# Patient Record
Sex: MALE | Race: WHITE | NOT HISPANIC OR LATINO | Employment: OTHER | URBAN - NONMETROPOLITAN AREA
[De-identification: names, ages, dates, MRNs, and addresses within clinical notes are randomized per-mention and may not be internally consistent; named-entity substitution may affect disease eponyms.]

---

## 2018-06-12 ENCOUNTER — APPOINTMENT (OUTPATIENT)
Dept: CT IMAGING | Facility: HOSPITAL | Age: 48
End: 2018-06-12

## 2018-06-12 ENCOUNTER — APPOINTMENT (OUTPATIENT)
Dept: GENERAL RADIOLOGY | Facility: HOSPITAL | Age: 48
End: 2018-06-12

## 2018-06-12 ENCOUNTER — HOSPITAL ENCOUNTER (EMERGENCY)
Facility: HOSPITAL | Age: 48
Discharge: HOME | End: 2018-06-12
Attending: FAMILY MEDICINE | Admitting: FAMILY MEDICINE

## 2018-06-12 VITALS
BODY MASS INDEX: 38.5 KG/M2 | DIASTOLIC BLOOD PRESSURE: 79 MMHG | SYSTOLIC BLOOD PRESSURE: 114 MMHG | RESPIRATION RATE: 18 BRPM | WEIGHT: 300 LBS | TEMPERATURE: 98.4 F | OXYGEN SATURATION: 100 % | HEART RATE: 109 BPM | HEIGHT: 74 IN

## 2018-06-12 DIAGNOSIS — E11.65 TYPE 2 DIABETES MELLITUS WITH HYPERGLYCEMIA, WITH LONG-TERM CURRENT USE OF INSULIN (HCC): ICD-10-CM

## 2018-06-12 DIAGNOSIS — R42 EPISODE OF DIZZINESS: Primary | ICD-10-CM

## 2018-06-12 DIAGNOSIS — Z79.4 TYPE 2 DIABETES MELLITUS WITH HYPERGLYCEMIA, WITH LONG-TERM CURRENT USE OF INSULIN (HCC): ICD-10-CM

## 2018-06-12 LAB
A-A DO2: 18.7 MMHG (ref 0–300)
ALBUMIN SERPL-MCNC: 4 G/DL (ref 3.5–5)
ALBUMIN/GLOB SERPL: 1.5 G/DL (ref 1.5–2.5)
ALP SERPL-CCNC: 97 U/L (ref 40–129)
ALT SERPL W P-5'-P-CCNC: 31 U/L (ref 10–44)
ANION GAP SERPL CALCULATED.3IONS-SCNC: 9.8 MMOL/L (ref 3.6–11.2)
APTT PPP: 24.5 SECONDS (ref 23.8–36.1)
ARTERIAL PATENCY WRIST A: ABNORMAL
AST SERPL-CCNC: 26 U/L (ref 10–34)
ATMOSPHERIC PRESS: 728 MMHG
BASE EXCESS BLDA CALC-SCNC: 0.5 MMOL/L
BASOPHILS # BLD AUTO: 0.1 10*3/MM3 (ref 0–0.3)
BASOPHILS NFR BLD AUTO: 1.2 % (ref 0–2)
BDY SITE: ABNORMAL
BILIRUB SERPL-MCNC: 0.6 MG/DL (ref 0.2–1.8)
BILIRUB UR QL STRIP: NEGATIVE
BNP SERPL-MCNC: 15 PG/ML (ref 0–100)
BODY TEMPERATURE: 98.6 C
BUN BLD-MCNC: 10 MG/DL (ref 7–21)
BUN/CREAT SERPL: 12 (ref 7–25)
CALCIUM SPEC-SCNC: 8.6 MG/DL (ref 7.7–10)
CHLORIDE SERPL-SCNC: 101 MMOL/L (ref 99–112)
CK MB SERPL-CCNC: 0.56 NG/ML (ref 0–5)
CK MB SERPL-RTO: 1.2 % (ref 0–3)
CK SERPL-CCNC: 46 U/L (ref 24–204)
CLARITY UR: CLEAR
CO2 SERPL-SCNC: 23.2 MMOL/L (ref 24.3–31.9)
COHGB MFR BLD: 1.5 % (ref 0–5)
COLOR UR: YELLOW
CREAT BLD-MCNC: 0.83 MG/DL (ref 0.43–1.29)
CRP SERPL-MCNC: <0.5 MG/DL (ref 0–0.99)
D DIMER PPP FEU-MCNC: 0.41 MCGFEU/ML (ref 0–0.5)
D-LACTATE SERPL-SCNC: 1.4 MMOL/L (ref 0.5–2)
D-LACTATE SERPL-SCNC: 3 MMOL/L (ref 0.5–2)
DEPRECATED RDW RBC AUTO: 39.8 FL (ref 37–54)
EOSINOPHIL # BLD AUTO: 0.31 10*3/MM3 (ref 0–0.7)
EOSINOPHIL NFR BLD AUTO: 3.6 % (ref 0–5)
ERYTHROCYTE [DISTWIDTH] IN BLOOD BY AUTOMATED COUNT: 12.4 % (ref 11.5–14.5)
GFR SERPL CREATININE-BSD FRML MDRD: 99 ML/MIN/1.73
GLOBULIN UR ELPH-MCNC: 2.7 GM/DL
GLUCOSE BLD-MCNC: 407 MG/DL (ref 70–110)
GLUCOSE BLDC GLUCOMTR-MCNC: 459 MG/DL (ref 70–130)
GLUCOSE UR STRIP-MCNC: ABNORMAL MG/DL
HCO3 BLDA-SCNC: 24.6 MMOL/L (ref 22–26)
HCT VFR BLD AUTO: 46.5 % (ref 42–52)
HCT VFR BLD CALC: 50 % (ref 42–52)
HGB BLD-MCNC: 17 G/DL (ref 14–18)
HGB BLDA-MCNC: 17 G/DL (ref 12–16)
HGB UR QL STRIP.AUTO: NEGATIVE
HOLD SPECIMEN: NORMAL
HOROWITZ INDEX BLD+IHG-RTO: 21 %
IMM GRANULOCYTES # BLD: 0.05 10*3/MM3 (ref 0–0.03)
IMM GRANULOCYTES NFR BLD: 0.6 % (ref 0–0.5)
INR PPP: 0.94 (ref 0.9–1.1)
KETONES UR QL STRIP: NEGATIVE
LEUKOCYTE ESTERASE UR QL STRIP.AUTO: NEGATIVE
LIPASE SERPL-CCNC: 60 U/L (ref 13–60)
LYMPHOCYTES # BLD AUTO: 2.98 10*3/MM3 (ref 1–3)
LYMPHOCYTES NFR BLD AUTO: 34.7 % (ref 21–51)
MCH RBC QN AUTO: 32.9 PG (ref 27–33)
MCHC RBC AUTO-ENTMCNC: 36.6 G/DL (ref 33–37)
MCV RBC AUTO: 90.1 FL (ref 80–94)
METHGB BLD QL: 0.5 % (ref 0–3)
MODALITY: ABNORMAL
MONOCYTES # BLD AUTO: 0.81 10*3/MM3 (ref 0.1–0.9)
MONOCYTES NFR BLD AUTO: 9.4 % (ref 0–10)
NEUTROPHILS # BLD AUTO: 4.34 10*3/MM3 (ref 1.4–6.5)
NEUTROPHILS NFR BLD AUTO: 50.5 % (ref 30–70)
NITRITE UR QL STRIP: NEGATIVE
OSMOLALITY SERPL CALC.SUM OF ELEC: 284.4 MOSM/KG (ref 273–305)
OXYHGB MFR BLDV: 94.4 % (ref 85–100)
PCO2 BLDA: 38.3 MM HG (ref 35–45)
PH BLDA: 7.43 PH UNITS (ref 7.35–7.45)
PH UR STRIP.AUTO: 6 [PH] (ref 5–8)
PLATELET # BLD AUTO: 241 10*3/MM3 (ref 130–400)
PMV BLD AUTO: 11.1 FL (ref 6–10)
PO2 BLDA: 78.4 MM HG (ref 80–100)
POTASSIUM BLD-SCNC: 3.7 MMOL/L (ref 3.5–5.3)
PROT SERPL-MCNC: 6.7 G/DL (ref 6–8)
PROT UR QL STRIP: NEGATIVE
PROTHROMBIN TIME: 12.8 SECONDS (ref 11–15.4)
RBC # BLD AUTO: 5.16 10*6/MM3 (ref 4.7–6.1)
SAO2 % BLDCOA: 96.3 % (ref 90–100)
SODIUM BLD-SCNC: 134 MMOL/L (ref 135–153)
SP GR UR STRIP: >1.03 (ref 1–1.03)
TROPONIN I SERPL-MCNC: <0.006 NG/ML
TROPONIN I SERPL-MCNC: <0.006 NG/ML
UROBILINOGEN UR QL STRIP: ABNORMAL
WBC NRBC COR # BLD: 8.59 10*3/MM3 (ref 4.5–12.5)
WHOLE BLOOD HOLD SPECIMEN: NORMAL
WHOLE BLOOD HOLD SPECIMEN: NORMAL

## 2018-06-12 PROCEDURE — 36600 WITHDRAWAL OF ARTERIAL BLOOD: CPT | Performed by: FAMILY MEDICINE

## 2018-06-12 PROCEDURE — 82805 BLOOD GASES W/O2 SATURATION: CPT | Performed by: FAMILY MEDICINE

## 2018-06-12 PROCEDURE — 93010 ELECTROCARDIOGRAM REPORT: CPT | Performed by: INTERNAL MEDICINE

## 2018-06-12 PROCEDURE — 71045 X-RAY EXAM CHEST 1 VIEW: CPT | Performed by: RADIOLOGY

## 2018-06-12 PROCEDURE — 81003 URINALYSIS AUTO W/O SCOPE: CPT | Performed by: FAMILY MEDICINE

## 2018-06-12 PROCEDURE — 99284 EMERGENCY DEPT VISIT MOD MDM: CPT

## 2018-06-12 PROCEDURE — 70450 CT HEAD/BRAIN W/O DYE: CPT

## 2018-06-12 PROCEDURE — 82962 GLUCOSE BLOOD TEST: CPT

## 2018-06-12 PROCEDURE — 71045 X-RAY EXAM CHEST 1 VIEW: CPT

## 2018-06-12 PROCEDURE — 25010000002 ONDANSETRON PER 1 MG: Performed by: FAMILY MEDICINE

## 2018-06-12 PROCEDURE — 93005 ELECTROCARDIOGRAM TRACING: CPT | Performed by: FAMILY MEDICINE

## 2018-06-12 PROCEDURE — 83880 ASSAY OF NATRIURETIC PEPTIDE: CPT | Performed by: FAMILY MEDICINE

## 2018-06-12 PROCEDURE — 83605 ASSAY OF LACTIC ACID: CPT | Performed by: FAMILY MEDICINE

## 2018-06-12 PROCEDURE — 87040 BLOOD CULTURE FOR BACTERIA: CPT | Performed by: FAMILY MEDICINE

## 2018-06-12 PROCEDURE — 82375 ASSAY CARBOXYHB QUANT: CPT | Performed by: FAMILY MEDICINE

## 2018-06-12 PROCEDURE — 84484 ASSAY OF TROPONIN QUANT: CPT | Performed by: FAMILY MEDICINE

## 2018-06-12 PROCEDURE — 70450 CT HEAD/BRAIN W/O DYE: CPT | Performed by: RADIOLOGY

## 2018-06-12 PROCEDURE — 83050 HGB METHEMOGLOBIN QUAN: CPT | Performed by: FAMILY MEDICINE

## 2018-06-12 PROCEDURE — 96361 HYDRATE IV INFUSION ADD-ON: CPT

## 2018-06-12 PROCEDURE — 85730 THROMBOPLASTIN TIME PARTIAL: CPT | Performed by: FAMILY MEDICINE

## 2018-06-12 PROCEDURE — 85025 COMPLETE CBC W/AUTO DIFF WBC: CPT | Performed by: EMERGENCY MEDICINE

## 2018-06-12 PROCEDURE — 85610 PROTHROMBIN TIME: CPT | Performed by: FAMILY MEDICINE

## 2018-06-12 PROCEDURE — 86140 C-REACTIVE PROTEIN: CPT | Performed by: FAMILY MEDICINE

## 2018-06-12 PROCEDURE — 82550 ASSAY OF CK (CPK): CPT | Performed by: FAMILY MEDICINE

## 2018-06-12 PROCEDURE — 36415 COLL VENOUS BLD VENIPUNCTURE: CPT

## 2018-06-12 PROCEDURE — 80053 COMPREHEN METABOLIC PANEL: CPT | Performed by: FAMILY MEDICINE

## 2018-06-12 PROCEDURE — 83690 ASSAY OF LIPASE: CPT | Performed by: FAMILY MEDICINE

## 2018-06-12 PROCEDURE — 82553 CREATINE MB FRACTION: CPT | Performed by: FAMILY MEDICINE

## 2018-06-12 PROCEDURE — 96374 THER/PROPH/DIAG INJ IV PUSH: CPT

## 2018-06-12 PROCEDURE — 85379 FIBRIN DEGRADATION QUANT: CPT | Performed by: FAMILY MEDICINE

## 2018-06-12 RX ORDER — QUINAPRIL 20 MG/1
20 TABLET ORAL NIGHTLY
COMMUNITY
End: 2021-03-09 | Stop reason: SDUPTHER

## 2018-06-12 RX ORDER — MELOXICAM 7.5 MG/1
7.5 TABLET ORAL DAILY
COMMUNITY
End: 2021-03-09 | Stop reason: SDUPTHER

## 2018-06-12 RX ORDER — METOPROLOL TARTRATE 50 MG/1
50 TABLET, FILM COATED ORAL 2 TIMES DAILY
COMMUNITY
End: 2021-03-09 | Stop reason: SDUPTHER

## 2018-06-12 RX ORDER — ASPIRIN 325 MG
325 TABLET ORAL DAILY
COMMUNITY
End: 2020-09-02

## 2018-06-12 RX ORDER — SODIUM CHLORIDE 0.9 % (FLUSH) 0.9 %
10 SYRINGE (ML) INJECTION AS NEEDED
Status: DISCONTINUED | OUTPATIENT
Start: 2018-06-12 | End: 2018-06-12 | Stop reason: HOSPADM

## 2018-06-12 RX ORDER — INSULIN GLARGINE 100 [IU]/ML
30 INJECTION, SOLUTION SUBCUTANEOUS 2 TIMES DAILY
COMMUNITY
End: 2020-10-30 | Stop reason: ALTCHOICE

## 2018-06-12 RX ORDER — ONDANSETRON 2 MG/ML
4 INJECTION INTRAMUSCULAR; INTRAVENOUS ONCE
Status: COMPLETED | OUTPATIENT
Start: 2018-06-12 | End: 2018-06-12

## 2018-06-12 RX ORDER — SILDENAFIL CITRATE 20 MG/1
20 TABLET ORAL 3 TIMES DAILY
COMMUNITY
End: 2020-10-30 | Stop reason: DRUGHIGH

## 2018-06-12 RX ORDER — SODIUM CHLORIDE 9 MG/ML
INJECTION, SOLUTION INTRAVENOUS
Status: COMPLETED
Start: 2018-06-12 | End: 2018-06-12

## 2018-06-12 RX ADMIN — ONDANSETRON 4 MG: 2 INJECTION, SOLUTION INTRAMUSCULAR; INTRAVENOUS at 08:45

## 2018-06-12 RX ADMIN — SODIUM CHLORIDE 2466 ML: 9 INJECTION, SOLUTION INTRAVENOUS at 08:49

## 2018-06-12 NOTE — ED PROVIDER NOTES
Subjective     History provided by:  Patient  Dizziness   Quality:  Lightheadedness  Severity:  Moderate  Onset quality:  Sudden  Timing:  Constant  Progression:  Resolved  Chronicity:  New  Context: standing up    Relieved by:  Nothing  Worsened by:  Standing up  Ineffective treatments:  None tried  Associated symptoms: headaches and nausea    Associated symptoms: no blood in stool, no chest pain, no tinnitus, no vision changes, no vomiting and no weakness        Review of Systems   Constitutional: Negative.  Negative for fever.   HENT: Negative for tinnitus.    Respiratory: Negative.    Cardiovascular: Negative.  Negative for chest pain.   Gastrointestinal: Positive for nausea. Negative for abdominal pain, blood in stool and vomiting.   Endocrine: Negative.    Genitourinary: Negative.  Negative for dysuria.   Skin: Negative.    Neurological: Positive for dizziness and headaches. Negative for weakness.   Psychiatric/Behavioral: Negative.    All other systems reviewed and are negative.      Past Medical History:   Diagnosis Date   • Blood clot in vein    • Diabetes mellitus    • Hypertension        No Known Allergies    Past Surgical History:   Procedure Laterality Date   • IVC FILTER RETRIEVAL         History reviewed. No pertinent family history.    Social History     Social History   • Marital status:      Social History Main Topics   • Smoking status: Never Smoker   • Alcohol use Yes      Comment: ocassional use - reports using 2x yearly   • Drug use: No   • Sexual activity: Defer     Other Topics Concern   • Not on file           Objective   Physical Exam   Constitutional: He is oriented to person, place, and time. He appears well-developed and well-nourished.   HENT:   Head: Normocephalic and atraumatic.   Right Ear: External ear normal.   Left Ear: External ear normal.   Nose: Nose normal.   Mouth/Throat: Oropharynx is clear and moist.   Eyes: EOM are normal. Pupils are equal, round, and reactive to  light.   Neck: Neck supple.   Cardiovascular: Normal rate and regular rhythm.    Pulmonary/Chest: Effort normal and breath sounds normal.   Abdominal: Soft. Bowel sounds are normal.   Musculoskeletal: Normal range of motion.   Neurological: He is alert and oriented to person, place, and time.   Skin: Skin is warm. Capillary refill takes less than 2 seconds.   Psychiatric: He has a normal mood and affect. His behavior is normal. Judgment and thought content normal.   Nursing note and vitals reviewed.      Procedures           ED Course  ED Course as of Jun 12 1729   Tue Jun 12, 2018   1225 Gave pt RX for glucometer with test strips and lancets since his broke  []      ED Course User Index  [MH] Lizabeth Villagomez, DO                  MDM  Number of Diagnoses or Management Options  Episode of dizziness: new and does not require workup  Type 2 diabetes mellitus with hyperglycemia, with long-term current use of insulin: new and does not require workup     Amount and/or Complexity of Data Reviewed  Clinical lab tests: ordered and reviewed  Tests in the radiology section of CPT®: ordered and reviewed  Tests in the medicine section of CPT®: reviewed and ordered  Independent visualization of images, tracings, or specimens: yes    Risk of Complications, Morbidity, and/or Mortality  Presenting problems: moderate  Diagnostic procedures: moderate  Management options: moderate    Patient Progress  Patient progress: stable        Final diagnoses:   Episode of dizziness   Type 2 diabetes mellitus with hyperglycemia, with long-term current use of insulin            Lizabeth Villagomez DO  06/12/18 4108

## 2018-06-12 NOTE — ED NOTES
Sepsis bolus complete. VS:    T 98.0  HR 87  RR 18  /76  O2 99% on room air.     Dr. Villagomez aware. Awaiting further orders, will continue to monitor and follow plan of care.     Kym Hurt RN  06/12/18 7804

## 2018-06-17 LAB
BACTERIA SPEC AEROBE CULT: NORMAL
BACTERIA SPEC AEROBE CULT: NORMAL

## 2020-06-18 ENCOUNTER — TELEPHONE (OUTPATIENT)
Dept: FAMILY MEDICINE CLINIC | Facility: CLINIC | Age: 50
End: 2020-06-18

## 2020-06-24 ENCOUNTER — APPOINTMENT (OUTPATIENT)
Dept: PHYSICAL THERAPY | Facility: HOSPITAL | Age: 50
End: 2020-06-24

## 2020-06-26 ENCOUNTER — HOSPITAL ENCOUNTER (OUTPATIENT)
Dept: PHYSICAL THERAPY | Facility: HOSPITAL | Age: 50
Setting detail: THERAPIES SERIES
Discharge: HOME | End: 2020-06-26

## 2020-06-26 DIAGNOSIS — L97.519 DIABETIC ULCER OF RIGHT GREAT TOE (HCC): Primary | ICD-10-CM

## 2020-06-26 DIAGNOSIS — E11.621 DIABETIC ULCER OF RIGHT GREAT TOE (HCC): Primary | ICD-10-CM

## 2020-06-26 PROCEDURE — 97597 DBRDMT OPN WND 1ST 20 CM/<: CPT

## 2020-06-26 PROCEDURE — 97161 PT EVAL LOW COMPLEX 20 MIN: CPT

## 2020-06-26 NOTE — THERAPY EVALUATION
Outpatient Rehabilitation - Wound/Debridement Initial Eval   [facility]     Patient Name: Juarez Hunt  : 1970  MRN: [id number]  Today's Date: 2020                  Admit Date: 2020    Visit Dx:    ICD-10-CM ICD-9-CM   1. Diabetic ulcer of right great toe (CMS/HCC) E11.621 250.80    L97.519 707.15       There is no problem list on file for this patient.       Past Medical History:   Diagnosis Date   • Blood clot in vein    • Diabetes mellitus (CMS/HCC)    • Hypertension         Past Surgical History:   Procedure Laterality Date   • IVC FILTER RETRIEVAL         Patient History     Row Name 20 0840             History    Chief Complaint  Ulcer, wound or other skin conditions  -      Brief Description of Current Complaint  Pt reports the area opened in 2020 with no specific trauma or known etiology. Pt has been treating with soap and triple abx ointment. Followed up with urgent care secondary to delayed healing. Pt was given oral abx, a Darco shoe, and was referred here for follow up.  -      Previous treatment for THIS PROBLEM  Medication  -      Patient/Caregiver Goals  Heal wound  -      Patient's Rating of General Health  Fair  -      Patient seeing anyone else for problem(s)?  No  -      How has patient tried to help current problem?  triple abx ointment, oral abx (almost finished with current course)  -      Related/Recent Hospitalizations  No  -         Pain     Pain Location  Toe (Comment which one)  -      Pain at Present  0  -      Pain at Best  0  -      Pain at Worst  0  -         Services    Are you currently receiving Home Health services  No  -      Do you plan to receive Home Health services in the near future  No  -         Daily Activities    Primary Language  English  -      How does patient learn best?  Listening;Demonstration  -      Teaching needs identified  Management of Condition  -      Patient is concerned about/has  problems with  Other (comment) wound mgmt  -      Does patient have problems with the following?  None  -      Barriers to learning  None  -      Recommended Referrals  Physician endocrinologist for diabetes education and management  -      Pt Participated in POC and Goals  Yes  -         Safety    Are you being hurt, hit, or frightened by anyone at home or in your life?  No  -MC      Are you being neglected by a caregiver  No  -MC        User Key  (r) = Recorded By, (t) = Taken By, (c) = Cosigned By    Initials Name Provider Type    Shanita Lance PT Physical Therapist          EVALUATION  PT Ortho     Row Name 06/26/20 0840       Precautions and Contraindications    Precautions  DPN  -       Subjective Pain    Able to rate subjective pain?  yes  -MC    Pre-Treatment Pain Level  0  -MC    Post-Treatment Pain Level  0  -MC       Transfers    Sit-Stand [place] (Transfers)  independent  -MC    Stand-Sit [place] (Transfers)  independent  -    Comment (Transfers)  long sitting on stretcher for tx  -       Gait/Stairs Assessment/Training    [place] Level (Gait)  independent  -      User Key  (r) = Recorded By, (t) = Taken By, (c) = Cosigned By    Initials Name Provider Type    Shanita Lance PT Physical Therapist        LDA Wound     Row Name 06/26/20 0840             Wound 06/26/20 0840 Right toe Diabetic Ulcer    Wound - Properties Group Date first assessed: 06/26/20  - Time first assessed: 0840  - Present on Hospital Admission: Y  - Side: Right  - Location: toe  - Primary Wound Type: Diabetic ulc  -    Wound Image  Images linked: 1  -      Dressing Appearance  open to air  -      Base  moist;red;pink  -      Periwound  intact;dry;pink  -      Periwound Temperature  warm  -      Periwound Skin Turgor  firm  -      Edges  callused;open  -      Wound Length (cm)  1 cm  -      Wound Width (cm)  0.8 cm  -      Wound Depth (cm)  0.1 cm  -       Drainage Characteristics/Odor  serosanguineous  -      Drainage Amount  scant  -      Care, Wound  cleansed with;wound cleanser;debrided  -      Dressing Care, Wound  dressing applied;collagen;silver impregnated;low-adherent;foam rey, optifoam Ag, primafix tape  -      Periwound Care, Wound  cleansed with pH balanced cleanser;dry periwound area maintained issued XL offloading shoe  -        User Key  (r) = Recorded By, (t) = Taken By, (c) = Cosigned By    Initials Name Provider Type    Shanita Lance, PT Physical Therapist            WOUND DEBRIDEMENT  Total area of Debridement: 2 cm2  Debridement Site 1  Location- Site 1: R great toe  Selective Debridement- Site 1: Wound Surface <20cmsq  Instruments- Site 1: #15, scapel, tweezers  Excised Tissue Description- Site 1: scant, slough, moderate, other (comment)(mod periwound callus)  Bleeding- Site 1: none             Therapy Education     Row Name 06/26/20 0879             Therapy Education    Education Details  Extensive education re: normal blood glucose levels, management through diet/exercise, and importance of establishing care with an endocrinologist for long-term education and management. Explained that blood glucose control will be vital for wound healing. Explained PT role in wound care and reviewed s/sx of infection. Change dressing daily or every other day as follows: clean with skintegrity/gauze, cover with rey, optifoam Ag, and primafix tape. Offload when able, and wear offloading shoe when unable to stay off the foot.   -      Given  Symptoms/condition management;Bandaging/dressing change  -      Program  New  -      How Provided  Verbal;Demonstration  -MC      Provided to  Patient;Other (comment) spouse  -      Level of Understanding  Teach back education performed;Verbalized  -        User Key  (r) = Recorded By, (t) = Taken By, (c) = Cosigned By    Initials Name Provider Type    Shanita Lance, PT Physical  Therapist          Recommendation and Plan  PT Assessment/Plan     Row Name 06/26/20 0840          PT Assessment    Functional Limitations  Other (comment) wound mgmt  -     Impairments  Integumentary integrity  -     Assessment Comments  Pt presents with diabetic ulcer to the R great toe. The wound bed is clean, moist, and pink/red. PT able to debride moderate callus from the periwound area as well. Pt with uncontrolled diabetes mellitus (reports BG levels averaging in the 300s), which will complicate his healing process. Pt will benefit from skilled PT wound care to promote healing.  -     Rehab Potential  Fair  -     Patient/caregiver participated in establishment of treatment plan and goals  Yes  -     Patient would benefit from skilled therapy intervention  Yes  -MC        PT Plan    PT Frequency  1x/week  -     Predicted Duration of Therapy Intervention (Therapy Eval)  16 visits  -     Planned CPT's?  PT EVAL LOW COMPLEXITY: 36740;PT SELF CARE/MGMT/TRAIN 15 MIN: 45088;PT NONSELECT DEBRIDE 15 MIN: 28880;PT EAMON DEBRIDE OPEN WOUND UP TO 20 CM: 36311;PT NLFU MIST: 88899  -     Physical Therapy Interventions (Optional Details)  patient/family education;wound care  -     PT Plan Comments  debridement, dressings. MIST not covered (pt may consider OOP payment depending on price and healing progress)  -       User Key  (r) = Recorded By, (t) = Taken By, (c) = Cosigned By    Initials Name Provider Type    Shanita Lance, PT Physical Therapist            Goals  PT OP Goals     Row Name 06/26/20 0840          PT Short Term Goals    STG 1  Pt will verbalize s/sx of infection.  -     STG 2  Pt will demonstrate 25% reduction in wound area to indicate healing progress.  -        Long Term Goals    LTG 1  Pt will demonstrate independence with clean home dressing changes.  -     LTG 2  Pt will demonstrate 75% reduction in wound area to indicate healing progress.  -        Time Calculation     PT Goal Re-Cert Due Date  09/24/20  -       User Key  (r) = Recorded By, (t) = Taken By, (c) = Cosigned By    Initials Name Provider Type    Shanita Lance, PT Physical Therapist          Time Calculation: Start Time: 0840  Therapy Charges for Today     Code Description Service Date Service Provider Modifiers Qty    30175241959  PT EVAL LOW COMPLEXITY 4 6/26/2020 Shanita Massey, PT GP 1    [id number]  EAMON DEBRIDE OPEN WOUND UP TO 20CM 6/26/2020 Shanita Massey, PT GP 1                Shanita Massey, PT  6/26/2020

## 2020-07-01 ENCOUNTER — APPOINTMENT (OUTPATIENT)
Dept: PHYSICAL THERAPY | Facility: HOSPITAL | Age: 50
End: 2020-07-01

## 2020-07-03 ENCOUNTER — HOSPITAL ENCOUNTER (OUTPATIENT)
Dept: PHYSICAL THERAPY | Facility: HOSPITAL | Age: 50
Setting detail: THERAPIES SERIES
Discharge: HOME | End: 2020-07-03

## 2020-07-03 DIAGNOSIS — E11.621 DIABETIC ULCER OF RIGHT GREAT TOE (HCC): Primary | ICD-10-CM

## 2020-07-03 DIAGNOSIS — L97.519 DIABETIC ULCER OF RIGHT GREAT TOE (HCC): Primary | ICD-10-CM

## 2020-07-03 PROCEDURE — 97597 DBRDMT OPN WND 1ST 20 CM/<: CPT

## 2020-07-03 NOTE — THERAPY WOUND CARE TREATMENT
Outpatient Rehabilitation - Wound/Debridement Treatment Note  [facility]     Patient Name: Juarez Hunt  : 1970  MRN: [id number]  Today's Date: 7/3/2020                 Admit Date: 7/3/2020    Visit Dx:    ICD-10-CM ICD-9-CM   1. Diabetic ulcer of right great toe (CMS/HCC) E11.621 250.80    L97.519 707.15       There is no problem list on file for this patient.       Past Medical History:   Diagnosis Date   • Blood clot in vein    • Diabetes mellitus (CMS/HCC)    • Hypertension         Past Surgical History:   Procedure Laterality Date   • IVC FILTER RETRIEVAL           EVALUATION  PT Ortho     Row Name 20 1445       Subjective Comments    Subjective Comments  Pt/spouse think the area is improving. No issues with dressing changes.  -       Precautions and Contraindications    Precautions  DPN  -       Subjective Pain    Able to rate subjective pain?  yes  -MC    Pre-Treatment Pain Level  0  -MC    Post-Treatment Pain Level  0  -MC       Transfers    Sit-Stand [place] (Transfers)  independent  -MC    Stand-Sit [place] (Transfers)  independent  -MC    Comment (Transfers)  long sitting on stretcher for tx  -       Gait/Stairs Assessment/Training    [place] Level (Gait)  independent  -      User Key  (r) = Recorded By, (t) = Taken By, (c) = Cosigned By    Initials Name Provider Type    Shanita Lance PT Physical Therapist          [facility] Wound     Row Name 20 1445             Wound 20 0840 Right toe Diabetic Ulcer    Wound - Properties Group Date first assessed: 20  - Time first assessed: 840  - Present on Hospital Admission: Y  - Side: Right  - Location: toe  - Primary Wound Type: Diabetic ulc  -    Wound Image  Images linked: 1  -MC      Dressing Appearance  moist drainage;intact  -      Base  moist;red;pink;epithelialization  -      Periwound  intact;dry;pink  -      Periwound Temperature  warm  -      Periwound Skin Turgor  firm   -      Edges  callused;open  -      Wound Length (cm)  0.8 cm  -      Wound Width (cm)  0.7 cm  -      Wound Depth (cm)  0.1 cm  -      Drainage Characteristics/Odor  serosanguineous  -      Drainage Amount  scant  -MC      Care, Wound  cleansed with;wound cleanser;debrided  -      Dressing Care, Wound  dressing applied;silver impregnated;collagen;low-adherent;foam rey, optifoam Ag, primafix tape  -      Periwound Care, Wound  cleansed with pH balanced cleanser;dry periwound area maintained  -        User Key  (r) = Recorded By, (t) = Taken By, (c) = Cosigned By    Initials Name Provider Type    Shanita Lance, PT Physical Therapist            WOUND DEBRIDEMENT  Total area of Debridement: 1 cm2  Debridement Site 1  Location- Site 1: R great toe  Selective Debridement- Site 1: Wound Surface <20cmsq  Instruments- Site 1: #15, scapel, tweezers  Excised Tissue Description- Site 1: scant, slough, moderate, other (comment)(periwound callus)  Bleeding- Site 1: scant, held pressure, 1 minute             Therapy Education     Row Name 07/03/20 1449             Therapy Education    Education Details  Continue with current POC  -      Given  Symptoms/condition management;Bandaging/dressing change  -      Program  Reinforced  -      How Provided  Verbal;Demonstration  -MC      Provided to  Patient;Other (comment) spouse  -      Level of Understanding  Teach back education performed;Verbalized  -        User Key  (r) = Recorded By, (t) = Taken By, (c) = Cosigned By    Initials Name Provider Type    Shanita Lance, PT Physical Therapist          Recommendation and Plan  PT Assessment/Plan     Row Name 07/03/20 1445          PT Assessment    Functional Limitations  Other (comment) wound mgmt  -     Impairments  Integumentary integrity  -MC     Assessment Comments  Pt with improved wound dimensions and new epithelialization around the wound edges. The central aspect of the wound  remains clean, pink, and red. Pt will continue to benefit from the current POC to continue progress.  -     Rehab Potential  Fair  -     Patient/caregiver participated in establishment of treatment plan and goals  Yes  -     Patient would benefit from skilled therapy intervention  Yes  -        PT Plan    PT Frequency  1x/week  -     Physical Therapy Interventions (Optional Details)  patient/family education;wound care  -     PT Plan Comments  debridement, dressings (MIST not covered)  -       User Key  (r) = Recorded By, (t) = Taken By, (c) = Cosigned By    Initials Name Provider Type    Shanita Lance, PT Physical Therapist          Goals  PT OP Goals     Row Name 07/03/20 1445          Time Calculation    PT Goal Re-Cert Due Date  09/24/20  -       User Key  (r) = Recorded By, (t) = Taken By, (c) = Cosigned By    Initials Name Provider Type    Shanita Lance, PT Physical Therapist          PT Goal Re-Cert Due Date: 09/24/20            Time Calculation: Start Time: 1445  Therapy Charges for Today     Code Description Service Date Service Provider Modifiers Qty    47546023328 HC EAMON DEBRIDE OPEN WOUND UP TO 20CM 7/3/2020 Shanita Massey, PT GP 1                  Shanita Massey, PT  7/3/2020

## 2020-07-08 ENCOUNTER — HOSPITAL ENCOUNTER (OUTPATIENT)
Dept: PHYSICAL THERAPY | Facility: HOSPITAL | Age: 50
Setting detail: THERAPIES SERIES
Discharge: HOME | End: 2020-07-08

## 2020-07-08 DIAGNOSIS — L97.519 DIABETIC ULCER OF RIGHT GREAT TOE (HCC): Primary | ICD-10-CM

## 2020-07-08 DIAGNOSIS — E11.621 DIABETIC ULCER OF RIGHT GREAT TOE (HCC): Primary | ICD-10-CM

## 2020-07-08 PROCEDURE — 97597 DBRDMT OPN WND 1ST 20 CM/<: CPT

## 2020-07-08 NOTE — THERAPY WOUND CARE TREATMENT
Outpatient Rehabilitation - Wound/Debridement Treatment Note  [facility]     Patient Name: Juarez Hunt  : 1970  MRN: [id number]  Today's Date: 2020                 Admit Date: 2020    Visit Dx:    ICD-10-CM ICD-9-CM   1. Diabetic ulcer of right great toe (CMS/HCC) E11.621 250.80    L97.519 707.15       There is no problem list on file for this patient.       Past Medical History:   Diagnosis Date   • Blood clot in vein    • Diabetes mellitus (CMS/HCC)    • Hypertension         Past Surgical History:   Procedure Laterality Date   • IVC FILTER RETRIEVAL           EVALUATION  PT Ortho     Row Name 20 1500       Subjective Comments    Subjective Comments  No complaints or changes since last assessment  -       Precautions and Contraindications    Precautions  DPN  -       Subjective Pain    Able to rate subjective pain?  yes  -MC    Pre-Treatment Pain Level  0  -MC    Post-Treatment Pain Level  0  -MC       Transfers    Sit-Stand [place] (Transfers)  independent  -MC    Stand-Sit [place] (Transfers)  independent  -MC    Comment (Transfers)  long sitting on stretcher for tx  -       Gait/Stairs Assessment/Training    [place] Level (Gait)  independent  -      User Key  (r) = Recorded By, (t) = Taken By, (c) = Cosigned By    Initials Name Provider Type    Shanita Lance PT Physical Therapist          LDA Wound     Row Name 20 1500             Wound 20 0840 Right toe Diabetic Ulcer    Wound - Properties Group Date first assessed: 20  - Time first assessed: 840  - Present on Hospital Admission: Y  - Side: Right  - Location: toe  - Primary Wound Type: Diabetic ulc  -    Wound Image  Images linked: 1  -      Dressing Appearance  moist drainage;intact  -      Base  moist;red;pink;epithelialization  -      Periwound  intact;dry;pink  -      Periwound Temperature  warm  -      Periwound Skin Turgor  firm  -      Edges   callused;open  -      Wound Length (cm)  0.8 cm  -      Wound Width (cm)  0.8 cm  -      Wound Depth (cm)  0.1 cm  -      Drainage Characteristics/Odor  serosanguineous  -      Drainage Amount  scant  -MC      Care, Wound  cleansed with;wound cleanser;debrided  -      Dressing Care, Wound  dressing applied;silver impregnated;collagen;low-adherent;foam rey, optifoam Ag, primafix tape  -      Periwound Care, Wound  cleansed with pH balanced cleanser;dry periwound area maintained  -        User Key  (r) = Recorded By, (t) = Taken By, (c) = Cosigned By    Initials Name Provider Type    Shanita Lance, PT Physical Therapist            WOUND DEBRIDEMENT  Total area of Debridement: 1 cm2  Debridement Site 1  Location- Site 1: R great toe  Selective Debridement- Site 1: Wound Surface <20cmsq  Instruments- Site 1: #15, scapel  Excised Tissue Description- Site 1: moderate, other (comment)(periwound callus)  Bleeding- Site 1: none             Therapy Education     Row Name 07/08/20 1500             Therapy Education    Education Details  Continue with current POC  -      Given  Symptoms/condition management;Bandaging/dressing change  -      Program  Reinforced  -      How Provided  Verbal;Demonstration  -MC      Provided to  Patient;Other (comment) spouse  -      Level of Understanding  Teach back education performed;Verbalized  -        User Key  (r) = Recorded By, (t) = Taken By, (c) = Cosigned By    Initials Name Provider Type    Sahnita Lance PT Physical Therapist          Recommendation and Plan  PT Assessment/Plan     Row Name 07/08/20 1500          PT Assessment    Functional Limitations  Other (comment) wound mgmt  -     Impairments  Integumentary integrity  -     Assessment Comments  Pt with stable wound dimensions since last assessment. Pt with no slough, but still with periwound callus development that requires debridement. Progress is likely to be slow due to chronic  diabetes mellitus with limited control, but the pt is attempting to make dietary changes to improve his blood glucose. Pt will continue to benefit from skilled PT wound care to promote healing.  -     Rehab Potential  Fair  -     Patient/caregiver participated in establishment of treatment plan and goals  Yes  -     Patient would benefit from skilled therapy intervention  Yes  -        PT Plan    PT Frequency  1x/week  -     Physical Therapy Interventions (Optional Details)  patient/family education;wound care  -     PT Plan Comments  debridement, dressings. MIST not covered. ?add honey or change dressing type if no progress noted next visit.  -       User Key  (r) = Recorded By, (t) = Taken By, (c) = Cosigned By    Initials Name Provider Type    Shanita Lance, PT Physical Therapist          Goals  PT OP Goals     Row Name 07/08/20 1500          Time Calculation    PT Goal Re-Cert Due Date  09/24/20  -       User Key  (r) = Recorded By, (t) = Taken By, (c) = Cosigned By    Initials Name Provider Type     Shanita Massey, PT Physical Therapist          PT Goal Re-Cert Due Date: 09/24/20            Time Calculation: Start Time: 1500  Therapy Charges for Today     Code Description Service Date Service Provider Modifiers Qty    [id number] HC EAMON DEBRIDE OPEN WOUND UP TO 20CM 7/8/2020 Shanita Msasey, PT GP 1                  Shanita Massey, ENEDELIA  7/8/2020

## 2020-07-15 ENCOUNTER — APPOINTMENT (OUTPATIENT)
Dept: PHYSICAL THERAPY | Facility: HOSPITAL | Age: 50
End: 2020-07-15

## 2020-07-23 ENCOUNTER — HOSPITAL ENCOUNTER (OUTPATIENT)
Dept: PHYSICAL THERAPY | Facility: HOSPITAL | Age: 50
Setting detail: THERAPIES SERIES
Discharge: HOME | End: 2020-07-23

## 2020-07-23 DIAGNOSIS — E11.621 DIABETIC ULCER OF RIGHT GREAT TOE (HCC): Primary | ICD-10-CM

## 2020-07-23 DIAGNOSIS — L97.519 DIABETIC ULCER OF RIGHT GREAT TOE (HCC): Primary | ICD-10-CM

## 2020-07-23 PROCEDURE — 97597 DBRDMT OPN WND 1ST 20 CM/<: CPT

## 2020-07-23 NOTE — THERAPY PROGRESS REPORT/RE-CERT
Outpatient Rehabilitation - Wound/Debridement Progress Note  [facility]     Patient Name: Juarez Hunt  : 1970  MRN: [id number]  Today's Date: 2020                 Admit Date: 2020    Visit Dx:    ICD-10-CM ICD-9-CM   1. Diabetic ulcer of right great toe (CMS/HCC) E11.621 250.80    L97.519 707.15       There is no problem list on file for this patient.       Past Medical History:   Diagnosis Date   • Blood clot in vein    • Diabetes mellitus (CMS/HCC)    • Hypertension         Past Surgical History:   Procedure Laterality Date   • IVC FILTER RETRIEVAL           EVALUATION  PT Ortho     Row Name 20 1400       Subjective Comments    Subjective Comments  Pt/family report being nervous about the pandemic. The would like to keep 1-2 more appointments and then maybe decrease frequency to reduce potential exposure.  -       Precautions and Contraindications    Precautions  DPN  -MC       Subjective Pain    Able to rate subjective pain?  yes  -MC    Pre-Treatment Pain Level  0  -MC    Post-Treatment Pain Level  0  -MC       Transfers    Sit-Stand [place] (Transfers)  independent  -MC    Stand-Sit [place] (Transfers)  independent  -MC    Comment (Transfers)  long sitting on stretcher for tx  -MC       Gait/Stairs Assessment/Training    [place] Level (Gait)  independent  -      User Key  (r) = Recorded By, (t) = Taken By, (c) = Cosigned By    Initials Name Provider Type    Shanita Lance, PT Physical Therapist          LDA Wound     Row Name 20 1400             Wound 20 0840 Right toe Diabetic Ulcer    Wound - Properties Group Date first assessed: 20  - Time first assessed: 840  - Present on Hospital Admission: Y  - Side: Right  - Location: toe  -MC Primary Wound Type: Diabetic ulc  -    Wound Image  Images linked: 1  -MC      Dressing Appearance  moist drainage;intact  -MC      Base  moist;red;pink;epithelialization;dry slightly dry  -       Periwound  intact;dry;pink  -      Periwound Temperature  warm  -      Periwound Skin Turgor  firm  -      Edges  callused;open  -      Wound Length (cm)  0.7 cm  -      Wound Width (cm)  0.6 cm  -      Wound Depth (cm)  0.1 cm  -      Drainage Characteristics/Odor  serosanguineous  -      Drainage Amount  scant  -      Care, Wound  cleansed with;wound cleanser;debrided;honey applied  -      Dressing Care, Wound  dressing applied;silver impregnated;collagen;low-adherent;foam rey, optifoam Ag, primafix tape  -      Periwound Care, Wound  cleansed with pH balanced cleanser;dry periwound area maintained  -        User Key  (r) = Recorded By, (t) = Taken By, (c) = Cosigned By    Initials Name Provider Type    Shanita Lance, PT Physical Therapist            WOUND DEBRIDEMENT  Total area of Debridement: 1 cm2  Debridement Site 1  Location- Site 1: R great toe  Selective Debridement- Site 1: Wound Surface <20cmsq  Instruments- Site 1: #15, scapel, tweezers  Excised Tissue Description- Site 1: minimum, slough, other (comment)(periwound callus)  Bleeding- Site 1: none             Therapy Education     Row Name 07/23/20 1400             Therapy Education    Education Details  Add small drop of therahoney with every other dressing change to attempt to promote better moisture balance to the wound bed. Otherwise, continue with current POC.  -      Given  Symptoms/condition management;Bandaging/dressing change  -      Program  Reinforced  -      How Provided  Verbal;Demonstration  -MC      Provided to  Patient;Other (comment) spouse  -      Level of Understanding  Teach back education performed;Verbalized  -        User Key  (r) = Recorded By, (t) = Taken By, (c) = Cosigned By    Initials Name Provider Type    Shanita Lance, PT Physical Therapist          Recommendation and Plan  PT Assessment/Plan     Row Name 07/23/20 1400          PT Assessment    Functional Limitations   Other (comment) wound mgmt  -     Impairments  Integumentary integrity  -     Assessment Comments  Pt with small decrease in wound dimensions since last assessment. The wound bed is slightly dry today, so PT added therahoney and instructed pt/spouse to add therahoney to wound with every other dressing change to promote better moisture balance. Pt continues to develop minimal slough and periwound callus that requires debridement. Pt will continue to benefit from skilled PT wound care to promote healing. Dependent on progress over the next 1-2 weeks, pt will be appropriate to decrease frequency with family performing dressing changes at home, due to pt/family concerns about COVID pandemic.  -     Rehab Potential  Fair  -     Patient/caregiver participated in establishment of treatment plan and goals  Yes  -     Patient would benefit from skilled therapy intervention  Yes  -        PT Plan    PT Frequency  1x/week  -     Predicted Duration of Therapy Intervention (Therapy Eval)  10 visits  -     Planned CPT's?  PT SELF CARE/MGMT/TRAIN 15 MIN: 92193;PT NONSELECT DEBRIDE 15 MIN: 34506;PT EAMON DEBRIDE OPEN WOUND UP TO 20 CM: 17363  -     Physical Therapy Interventions (Optional Details)  patient/family education;wound care  -     PT Plan Comments  debridement, dressings. MIST not covered. Assess addition of honey. ?decrease frequency if appropriate.  -       User Key  (r) = Recorded By, (t) = Taken By, (c) = Cosigned By    Initials Name Provider Type    Shanita Lance, PT Physical Therapist          Goals  PT OP Goals     Row Name 07/23/20 1400          PT Short Term Goals    STG 1  Pt will verbalize s/sx of infection.  -     STG 1 Progress  Met  -     STG 2  Pt will demonstrate 25% reduction in wound area to indicate healing progress.  -     STG 2 Progress  Ongoing  -        Long Term Goals    LTG 1  Pt will demonstrate independence with clean home dressing changes.  -     LTG 1  Progress  Partially Met;Ongoing  -     LTG 2  Pt will demonstrate 75% reduction in wound area to indicate healing progress.  -     LTG 2 Progress  Ongoing  -        Time Calculation    PT Goal Re-Cert Due Date  09/24/20  -       User Key  (r) = Recorded By, (t) = Taken By, (c) = Cosigned By    Initials Name Provider Type    Shanita Lance, PT Physical Therapist          PT Goal Re-Cert Due Date: 09/24/20  PT Short Term Goals  STG 1: Pt will verbalize s/sx of infection.  STG 1 Progress: Met  STG 2: Pt will demonstrate 25% reduction in wound area to indicate healing progress.  STG 2 Progress: Ongoing  Long Term Goals  LTG 1: Pt will demonstrate independence with clean home dressing changes.  LTG 1 Progress: Partially Met, Ongoing  LTG 2: Pt will demonstrate 75% reduction in wound area to indicate healing progress.  LTG 2 Progress: Ongoing      Time Calculation: Start Time: 1400  Therapy Charges for Today     Code Description Service Date Service Provider Modifiers Qty    95613876345 HC EAMON DEBRIDE OPEN WOUND UP TO 20CM 7/23/2020 Shanita Massey, PT GP 1                  Shanita Massey, PT  7/23/2020

## 2020-07-27 ENCOUNTER — APPOINTMENT (OUTPATIENT)
Dept: PHYSICAL THERAPY | Facility: HOSPITAL | Age: 50
End: 2020-07-27

## 2020-07-31 ENCOUNTER — APPOINTMENT (OUTPATIENT)
Dept: PHYSICAL THERAPY | Facility: HOSPITAL | Age: 50
End: 2020-07-31

## 2020-08-05 ENCOUNTER — RESULTS ENCOUNTER (OUTPATIENT)
Dept: INTERNAL MEDICINE | Facility: CLINIC | Age: 50
End: 2020-08-05

## 2020-08-05 ENCOUNTER — OFFICE VISIT (OUTPATIENT)
Dept: INTERNAL MEDICINE | Facility: CLINIC | Age: 50
End: 2020-08-05

## 2020-08-05 VITALS
DIASTOLIC BLOOD PRESSURE: 88 MMHG | SYSTOLIC BLOOD PRESSURE: 122 MMHG | HEART RATE: 104 BPM | WEIGHT: 315 LBS | BODY MASS INDEX: 40.43 KG/M2 | OXYGEN SATURATION: 98 % | RESPIRATION RATE: 16 BRPM | HEIGHT: 74 IN | TEMPERATURE: 97.5 F

## 2020-08-05 DIAGNOSIS — I27.82 OTHER CHRONIC PULMONARY EMBOLISM, UNSPECIFIED WHETHER ACUTE COR PULMONALE PRESENT (HCC): ICD-10-CM

## 2020-08-05 DIAGNOSIS — Z12.11 COLON CANCER SCREENING: ICD-10-CM

## 2020-08-05 DIAGNOSIS — Z95.828 GREENFIELD FILTER IN PLACE: ICD-10-CM

## 2020-08-05 DIAGNOSIS — E11.621 DIABETIC ULCER OF TOE OF RIGHT FOOT ASSOCIATED WITH TYPE 2 DIABETES MELLITUS, WITH FAT LAYER EXPOSED (HCC): ICD-10-CM

## 2020-08-05 DIAGNOSIS — L97.509 TYPE 2 DIABETES MELLITUS WITH FOOT ULCER, WITH LONG-TERM CURRENT USE OF INSULIN (HCC): Primary | ICD-10-CM

## 2020-08-05 DIAGNOSIS — I73.9 PVD (PERIPHERAL VASCULAR DISEASE) (HCC): ICD-10-CM

## 2020-08-05 DIAGNOSIS — G62.9 NEUROPATHY: ICD-10-CM

## 2020-08-05 DIAGNOSIS — Z12.5 PROSTATE CANCER SCREENING: ICD-10-CM

## 2020-08-05 DIAGNOSIS — L97.512 DIABETIC ULCER OF TOE OF RIGHT FOOT ASSOCIATED WITH TYPE 2 DIABETES MELLITUS, WITH FAT LAYER EXPOSED (HCC): ICD-10-CM

## 2020-08-05 DIAGNOSIS — I10 ESSENTIAL HYPERTENSION: ICD-10-CM

## 2020-08-05 DIAGNOSIS — G47.33 OSA (OBSTRUCTIVE SLEEP APNEA): ICD-10-CM

## 2020-08-05 DIAGNOSIS — Z79.4 TYPE 2 DIABETES MELLITUS WITH FOOT ULCER, WITH LONG-TERM CURRENT USE OF INSULIN (HCC): Primary | ICD-10-CM

## 2020-08-05 DIAGNOSIS — I82.469 DEEP VEIN THROMBOSIS (DVT) OF CALF MUSCLE VEIN, UNSPECIFIED CHRONICITY, UNSPECIFIED LATERALITY (HCC): ICD-10-CM

## 2020-08-05 DIAGNOSIS — E11.621 TYPE 2 DIABETES MELLITUS WITH FOOT ULCER, WITH LONG-TERM CURRENT USE OF INSULIN (HCC): Primary | ICD-10-CM

## 2020-08-05 PROBLEM — Z99.89 OSA ON CPAP: Status: ACTIVE | Noted: 2020-08-05

## 2020-08-05 PROBLEM — E11.9 TYPE 2 DIABETES MELLITUS WITHOUT COMPLICATION, WITH LONG-TERM CURRENT USE OF INSULIN (HCC): Status: ACTIVE | Noted: 2020-08-05

## 2020-08-05 PROCEDURE — 99386 PREV VISIT NEW AGE 40-64: CPT | Performed by: INTERNAL MEDICINE

## 2020-08-05 NOTE — PROGRESS NOTES
Subjective     Patient ID: Juarez Hunt is a 49 y.o. male. Patient is here for management of multiple medical problems.     Chief Complaint   Patient presents with   • Annual Exam     Initial visit to establish care, patient having issues with right lef, pain in right thigh and right knee especially in the mornings     History of Present Illness        annual exam.    PCP in [place].  NP in [place].  Had phone visit  Moved to [facility]   The following portions of the patient's history were reviewed and updated as appropriate: allergies, current medications, past family history, past medical history, past social history, past surgical history and problem list.      Blood clot   Coumadin stopped. Pt not compliant to inr.   Hx of dvt and pe.  And multiple dvt pe.   Fist clot followed surgery of the knee 1986. Smoked at the time.        Review of Systems   Constitutional: Negative for diaphoresis and fatigue.   Respiratory: Negative for cough, choking and shortness of breath.        Current Outpatient Medications:   •  aspirin 325 MG tablet, Take 325 mg by mouth Daily., Disp: , Rfl:   •  insulin glargine (LANTUS) 100 UNIT/ML injection, Inject 30 Units under the skin into the appropriate area as directed 2 (Two) Times a Day., Disp: , Rfl:   •  meloxicam (MOBIC) 7.5 MG tablet, Take 7.5 mg by mouth Daily., Disp: , Rfl:   •  metFORMIN (GLUCOPHAGE) 500 MG tablet, Take 500 mg by mouth 2 (Two) Times a Day With Meals., Disp: , Rfl:   •  metoprolol tartrate (LOPRESSOR) 50 MG tablet, Take 50 mg by mouth 2 (Two) Times a Day., Disp: , Rfl:   •  quinapril (ACCUPRIL) 20 MG tablet, Take 20 mg by mouth Every Night., Disp: , Rfl:   •  sildenafil (REVATIO) 20 MG tablet, Take 20 mg by mouth 3 (Three) Times a Day., Disp: , Rfl:   •  apixaban (ELIQUIS) 5 MG tablet tablet, Take 1 tablet by mouth Every 12 (Twelve) Hours., Disp: 60 tablet, Rfl: 11  •  Semaglutide,0.25 or 0.5MG/DOS, (Ozempic, 0.25 or 0.5 MG/DOSE,) 2 MG/1.5ML solution pen-injector,  "Inject 0.25 mg under the skin into the appropriate area as directed Every 7 (Seven) Days., Disp: 1.5 mL, Rfl: 3    Objective      Blood pressure 122/88, pulse 104, temperature 97.5 °F (36.4 °C), temperature source Temporal, resp. rate 16, height 188 cm (74\"), weight (!) 143 kg (315 lb), SpO2 98 %.    Physical Exam    Juarez had a diabetic foot exam performed today.   During the foot exam he had a monofilament test performed.    Neurological Sensory Findings - Unaltered hot/cold right ankle/foot discrimination and unaltered hot/cold left ankle/foot discrimination. Altered sharp/dull right ankle/foot discrimination and altered sharp/dull left ankle/foot discrimination. Right ankle/foot altered proprioception and left ankle/foot altered proprioception.  Vascular Status -  His right foot exhibits normal foot vasculature  and no edema.  Skin Integrity  -  His right foot skin is intact.His left foot skin is intact.He has left foot ulcer..   Foot Structure and Biomechanics -  His right foot has no hallux valgus, no pes cavus and no claw toes present. His left foot has no hallux valgus, no pes cavus and no claw toes.       General Appearance:    Alert, cooperative, no distress, appears stated age   Head:    Normocephalic, without obvious abnormality, atraumatic   Eyes:    PERRL, conjunctiva/corneas clear, EOM's intact   Ears:    Normal TM's and external ear canals, both ears   Nose:   Nares normal, septum midline, mucosa normal, no drainage   or sinus tenderness   Throat:   Lips, mucosa, and tongue normal; teeth and gums normal   Neck:   Supple, symmetrical, trachea midline, no adenopathy;        thyroid:  No enlargement/tenderness/nodules; no carotid    bruit or JVD   Back:     Symmetric, no curvature, ROM normal, no CVA tenderness   Lungs:     Clear to auscultation bilaterally, respirations unlabored   Chest wall:    No tenderness or deformity   Heart:    Regular rate and rhythm, S1 and S2 normal, no murmur,        rub or " gallop   Abdomen:     Soft, non-tender, bowel sounds active all four quadrants,     no masses, no organomegaly   Extremities: Loss of sensation to mind shin.     Pulses:   2+ and symmetric all extremities   Skin:   Skin color, texture, turgor normal, no rashes or lesions   Lymph nodes:   Cervical, supraclavicular, and axillary nodes normal   Neurologic:   CNII-XII intact. Normal strength, sensation and reflexes       throughout      Results for orders placed or performed during the hospital encounter of 06/13/20   Anaerobic & Aerobic Culture ([facility] Only) - Swab, Toe, Right   Result Value Ref Range    Aer Anaer Result 1 Final report     Result 1 Comment     Culture Final report     Result 1 Mixed skin merari          Assessment/Plan   starting to exercise more.  Walking more.    Walking a mile 5-6 x a week.      Juarez was seen today for annual exam.    Diagnoses and all orders for this visit:    Type 2 diabetes mellitus with foot ulcer, with long-term current use of insulin (CMS/[facility])  -     Lipid Panel  -     CBC & Differential  -     Vitamin B12  -     Comprehensive Metabolic Panel  -     TSH  -     T4, Free  -     Hemoglobin A1c  -     MicroAlbumin, Urine, Random - Urine, Clean Catch    Essential hypertension  -     Lipid Panel  -     CBC & Differential  -     Vitamin B12  -     Comprehensive Metabolic Panel  -     TSH  -     T4, Free  -     Hemoglobin A1c  -     MicroAlbumin, Urine, Random - Urine, Clean Catch    Prostate cancer screening  -     PSA Screen    Diabetic ulcer of toe of right foot associated with type 2 diabetes mellitus, with fat layer exposed (CMS/[facility])  -     Doppler Arterial Multi Level Lower Extremity - Bilateral CAR; Future  -     US Arterial Doppler Lower Extremity Bilateral; Future    [place] filter in place  -     Ambulatory Referral to General Surgery    Deep vein thrombosis (DVT) of calf muscle vein, unspecified chronicity, unspecified laterality (CMS/[facility])  -     Ambulatory Referral to  Hematology / Oncology  -     Ambulatory Referral to General Surgery    Other chronic pulmonary embolism, unspecified whether acute cor pulmonale present (CMS/[facility])  -     Ambulatory Referral to Hematology / Oncology  -     Ambulatory Referral to General Surgery    Colon cancer screening  -     Cologuard - Stool, Per Rectum; Future    Neuropathy    PVD (peripheral vascular disease) (CMS/[facility])    ERIK (obstructive sleep apnea)  -     Ambulatory Referral to Pulmonology    Other orders  -     Semaglutide,0.25 or 0.5MG/DOS, (Ozempic, 0.25 or 0.5 MG/DOSE,) 2 MG/1.5ML solution pen-injector; Inject 0.25 mg under the skin into the appropriate area as directed Every 7 (Seven) Days.  -     apixaban (ELIQUIS) 5 MG tablet tablet; Take 1 tablet by mouth Every 12 (Twelve) Hours.      Return in about 6 weeks (around 9/16/2020).          There are no Patient Instructions on file for this visit.     Jw Guido MD    Assessment/Plan

## 2020-08-07 ENCOUNTER — HOSPITAL ENCOUNTER (OUTPATIENT)
Dept: PHYSICAL THERAPY | Facility: HOSPITAL | Age: 50
Setting detail: THERAPIES SERIES
Discharge: HOME | End: 2020-08-07

## 2020-08-07 DIAGNOSIS — E11.621 DIABETIC ULCER OF RIGHT GREAT TOE (HCC): Primary | ICD-10-CM

## 2020-08-07 DIAGNOSIS — L97.519 DIABETIC ULCER OF RIGHT GREAT TOE (HCC): Primary | ICD-10-CM

## 2020-08-07 PROCEDURE — 97597 DBRDMT OPN WND 1ST 20 CM/<: CPT

## 2020-08-07 NOTE — THERAPY WOUND CARE TREATMENT
Outpatient Rehabilitation - Wound/Debridement Treatment Note  [facility]     Patient Name: Juarez Hunt  : 1970  MRN: [id number]  Today's Date: 2020                 Admit Date: 2020    Visit Dx:    ICD-10-CM ICD-9-CM   1. Diabetic ulcer of right great toe (CMS/HCC) E11.621 250.80    L97.519 707.15       Patient Active Problem List   Diagnosis   • ERIK on CPAP   • Type 2 diabetes mellitus without complication, with long-term current use of insulin (CMS/HCC)        Past Medical History:   Diagnosis Date   • Arthritis    • Blood clot in vein    • Diabetes mellitus (CMS/HCC)    • Hypertension         Past Surgical History:   Procedure Laterality Date   • IVC FILTER RETRIEVAL     • KNEE SURGERY Left          EVALUATION  PT Ortho     Row Name 20 1130       Subjective Comments    Subjective Comments  Pt reports he is pleased with his new PCP, was placed on blood thinners for possible blood clot in RLE, and is having vascular tests and consultation with vascular surgery.  Did not bandage his toe wound today due to coming for tx, and is wearing his regular shoe due to driving this AM.  Reports he wears the offloading shoe most of the time.  -       Precautions and Contraindications    Precautions  DPN  -       Subjective Pain    Able to rate subjective pain?  yes  -    Pre-Treatment Pain Level  0  -    Post-Treatment Pain Level  0  -JM       Transfers    Sit-Stand [place] (Transfers)  independent  -JM    Stand-Sit [place] (Transfers)  independent  -    Comment (Transfers)  long sitting for tx  -       Gait/Stairs Assessment/Training    [place] Level (Gait)  independent  -      User Key  (r) = Recorded By, (t) = Taken By, (c) = Cosigned By    Initials Name Provider Type    Karolyn Montero, PT Physical Therapist          LDA Wound     Row Name 20 1130             Wound 20 0840 Right toe Diabetic Ulcer    Wound - Properties Group Date first  assessed: 06/26/20  - Time first assessed: 0840  - Present on Hospital Admission: Y  - Side: Right  - Location: toe  - Primary Wound Type: Diabetic ulc  -    Wound Image  Images linked: 2  -      Dressing Appearance  open to air  -      Base  moist;red;pink;epithelialization;dry slightly dry  -      Periwound  intact;dry;pink  -      Periwound Temperature  warm  -      Periwound Skin Turgor  firm  -      Edges  callused;open  -      Wound Length (cm)  0.7 cm  -      Wound Width (cm)  0.5 cm  -      Wound Depth (cm)  0.1 cm  -      Drainage Characteristics/Odor  serosanguineous  -      Drainage Amount  small bloody drainage on pt's sock  -      Care, Wound  cleansed with;wound cleanser;debrided  -      Dressing Care, Wound  dressing applied;collagen;foam;silver impregnated rey, optifoam ag, primafix tape  -      Periwound Care, Wound  cleansed with pH balanced cleanser;dry periwound area maintained  -        User Key  (r) = Recorded By, (t) = Taken By, (c) = Cosigned By    Initials Name Provider Type     Shanita Massey, PT Physical Therapist    Karolyn Montero, PT Physical Therapist            WOUND DEBRIDEMENT  Total area of Debridement: 1cmsq  Debridement Site 1  Location- Site 1: R great toe  Selective Debridement- Site 1: Wound Surface <20cmsq  Instruments- Site 1: #15, scapel, tweezers  Excised Tissue Description- Site 1: minimum, slough, moderate, other (comment)(periwound callous)  Bleeding- Site 1: none             Therapy Education     Row Name 08/07/20 5678             Therapy Education    Education Details  Reinforced need to keep area bandaged at all times, and continue to wear offloading shoe as much as possible, preferrably NWB R foot for wound to heal.  -      Given  Symptoms/condition management;Bandaging/dressing change  -      Program  Reinforced  -GERMAIN      How Provided  Verbal;Demonstration  -      Provided to  Patient;Other (comment)  spouse  -      Level of Understanding  Teach back education performed;Verbalized  -        User Key  (r) = Recorded By, (t) = Taken By, (c) = Cosigned By    Initials Name Provider Type    Karolyn Montero, PT Physical Therapist          Recommendation and Plan  PT Assessment/Plan     Row Name 08/07/20 1030          PT Assessment    Functional Limitations  Other (comment) wound mgmt  -     Impairments  Integumentary integrity  -     Assessment Comments  Small decrease in wound area, but still moderate callous buildup d/t going 2 weeks b/n txs.  Pt recommended continued weekly tx for debridement for best outcomes.  Pt agreeable and compliant with tx plan  -        PT Plan    PT Frequency  1x/week  -     Physical Therapy Interventions (Optional Details)  patient/family education;wound care  -     PT Plan Comments  debridement, dressings management  -       User Key  (r) = Recorded By, (t) = Taken By, (c) = Cosigned By    Initials Name Provider Type    Karolyn Montero, PT Physical Therapist          Goals  PT OP Goals     Row Name 08/07/20 1130          Time Calculation    PT Goal Re-Cert Due Date  09/24/20  -       User Key  (r) = Recorded By, (t) = Taken By, (c) = Cosigned By    Initials Name Provider Type    Karolyn Montero, PT Physical Therapist          PT Goal Re-Cert Due Date: 09/24/20            Time Calculation: Start Time: 1130  Therapy Charges for Today     Code Description Service Date Service Provider Modifiers Qty    07448149952 HC EAMON DEBRIDE OPEN WOUND UP TO 20CM 8/7/2020 Karolyn Lomeli, PT GP 1                  Karolyn Lomeli, PT  8/7/2020

## 2020-08-10 ENCOUNTER — HOSPITAL ENCOUNTER (OUTPATIENT)
Dept: ULTRASOUND IMAGING | Facility: HOSPITAL | Age: 50
Discharge: HOME | End: 2020-08-10
Admitting: INTERNAL MEDICINE

## 2020-08-10 DIAGNOSIS — E11.621 DIABETIC ULCER OF TOE OF RIGHT FOOT ASSOCIATED WITH TYPE 2 DIABETES MELLITUS, WITH FAT LAYER EXPOSED (HCC): ICD-10-CM

## 2020-08-10 DIAGNOSIS — L97.512 DIABETIC ULCER OF TOE OF RIGHT FOOT ASSOCIATED WITH TYPE 2 DIABETES MELLITUS, WITH FAT LAYER EXPOSED (HCC): ICD-10-CM

## 2020-08-10 PROCEDURE — 93923 UPR/LXTR ART STDY 3+ LVLS: CPT

## 2020-08-14 ENCOUNTER — APPOINTMENT (OUTPATIENT)
Dept: PHYSICAL THERAPY | Facility: HOSPITAL | Age: 50
End: 2020-08-14

## 2020-08-19 ENCOUNTER — HOSPITAL ENCOUNTER (OUTPATIENT)
Dept: PHYSICAL THERAPY | Facility: HOSPITAL | Age: 50
Setting detail: THERAPIES SERIES
Discharge: HOME | End: 2020-08-19

## 2020-08-19 DIAGNOSIS — E11.621 DIABETIC ULCER OF RIGHT GREAT TOE (HCC): Primary | ICD-10-CM

## 2020-08-19 DIAGNOSIS — L97.519 DIABETIC ULCER OF RIGHT GREAT TOE (HCC): Primary | ICD-10-CM

## 2020-08-19 PROCEDURE — 97597 DBRDMT OPN WND 1ST 20 CM/<: CPT

## 2020-08-19 NOTE — THERAPY WOUND CARE TREATMENT
Outpatient Rehabilitation - Wound/Debridement Treatment Note  [facility]     Patient Name: Juarez Hunt  : 1970  MRN: [id number]  Today's Date: 2020                 Admit Date: 2020    Visit Dx:    ICD-10-CM ICD-9-CM   1. Diabetic ulcer of right great toe (CMS/HCC) E11.621 250.80    L97.519 707.15       Patient Active Problem List   Diagnosis   • ERIK on CPAP   • Type 2 diabetes mellitus without complication, with long-term current use of insulin (CMS/HCC)        Past Medical History:   Diagnosis Date   • Arthritis    • Blood clot in vein    • Diabetes mellitus (CMS/HCC)    • Hypertension         Past Surgical History:   Procedure Laterality Date   • IVC FILTER RETRIEVAL     • KNEE SURGERY Left          EVALUATION  PT Ortho     Row Name 20 1345       Subjective Comments    Subjective Comments  No complaints or changes. Wife reports they ran out of dressings, so she did the best she could to keep it covered.  -       Precautions and Contraindications    Precautions  DPN  -       Subjective Pain    Able to rate subjective pain?  yes  -    Pre-Treatment Pain Level  0  -    Post-Treatment Pain Level  0  -MC       Transfers    Sit-Stand [place] (Transfers)  independent  -MC    Stand-Sit [place] (Transfers)  independent  -    Comment (Transfers)  long sitting for tx  -       Gait/Stairs Assessment/Training    [place] Level (Gait)  independent  -      User Key  (r) = Recorded By, (t) = Taken By, (c) = Cosigned By    Initials Name Provider Type    Shanita Lance PT Physical Therapist          LDA Wound     Row Name 20 1345             Wound 20 0840 Right toe Diabetic Ulcer    Wound - Properties Group Date first assessed: 20  - Time first assessed: 840  - Present on Hospital Admission: Y  - Side: Right  - Location: toe  - Primary Wound Type: Diabetic ul  -    Wound Image  Images linked: 1  -      Dressing Appearance   intact;moist drainage film dressing, bandaid, tape  -      Base  moist;red;pink;epithelialization  -      Periwound  intact;pink;moist very moist/soft  -      Periwound Temperature  warm  -      Periwound Skin Turgor  firm  -      Edges  callused;open  -      Wound Length (cm)  0.6 cm  -      Wound Width (cm)  0.5 cm  -      Wound Depth (cm)  0.1 cm  -      Drainage Characteristics/Odor  serosanguineous  -      Drainage Amount  small  -      Care, Wound  cleansed with;wound cleanser;debrided  -      Dressing Care, Wound  dressing applied;silver impregnated;low-adherent;foam;collagen rey, optifoam Ag, primafix tape  -      Periwound Care, Wound  cleansed with pH balanced cleanser;dry periwound area maintained  -        User Key  (r) = Recorded By, (t) = Taken By, (c) = Cosigned By    Initials Name Provider Type    Shanita Lance, PT Physical Therapist    Shanita Lance, PT Physical Therapist            WOUND DEBRIDEMENT  Total area of Debridement: 1 cm2  Debridement Site 1  Location- Site 1: R great toe  Selective Debridement- Site 1: Wound Surface <20cmsq  Instruments- Site 1: #15, scapel, tweezers  Excised Tissue Description- Site 1: scant, slough, moderate, other (comment)(mod periwound callus)  Bleeding- Site 1: none             Therapy Education     Row Name 08/19/20 5337             Therapy Education    Education Details  Continue with home dressing changes, hold honey at this time, only reapply if area appears dry.  -      Given  Symptoms/condition management;Bandaging/dressing change  -      Program  Reinforced  -      How Provided  Verbal;Demonstration  -      Provided to  Patient;Other (comment) spouse  -      Level of Understanding  Teach back education performed;Verbalized  -        User Key  (r) = Recorded By, (t) = Taken By, (c) = Cosigned By    Initials Name Provider Type    Shanita Lance PT Physical Therapist          Recommendation and  Plan  PT Assessment/Plan     Row Name 08/19/20 1345          PT Assessment    Functional Limitations  Other (comment) wound mgmt  -     Impairments  Integumentary integrity  -     Assessment Comments  Pt with only small decrease in wound area, but new epithelialization around the edges not reflected in wound dimensions. The wound base is slightly less red, but the entire wound/periwound area is very moist today. PT held therahoney d/t excess moisture, but was able to debride moderate amounts of periwound callus to allow for additional epithelialization. Pt is making slow but consistent progress toward PT wound care goals. Anticipate continued progress with the current POC.  -     Rehab Potential  Fair  -     Patient/caregiver participated in establishment of treatment plan and goals  Yes  -     Patient would benefit from skilled therapy intervention  Yes  -        PT Plan    PT Frequency  1x/week  -     Predicted Duration of Therapy Intervention (Therapy Eval)  8 visits  -     Planned CPT's?  PT SELF CARE/MGMT/TRAIN 15 MIN: 84979;PT NONSELECT DEBRIDE 15 MIN: 61974;PT EAMON DEBRIDE OPEN WOUND UP TO 20 CM: 70869  -     Physical Therapy Interventions (Optional Details)  patient/family education;wound care  -     PT Plan Comments  debridement, dressings management  -       User Key  (r) = Recorded By, (t) = Taken By, (c) = Cosigned By    Initials Name Provider Type    Shanita Lance, PT Physical Therapist          Goals  PT OP Goals     Row Name 08/19/20 5820          PT Short Term Goals    STG 1  Pt will verbalize s/sx of infection.  -     STG 1 Progress  Met  -     STG 2  Pt will demonstrate 25% reduction in wound area to indicate healing progress.  -     STG 2 Progress  Met  -        Long Term Goals    LTG 1  Pt will demonstrate independence with clean home dressing changes.  -     LTG 1 Progress  Partially Met;Ongoing  -     LTG 2  Pt will demonstrate 75% reduction in wound  area to indicate healing progress.  -     LTG 2 Progress  Ongoing  -        Time Calculation    PT Goal Re-Cert Due Date  09/24/20  -       User Key  (r) = Recorded By, (t) = Taken By, (c) = Cosigned By    Initials Name Provider Type    Shanita Lance, PT Physical Therapist          PT Goal Re-Cert Due Date: 09/24/20  PT Short Term Goals  STG 1: Pt will verbalize s/sx of infection.  STG 1 Progress: Met  STG 2: Pt will demonstrate 25% reduction in wound area to indicate healing progress.  STG 2 Progress: Met  Long Term Goals  LTG 1: Pt will demonstrate independence with clean home dressing changes.  LTG 1 Progress: Partially Met, Ongoing  LTG 2: Pt will demonstrate 75% reduction in wound area to indicate healing progress.  LTG 2 Progress: Ongoing      Time Calculation: Start Time: 1345  Therapy Charges for Today     Code Description Service Date Service Provider Modifiers Qty    41180410998 HC EAMON DEBRIDE OPEN WOUND UP TO 20CM 8/19/2020 Shanita Massey, PT GP 1                  Shanita Massey, PT  8/19/2020

## 2020-08-27 ENCOUNTER — HOSPITAL ENCOUNTER (OUTPATIENT)
Dept: PHYSICAL THERAPY | Facility: HOSPITAL | Age: 50
Setting detail: THERAPIES SERIES
Discharge: HOME | End: 2020-08-27

## 2020-08-27 DIAGNOSIS — L97.519 DIABETIC ULCER OF RIGHT GREAT TOE (HCC): Primary | ICD-10-CM

## 2020-08-27 DIAGNOSIS — E11.621 DIABETIC ULCER OF RIGHT GREAT TOE (HCC): Primary | ICD-10-CM

## 2020-08-27 PROCEDURE — 97597 DBRDMT OPN WND 1ST 20 CM/<: CPT | Performed by: PHYSICAL THERAPIST

## 2020-08-27 NOTE — THERAPY PROGRESS REPORT/RE-CERT
Outpatient Rehabilitation - Wound/Debridement Progress Note  [facility]     Patient Name: Juarez Hunt  : 1970  MRN: [id number]  Today's Date: 2020                 Admit Date: 2020    Visit Dx:    ICD-10-CM ICD-9-CM   1. Diabetic ulcer of right great toe (CMS/HCC) E11.621 250.80    L97.519 707.15       Patient Active Problem List   Diagnosis   • ERIK on CPAP   • Type 2 diabetes mellitus without complication, with long-term current use of insulin (CMS/HCC)        Past Medical History:   Diagnosis Date   • Arthritis    • Blood clot in vein    • Diabetes mellitus (CMS/HCC)    • Hypertension         Past Surgical History:   Procedure Laterality Date   • IVC FILTER RETRIEVAL     • KNEE SURGERY Left          EVALUATION  PT Ortho     Row Name 20       Subjective Comments    Subjective Comments  Changed dressing this morning after showering. Seems to have less drainage.   -MW       Precautions and Contraindications    Precautions  DPN  -MW       Subjective Pain    Able to rate subjective pain?  yes  -MW    Pre-Treatment Pain Level  0  -MW    Post-Treatment Pain Level  0  -MW       Transfers    Sit-Stand [place] (Transfers)  independent  -MW    Stand-Sit [place] (Transfers)  independent  -MW    Comment (Transfers)  long sitting for tx  -MW       Gait/Stairs Assessment/Training    [place] Level (Gait)  independent  -MW      User Key  (r) = Recorded By, (t) = Taken By, (c) = Cosigned By    Initials Name Provider Type    Kristan Velarde, PT Physical Therapist          LDA Wound     Row Name 20 0915             Wound 20 0840 Right toe Diabetic Ulcer    Wound - Properties Group Date first assessed: 20  - Time first assessed: 840  - Present on Hospital Admission: Y  - Side: Right  - Location: toe  -MC Primary Wound Type: Diabetic ulc  -    Dressing Appearance  intact;moist drainage  -MW      Base  moist;red;pink;epithelialization  -MW       Periwound  intact;pink very moist/soft  -MW      Periwound Temperature  warm  -MW      Periwound Skin Turgor  firm  -MW      Edges  callused;open  -MW      Wound Length (cm)  0.4 cm  -MW      Wound Width (cm)  0.2 cm  -MW      Wound Depth (cm)  0.1 cm  -MW      Drainage Characteristics/Odor  serosanguineous  -MW      Drainage Amount  scant  -MW      Care, Wound  cleansed with;wound cleanser;debrided  -MW      Dressing Care, Wound  dressing applied;silver impregnated;collagen;low-adherent;foam Ayde, Optifoam AG, primafix   -MW      Periwound Care, Wound  cleansed with pH balanced cleanser;dry periwound area maintained  -MW        User Key  (r) = Recorded By, (t) = Taken By, (c) = Cosigned By    Initials Name Provider Type    MW Kristan Yun, PT Physical Therapist    Shanita Lance, PT Physical Therapist            WOUND DEBRIDEMENT  Total area of Debridement: ~1 cm2  Debridement Site 1  Location- Site 1: R great toe  Selective Debridement- Site 1: Wound Surface <20cmsq  Instruments- Site 1: #15, scapel, tweezers  Excised Tissue Description- Site 1: scant, slough, minimum, other (comment)(periwound callus )  Bleeding- Site 1: none                 Recommendation and Plan  PT Assessment/Plan     Row Name 08/27/20 0915          PT Assessment    Functional Limitations  Other (comment) wound mgmt  -MW     Impairments  Integumentary integrity  -MW     Assessment Comments  Decrease in wound area noted this visit. Reviewed benefits of keeping blood sugar levels closer to normal range for improved blood flow and improved wound healing; also reviewed effects of pressure on wound healing. Able to debride additional periwound callous this visit. Pt may ultimately benefit from custom orthotics to prevent future callous and ulcerations. Cont PT to monitor for wound closure due to location and high risk for delayed closure.   -MW     Rehab Potential  Good  -MW     Patient/caregiver participated in establishment of  treatment plan and goals  Yes  -MW     Patient would benefit from skilled therapy intervention  Yes  -MW        PT Plan    PT Frequency  1x/week  -MW     Predicted Duration of Therapy Intervention (Therapy Eval)  7 visits   -MW     Planned CPT's?  PT EAMON DEBRIDE OPEN WOUND UP TO 20 CM: 68500;PT NONSELECT DEBRIDE 15 MIN: 09329;PT SELF CARE/HOME MGMT/TRAIN EA 15: 64181;PT NLFU MIST: 15424  -MW     Physical Therapy Interventions (Optional Details)  wound care;patient/family education  -     PT Plan Comments  debridement, dressings management  -       User Key  (r) = Recorded By, (t) = Taken By, (c) = Cosigned By    Initials Name Provider Type    Kristan Velarde, PT Physical Therapist          Goals  PT OP Goals     Row Name 08/27/20 0915          PT Short Term Goals    STG 1  Pt will verbalize s/sx of infection.  -     STG 1 Progress  Met  -     STG 2  Pt will demonstrate 25% reduction in wound area to indicate healing progress.  -     STG 2 Progress  Met  -        Long Term Goals    LTG 1  Pt will demonstrate independence with clean home dressing changes.  -     LTG 1 Progress  Met  -     LTG 2  Pt will demonstrate 75% reduction in wound area to indicate healing progress.  -     LTG 2 Progress  Ongoing;Progressing  -       User Key  (r) = Recorded By, (t) = Taken By, (c) = Cosigned By    Initials Name Provider Type    Kristan Velarde, PT Physical Therapist             PT Short Term Goals  STG 1: Pt will verbalize s/sx of infection.  STG 1 Progress: Met  STG 2: Pt will demonstrate 25% reduction in wound area to indicate healing progress.  STG 2 Progress: Met  Long Term Goals  LTG 1: Pt will demonstrate independence with clean home dressing changes.  LTG 1 Progress: Met  LTG 2: Pt will demonstrate 75% reduction in wound area to indicate healing progress.  LTG 2 Progress: Ongoing, Progressing      Time Calculation: Start Time: 0915  Therapy Charges for Today     Code Description Service Date  Service Provider Modifiers Qty    [id number] HC EAMON DEBRIDE OPEN WOUND UP TO 20CM 8/27/2020 Kristan Yun, PT GP 1                  Kristan Yun, PT  8/27/2020

## 2020-09-02 ENCOUNTER — CONSULT (OUTPATIENT)
Dept: ONCOLOGY | Facility: CLINIC | Age: 50
End: 2020-09-02

## 2020-09-02 VITALS
HEART RATE: 104 BPM | HEIGHT: 74 IN | TEMPERATURE: 97.1 F | RESPIRATION RATE: 16 BRPM | WEIGHT: 315 LBS | SYSTOLIC BLOOD PRESSURE: 118 MMHG | BODY MASS INDEX: 40.43 KG/M2 | OXYGEN SATURATION: 98 % | DIASTOLIC BLOOD PRESSURE: 68 MMHG

## 2020-09-02 DIAGNOSIS — I26.99 ACUTE PULMONARY EMBOLISM WITHOUT ACUTE COR PULMONALE, UNSPECIFIED PULMONARY EMBOLISM TYPE (HCC): Primary | ICD-10-CM

## 2020-09-02 PROCEDURE — 99205 OFFICE O/P NEW HI 60 MIN: CPT | Performed by: INTERNAL MEDICINE

## 2020-09-02 RX ORDER — SILDENAFIL 100 MG/1
TABLET, FILM COATED ORAL
COMMUNITY
Start: 2020-08-26 | End: 2020-09-02

## 2020-09-02 NOTE — PROGRESS NOTES
DATE OF CONSULTATION: 9/2/2020    REFERRING PHYSICIAN: Rubina Pierre APRN    Dear Rubina Campos APRN  Thank you for asking for my medical advice on this patient. I saw him in the [place] office on 9/2/2020    REASON FOR CONSULTATION: History of recurrent venous thrombotic events    HISTORY OF PRESENT ILLNESS: The patient is a very pleasant 49 y.o.  male   who was in his usual state of health until 2005 patient presented with symptoms of chest pain and shortness of breath he was diagnosed with pulmonary embolism felt to be induced by morbid obesity type 2 diabetes and frequent long distance trips.  He was treated with full anticoagulation for about 6-month.  2006 he presented with similar symptoms found to have another episode of massive bilateral PEs at that point he did require IVC filter placement and he was discharged home on warfarin which he took for several years.  Over the last 3 years he was switched to aspirin for no clear reasons.  The patient switch providers and relocated to [facility].  He was appropriately restarted on full anticoagulation using Eliquis 5 mg twice daily.  The patient was referred to me for further recommendations.    SUBJECTIVE: When I saw the patient today he is here with his wife.  He is feeling really good.  Any bleedings denies any weight loss no night sweats no fever chills.    Review of Systems   Constitutional: Negative for activity change, appetite change, chills, fatigue, fever and unexpected weight change.   HENT: Negative for hearing loss, mouth sores, nosebleeds, sore throat and trouble swallowing.    Eyes: Negative for visual disturbance.   Respiratory: Negative for cough, chest tightness, shortness of breath and wheezing.    Cardiovascular: Negative for chest pain, palpitations and leg swelling.   Gastrointestinal: Negative for abdominal distention, abdominal pain, blood in stool, constipation, diarrhea, nausea, rectal pain and vomiting.    Endocrine: Negative for cold intolerance and heat intolerance.   Genitourinary: Negative for difficulty urinating, dysuria, frequency and urgency.   Musculoskeletal: Negative for arthralgias, back pain, gait problem, joint swelling and myalgias.   Skin: Negative for rash.   Neurological: Negative for dizziness, tremors, syncope, weakness, light-headedness, numbness and headaches.   Hematological: Negative for adenopathy. Does not bruise/bleed easily.   Psychiatric/Behavioral: Negative for confusion, sleep disturbance and suicidal ideas. The patient is not nervous/anxious.        Past Medical History:   Diagnosis Date   • Arthritis    • Blood clot in vein    • Diabetes mellitus (CMS/[facility])    • Hypertension        Social History     Socioeconomic History   • Marital status:      Spouse name: Not on file   • Number of children: Not on file   • Years of education: Not on file   • Highest education level: Not on file   Tobacco Use   • Smoking status: Former Smoker     Last attempt to quit: 2000     Years since quittin.6   • Smokeless tobacco: Current User     Types: Chew   Substance and Sexual Activity   • Alcohol use: Yes     Frequency: Monthly or less     Drinks per session: 1 or 2     Comment: 1/4 glass per week   • Drug use: No   • Sexual activity: Defer       Family History   Problem Relation Age of Onset   • Diabetes Mother    • Heart disease Mother    • Heart attack Mother    • Hypertension Mother    • Arthritis Father    • Heart attack Maternal Grandfather    • Heart attack Maternal Uncle    • Liver cancer Maternal Aunt        Past Surgical History:   Procedure Laterality Date   • CARDIAC CATHETERIZATION     • IVC FILTER RETRIEVAL     • KNEE SURGERY Left    • SKIN BIOPSY  2020    toe       No Known Allergies       Current Outpatient Medications:   •  apixaban (ELIQUIS) 5 MG tablet tablet, Take 1 tablet by mouth Every 12 (Twelve) Hours., Disp: 60 tablet, Rfl: 11  •  insulin glargine  "(LANTUS) 100 UNIT/ML injection, Inject 30 Units under the skin into the appropriate area as directed 2 (Two) Times a Day., Disp: , Rfl:   •  meloxicam (MOBIC) 7.5 MG tablet, Take 7.5 mg by mouth Daily., Disp: , Rfl:   •  metFORMIN (GLUCOPHAGE) 500 MG tablet, Take 500 mg by mouth 2 (Two) Times a Day With Meals., Disp: , Rfl:   •  metoprolol tartrate (LOPRESSOR) 50 MG tablet, Take 50 mg by mouth 2 (Two) Times a Day., Disp: , Rfl:   •  quinapril (ACCUPRIL) 20 MG tablet, Take 20 mg by mouth Every Night., Disp: , Rfl:   •  Semaglutide,0.25 or 0.5MG/DOS, (Ozempic, 0.25 or 0.5 MG/DOSE,) 2 MG/1.5ML solution pen-injector, Inject 0.25 mg under the skin into the appropriate area as directed Every 7 (Seven) Days., Disp: 1.5 mL, Rfl: 3  •  sildenafil (REVATIO) 20 MG tablet, Take 20 mg by mouth 3 (Three) Times a Day., Disp: , Rfl:     PHYSICAL EXAMINATION:   /68   Pulse 104   Temp 97.1 °F (36.2 °C) (Temporal)   Resp 16   Ht 188 cm (74\")   Wt (!) 143 kg (315 lb)   SpO2 98%   BMI 40.44 kg/m²   Pain Score    09/02/20 1342   PainSc: 0-No pain       ECOG Performance Status: 1 - Symptomatic but completely ambulatory  General Appearance:  alert, cooperative, no apparent distress and appears stated age   Neurologic/Psychiatric: A&O x 3, gait steady, appropriate affect, strength 5/5 in all muscle groups   HEENT:  Normocephalic, without obvious abnormality, mucous membranes moist   Neck: Supple, symmetrical, trachea midline, no adenopathy;  No thyromegaly, masses, or tenderness   Lungs:   Clear to auscultation bilaterally; respirations regular, even, and unlabored bilaterally   Heart:  Regular rate and rhythm, no murmurs appreciated   Abdomen:   Soft, non-tender, non-distended and no organomegaly   Lymph nodes: No cervical, supraclavicular, inguinal or axillary adenopathy noted   Extremities: Normal, atraumatic; no clubbing, cyanosis, or edema    Skin: No rashes, ulcers, or suspicious lesions noted       No visits with results " within 2 Week(s) from this visit.   Latest known visit with results is:   Admission on 06/13/2020, Discharged on 06/13/2020   Component Date Value Ref Range Status   • Aer Anaer Result 1 06/13/2020 Final report   Final   • Result 1 06/13/2020 Comment   Final    No anaerobic growth in 72 hours.   • Culture 06/13/2020 Final report   Final   • Result 1 06/13/2020 Mixed skin merari   Final        Us Arterial Doppler Lower Extremity Bilateral    Result Date: 8/10/2020  Narrative: PROCEDURE: US ARTERIAL DOPPLER LOWER EXTREMITY  BILATERAL-  HISTORY: cold leg with ulcer.; E11.621-Type 2 diabetes mellitus with foot ulcer; L97.512-Non-pressure chronic ulcer of other part of right foot with fat layer exposed  PROCEDURE: Doppler waveform evaluation of the lower extremities were performed bilaterally.  FINDINGS:  RIGHT LOWER EXTREMITY.      Velocities cm/sec :  CFA: 64 SFA Mid: 61 SFA Dist: 56 POP: 47 PT: 47 JORDY: 46 Waveforms are triphasic.    LEFT LOWER EXTREMITY.      Velocities cm/sec :  CFA: 80 SFA Mid: 82 SFA Dist: 76 POP: 52 PT: 63 JORDY: 64 Waveforms are triphasic.       Impression: No significant stenosis identified.  This report was finalized on 8/10/2020 10:59 AM by Toby Carr M.D..        DIAGNOSTIC DATA:   1. Radiology: As above  2. Dr. Gonzalez's note reviewed by me and documented in the  chart.   3. Pathology report: None available  4. Laboratory data:    Results for DUSTIN LAI (MRN [id number]) as of 9/2/2020 15:07   Ref. Range 6/12/2018 07:27   WBC Latest Ref Range: 4.50 - 12.50 10*3/mm3 8.59   RBC Latest Ref Range: 4.70 - 6.10 10*6/mm3 5.16   Hemoglobin Latest Ref Range: 14.0 - 18.0 g/dL 17.0   Hematocrit Latest Ref Range: 42.0 - 52.0 % 46.5   RDW Latest Ref Range: 11.5 - 14.5 % 12.4   MCV Latest Ref Range: 80.0 - 94.0 fL 90.1   MCH Latest Ref Range: 27.0 - 33.0 pg 32.9   MCHC Latest Ref Range: 33.0 - 37.0 g/dL 36.6   MPV Latest Ref Range: 6.0 - 10.0 fL 11.1 (H)   Platelets Latest Ref Range: 130 - 400  10*3/mm3 241       ASSESSMENT: The patient is a very pleasant 49 y.o.  male  with recurrent venous thrombotic events    PROBLEM LIST:   1.  Recurrent venous thrombotic events:  A.  First episode left lower extremity VT with bilateral PEs 2005 provoked by long distance trips morbid obesity and type 2 diabetes  B.  Second episode massive other PEs requiring IVC filter placement 2006 seemed to be unprovoked  C.  Initially was treated with warfarin and currently he is on Eliquis 5 mg twice daily started August 2020  2.  Morbid obesity  3.  Type 2 diabetes  4.  Hypertension    PLAN:   1. I had a long discussion today with the patient and his wife about his  new diagnosis of recurrent venous thrombotic events. I did go over the final pathology report in  detail with both of them. I reviewed the patient's documents including refereing provider's notes, lab results, imaging, and path report.   2.  Regarding etiology the patient does have morbid obesity as well as type 2 diabetes which definitely has been contributing to his recurrent blood clots.  He could have inherited thrombophilia since his first clot happened at age of 34.  3.  Regarding agent of choice Eliquis is an FDA approved drug.  I recommend to go done to 2.5 mg twice daily since he has been on treatment for a long time.  Based on amplify extension trial 2.5 mg twice daily was as effective as 500 twice daily after initial 612-month course of treatment with less risk of major bleeding which was as low as placebo.  4.  Regarding duration of treatment patient will benefit from lifelong anticoagulation given the fact that he has recurrent PEs as well as concurrent IVC filter placement.  5.  He will follow-up with me in 3 months.  6.  We discussed role of thrombophilia testing I told him it may not change what were doing for him since he will still need to be on anticoagulation lifelong but it might help his siblings as well as his 3 daughters.  He is interested we  will get the testing done prior to return.  I would not be able to do a lupus anticoagulant since he is on Eliquis but we should be applied to do the rest.  7.  Patient was advised to exercise and lose weight  8.  We will continue Lopressor as well as Accupril for hypertension  9.  Continue insulin and metformin for type 2 diabetes.  Souleymane Woods MD  9/2/2020

## 2020-09-09 ENCOUNTER — APPOINTMENT (OUTPATIENT)
Dept: PHYSICAL THERAPY | Facility: HOSPITAL | Age: 50
End: 2020-09-09

## 2020-10-22 ENCOUNTER — TELEPHONE (OUTPATIENT)
Dept: INTERNAL MEDICINE | Facility: CLINIC | Age: 50
End: 2020-10-22

## 2020-10-22 NOTE — TELEPHONE ENCOUNTER
Caller: [email or web address]    Relationship: Emergency Contact    Best call back number: [phone]      What medication are you requesting:  sildenafil (VIAGRA) 100 MG tablet     If a prescription is needed, what is your preferred pharmacy and phone number: [id number] - [place], [place] - [address]  - [phone]  - [phone]     Additional notes: Patient's wife Nu requested this medication. Nu stated this has been prescribed to the patient by his previous PCP, GISESL Pierre. Patient is currently out of this medication.

## 2020-10-30 ENCOUNTER — OFFICE VISIT (OUTPATIENT)
Dept: INTERNAL MEDICINE | Facility: CLINIC | Age: 50
End: 2020-10-30

## 2020-10-30 VITALS
DIASTOLIC BLOOD PRESSURE: 73 MMHG | RESPIRATION RATE: 16 BRPM | OXYGEN SATURATION: 97 % | HEART RATE: 87 BPM | WEIGHT: 315 LBS | TEMPERATURE: 98 F | HEIGHT: 74 IN | SYSTOLIC BLOOD PRESSURE: 137 MMHG | BODY MASS INDEX: 40.43 KG/M2

## 2020-10-30 DIAGNOSIS — Z00.00 ROUTINE GENERAL MEDICAL EXAMINATION AT A HEALTH CARE FACILITY: ICD-10-CM

## 2020-10-30 DIAGNOSIS — G89.29 CHRONIC PAIN OF LEFT KNEE: Primary | ICD-10-CM

## 2020-10-30 DIAGNOSIS — M25.562 CHRONIC PAIN OF LEFT KNEE: Primary | ICD-10-CM

## 2020-10-30 PROCEDURE — 99214 OFFICE O/P EST MOD 30 MIN: CPT | Performed by: INTERNAL MEDICINE

## 2020-10-30 RX ORDER — APIXABAN 5 MG/1
5 TABLET, FILM COATED ORAL EVERY 12 HOURS
COMMUNITY
Start: 2020-10-19 | End: 2021-03-09 | Stop reason: SDUPTHER

## 2020-10-30 RX ORDER — SILDENAFIL 100 MG/1
TABLET, FILM COATED ORAL
COMMUNITY
Start: 2020-09-22 | End: 2021-10-13 | Stop reason: SDUPTHER

## 2020-10-30 RX ORDER — INSULIN GLARGINE 100 [IU]/ML
INJECTION, SOLUTION SUBCUTANEOUS
COMMUNITY
Start: 2020-10-28 | End: 2020-12-21 | Stop reason: SDUPTHER

## 2020-10-30 RX ORDER — PNEUMOCOCCAL VACCINE POLYVALENT 25; 25; 25; 25; 25; 25; 25; 25; 25; 25; 25; 25; 25; 25; 25; 25; 25; 25; 25; 25; 25; 25; 25 UG/.5ML; UG/.5ML; UG/.5ML; UG/.5ML; UG/.5ML; UG/.5ML; UG/.5ML; UG/.5ML; UG/.5ML; UG/.5ML; UG/.5ML; UG/.5ML; UG/.5ML; UG/.5ML; UG/.5ML; UG/.5ML; UG/.5ML; UG/.5ML; UG/.5ML; UG/.5ML; UG/.5ML; UG/.5ML; UG/.5ML
0.5 INJECTION, SOLUTION INTRAMUSCULAR; SUBCUTANEOUS ONCE
Qty: 0.5 ML | Refills: 0 | Status: SHIPPED | OUTPATIENT
Start: 2020-10-30 | End: 2020-10-30

## 2020-10-30 RX ORDER — SILDENAFIL CITRATE 20 MG/1
20 TABLET ORAL 3 TIMES DAILY
Qty: 60 TABLET | Refills: 5 | Status: SHIPPED | OUTPATIENT
Start: 2020-10-30 | End: 2021-03-02

## 2020-10-30 RX ORDER — TITANIUM DIOXIDE, OCTINOXATE 1.94; 1.8 MG/G; MG/G
POWDER TOPICAL
COMMUNITY
Start: 2020-09-30

## 2020-10-30 NOTE — PROGRESS NOTES
Subjective     Patient ID: Juarez Hunt is a 50 y.o. male. Patient is here for management of multiple medical problems.     Chief Complaint   Patient presents with   • Diabetes     follow-up     History of Present Illness       Change from asa to eliquis.     Pt feels better on eliquis.    Pt on lantus;        The following portions of the patient's history were reviewed and updated as appropriate: allergies, current medications, past family history, past medical history, past social history, past surgical history and problem list.    Review of Systems   Constitutional: Negative for fatigue.   Respiratory: Negative for shortness of breath and stridor.    Cardiovascular: Negative for chest pain.   Musculoskeletal: Positive for arthralgias and gait problem. Negative for back pain and joint swelling.   Psychiatric/Behavioral: Negative for sleep disturbance.   All other systems reviewed and are negative.      Current Outpatient Medications:   •  Eliquis 5 MG tablet tablet, Take 5 mg by mouth Every 12 (Twelve) Hours., Disp: , Rfl:   •  Insulin Glargine (BASAGLAR KWIKPEN) 100 UNIT/ML injection pen, inject 30 units sub-q two times a day, Disp: , Rfl:   •  [employer] Lancets Universal 33G misc, USE TO CHECK BLOOD GLUCOSE THREE TIMES DAILY OR AS DIRECTED, Disp: , Rfl:   •  meloxicam (MOBIC) 7.5 MG tablet, Take 7.5 mg by mouth Daily., Disp: , Rfl:   •  metFORMIN (GLUCOPHAGE) 500 MG tablet, Take 500 mg by mouth 2 (Two) Times a Day With Meals., Disp: , Rfl:   •  metoprolol tartrate (LOPRESSOR) 50 MG tablet, Take 50 mg by mouth 2 (Two) Times a Day., Disp: , Rfl:   •  quinapril (ACCUPRIL) 20 MG tablet, Take 20 mg by mouth Every Night., Disp: , Rfl:   •  sildenafil (REVATIO) 20 MG tablet, Take 1 tablet by mouth 3 (Three) Times a Day., Disp: 60 tablet, Rfl: 5  •  sildenafil (VIAGRA) 100 MG tablet, TAKE 1/2 TABLET BY MOUTH ONE TIME A DAY AS NEEDED, Disp: , Rfl:   •  pneumococcal polysaccharide 23-valent (Pneumovax 23) 25 MCG/0.5ML  "vaccine, Inject 0.5 mL into the appropriate muscle as directed by prescriber 1 (One) Time for 1 dose., Disp: 0.5 mL, Rfl: 0    Objective      Blood pressure 137/73, pulse 87, temperature 98 °F (36.7 °C), temperature source Temporal, resp. rate 16, height 188 cm (74\"), weight (!) 147 kg (324 lb 12.8 oz), SpO2 97 %.    Physical Exam     General Appearance:    Alert, cooperative, no distress, appears stated age   Head:    Normocephalic, without obvious abnormality, atraumatic   Eyes:    PERRL, conjunctiva/corneas clear, EOM's intact   Ears:    Normal TM's and external ear canals, both ears   Nose:   Nares normal, septum midline, mucosa normal, no drainage   or sinus tenderness   Throat:   Lips, mucosa, and tongue normal; teeth and gums normal   Neck:   Supple, symmetrical, trachea midline, no adenopathy;        thyroid:  No enlargement/tenderness/nodules; no carotid    bruit or JVD   Back:     Symmetric, no curvature, ROM normal, no CVA tenderness   Lungs:     Clear to auscultation bilaterally, respirations unlabored   Chest wall:    No tenderness or deformity   Heart:    Regular rate and rhythm, S1 and S2 normal, no murmur,        rub or gallop   Abdomen:     Soft, non-tender, bowel sounds active all four quadrants,     no masses, no organomegaly   Extremities:   Limited rom in left knee.  Extremities normal, atraumatic, no cyanosis or edema   Pulses:   2+ and symmetric all extremities   Skin:   Skin color, texture, turgor normal, no rashes or lesions   Lymph nodes:   Cervical, supraclavicular, and axillary nodes normal   Neurologic:   CNII-XII intact. Normal strength, sensation and reflexes       throughout      Results for orders placed or performed during the hospital encounter of 06/13/20   Anaerobic & Aerobic Culture ([facility] Only) - Swab, Toe, Right    Specimen: Toe, Right; Swab   Result Value Ref Range    Aer Anaer Result 1 Final report     Result 1 Comment     Culture Final report     Result 1 Mixed skin merari "          Assessment/Plan       Diagnoses and all orders for this visit:    1. Chronic pain of left knee (Primary)  -     Cane    2. Routine general medical examination at a health care facility    Other orders  -     sildenafil (REVATIO) 20 MG tablet; Take 1 tablet by mouth 3 (Three) Times a Day.  Dispense: 60 tablet; Refill: 5  -     FluLaval Quad >6 Months (8289-4730)  -     pneumococcal polysaccharide 23-valent (Pneumovax 23) 25 MCG/0.5ML vaccine; Inject 0.5 mL into the appropriate muscle as directed by prescriber 1 (One) Time for 1 dose.  Dispense: 0.5 mL; Refill: 0      Return in about 6 months (around 4/30/2021).          There are no Patient Instructions on file for this visit.     Jw Guido MD    Assessment/Plan

## 2020-11-09 ENCOUNTER — DOCUMENTATION (OUTPATIENT)
Dept: PHYSICAL THERAPY | Facility: HOSPITAL | Age: 50
End: 2020-11-09

## 2020-11-09 NOTE — THERAPY DISCHARGE NOTE
Outpatient Rehabilitation - Wound/Debridement Discharge Summary       Patient Name: Juarez Hunt  : 1970  MRN: [id number]  Today's Date: 2020                  Admit Date: (Not on file)    Visit Dx:  No diagnosis found.    Patient Active Problem List   Diagnosis   • ERIK on CPAP   • Type 2 diabetes mellitus without complication, with long-term current use of insulin (CMS/[facility])   • Acute pulmonary embolism without acute cor pulmonale (CMS/[facility])        Past Medical History:   Diagnosis Date   • Arthritis    • Blood clot in vein    • Diabetes mellitus (CMS/[facility])    • Hypertension         Past Surgical History:   Procedure Laterality Date   • CARDIAC CATHETERIZATION     • IVC FILTER RETRIEVAL     • KNEE SURGERY Left    • SKIN BIOPSY  2020    toe       Goals  PT OP Goals     Row Name 20 0800          PT Short Term Goals    STG 1  Pt will verbalize s/sx of infection.  -     STG 1 Progress  Met  -     STG 2  Pt will demonstrate 25% reduction in wound area to indicate healing progress.  -     STG 2 Progress  Met  -        Long Term Goals    LTG 1  Pt will demonstrate independence with clean home dressing changes.  -     LTG 1 Progress  Met  -     LTG 2  Pt will demonstrate 75% reduction in wound area to indicate healing progress.  -     LTG 2 Progress  Met  -       User Key  (r) = Recorded By, (t) = Taken By, (c) = Cosigned By    Initials Name Provider Type    Karolyn Montero, PT Physical Therapist          OP Discharge Summary     Row Name 20 0805             OP PT Discharge Summary    Date of Discharge  20  -      Reason for Discharge  All goals achieved;Independent  -      Outcomes Achieved  Able to achieve all goals within established timeline  -      Discharge Destination  Home without follow-up  -        User Key  (r) = Recorded By, (t) = Taken By, (c) = Cosigned By    Initials Name Provider Type    Karolyn Montero, PT Physical  Therapist          Karolyn Lomeli, PT  11/9/2020

## 2020-11-10 ENCOUNTER — OFFICE VISIT (OUTPATIENT)
Dept: NEUROLOGY | Facility: CLINIC | Age: 50
End: 2020-11-10

## 2020-11-10 VITALS
SYSTOLIC BLOOD PRESSURE: 122 MMHG | HEIGHT: 74 IN | HEART RATE: 86 BPM | DIASTOLIC BLOOD PRESSURE: 70 MMHG | BODY MASS INDEX: 40.43 KG/M2 | OXYGEN SATURATION: 93 % | TEMPERATURE: 97.5 F | WEIGHT: 315 LBS

## 2020-11-10 DIAGNOSIS — G47.19 EXCESSIVE DAYTIME SLEEPINESS: ICD-10-CM

## 2020-11-10 DIAGNOSIS — Z79.4 TYPE 2 DIABETES MELLITUS WITHOUT COMPLICATION, WITH LONG-TERM CURRENT USE OF INSULIN (HCC): ICD-10-CM

## 2020-11-10 DIAGNOSIS — E11.9 TYPE 2 DIABETES MELLITUS WITHOUT COMPLICATION, WITH LONG-TERM CURRENT USE OF INSULIN (HCC): ICD-10-CM

## 2020-11-10 DIAGNOSIS — Z86.711 HISTORY OF PULMONARY EMBOLISM: ICD-10-CM

## 2020-11-10 DIAGNOSIS — I10 ESSENTIAL HYPERTENSION: ICD-10-CM

## 2020-11-10 DIAGNOSIS — R06.83 SNORING: Primary | ICD-10-CM

## 2020-11-10 PROCEDURE — 99213 OFFICE O/P EST LOW 20 MIN: CPT | Performed by: NURSE PRACTITIONER

## 2020-11-10 NOTE — PROGRESS NOTES
New Sleep Patient Office Visit      Patient Name: Juarez Hunt  : 1970   MRN: [id number]     Referring Physician: Jw Guido MD    Chief Complaint:    Chief Complaint   Patient presents with   • Consult     NP,in office to establish care for sleep.       History of Present Illness: Juarez Hunt is a 50 y.o. male who is here today to establish care with Sleep Medicine.  Sleep questionnaire reviewed.  He complains of not sleeping but 2-3 hours at a time, waking up 1-4 times during the night, having difficulty returning to sleep after waking up, sleeping 2 to 3 hours per night, experiencing disturbed her restless sleep, feeling better with more sleep, having a history of working third shift, napping during the day, snoring moderately, being told he stops breathing during sleep, waking up with a dry mouth, experiencing memory loss/decreased concentration/decreased libido/daytime sleepiness, experiencing a crawling and aching feeling in his legs or an urge to move his legs prior to falling asleep.  Additional risk factors- BMI 41, hypertension, diabetes, pulmonary embolism.      Hopkins Score: 4    Subjective      Review of Systems:   Review of Systems   Constitutional: Positive for fatigue. Negative for fever, unexpected weight gain and unexpected weight loss.   HENT: Negative for hearing loss, sore throat, swollen glands, tinnitus and trouble swallowing.    Eyes: Negative for blurred vision, double vision, photophobia and visual disturbance.   Respiratory: Positive for apnea. Negative for cough, chest tightness and shortness of breath.    Cardiovascular: Negative for chest pain, palpitations and leg swelling.   Gastrointestinal: Negative for constipation, diarrhea and nausea.   Endocrine: Negative for cold intolerance and heat intolerance.   Musculoskeletal: Negative for gait problem, neck pain and neck stiffness.   Skin: Negative for color change and rash.   Allergic/Immunologic: Negative for  environmental allergies and food allergies.   Neurological: Negative for dizziness, syncope, facial asymmetry, speech difficulty, weakness, headache, memory problem and confusion.   Psychiatric/Behavioral: Positive for sleep disturbance. Negative for agitation, behavioral problems and depressed mood. The patient is not nervous/anxious.        Past Medical History:   Past Medical History:   Diagnosis Date   • Arthritis    • Blood clot in vein    • Diabetes mellitus (CMS/HCC)    • Hypertension        Past Surgical History:   Past Surgical History:   Procedure Laterality Date   • CARDIAC CATHETERIZATION     • IVC FILTER RETRIEVAL     • KNEE SURGERY Left    • SKIN BIOPSY  2020    toe       Family History:   Family History   Problem Relation Age of Onset   • Diabetes Mother    • Heart disease Mother    • Heart attack Mother    • Hypertension Mother    • Arthritis Father    • Heart attack Maternal Grandfather    • Heart attack Maternal Uncle    • Liver cancer Maternal Aunt        Social History:   Social History     Socioeconomic History   • Marital status:      Spouse name: Not on file   • Number of children: Not on file   • Years of education: Not on file   • Highest education level: Not on file   Tobacco Use   • Smoking status: Former Smoker     Quit date:      Years since quittin.8   • Smokeless tobacco: Current User     Types: Chew   Substance and Sexual Activity   • Alcohol use: Yes     Frequency: Monthly or less     Drinks per session: 1 or 2     Comment: 1/4 glass per week   • Drug use: No   • Sexual activity: Defer       Medications:     Current Outpatient Medications:   •  Eliquis 5 MG tablet tablet, Take 5 mg by mouth Every 12 (Twelve) Hours., Disp: , Rfl:   •  Insulin Glargine (BASAGLAR KWIKPEN) 100 UNIT/ML injection pen, inject 30 units sub-q two times a day, Disp: , Rfl:   •  [employer] Lancets Universal 33G misc, USE TO CHECK BLOOD GLUCOSE THREE TIMES DAILY OR AS DIRECTED, Disp: ,  "Rfl:   •  meloxicam (MOBIC) 7.5 MG tablet, Take 7.5 mg by mouth Daily., Disp: , Rfl:   •  metFORMIN (GLUCOPHAGE) 500 MG tablet, Take 500 mg by mouth 2 (Two) Times a Day With Meals., Disp: , Rfl:   •  metoprolol tartrate (LOPRESSOR) 50 MG tablet, Take 50 mg by mouth 2 (Two) Times a Day., Disp: , Rfl:   •  quinapril (ACCUPRIL) 20 MG tablet, Take 20 mg by mouth Every Night., Disp: , Rfl:   •  sildenafil (REVATIO) 20 MG tablet, Take 1 tablet by mouth 3 (Three) Times a Day., Disp: 60 tablet, Rfl: 5  •  sildenafil (VIAGRA) 100 MG tablet, TAKE 1/2 TABLET BY MOUTH ONE TIME A DAY AS NEEDED, Disp: , Rfl:     Allergies:   No Known Allergies    Objective     Physical Exam:  Vital Signs:   Vitals:    11/10/20 1304   BP: 122/70   BP Location: Left arm   Patient Position: Sitting   Pulse: 86   Temp: 97.5 °F (36.4 °C)   SpO2: 93%   Weight: (!) 146 kg (322 lb)   Height: 188 cm (74\")   PainSc: 0-No pain     BMI: Body mass index is 41.34 kg/m².  Neck Circumference: 17 5/8    Physical Exam  Vitals signs and nursing note reviewed.   Constitutional:       General: He is not in acute distress.     Appearance: He is well-developed. He is obese. He is not diaphoretic.   HENT:      Head: Normocephalic and atraumatic.      Comments: Mallampati 4  Eyes:      Conjunctiva/sclera: Conjunctivae normal.      Pupils: Pupils are equal, round, and reactive to light.   Neck:      Musculoskeletal: Neck supple.      Thyroid: No thyroid mass or thyromegaly.      Vascular: Normal carotid pulses.      Trachea: Trachea normal.   Cardiovascular:      Rate and Rhythm: Normal rate and regular rhythm.      Heart sounds: Normal heart sounds. No murmur. No friction rub. No gallop.    Pulmonary:      Effort: Pulmonary effort is normal. No respiratory distress.      Breath sounds: Normal breath sounds. No wheezing or rales.   Musculoskeletal: Normal range of motion.   Skin:     General: Skin is warm and dry.      Findings: No rash.   Neurological:      Mental " Status: He is alert and oriented to person, place, and time.   Psychiatric:         Behavior: Behavior normal.         Thought Content: Thought content normal.         Assessment / Plan      Assessment/Plan:   Diagnoses and all orders for this visit:    1. Snoring (Primary)  -     Polysomnography 4 or More Parameters; Future. If too expensive patient may do home sleep study.   - Advised patient to avoid driving if drowsy.   - Printed patient education on ERIK provided.     2. Excessive daytime sleepiness  -     Polysomnography 4 or More Parameters; Future    3. Type 2 diabetes mellitus without complication, with long-term current use of insulin (CMS/HCC)  -     Polysomnography 4 or More Parameters; Future    4. History of pulmonary embolism  -     Polysomnography 4 or More Parameters; Future    5. Essential hypertension  -     Polysomnography 4 or More Parameters; Future    6. BMI 40.0-44.9, adult (CMS/HCC)  -     Polysomnography 4 or More Parameters; Future  - The patient was counseled on goals and the need for weight reduction. They were directed to the NIH's website on weight management, (https://www.niddk.nih.gov/health-information/weight-management/health-tips-adults) which addresses the risks of being overweight or obese, a healthy diet, tips for losing weight, and the benefit of physical activity/exercise.         Follow Up:   Return in about 3 months (around 2/10/2021) for F/U Obstructive Sleep Apnea.    I have advised the patient the need to continue the use of CPAP.  Gold standard for treatment of sleep apnea includes weight loss, use of cpap and avoidance of alcohol.  Untreated ERIK may increase the risk for development of hypertension, stroke, myocardial infarction, diabetes, cardiovascular disease, work-related issues and driving accidents. I have counseled and advised the patient to avoid driving or operating heavy/dangerous equipment if feeling drowsy.     CORNELIA Morelos, FNP-C  [facility]  [facility] Neurology and Sleep Medicine       Please note that portions of this note may have been completed with a voice recognition program. Efforts were made to edit the dictations, but occasionally words are mistranscribed.

## 2020-11-19 ENCOUNTER — APPOINTMENT (OUTPATIENT)
Dept: SLEEP MEDICINE | Facility: HOSPITAL | Age: 50
End: 2020-11-19

## 2020-12-31 DIAGNOSIS — E11.9 TYPE 2 DIABETES MELLITUS WITHOUT COMPLICATION, WITHOUT LONG-TERM CURRENT USE OF INSULIN (HCC): Primary | ICD-10-CM

## 2020-12-31 DIAGNOSIS — Z12.5 PROSTATE CANCER SCREENING: ICD-10-CM

## 2021-01-04 ENCOUNTER — TELEPHONE (OUTPATIENT)
Dept: INTERNAL MEDICINE | Facility: CLINIC | Age: 51
End: 2021-01-04

## 2021-01-04 NOTE — TELEPHONE ENCOUNTER
Caller: Juarez Hunt    Relationship to patient: Self    Best call back number [phone]  Patient is needing: PATIENT RETURNED A PHONE CALL

## 2021-01-05 NOTE — TELEPHONE ENCOUNTER
Tried calling patient back with no answer and no voicemail set up, if patient calls back please inform that Dr. Guido called him yesterday for him to have labs done since he is overdue for those.   Hub may deliver message.

## 2021-02-15 RX ORDER — INSULIN GLARGINE 100 [IU]/ML
30 INJECTION, SOLUTION SUBCUTANEOUS 2 TIMES DAILY
Qty: 30 ML | Refills: 3 | Status: SHIPPED | OUTPATIENT
Start: 2021-02-15 | End: 2021-03-09 | Stop reason: SDUPTHER

## 2021-02-15 RX ORDER — INSULIN GLARGINE 100 [IU]/ML
30 INJECTION, SOLUTION SUBCUTANEOUS 2 TIMES DAILY
Qty: 30 ML | Refills: 3 | Status: SHIPPED | OUTPATIENT
Start: 2021-02-15 | End: 2021-02-15 | Stop reason: SDUPTHER

## 2021-02-15 NOTE — TELEPHONE ENCOUNTER
-- Message is from the [facility]--    Result Request  Type of Test / Lab: blood work   Date Test / Lab: 11/15/19  Location: [facility]   Test / Lab ordered/authorized by: Rupert    Other comments: Please call to review results. Patient has procedure scheduled this afternoon. She request a call back from the doctor to discuss that appointment.    Preferred Delivery Method   Call back patient with results.  Phone number:  [phone]    Caller Information       Type Contact Phone    11/18/2019 06:58 AM Phone (Incoming) Shraddha Mcneil (Self) [phone] (H)          Alternative phone number: n/a    Turnaround time given to caller:   \"This message will be sent to [state Provider's name]. The clinical team will fulfill your request as soon as they review your message.\"   Caller: Juarez Hunt    Relationship: Self    Best call back number: [phone]    Medication needed:   Requested Prescriptions     Pending Prescriptions Disp Refills   • Insulin Glargine (BASAGLAR KWIKPEN) 100 UNIT/ML injection pen 30 mL 2     Sig: Inject 30 Units under the skin into the appropriate area as directed 2 (Two) Times a Day.       When do you need the refill by: TODAY    What details did the patient provide when requesting the medication: PATIENT HAS 1/2 A PEN LEFT.    Does the patient have less than a 3 day supply:  [x] Yes  [] No    What is the patient's preferred pharmacy: [id number] - [place], [place] - [address] - [phone]  - [phone]

## 2021-02-24 LAB
ALBUMIN SERPL-MCNC: 4.3 G/DL (ref 3.5–5.2)
ALBUMIN/GLOB SERPL: 1.5 G/DL
ALP SERPL-CCNC: 85 U/L (ref 39–117)
ALT SERPL-CCNC: 45 U/L (ref 1–41)
AST SERPL-CCNC: 39 U/L (ref 1–40)
BASOPHILS # BLD AUTO: 0.1 10*3/MM3 (ref 0–0.2)
BASOPHILS NFR BLD AUTO: 1.5 % (ref 0–1.5)
BILIRUB SERPL-MCNC: 0.8 MG/DL (ref 0–1.2)
BUN SERPL-MCNC: 9 MG/DL (ref 6–20)
BUN/CREAT SERPL: 10.7 (ref 7–25)
CALCIUM SERPL-MCNC: 9.7 MG/DL (ref 8.6–10.5)
CHLORIDE SERPL-SCNC: 100 MMOL/L (ref 98–107)
CHOLEST SERPL-MCNC: 165 MG/DL (ref 0–200)
CO2 SERPL-SCNC: 29.8 MMOL/L (ref 22–29)
CREAT SERPL-MCNC: 0.84 MG/DL (ref 0.76–1.27)
EOSINOPHIL # BLD AUTO: 0.2 10*3/MM3 (ref 0–0.4)
EOSINOPHIL NFR BLD AUTO: 3 % (ref 0.3–6.2)
ERYTHROCYTE [DISTWIDTH] IN BLOOD BY AUTOMATED COUNT: 11.7 % (ref 12.3–15.4)
GLOBULIN SER CALC-MCNC: 2.8 GM/DL
GLUCOSE SERPL-MCNC: 216 MG/DL (ref 65–99)
HBA1C MFR BLD: 9.4 % (ref 4.8–5.6)
HCT VFR BLD AUTO: 51.4 % (ref 37.5–51)
HDLC SERPL-MCNC: 35 MG/DL (ref 40–60)
HGB BLD-MCNC: 17.6 G/DL (ref 13–17.7)
IMM GRANULOCYTES # BLD AUTO: 0.04 10*3/MM3 (ref 0–0.05)
IMM GRANULOCYTES NFR BLD AUTO: 0.6 % (ref 0–0.5)
LDLC SERPL CALC-MCNC: 105 MG/DL (ref 0–100)
LYMPHOCYTES # BLD AUTO: 2.07 10*3/MM3 (ref 0.7–3.1)
LYMPHOCYTES NFR BLD AUTO: 30.8 % (ref 19.6–45.3)
MCH RBC QN AUTO: 32.1 PG (ref 26.6–33)
MCHC RBC AUTO-ENTMCNC: 34.2 G/DL (ref 31.5–35.7)
MCV RBC AUTO: 93.8 FL (ref 79–97)
MICROALBUMIN UR-MCNC: 122.1 UG/ML
MONOCYTES # BLD AUTO: 0.54 10*3/MM3 (ref 0.1–0.9)
MONOCYTES NFR BLD AUTO: 8 % (ref 5–12)
NEUTROPHILS # BLD AUTO: 3.78 10*3/MM3 (ref 1.7–7)
NEUTROPHILS NFR BLD AUTO: 56.1 % (ref 42.7–76)
NRBC BLD AUTO-RTO: 0.1 /100 WBC (ref 0–0.2)
PLATELET # BLD AUTO: 281 10*3/MM3 (ref 140–450)
POTASSIUM SERPL-SCNC: 4.6 MMOL/L (ref 3.5–5.2)
PROT SERPL-MCNC: 7.1 G/DL (ref 6–8.5)
PSA SERPL-MCNC: 0.41 NG/ML (ref 0–4)
RBC # BLD AUTO: 5.48 10*6/MM3 (ref 4.14–5.8)
SODIUM SERPL-SCNC: 140 MMOL/L (ref 136–145)
T4 FREE SERPL-MCNC: 1.28 NG/DL (ref 0.93–1.7)
TRIGL SERPL-MCNC: 138 MG/DL (ref 0–150)
TSH SERPL DL<=0.005 MIU/L-ACNC: 1.4 UIU/ML (ref 0.27–4.2)
VIT B12 SERPL-MCNC: 530 PG/ML (ref 211–946)
VLDLC SERPL CALC-MCNC: 25 MG/DL (ref 5–40)
WBC # BLD AUTO: 6.73 10*3/MM3 (ref 3.4–10.8)

## 2021-03-02 RX ORDER — SILDENAFIL CITRATE 20 MG/1
TABLET ORAL
Qty: 60 TABLET | Refills: 0 | Status: SHIPPED | OUTPATIENT
Start: 2021-03-02 | End: 2021-03-29

## 2021-03-03 ENCOUNTER — TELEPHONE (OUTPATIENT)
Dept: INTERNAL MEDICINE | Facility: CLINIC | Age: 51
End: 2021-03-03

## 2021-03-09 NOTE — TELEPHONE ENCOUNTER
Caller: Nu Hunt    Relationship: Emergency Contact    Best call back number: [phone]    Medication needed:   Requested Prescriptions     Pending Prescriptions Disp Refills   • meloxicam (MOBIC) 7.5 MG tablet       Sig: Take 1 tablet by mouth Daily.   • Insulin Glargine (BASAGLAR KWIKPEN) 100 UNIT/ML injection pen 30 mL 3     Sig: Inject 30 Units under the skin into the appropriate area as directed 2 (Two) Times a Day.   • Eliquis 5 MG tablet tablet 60 tablet      Sig: Take 1 tablet by mouth Every 12 (Twelve) Hours.   • metFORMIN (GLUCOPHAGE) 500 MG tablet       Sig: Take 1 tablet by mouth 2 (Two) Times a Day With Meals.   • metoprolol tartrate (LOPRESSOR) 50 MG tablet       Sig: Take 1 tablet by mouth.   • quinapril (ACCUPRIL) 20 MG tablet       Sig: Take 1 tablet by mouth Every Night.       When do you need the refill by: 3/9/2021    What details did the patient provide when requesting the medication: PATIENT IS OUT OF MELOXICAM AND  HE HAS AT LEAST FOUR OF ALL THE REST.    Does the patient have less than a 3 day supply:  [x] Yes  [] No    What is the patient's preferred pharmacy: [id number] - [place], [place] - [address] [phone]  [place] [phone] [place]

## 2021-03-10 RX ORDER — QUINAPRIL 20 MG/1
20 TABLET ORAL NIGHTLY
Qty: 90 TABLET | Refills: 3 | Status: SHIPPED | OUTPATIENT
Start: 2021-03-10 | End: 2022-03-15 | Stop reason: SDUPTHER

## 2021-03-10 RX ORDER — INSULIN GLARGINE 100 [IU]/ML
30 INJECTION, SOLUTION SUBCUTANEOUS 2 TIMES DAILY
Qty: 30 ML | Refills: 3 | Status: SHIPPED | OUTPATIENT
Start: 2021-03-10 | End: 2021-05-13 | Stop reason: SDUPTHER

## 2021-03-10 RX ORDER — MELOXICAM 7.5 MG/1
7.5 TABLET ORAL DAILY
Qty: 30 TABLET | Refills: 5 | Status: SHIPPED | OUTPATIENT
Start: 2021-03-10 | End: 2021-09-02 | Stop reason: SDUPTHER

## 2021-03-10 RX ORDER — APIXABAN 5 MG/1
5 TABLET, FILM COATED ORAL EVERY 12 HOURS
Qty: 60 TABLET | Refills: 3 | Status: SHIPPED | OUTPATIENT
Start: 2021-03-10 | End: 2021-08-18 | Stop reason: SDUPTHER

## 2021-03-10 RX ORDER — METOPROLOL TARTRATE 50 MG/1
50 TABLET, FILM COATED ORAL 2 TIMES DAILY
Qty: 180 TABLET | Refills: 3 | Status: SHIPPED | OUTPATIENT
Start: 2021-03-10 | End: 2022-03-29

## 2021-03-29 ENCOUNTER — OFFICE VISIT (OUTPATIENT)
Dept: INTERNAL MEDICINE | Facility: CLINIC | Age: 51
End: 2021-03-29

## 2021-03-29 VITALS
TEMPERATURE: 97.1 F | HEIGHT: 74 IN | HEART RATE: 94 BPM | OXYGEN SATURATION: 98 % | DIASTOLIC BLOOD PRESSURE: 82 MMHG | BODY MASS INDEX: 40.43 KG/M2 | WEIGHT: 315 LBS | SYSTOLIC BLOOD PRESSURE: 120 MMHG | RESPIRATION RATE: 16 BRPM

## 2021-03-29 DIAGNOSIS — R07.89 OTHER CHEST PAIN: Primary | ICD-10-CM

## 2021-03-29 DIAGNOSIS — Z86.711 HX PULMONARY EMBOLISM: ICD-10-CM

## 2021-03-29 PROCEDURE — 99214 OFFICE O/P EST MOD 30 MIN: CPT | Performed by: INTERNAL MEDICINE

## 2021-03-29 PROCEDURE — 93000 ELECTROCARDIOGRAM COMPLETE: CPT | Performed by: INTERNAL MEDICINE

## 2021-03-29 RX ORDER — SILDENAFIL CITRATE 20 MG/1
TABLET ORAL
Qty: 60 TABLET | Refills: 1 | Status: SHIPPED | OUTPATIENT
Start: 2021-03-29 | End: 2021-09-03

## 2021-03-29 NOTE — PROGRESS NOTES
Subjective     Patient ID: Juarez Hunt is a 50 y.o. male. Patient is here for management of multiple medical problems.     Chief Complaint   Patient presents with   • Chest Pain     History of Present Illness   Chest pain. Thought he had covid and is neg.   Weakness, tired. blury vission.    Then in 1.5 days better. All in JAN.         Answers for HPI/ROS submitted by the patient on 3/8/2021  What is the primary reason for your visit?: Other  Please describe your symptoms.: Various symptoms possible heart related with correlation to mild exercise in cold weather.  Have you had these symptoms before?: No  How long have you been having these symptoms?: Greater than 2 weeks  Please list any medications you are currently taking for this condition.: As prescribed  Please describe any probable cause for these symptoms. : Seemed to have started in Jan 2021 with major event around Jan 12 2021 have improved since    Was walking and topped a hill in JAN.       Clearing ice of out side got ill. Not as bad.       Hx of cor pulmonary emboli.    Not on cpap yet.     Last covid vaccine. 3/19. Last chest pain symptoms. Mild discomfort on both sides of chest.  Can walk a mile and feel fine.            The following portions of the patient's history were reviewed and updated as appropriate: allergies, current medications, past family history, past medical history, past social history, past surgical history and problem list.    Review of Systems   Constitutional: Negative for fatigue.   Musculoskeletal: Negative for gait problem and joint swelling.   Psychiatric/Behavioral: Negative for self-injury. The patient is not nervous/anxious.    All other systems reviewed and are negative.      Current Outpatient Medications:   •  Eliquis 5 MG tablet tablet, Take 1 tablet by mouth Every 12 (Twelve) Hours., Disp: 60 tablet, Rfl: 3  •  Insulin Glargine (BASAGLAR KWIKPEN) 100 UNIT/ML injection pen, Inject 30 Units under the skin into the  "appropriate area as directed 2 (Two) Times a Day., Disp: 30 mL, Rfl: 3  •  [employer] Lancets Universal 33G misc, USE TO CHECK BLOOD GLUCOSE THREE TIMES DAILY OR AS DIRECTED, Disp: , Rfl:   •  meloxicam (MOBIC) 7.5 MG tablet, Take 1 tablet by mouth Daily., Disp: 30 tablet, Rfl: 5  •  metFORMIN (GLUCOPHAGE) 500 MG tablet, Take 1 tablet by mouth 2 (Two) Times a Day With Meals., Disp: 180 tablet, Rfl: 3  •  metoprolol tartrate (LOPRESSOR) 50 MG tablet, Take 1 tablet by mouth 2 (Two) Times a Day., Disp: 180 tablet, Rfl: 3  •  quinapril (ACCUPRIL) 20 MG tablet, Take 1 tablet by mouth Every Night., Disp: 90 tablet, Rfl: 3  •  sildenafil (REVATIO) 20 MG tablet, TAKE 1 TABLET BY MOUTH THREE TIMES A DAY , Disp: 60 tablet, Rfl: 0  •  sildenafil (VIAGRA) 100 MG tablet, TAKE 1/2 TABLET BY MOUTH ONE TIME A DAY AS NEEDED, Disp: , Rfl:     Objective      Blood pressure 120/82, pulse 94, temperature 97.1 °F (36.2 °C), resp. rate 16, height 188 cm (74\"), weight (!) 147 kg (324 lb), SpO2 98 %.    Physical Exam     General Appearance:    Alert, cooperative, no distress, appears stated age   Head:    Normocephalic, without obvious abnormality, atraumatic   Eyes:    PERRL, conjunctiva/corneas clear, EOM's intact   Ears:    Normal TM's and external ear canals, both ears   Nose:   Nares normal, septum midline, mucosa normal, no drainage   or sinus tenderness   Throat:   Lips, mucosa, and tongue normal; teeth and gums normal   Neck:   Supple, symmetrical, trachea midline, no adenopathy;        thyroid:  No enlargement/tenderness/nodules; no carotid    bruit or JVD   Back:     Symmetric, no curvature, ROM normal, no CVA tenderness   Lungs:     Clear to auscultation bilaterally, respirations unlabored   Chest wall:    No tenderness or deformity   Heart:    Regular rate and rhythm, S1 and S2 normal, no murmur,        rub or gallop   Abdomen:     Soft, non-tender, bowel sounds active all four quadrants,     no masses, no organomegaly "   Extremities:   Extremities normal, atraumatic, no cyanosis or edema   Pulses:   2+ and symmetric all extremities   Skin:   Skin color, texture, turgor normal, no rashes or lesions   Lymph nodes:   Cervical, supraclavicular, and axillary nodes normal   Neurologic:   CNII-XII intact. Normal strength, sensation and reflexes       throughout      Results for orders placed or performed in visit on 12/31/20   Comprehensive Metabolic Panel    Specimen: Blood   Result Value Ref Range    Glucose 216 (H) 65 - 99 mg/dL    BUN 9 6 - 20 mg/dL    Creatinine 0.84 0.76 - 1.27 mg/dL    eGFR Non African Am 96 >60 mL/min/1.73    eGFR African Am 117 >60 mL/min/1.73    BUN/Creatinine Ratio 10.7 7.0 - 25.0    Sodium 140 136 - 145 mmol/L    Potassium 4.6 3.5 - 5.2 mmol/L    Chloride 100 98 - 107 mmol/L    Total CO2 29.8 (H) 22.0 - 29.0 mmol/L    Calcium 9.7 8.6 - 10.5 mg/dL    Total Protein 7.1 6.0 - 8.5 g/dL    Albumin 4.30 3.50 - 5.20 g/dL    Globulin 2.8 gm/dL    A/G Ratio 1.5 g/dL    Total Bilirubin 0.8 0.0 - 1.2 mg/dL    Alkaline Phosphatase 85 39 - 117 U/L    AST (SGOT) 39 1 - 40 U/L    ALT (SGPT) 45 (H) 1 - 41 U/L   TSH    Specimen: Blood   Result Value Ref Range    TSH 1.400 0.270 - 4.200 uIU/mL   T4, Free    Specimen: Blood   Result Value Ref Range    Free T4 1.28 0.93 - 1.70 ng/dL   Vitamin B12    Specimen: Blood   Result Value Ref Range    Vitamin B-12 530 211 - 946 pg/mL   Lipid Panel    Specimen: Blood   Result Value Ref Range    Total Cholesterol 165 0 - 200 mg/dL    Triglycerides 138 0 - 150 mg/dL    HDL Cholesterol 35 (L) 40 - 60 mg/dL    VLDL Cholesterol Kamran 25 5 - 40 mg/dL    LDL Chol Calc (NIH) 105 (H) 0 - 100 mg/dL   Hemoglobin A1c    Specimen: Blood   Result Value Ref Range    Hemoglobin A1C 9.40 (H) 4.80 - 5.60 %   MicroAlbumin, Urine, Random - Urine, Clean Catch    Specimen: Urine, Clean Catch   Result Value Ref Range    Microalbumin, Urine 122.1 Not Estab. ug/mL   PSA Screen    Specimen: Blood   Result Value Ref  Range    PSA 0.410 0.000 - 4.000 ng/mL   CBC & Differential    Specimen: Blood   Result Value Ref Range    WBC 6.73 3.40 - 10.80 10*3/mm3    RBC 5.48 4.14 - 5.80 10*6/mm3    Hemoglobin 17.6 13.0 - 17.7 g/dL    Hematocrit 51.4 (H) 37.5 - 51.0 %    MCV 93.8 79.0 - 97.0 fL    MCH 32.1 26.6 - 33.0 pg    MCHC 34.2 31.5 - 35.7 g/dL    RDW 11.7 (L) 12.3 - 15.4 %    Platelets 281 140 - 450 10*3/mm3    Neutrophil Rel % 56.1 42.7 - 76.0 %    Lymphocyte Rel % 30.8 19.6 - 45.3 %    Monocyte Rel % 8.0 5.0 - 12.0 %    Eosinophil Rel % 3.0 0.3 - 6.2 %    Basophil Rel % 1.5 0.0 - 1.5 %    Neutrophils Absolute 3.78 1.70 - 7.00 10*3/mm3    Lymphocytes Absolute 2.07 0.70 - 3.10 10*3/mm3    Monocytes Absolute 0.54 0.10 - 0.90 10*3/mm3    Eosinophils Absolute 0.20 0.00 - 0.40 10*3/mm3    Basophils Absolute 0.10 0.00 - 0.20 10*3/mm3    Immature Granulocyte Rel % 0.6 (H) 0.0 - 0.5 %    Immature Grans Absolute 0.04 0.00 - 0.05 10*3/mm3    nRBC 0.1 0.0 - 0.2 /100 WBC         Assessment/Plan   Start asa 81 mg.      ECG 12 Lead    Date/Time: 3/29/2021 3:22 PM  Performed by: Jw Guido MD  Authorized by: Jw Guido MD   Comparison: compared with previous ECG from 6/12/2018  Rhythm: sinus rhythm  Rate: normal  Conduction: left anterior fascicular block  QRS axis: left  Other: no other findings    Clinical impression: abnormal EKG            Pt will call and get a sleep for randell.        Diagnoses and all orders for this visit:    1. Other chest pain (Primary)  -     ECG 12 Lead  -     Ambulatory Referral to Cardiology    2. Hx pulmonary embolism    Other orders  -     ECG 12 Lead      Return in about 6 weeks (around 5/10/2021).          There are no Patient Instructions on file for this visit.     Jw Guido MD    Assessment/Plan

## 2021-04-06 ENCOUNTER — TELEPHONE (OUTPATIENT)
Dept: INTERNAL MEDICINE | Facility: CLINIC | Age: 51
End: 2021-04-06

## 2021-04-06 ENCOUNTER — APPOINTMENT (OUTPATIENT)
Dept: GENERAL RADIOLOGY | Facility: HOSPITAL | Age: 51
End: 2021-04-06

## 2021-04-06 ENCOUNTER — HOSPITAL ENCOUNTER (EMERGENCY)
Facility: HOSPITAL | Age: 51
Discharge: HOME | End: 2021-04-06
Attending: EMERGENCY MEDICINE | Admitting: EMERGENCY MEDICINE

## 2021-04-06 VITALS
HEIGHT: 74 IN | WEIGHT: 315 LBS | SYSTOLIC BLOOD PRESSURE: 132 MMHG | TEMPERATURE: 97.6 F | BODY MASS INDEX: 40.43 KG/M2 | HEART RATE: 98 BPM | DIASTOLIC BLOOD PRESSURE: 77 MMHG | RESPIRATION RATE: 20 BRPM | OXYGEN SATURATION: 96 %

## 2021-04-06 DIAGNOSIS — J06.9 UPPER RESPIRATORY TRACT INFECTION, UNSPECIFIED TYPE: Primary | ICD-10-CM

## 2021-04-06 LAB
ALBUMIN SERPL-MCNC: 4 G/DL (ref 3.5–5.2)
ALBUMIN/GLOB SERPL: 1.7 G/DL
ALP SERPL-CCNC: 84 U/L (ref 39–117)
ALT SERPL W P-5'-P-CCNC: 33 U/L (ref 1–41)
ANION GAP SERPL CALCULATED.3IONS-SCNC: 11.4 MMOL/L (ref 5–15)
AST SERPL-CCNC: 28 U/L (ref 1–40)
BASOPHILS # BLD AUTO: 0.09 10*3/MM3 (ref 0–0.2)
BASOPHILS NFR BLD AUTO: 0.9 % (ref 0–1.5)
BILIRUB SERPL-MCNC: 0.8 MG/DL (ref 0–1.2)
BUN SERPL-MCNC: 11 MG/DL (ref 6–20)
BUN/CREAT SERPL: 15.3 (ref 7–25)
CALCIUM SPEC-SCNC: 9 MG/DL (ref 8.6–10.5)
CHLORIDE SERPL-SCNC: 101 MMOL/L (ref 98–107)
CO2 SERPL-SCNC: 25.6 MMOL/L (ref 22–29)
CREAT SERPL-MCNC: 0.72 MG/DL (ref 0.76–1.27)
D DIMER PPP FEU-MCNC: 0.29 MCGFEU/ML (ref 0–0.57)
DEPRECATED RDW RBC AUTO: 41.2 FL (ref 37–54)
EOSINOPHIL # BLD AUTO: 0.25 10*3/MM3 (ref 0–0.4)
EOSINOPHIL NFR BLD AUTO: 2.6 % (ref 0.3–6.2)
ERYTHROCYTE [DISTWIDTH] IN BLOOD BY AUTOMATED COUNT: 12.2 % (ref 12.3–15.4)
FLUAV RNA RESP QL NAA+PROBE: NOT DETECTED
FLUBV RNA RESP QL NAA+PROBE: NOT DETECTED
GFR SERPL CREATININE-BSD FRML MDRD: 115 ML/MIN/1.73
GLOBULIN UR ELPH-MCNC: 2.4 GM/DL
GLUCOSE SERPL-MCNC: 259 MG/DL (ref 65–99)
HCT VFR BLD AUTO: 45.7 % (ref 37.5–51)
HGB BLD-MCNC: 15.8 G/DL (ref 13–17.7)
IMM GRANULOCYTES # BLD AUTO: 0.06 10*3/MM3 (ref 0–0.05)
IMM GRANULOCYTES NFR BLD AUTO: 0.6 % (ref 0–0.5)
LYMPHOCYTES # BLD AUTO: 2.07 10*3/MM3 (ref 0.7–3.1)
LYMPHOCYTES NFR BLD AUTO: 21.2 % (ref 19.6–45.3)
MCH RBC QN AUTO: 31.9 PG (ref 26.6–33)
MCHC RBC AUTO-ENTMCNC: 34.6 G/DL (ref 31.5–35.7)
MCV RBC AUTO: 92.1 FL (ref 79–97)
MONOCYTES # BLD AUTO: 0.82 10*3/MM3 (ref 0.1–0.9)
MONOCYTES NFR BLD AUTO: 8.4 % (ref 5–12)
NEUTROPHILS NFR BLD AUTO: 6.46 10*3/MM3 (ref 1.7–7)
NEUTROPHILS NFR BLD AUTO: 66.3 % (ref 42.7–76)
NRBC BLD AUTO-RTO: 0 /100 WBC (ref 0–0.2)
NT-PROBNP SERPL-MCNC: 27.1 PG/ML (ref 0–900)
PLATELET # BLD AUTO: 220 10*3/MM3 (ref 140–450)
PMV BLD AUTO: 10.9 FL (ref 6–12)
POTASSIUM SERPL-SCNC: 4.3 MMOL/L (ref 3.5–5.2)
PROT SERPL-MCNC: 6.4 G/DL (ref 6–8.5)
RBC # BLD AUTO: 4.96 10*6/MM3 (ref 4.14–5.8)
SARS-COV-2 RNA RESP QL NAA+PROBE: NOT DETECTED
SODIUM SERPL-SCNC: 138 MMOL/L (ref 136–145)
TROPONIN T SERPL-MCNC: <0.01 NG/ML (ref 0–0.03)
WBC # BLD AUTO: 9.75 10*3/MM3 (ref 3.4–10.8)

## 2021-04-06 PROCEDURE — 85025 COMPLETE CBC W/AUTO DIFF WBC: CPT | Performed by: EMERGENCY MEDICINE

## 2021-04-06 PROCEDURE — 85379 FIBRIN DEGRADATION QUANT: CPT | Performed by: EMERGENCY MEDICINE

## 2021-04-06 PROCEDURE — 80053 COMPREHEN METABOLIC PANEL: CPT | Performed by: EMERGENCY MEDICINE

## 2021-04-06 PROCEDURE — 84484 ASSAY OF TROPONIN QUANT: CPT | Performed by: EMERGENCY MEDICINE

## 2021-04-06 PROCEDURE — 93005 ELECTROCARDIOGRAM TRACING: CPT | Performed by: EMERGENCY MEDICINE

## 2021-04-06 PROCEDURE — 99284 EMERGENCY DEPT VISIT MOD MDM: CPT

## 2021-04-06 PROCEDURE — 96374 THER/PROPH/DIAG INJ IV PUSH: CPT

## 2021-04-06 PROCEDURE — 83880 ASSAY OF NATRIURETIC PEPTIDE: CPT | Performed by: EMERGENCY MEDICINE

## 2021-04-06 PROCEDURE — 99283 EMERGENCY DEPT VISIT LOW MDM: CPT

## 2021-04-06 PROCEDURE — 71045 X-RAY EXAM CHEST 1 VIEW: CPT

## 2021-04-06 PROCEDURE — 25010000002 DEXAMETHASONE SODIUM PHOSPHATE 10 MG/ML SOLUTION: Performed by: EMERGENCY MEDICINE

## 2021-04-06 PROCEDURE — 87636 SARSCOV2 & INF A&B AMP PRB: CPT | Performed by: EMERGENCY MEDICINE

## 2021-04-06 RX ORDER — DEXAMETHASONE SODIUM PHOSPHATE 10 MG/ML
10 INJECTION, SOLUTION INTRAMUSCULAR; INTRAVENOUS ONCE
Status: COMPLETED | OUTPATIENT
Start: 2021-04-06 | End: 2021-04-06

## 2021-04-06 RX ORDER — DOXYCYCLINE 100 MG/1
100 CAPSULE ORAL EVERY 12 HOURS SCHEDULED
Qty: 20 CAPSULE | Refills: 0 | Status: SHIPPED | OUTPATIENT
Start: 2021-04-06 | End: 2021-04-30

## 2021-04-06 RX ADMIN — DEXAMETHASONE SODIUM PHOSPHATE 10 MG: 10 INJECTION, SOLUTION INTRAMUSCULAR; INTRAVENOUS at 04:29

## 2021-04-06 NOTE — ED PROVIDER NOTES
Subjective   50-year-old male presenting with shortness of breath.  He states that all day yesterday he had some cough, it is mostly nonproductive.  He has had progressive shortness of breath throughout the evening and tonight.  He denies any fevers, chills, nausea, vomiting, chest pain.  He does note a history of previous pulmonary embolus.  No sick contacts that he knows of.          Review of Systems   Constitutional: Negative.    HENT: Negative.    Eyes: Negative.    Respiratory: Positive for cough and shortness of breath.    Cardiovascular: Negative.    Gastrointestinal: Negative.    Genitourinary: Negative.    Musculoskeletal: Negative.    Skin: Negative.    Neurological: Negative.    Psychiatric/Behavioral: Negative.        Past Medical History:   Diagnosis Date   • Arthritis    • Blood clot in vein    • Diabetes mellitus (CMS/HCC)    • Hypertension        No Known Allergies    Past Surgical History:   Procedure Laterality Date   • CARDIAC CATHETERIZATION     • IVC FILTER RETRIEVAL     • KNEE SURGERY Left    • SKIN BIOPSY  2020    toe       Family History   Problem Relation Age of Onset   • Diabetes Mother    • Heart disease Mother    • Heart attack Mother    • Hypertension Mother    • Arthritis Father    • Heart attack Maternal Grandfather    • Heart attack Maternal Uncle    • Liver cancer Maternal Aunt        Social History     Socioeconomic History   • Marital status:      Spouse name: Not on file   • Number of children: Not on file   • Years of education: Not on file   • Highest education level: Not on file   Tobacco Use   • Smoking status: Former Smoker     Quit date:      Years since quittin.2   • Smokeless tobacco: Current User     Types: Chew   Substance and Sexual Activity   • Alcohol use: Yes     Comment: 1/4 glass per week   • Drug use: No   • Sexual activity: Defer           Objective   Physical Exam  Vitals reviewed.   Constitutional:       General: He is not in  acute distress.     Appearance: Normal appearance. He is not ill-appearing, toxic-appearing or diaphoretic.   HENT:      Head: Normocephalic and atraumatic.      Right Ear: External ear normal.      Left Ear: External ear normal.      Nose: Nose normal.      Mouth/Throat:      Mouth: Mucous membranes are moist.      Pharynx: Oropharynx is clear.   Eyes:      Extraocular Movements: Extraocular movements intact.      Conjunctiva/sclera: Conjunctivae normal.      Pupils: Pupils are equal, round, and reactive to light.   Cardiovascular:      Rate and Rhythm: Regular rhythm.      Pulses: Normal pulses.      Heart sounds: Normal heart sounds.      Comments: Mild tachycardia  Pulmonary:      Effort: Pulmonary effort is normal. No respiratory distress.      Breath sounds: Normal breath sounds. No stridor. No wheezing, rhonchi or rales.   Abdominal:      General: Bowel sounds are normal. There is no distension.      Tenderness: There is no abdominal tenderness.   Musculoskeletal:         General: No swelling, tenderness or deformity. Normal range of motion.      Cervical back: Normal range of motion and neck supple.   Skin:     General: Skin is warm and dry.      Capillary Refill: Capillary refill takes less than 2 seconds.      Findings: No rash.   Neurological:      General: No focal deficit present.      Mental Status: He is alert and oriented to person, place, and time.   Psychiatric:         Mood and Affect: Mood normal.         Behavior: Behavior normal.         Procedures           ED Course                                           MDM  Number of Diagnoses or Management Options  Upper respiratory tract infection, unspecified type  Diagnosis management comments: 50-year-old male with cough and shortness of breath.  Well-developed, well-nourished obese man in no distress with exam as above.  Is mildly tachycardic.  His lungs are clear.  Will obtain labs, EKG, chest x-ray.  Given his tachycardia and history of previous  blood clot will obtain D-dimer.  Will give symptomatic treatment.  Disposition pending.    DDx: Viral illness including COVID-19, pneumonia, CHF, ACS, PE    EKG interpreted by me: sinus rhythm, normal rate, LAFB, no acute ST/T changes, this is an abnormal EKG, this is similar to previous    Work-up here is without acute or significant abnormality.  He feels well, resting comfortably.  Will discharge home with supportive measures and outpatient follow-up.      Final diagnoses:   Upper respiratory tract infection, unspecified type        Jw Santana MD  04/06/21 [id number]

## 2021-04-06 NOTE — TELEPHONE ENCOUNTER
Caller: Nu Hunt    Relationship: Emergency Contact    Best call back number: [phone]     What medication are you requesting: ANTIBIOTIC     What are your current symptoms: UPPER RESPIRATORY INFECTION     How long have you been experiencing symptoms:  3 DAYS     Have you had these symptoms before:    [x] Yes  [] No    Have you been treated for these symptoms before:   [x] Yes  [] No    If a prescription is needed, what is your preferred pharmacy and phone number:    [id number] - [place], [place] - [address] - [phone] [facility] [phone]   [phone]    Additional notes:  PATIENT'S (WIFE) NU STATED THAT PATIENT WAS SEEN AT THE ER LAST NIGHT AND WAS DIAGNOSED WITH UPPER RESPIRATORY INFECTION AND WAS GIVEN A STEROID AND COUGH SYRUP. PATIENT WOULD LIKE A ANTIBIOTIC CALLED INTO THE PHARMACY.     [address] [address] [phone]

## 2021-04-13 ENCOUNTER — APPOINTMENT (OUTPATIENT)
Dept: SLEEP MEDICINE | Facility: HOSPITAL | Age: 51
End: 2021-04-13

## 2021-04-23 ENCOUNTER — HOSPITAL ENCOUNTER (OUTPATIENT)
Dept: SLEEP MEDICINE | Facility: HOSPITAL | Age: 51
Discharge: HOME | End: 2021-04-23
Admitting: NURSE PRACTITIONER

## 2021-04-23 DIAGNOSIS — E11.9 TYPE 2 DIABETES MELLITUS WITHOUT COMPLICATION, WITH LONG-TERM CURRENT USE OF INSULIN (HCC): ICD-10-CM

## 2021-04-23 DIAGNOSIS — G47.19 EXCESSIVE DAYTIME SLEEPINESS: ICD-10-CM

## 2021-04-23 DIAGNOSIS — R06.83 SNORING: ICD-10-CM

## 2021-04-23 DIAGNOSIS — Z79.4 TYPE 2 DIABETES MELLITUS WITHOUT COMPLICATION, WITH LONG-TERM CURRENT USE OF INSULIN (HCC): ICD-10-CM

## 2021-04-23 DIAGNOSIS — I10 ESSENTIAL HYPERTENSION: ICD-10-CM

## 2021-04-23 DIAGNOSIS — Z86.711 HISTORY OF PULMONARY EMBOLISM: ICD-10-CM

## 2021-04-23 PROCEDURE — 95810 POLYSOM 6/> YRS 4/> PARAM: CPT | Performed by: INTERNAL MEDICINE

## 2021-04-23 PROCEDURE — 95810 POLYSOM 6/> YRS 4/> PARAM: CPT

## 2021-04-30 ENCOUNTER — OFFICE VISIT (OUTPATIENT)
Dept: INTERNAL MEDICINE | Facility: CLINIC | Age: 51
End: 2021-04-30

## 2021-04-30 VITALS
DIASTOLIC BLOOD PRESSURE: 80 MMHG | OXYGEN SATURATION: 95 % | WEIGHT: 315 LBS | HEIGHT: 74 IN | SYSTOLIC BLOOD PRESSURE: 110 MMHG | RESPIRATION RATE: 16 BRPM | HEART RATE: 90 BPM | TEMPERATURE: 96.8 F | BODY MASS INDEX: 40.43 KG/M2

## 2021-04-30 DIAGNOSIS — E11.9 TYPE 2 DIABETES MELLITUS WITHOUT COMPLICATION, WITHOUT LONG-TERM CURRENT USE OF INSULIN (HCC): Primary | ICD-10-CM

## 2021-04-30 PROCEDURE — 99213 OFFICE O/P EST LOW 20 MIN: CPT | Performed by: INTERNAL MEDICINE

## 2021-04-30 RX ORDER — ORAL SEMAGLUTIDE 3 MG/1
3 TABLET ORAL DAILY
Qty: 30 TABLET | Refills: 11 | Status: SHIPPED | OUTPATIENT
Start: 2021-04-30 | End: 2021-09-24

## 2021-04-30 NOTE — PROGRESS NOTES
Subjective     Patient ID: Juarez Hunt is a 50 y.o. male. Patient is here for management of multiple medical problems.     Chief Complaint   Patient presents with   • Sleep Apnea   • Knee Pain     Left Knee     History of Present Illness     Knee pain.   Left.  Blood in joint in past.  Arthritis and pain x years.   Declined replacement for now.           Sleep apnea.     Had pfizer  covid vac.        The following portions of the patient's history were reviewed and updated as appropriate: allergies, current medications, past family history, past medical history, past social history, past surgical history and problem list.    Review of Systems   Constitutional: Negative for fatigue.   All other systems reviewed and are negative.      Current Outpatient Medications:   •  Eliquis 5 MG tablet tablet, Take 1 tablet by mouth Every 12 (Twelve) Hours., Disp: 60 tablet, Rfl: 3  •  HYDROcod Polst-CPM Polst ER (Tussionex Pennkinetic ER) 10-8 MG/5ML ER suspension, Take 5 mL by mouth Every 12 (Twelve) Hours As Needed for Cough., Disp: 115 mL, Rfl: 0  •  Insulin Glargine (BASAGLAR KWIKPEN) 100 UNIT/ML injection pen, Inject 30 Units under the skin into the appropriate area as directed 2 (Two) Times a Day., Disp: 30 mL, Rfl: 3  •  [employer] Lancets Universal 33G misc, USE TO CHECK BLOOD GLUCOSE THREE TIMES DAILY OR AS DIRECTED, Disp: , Rfl:   •  meloxicam (MOBIC) 7.5 MG tablet, Take 1 tablet by mouth Daily., Disp: 30 tablet, Rfl: 5  •  metFORMIN (GLUCOPHAGE) 500 MG tablet, Take 1 tablet by mouth 2 (Two) Times a Day With Meals., Disp: 180 tablet, Rfl: 3  •  metoprolol tartrate (LOPRESSOR) 50 MG tablet, Take 1 tablet by mouth 2 (Two) Times a Day., Disp: 180 tablet, Rfl: 3  •  quinapril (ACCUPRIL) 20 MG tablet, Take 1 tablet by mouth Every Night., Disp: 90 tablet, Rfl: 3  •  sildenafil (REVATIO) 20 MG tablet, TAKE 1 TABLET BY MOUTH THREE TIMES A DAY , Disp: 60 tablet, Rfl: 1  •  sildenafil (VIAGRA) 100 MG tablet, TAKE 1/2 TABLET BY  "MOUTH ONE TIME A DAY AS NEEDED, Disp: , Rfl:   •  Semaglutide (Rybelsus) 3 MG tablet, Take 3 mg by mouth Daily., Disp: 30 tablet, Rfl: 11    Objective      Blood pressure 110/80, pulse 90, temperature 96.8 °F (36 °C), resp. rate 16, height 188 cm (74\"), weight (!) 144 kg (317 lb), SpO2 95 %.    Physical Exam     General Appearance:    Alert, cooperative, no distress, appears stated age   Head:    Normocephalic, without obvious abnormality, atraumatic   Eyes:    PERRL, conjunctiva/corneas clear, EOM's intact   Ears:    Normal TM's and external ear canals, both ears   Nose:   Nares normal, septum midline, mucosa normal, no drainage   or sinus tenderness   Throat:   Lips, mucosa, and tongue normal; teeth and gums normal   Neck:   Supple, symmetrical, trachea midline, no adenopathy;        thyroid:  No enlargement/tenderness/nodules; no carotid    bruit or JVD   Back:     Symmetric, no curvature, ROM normal, no CVA tenderness   Lungs:     Clear to auscultation bilaterally, respirations unlabored   Chest wall:    No tenderness or deformity   Heart:    Regular rate and rhythm, S1 and S2 normal, no murmur,        rub or gallop   Abdomen:     Soft, non-tender, bowel sounds active all four quadrants,     no masses, no organomegaly   Extremities:   Extremities normal, atraumatic, no cyanosis or edema   Pulses:   2+ and symmetric all extremities   Skin:   Skin color, texture, turgor normal, no rashes or lesions   Lymph nodes:   Cervical, supraclavicular, and axillary nodes normal   Neurologic:   CNII-XII intact. Normal strength, sensation and reflexes       throughout      Results for orders placed or performed during the hospital encounter of 04/06/21   COVID-19 and FLU A/B PCR - Swab, Nasopharynx    Specimen: Nasopharynx; Swab   Result Value Ref Range    COVID19 Not Detected Not Detected - Ref. Range    Influenza A PCR Not Detected Not Detected    Influenza B PCR Not Detected Not Detected   Comprehensive Metabolic Panel    " Specimen: Blood   Result Value Ref Range    Glucose 259 (H) 65 - 99 mg/dL    BUN 11 6 - 20 mg/dL    Creatinine 0.72 (L) 0.76 - 1.27 mg/dL    Sodium 138 136 - 145 mmol/L    Potassium 4.3 3.5 - 5.2 mmol/L    Chloride 101 98 - 107 mmol/L    CO2 25.6 22.0 - 29.0 mmol/L    Calcium 9.0 8.6 - 10.5 mg/dL    Total Protein 6.4 6.0 - 8.5 g/dL    Albumin 4.00 3.50 - 5.20 g/dL    ALT (SGPT) 33 1 - 41 U/L    AST (SGOT) 28 1 - 40 U/L    Alkaline Phosphatase 84 39 - 117 U/L    Total Bilirubin 0.8 0.0 - 1.2 mg/dL    eGFR Non African Amer 115 >60 mL/min/1.73    Globulin 2.4 gm/dL    A/G Ratio 1.7 g/dL    BUN/Creatinine Ratio 15.3 7.0 - 25.0    Anion Gap 11.4 5.0 - 15.0 mmol/L   BNP    Specimen: Blood   Result Value Ref Range    proBNP 27.1 0.0 - 900.0 pg/mL   D-dimer, Quantitative    Specimen: Blood   Result Value Ref Range    D-Dimer, Quantitative 0.29 0.00 - 0.57 MCGFEU/mL   Troponin    Specimen: Blood   Result Value Ref Range    Troponin T <0.010 0.000 - 0.030 ng/mL   CBC Auto Differential    Specimen: Blood   Result Value Ref Range    WBC 9.75 3.40 - 10.80 10*3/mm3    RBC 4.96 4.14 - 5.80 10*6/mm3    Hemoglobin 15.8 13.0 - 17.7 g/dL    Hematocrit 45.7 37.5 - 51.0 %    MCV 92.1 79.0 - 97.0 fL    MCH 31.9 26.6 - 33.0 pg    MCHC 34.6 31.5 - 35.7 g/dL    RDW 12.2 (L) 12.3 - 15.4 %    RDW-SD 41.2 37.0 - 54.0 fl    MPV 10.9 6.0 - 12.0 fL    Platelets 220 140 - 450 10*3/mm3    Neutrophil % 66.3 42.7 - 76.0 %    Lymphocyte % 21.2 19.6 - 45.3 %    Monocyte % 8.4 5.0 - 12.0 %    Eosinophil % 2.6 0.3 - 6.2 %    Basophil % 0.9 0.0 - 1.5 %    Immature Grans % 0.6 (H) 0.0 - 0.5 %    Neutrophils, Absolute 6.46 1.70 - 7.00 10*3/mm3    Lymphocytes, Absolute 2.07 0.70 - 3.10 10*3/mm3    Monocytes, Absolute 0.82 0.10 - 0.90 10*3/mm3    Eosinophils, Absolute 0.25 0.00 - 0.40 10*3/mm3    Basophils, Absolute 0.09 0.00 - 0.20 10*3/mm3    Immature Grans, Absolute 0.06 (H) 0.00 - 0.05 10*3/mm3    nRBC 0.0 0.0 - 0.2 /100 WBC         Assessment/Plan        Diagnoses and all orders for this visit:    1. Type 2 diabetes mellitus without complication, without long-term current use of insulin (CMS/[facility]) (Primary)  -     Semaglutide (Rybelsus) 3 MG tablet; Take 3 mg by mouth Daily.  Dispense: 30 tablet; Refill: 11  -     Comprehensive Metabolic Panel  -     Hemoglobin A1c      Return in about 6 weeks (around 6/11/2021).          There are no Patient Instructions on file for this visit.     Jw Guido MD    Assessment/Plan

## 2021-05-03 ENCOUNTER — TELEPHONE (OUTPATIENT)
Dept: INTERNAL MEDICINE | Facility: CLINIC | Age: 51
End: 2021-05-03

## 2021-05-03 NOTE — TELEPHONE ENCOUNTER
Caller: Nu Hunt    Relationship: Emergency Contact    Best call back number: [phone]    Medication needed:   Requested Prescriptions      No prescriptions requested or ordered in this encounter       Semaglutide (Rybelsus) 3 MG tablet    What additional details did the patient provide when requesting the medication: PATIENT'S WIFE REPORTS THAT Semaglutide (Rybelsus) 3 MG tablet IS OVER $900.  SHE STATED THAT THE PHARMACY DIRECTED THEM TO CALL, STATING THE OFFICE NEEDED TO DO SOMETHING WITH THE INSURANCE, TO GET THEM TO APPROVE IT.     PATIENT WOULD LIKE A CALL BACK WITH ANY UPDATES.        What is the patient's preferred pharmacy: [id number] - [place], [place] - [address] - [phone]  - [phone]

## 2021-05-13 DIAGNOSIS — E11.9 TYPE 2 DIABETES MELLITUS WITHOUT COMPLICATION, WITHOUT LONG-TERM CURRENT USE OF INSULIN (HCC): Primary | ICD-10-CM

## 2021-05-13 RX ORDER — INSULIN GLARGINE 100 [IU]/ML
30 INJECTION, SOLUTION SUBCUTANEOUS 2 TIMES DAILY
Qty: 30 ML | Refills: 3 | Status: SHIPPED | OUTPATIENT
Start: 2021-05-13 | End: 2021-05-18

## 2021-05-13 NOTE — TELEPHONE ENCOUNTER
Caller: GilbertNu    Relationship: Emergency Contact    Best call back number: [phone]     Medication needed:   Requested Prescriptions     Pending Prescriptions Disp Refills   • Insulin Glargine (BASAGLAR KWIKPEN) 100 UNIT/ML injection pen 30 mL 3     Sig: Inject 30 Units under the skin into the appropriate area as directed 2 (Two) Times a Day.       When do you need the refill by: ASAP    What additional details did the patient provide when requesting the medication: PATIENT'S WIFE STATED THAT PATIENT HAS ENOUGH FOR TONIGHT AND THE MORNING. PATIENT'S WIFE STATED WHEN THEY REQUEST REFILL FROM THE PHARMACY THEY ARE STATING THAT A PRIOR AUTH IS NEEDED. PATIENT'S WIFE STATED THAT THE BOX STATES THERE IS 3 REFILLS.  Does the patient have less than a 3 day supply:  [x] Yes  [] No    What is the patient's preferred pharmacy: [id number] - [place], [place] - [address] - [phone] [facility] [phone]

## 2021-05-14 ENCOUNTER — PRIOR AUTHORIZATION (OUTPATIENT)
Dept: INTERNAL MEDICINE | Facility: CLINIC | Age: 51
End: 2021-05-14

## 2021-05-14 ENCOUNTER — TELEPHONE (OUTPATIENT)
Dept: INTERNAL MEDICINE | Facility: CLINIC | Age: 51
End: 2021-05-14

## 2021-05-14 NOTE — TELEPHONE ENCOUNTER
PA for Basaglar initiated over the telephone with pts insurance. Will hear back from them in a 24 hour time frame, as that is going to be Saturday and we won't know until Monday I did hernán pts wife and tell them to come in for samples to last him through the weekend. Siddhartha said that they would come by shortly to . PA ref # [id number]

## 2021-05-18 ENCOUNTER — OFFICE VISIT (OUTPATIENT)
Dept: CARDIOLOGY | Facility: CLINIC | Age: 51
End: 2021-05-18

## 2021-05-18 VITALS
DIASTOLIC BLOOD PRESSURE: 72 MMHG | BODY MASS INDEX: 40.17 KG/M2 | SYSTOLIC BLOOD PRESSURE: 110 MMHG | WEIGHT: 313 LBS | HEART RATE: 100 BPM | OXYGEN SATURATION: 96 % | HEIGHT: 74 IN

## 2021-05-18 DIAGNOSIS — Z79.4 TYPE 2 DIABETES MELLITUS WITHOUT COMPLICATION, WITH LONG-TERM CURRENT USE OF INSULIN (HCC): ICD-10-CM

## 2021-05-18 DIAGNOSIS — E11.9 TYPE 2 DIABETES MELLITUS WITHOUT COMPLICATION, WITH LONG-TERM CURRENT USE OF INSULIN (HCC): ICD-10-CM

## 2021-05-18 DIAGNOSIS — E66.01 MORBID OBESITY WITH BMI OF 40.0-44.9, ADULT (HCC): ICD-10-CM

## 2021-05-18 DIAGNOSIS — E78.5 DYSLIPIDEMIA: ICD-10-CM

## 2021-05-18 DIAGNOSIS — R61 DIAPHORESIS: Primary | ICD-10-CM

## 2021-05-18 PROCEDURE — 99204 OFFICE O/P NEW MOD 45 MIN: CPT | Performed by: INTERNAL MEDICINE

## 2021-05-18 NOTE — PROGRESS NOTES
"     [facility] Cardiology OP Consult Note    Juarez Hunt  [id number]  2021    Referred By: Jw Guido MD    Chief Complaint: Diaphoresis    History of Present Illness:   Mr. Juarez Hunt is a 50 y.o. male who presents to the Cardiology Clinic for evaluation of an episode of diaphoresis and weakness which occurred in .  He has a past medical history significant for type 2 diabetes mellitus, obesity with a BMI of 40 kg/m², this lipidemia, and a history of prior DVT/PE.  He does not have any significant past cardiac history.  Patient reports that in  he acutely developed diaphoresis which is associated with a sensation of \"weakness.\"  At that time, he denies any significant chest pain or chest discomfort.  He reports that his symptoms lasted for approximately 24 hours, before resolving spontaneously.  At that time, he does report some concern for a possible viral illness.  Since resolution of his symptoms, he has been active walking on a regular basis for aerobic exercise.  He denies exertional chest pain or chest discomfort.  He does report mild exertional dyspnea, which he believes is related to his obesity and deconditioning.  Has not had any recurrent episodes of significant diaphoresis or weakness.  No palpitations.  No presyncopal or syncopal events.  No other specific complaints at this time.    Past Cardiac Testin. Last Coronary Angio: None  2. Prior Stress Testing: None  3. Last Echo: None  4. Prior Holter Monitor: None    Review of Systems:   Review of Systems   Constitutional: Negative for activity change, appetite change, chills, diaphoresis, fatigue, fever, unexpected weight gain and unexpected weight loss.   Eyes: Negative for blurred vision and double vision.   Respiratory: Negative for cough, chest tightness, shortness of breath and wheezing.    Cardiovascular: Negative for chest pain, palpitations and leg swelling.   Gastrointestinal: Negative for abdominal " pain, anal bleeding, blood in stool and GERD.   Endocrine: Negative for cold intolerance and heat intolerance.   Genitourinary: Negative for hematuria.   Neurological: Negative for dizziness, syncope, weakness and light-headedness.   Hematological: Does not bruise/bleed easily.   Psychiatric/Behavioral: Negative for depressed mood and stress. The patient is not nervous/anxious.        Past Medical History:   Past Medical History:   Diagnosis Date   • Arthritis    • Blood clot in vein    • Diabetes mellitus (CMS/HCC)    • Hypertension        Past Surgical History:   Past Surgical History:   Procedure Laterality Date   • CARDIAC CATHETERIZATION     • IVC FILTER RETRIEVAL     • KNEE SURGERY Left    • SKIN BIOPSY  2020    toe       Family History:   Family History   Problem Relation Age of Onset   • Diabetes Mother    • Heart disease Mother    • Heart attack Mother    • Hypertension Mother    • Arthritis Father    • Heart attack Maternal Grandfather    • Heart attack Maternal Uncle    • Liver cancer Maternal Aunt        Social History:   Social History     Socioeconomic History   • Marital status:      Spouse name: Not on file   • Number of children: Not on file   • Years of education: Not on file   • Highest education level: Not on file   Tobacco Use   • Smoking status: Former Smoker     Quit date:      Years since quittin.3   • Smokeless tobacco: Current User     Types: Chew   Substance and Sexual Activity   • Alcohol use: Yes     Comment: 1/4 glass per week   • Drug use: No   • Sexual activity: Defer       Medications:     Current Outpatient Medications:   •  Eliquis 5 MG tablet tablet, Take 1 tablet by mouth Every 12 (Twelve) Hours., Disp: 60 tablet, Rfl: 3  •  Insulin Glargine (BASAGLAR KWIKPEN) 100 UNIT/ML injection pen, Inject 30 Units under the skin into the appropriate area as directed 2 (Two) Times a Day., Disp: 30 mL, Rfl: 3  •  [employer] Lancets Universal 33G misc, USE TO CHECK  "BLOOD GLUCOSE THREE TIMES DAILY OR AS DIRECTED, Disp: , Rfl:   •  meloxicam (MOBIC) 7.5 MG tablet, Take 1 tablet by mouth Daily., Disp: 30 tablet, Rfl: 5  •  metFORMIN (GLUCOPHAGE) 500 MG tablet, Take 1 tablet by mouth 2 (Two) Times a Day With Meals., Disp: 180 tablet, Rfl: 3  •  metoprolol tartrate (LOPRESSOR) 50 MG tablet, Take 1 tablet by mouth 2 (Two) Times a Day., Disp: 180 tablet, Rfl: 3  •  quinapril (ACCUPRIL) 20 MG tablet, Take 1 tablet by mouth Every Night., Disp: 90 tablet, Rfl: 3  •  Semaglutide (Rybelsus) 3 MG tablet, Take 3 mg by mouth Daily., Disp: 30 tablet, Rfl: 11  •  sildenafil (REVATIO) 20 MG tablet, TAKE 1 TABLET BY MOUTH THREE TIMES A DAY , Disp: 60 tablet, Rfl: 1  •  sildenafil (VIAGRA) 100 MG tablet, TAKE 1/2 TABLET BY MOUTH ONE TIME A DAY AS NEEDED, Disp: , Rfl:   •  HYDROcod Polst-CPM Polst ER (Tussionex Pennkinetic ER) 10-8 MG/5ML ER suspension, Take 5 mL by mouth Every 12 (Twelve) Hours As Needed for Cough., Disp: 115 mL, Rfl: 0    Allergies:   No Known Allergies    Physical Exam:  Vital Signs:   Vitals:    05/18/21 1036 05/18/21 1040 05/18/21 1041   BP: 110/78 118/80 110/72   BP Location: Right arm Left arm Left arm   Patient Position: Sitting Sitting Standing   Cuff Size: Adult Adult Adult   Pulse: 100     SpO2: 96%     Weight: (!) 142 kg (313 lb)     Height: 188 cm (74\")         Physical Exam  Constitutional:       General: He is not in acute distress.     Appearance: He is well-developed. He is obese. He is not diaphoretic.   HENT:      Head: Normocephalic and atraumatic.   Eyes:      General: No scleral icterus.     Pupils: Pupils are equal, round, and reactive to light.   Neck:      Trachea: No tracheal deviation.   Cardiovascular:      Rate and Rhythm: Normal rate and regular rhythm.      Heart sounds: Normal heart sounds. No murmur heard.   No friction rub. No gallop.       Comments: Normal JVD.  Pulmonary:      Effort: Pulmonary effort is normal. No respiratory distress.      " Breath sounds: Normal breath sounds. No stridor. No wheezing or rales.   Chest:      Chest wall: No tenderness.   Abdominal:      General: Bowel sounds are normal. There is no distension.      Palpations: Abdomen is soft.      Tenderness: There is no abdominal tenderness. There is no guarding or rebound.   Musculoskeletal:         General: No swelling. Normal range of motion.      Cervical back: Neck supple. No tenderness.   Lymphadenopathy:      Cervical: No cervical adenopathy.   Skin:     General: Skin is warm and dry.      Findings: No erythema.   Neurological:      General: No focal deficit present.      Mental Status: He is alert and oriented to person, place, and time.   Psychiatric:         Mood and Affect: Mood normal.         Behavior: Behavior normal.         Results Review:   I reviewed the patient's new clinical results.    ECG 4/6/2021: Sinus rhythm at 99 bpm.  Left axis deviation.  Left anterior fascicular block.    Assessment / Plan:     1. Diaphoresis   --Presents today for evaluation of an episode of diaphoresis and weakness in 1/21, lasting for 24 hours before resolving spontaneously  --Currently denies chest pain or exertional anginal symptoms  --No prior history of CAD, however the patient does have underlying risk factors for CAD  --ECG in 4/21 without evidence of acute or prior ischemia  --Given interval resolution of symptoms which are somewhat suggestive of a possible viral illness, low suspicion for ischemia at this time  --Discussed GXT, however given no current anginal symptoms will defer for now  --Follow-up in 6 months to reevaluate symptoms, consider as needed follow-up thereafter if the patient continues to do well    2. Dyslipidemia  --Repeat lipid profile at next follow-up with consideration of statin at that time    3. Type 2 diabetes mellitus without complication  --Management per PCP    4. Morbid obesity with BMI of 40.0-44.9  --Advised diet and regular aerobic exercise for  weight loss        Follow Up:   Return in about 6 months (around 11/18/2021).      Thank you for allowing me to participate in the care of your patient. Please to not hesitate to contact me with additional questions or concerns.     TATI Huang MD  Interventional Cardiology   05/18/2021  11:05 EDT

## 2021-06-12 LAB
ALBUMIN SERPL-MCNC: 4.1 G/DL (ref 3.8–4.9)
ALBUMIN/GLOB SERPL: 1.8 {RATIO} (ref 1.2–2.2)
ALP SERPL-CCNC: 88 IU/L (ref 48–121)
ALT SERPL-CCNC: 30 IU/L (ref 0–44)
AST SERPL-CCNC: 28 IU/L (ref 0–40)
BILIRUB SERPL-MCNC: 0.5 MG/DL (ref 0–1.2)
BUN SERPL-MCNC: 10 MG/DL (ref 6–24)
BUN/CREAT SERPL: 10 (ref 9–20)
CALCIUM SERPL-MCNC: 9.3 MG/DL (ref 8.7–10.2)
CHLORIDE SERPL-SCNC: 101 MMOL/L (ref 96–106)
CO2 SERPL-SCNC: 24 MMOL/L (ref 20–29)
CREAT SERPL-MCNC: 0.96 MG/DL (ref 0.76–1.27)
GLOBULIN SER CALC-MCNC: 2.3 G/DL (ref 1.5–4.5)
GLUCOSE SERPL-MCNC: 256 MG/DL (ref 65–99)
HBA1C MFR BLD: 9 % (ref 4.8–5.6)
POTASSIUM SERPL-SCNC: 5.1 MMOL/L (ref 3.5–5.2)
PROT SERPL-MCNC: 6.4 G/DL (ref 6–8.5)
SODIUM SERPL-SCNC: 138 MMOL/L (ref 134–144)

## 2021-06-17 ENCOUNTER — OFFICE VISIT (OUTPATIENT)
Dept: INTERNAL MEDICINE | Facility: CLINIC | Age: 51
End: 2021-06-17

## 2021-06-17 VITALS
BODY MASS INDEX: 39.91 KG/M2 | RESPIRATION RATE: 14 BRPM | SYSTOLIC BLOOD PRESSURE: 123 MMHG | OXYGEN SATURATION: 99 % | TEMPERATURE: 97.3 F | WEIGHT: 311 LBS | DIASTOLIC BLOOD PRESSURE: 80 MMHG | HEART RATE: 90 BPM | HEIGHT: 74 IN

## 2021-06-17 DIAGNOSIS — Z79.4 TYPE 2 DIABETES MELLITUS WITHOUT COMPLICATION, WITH LONG-TERM CURRENT USE OF INSULIN (HCC): Primary | ICD-10-CM

## 2021-06-17 DIAGNOSIS — E11.9 TYPE 2 DIABETES MELLITUS WITHOUT COMPLICATION, WITH LONG-TERM CURRENT USE OF INSULIN (HCC): Primary | ICD-10-CM

## 2021-06-17 PROCEDURE — 99213 OFFICE O/P EST LOW 20 MIN: CPT | Performed by: INTERNAL MEDICINE

## 2021-06-17 RX ORDER — ORAL SEMAGLUTIDE 3 MG/1
3 TABLET ORAL DAILY
Qty: 30 TABLET | Refills: 11 | COMMUNITY
Start: 2021-06-17 | End: 2021-09-24

## 2021-06-17 NOTE — PROGRESS NOTES
Subjective     Patient ID: Juarez Hunt is a 50 y.o. male. Patient is here for management of multiple medical problems.     Chief Complaint   Patient presents with   • Diabetes     History of Present Illness   Dm    Doing much better.  rybelus helping a lot.  High 200's    Wt loss 319-->311        The following portions of the patient's history were reviewed and updated as appropriate: allergies, current medications, past family history, past medical history, past social history, past surgical history and problem list.    Review of Systems   Constitutional: Positive for fatigue.   Psychiatric/Behavioral: Negative for hallucinations. The patient is not nervous/anxious and is not hyperactive.    All other systems reviewed and are negative.      Current Outpatient Medications:   •  Eliquis 5 MG tablet tablet, Take 1 tablet by mouth Every 12 (Twelve) Hours., Disp: 60 tablet, Rfl: 3  •  Insulin Glargine (LANTUS SOLOSTAR) 100 UNIT/ML injection pen, Inject 30 Units under the skin into the appropriate area as directed 2 (Two) Times a Day., Disp: 10 pen, Rfl: 11  •  [employer] Lancets Universal 33G misc, USE TO CHECK BLOOD GLUCOSE THREE TIMES DAILY OR AS DIRECTED, Disp: , Rfl:   •  meloxicam (MOBIC) 7.5 MG tablet, Take 1 tablet by mouth Daily., Disp: 30 tablet, Rfl: 5  •  metFORMIN (GLUCOPHAGE) 500 MG tablet, Take 1 tablet by mouth 2 (Two) Times a Day With Meals., Disp: 180 tablet, Rfl: 3  •  metoprolol tartrate (LOPRESSOR) 50 MG tablet, Take 1 tablet by mouth 2 (Two) Times a Day., Disp: 180 tablet, Rfl: 3  •  quinapril (ACCUPRIL) 20 MG tablet, Take 1 tablet by mouth Every Night., Disp: 90 tablet, Rfl: 3  •  Semaglutide (Rybelsus) 3 MG tablet, Take 3 mg by mouth Daily., Disp: 30 tablet, Rfl: 11  •  sildenafil (REVATIO) 20 MG tablet, TAKE 1 TABLET BY MOUTH THREE TIMES A DAY , Disp: 60 tablet, Rfl: 1  •  HYDROcod Polst-CPM Polst ER (Tussionex Pennkinetic ER) 10-8 MG/5ML ER suspension, Take 5 mL by mouth Every 12 (Twelve) Hours As  "Needed for Cough., Disp: 115 mL, Rfl: 0  •  Semaglutide (Rybelsus) 3 MG tablet, Take 3 mg by mouth Daily., Disp: 30 tablet, Rfl: 11  •  sildenafil (VIAGRA) 100 MG tablet, TAKE 1/2 TABLET BY MOUTH ONE TIME A DAY AS NEEDED, Disp: , Rfl:     Objective      Blood pressure 123/80, pulse 90, temperature 97.3 °F (36.3 °C), resp. rate 14, height 188 cm (74.02\"), weight (!) 141 kg (311 lb), SpO2 99 %.    Physical Exam     General Appearance:    Alert, cooperative, no distress, appears stated age   Head:    Normocephalic, without obvious abnormality, atraumatic   Eyes:    PERRL, conjunctiva/corneas clear, EOM's intact   Ears:    Normal TM's and external ear canals, both ears   Nose:   Nares normal, septum midline, mucosa normal, no drainage   or sinus tenderness   Throat:   Lips, mucosa, and tongue normal; teeth and gums normal   Neck:   Supple, symmetrical, trachea midline, no adenopathy;        thyroid:  No enlargement/tenderness/nodules; no carotid    bruit or JVD   Back:     Symmetric, no curvature, ROM normal, no CVA tenderness   Lungs:     Clear to auscultation bilaterally, respirations unlabored   Chest wall:    No tenderness or deformity   Heart:    Regular rate and rhythm, S1 and S2 normal, no murmur,        rub or gallop   Abdomen:     Soft, non-tender, bowel sounds active all four quadrants,     no masses, no organomegaly   Extremities:   Extremities normal, atraumatic, no cyanosis or edema   Pulses:   2+ and symmetric all extremities   Skin:   Skin color, texture, turgor normal, no rashes or lesions   Lymph nodes:   Cervical, supraclavicular, and axillary nodes normal   Neurologic:   CNII-XII intact. Normal strength, sensation and reflexes       throughout      Results for orders placed or performed in visit on 04/30/21   Comprehensive Metabolic Panel    Specimen: Blood   Result Value Ref Range    Glucose 256 (H) 65 - 99 mg/dL    BUN 10 6 - 24 mg/dL    Creatinine 0.96 0.76 - 1.27 mg/dL    eGFR Non African Am 91 " >59 mL/min/1.73    eGFR African Am 105 >59 mL/min/1.73    BUN/Creatinine Ratio 10 9 - 20    Sodium 138 134 - 144 mmol/L    Potassium 5.1 3.5 - 5.2 mmol/L    Chloride 101 96 - 106 mmol/L    Total CO2 24 20 - 29 mmol/L    Calcium 9.3 8.7 - 10.2 mg/dL    Total Protein 6.4 6.0 - 8.5 g/dL    Albumin 4.1 3.8 - 4.9 g/dL    Globulin 2.3 1.5 - 4.5 g/dL    A/G Ratio 1.8 1.2 - 2.2    Total Bilirubin 0.5 0.0 - 1.2 mg/dL    Alkaline Phosphatase 88 48 - 121 IU/L    AST (SGOT) 28 0 - 40 IU/L    ALT (SGPT) 30 0 - 44 IU/L   Hemoglobin A1c    Specimen: Blood   Result Value Ref Range    Hemoglobin A1C 9.0 (H) 4.8 - 5.6 %         Assessment/Plan   rybelus not covered with ins. Best results on this med. ha1c 9.4-->9.0  Wt down    Nausea limiting dose increase.          Diagnoses and all orders for this visit:    1. Type 2 diabetes mellitus without complication, with long-term current use of insulin (CMS/[facility]) (Primary)  -     Hemoglobin A1c  -     T4, Free  -     TSH  -     Comprehensive Metabolic Panel  -     Vitamin B12  -     CBC & Differential  -     Lipid Panel    Other orders  -     Semaglutide (Rybelsus) 3 MG tablet; Take 3 mg by mouth Daily.  Dispense: 30 tablet; Refill: 11      No follow-ups on file.          There are no Patient Instructions on file for this visit.     Jw Guido MD    Assessment/Plan

## 2021-08-18 RX ORDER — APIXABAN 5 MG/1
5 TABLET, FILM COATED ORAL EVERY 12 HOURS
Qty: 60 TABLET | Refills: 3 | Status: SHIPPED | OUTPATIENT
Start: 2021-08-18 | End: 2021-12-10

## 2021-08-18 NOTE — TELEPHONE ENCOUNTER
Rx Refill Note  Requested Prescriptions      No prescriptions requested or ordered in this encounter      Last office visit with prescribing clinician: 6/17/2021      Next office visit with prescribing clinician: Visit date not found            Aura Johnson LPN  08/18/21, 17:03 EDT

## 2021-09-02 ENCOUNTER — TELEPHONE (OUTPATIENT)
Dept: INTERNAL MEDICINE | Facility: CLINIC | Age: 51
End: 2021-09-02

## 2021-09-02 NOTE — TELEPHONE ENCOUNTER
Caller: Nu Hunt    Relationship: Emergency Contact    Best call back number:     [phone]     Medication needed:      sildenafil (REVATIO) 20 MG tablet     IT WAS NOTED THAT THERE IS A PENDED REORDER FOR THE MEDICATION ABOVE     meloxicam (MOBIC) 7.5 MG tablet     When do you need the refill by:     ASAP    What additional details did the patient provide when requesting the medication:     PATIENT IS COMPLETELY OUT OF THE MEDICATIONS    Does the patient have less than a 3 day supply:  [x] Yes  [] No    What is the patient's preferred pharmacy:      [place], [place]    TELEPHONE CONTACT:    [phone]    DR MARTINEZ

## 2021-09-03 RX ORDER — MELOXICAM 7.5 MG/1
7.5 TABLET ORAL DAILY
Qty: 30 TABLET | Refills: 5 | Status: SHIPPED | OUTPATIENT
Start: 2021-09-03 | End: 2021-09-03

## 2021-09-03 RX ORDER — SILDENAFIL CITRATE 20 MG/1
TABLET ORAL
Qty: 60 TABLET | Refills: 0 | Status: SHIPPED | OUTPATIENT
Start: 2021-09-03 | End: 2021-10-13 | Stop reason: SDUPTHER

## 2021-09-07 ENCOUNTER — TELEPHONE (OUTPATIENT)
Dept: INTERNAL MEDICINE | Facility: CLINIC | Age: 51
End: 2021-09-07

## 2021-09-07 NOTE — TELEPHONE ENCOUNTER
Called pt to inform stop taking mobic, pt wife answered and stated that this pt takes mobic daily and cannot function without it due to his arthritis. If pt stops using mobic they would like to know what exactly he can take to help with pain and inflammation

## 2021-09-07 NOTE — TELEPHONE ENCOUNTER
----- Message from Jw Guido MD sent at 9/3/2021  3:39 PM EDT -----  I accidentally rf'd his mobic. He alexandru't have this or any other NSAID (ibu, aleve, advile ect.)  He is on eliquis. This will cause him to bleed out. Please call the pharmacy and cancel and let him know why.

## 2021-09-24 ENCOUNTER — OFFICE VISIT (OUTPATIENT)
Dept: INTERNAL MEDICINE | Facility: CLINIC | Age: 51
End: 2021-09-24

## 2021-09-24 VITALS
HEIGHT: 74 IN | TEMPERATURE: 98.4 F | OXYGEN SATURATION: 96 % | HEART RATE: 104 BPM | SYSTOLIC BLOOD PRESSURE: 110 MMHG | WEIGHT: 315 LBS | RESPIRATION RATE: 16 BRPM | DIASTOLIC BLOOD PRESSURE: 80 MMHG | BODY MASS INDEX: 40.43 KG/M2

## 2021-09-24 DIAGNOSIS — R53.83 FATIGUE, UNSPECIFIED TYPE: ICD-10-CM

## 2021-09-24 DIAGNOSIS — E11.9 TYPE 2 DIABETES MELLITUS WITHOUT COMPLICATION, WITH LONG-TERM CURRENT USE OF INSULIN (HCC): Primary | ICD-10-CM

## 2021-09-24 DIAGNOSIS — Z79.4 TYPE 2 DIABETES MELLITUS WITHOUT COMPLICATION, WITH LONG-TERM CURRENT USE OF INSULIN (HCC): Primary | ICD-10-CM

## 2021-09-24 DIAGNOSIS — M19.90 ARTHRITIS: ICD-10-CM

## 2021-09-24 PROCEDURE — 99214 OFFICE O/P EST MOD 30 MIN: CPT | Performed by: INTERNAL MEDICINE

## 2021-09-24 RX ORDER — ORAL SEMAGLUTIDE 3 MG/1
3 TABLET ORAL DAILY
Qty: 30 TABLET | Refills: 11 | Status: SHIPPED | OUTPATIENT
Start: 2021-09-24 | End: 2022-02-18

## 2021-09-24 RX ORDER — ORAL SEMAGLUTIDE 3 MG/1
3 TABLET ORAL DAILY
Qty: 30 TABLET | Refills: 11 | COMMUNITY
Start: 2021-09-24 | End: 2022-02-18

## 2021-09-24 NOTE — PROGRESS NOTES
Subjective     Patient ID: Juarez Hunt is a 50 y.o. male. Patient is here for management of multiple medical problems.     Chief Complaint   Patient presents with   • Arthritis     History of Present Illness   Arthritis    Wt is up.     Dm not doing well.        The following portions of the patient's history were reviewed and updated as appropriate: allergies, current medications, past family history, past medical history, past social history, past surgical history and problem list.    Review of Systems    Current Outpatient Medications:   •  Diclofenac Sodium (VOLTAREN) 1 % gel gel, Apply 4 g topically to the appropriate area as directed 4 (Four) Times a Day As Needed (pain)., Disp: 100 g, Rfl: 1  •  Eliquis 5 MG tablet tablet, Take 1 tablet by mouth Every 12 (Twelve) Hours., Disp: 60 tablet, Rfl: 3  •  HYDROcod Polst-CPM Polst ER (Tussionex Pennkinetic ER) 10-8 MG/5ML ER suspension, Take 5 mL by mouth Every 12 (Twelve) Hours As Needed for Cough., Disp: 115 mL, Rfl: 0  •  Insulin Glargine (LANTUS SOLOSTAR) 100 UNIT/ML injection pen, Inject 30 Units under the skin into the appropriate area as directed 2 (Two) Times a Day., Disp: 10 pen, Rfl: 11  •  [employer] Lancets Universal 33G misc, USE TO CHECK BLOOD GLUCOSE THREE TIMES DAILY OR AS DIRECTED, Disp: , Rfl:   •  metFORMIN (GLUCOPHAGE) 1000 MG tablet, Take 1,000 mg by mouth 2 (Two) Times a Day., Disp: , Rfl:   •  metoprolol tartrate (LOPRESSOR) 50 MG tablet, Take 1 tablet by mouth 2 (Two) Times a Day., Disp: 180 tablet, Rfl: 3  •  quinapril (ACCUPRIL) 20 MG tablet, Take 1 tablet by mouth Every Night., Disp: 90 tablet, Rfl: 3  •  Semaglutide (Rybelsus) 3 MG tablet, Take 1 tablet by mouth Daily., Disp: 30 tablet, Rfl: 11  •  Semaglutide (Rybelsus) 3 MG tablet, Take 1 tablet by mouth Daily., Disp: 30 tablet, Rfl: 11  •  sildenafil (REVATIO) 20 MG tablet, TAKE 1 TABLET BY MOUTH THREE TIMES A DAY , Disp: 60 tablet, Rfl: 0  •  sildenafil (VIAGRA) 100 MG tablet, TAKE 1/2  "TABLET BY MOUTH ONE TIME A DAY AS NEEDED, Disp: , Rfl:     Objective      Blood pressure 110/80, pulse 104, temperature 98.4 °F (36.9 °C), resp. rate 16, height 188 cm (74.02\"), weight (!) 144 kg (318 lb), SpO2 96 %.    Physical Exam     General Appearance:    Alert, cooperative, no distress, appears stated age   Head:    Normocephalic, without obvious abnormality, atraumatic   Eyes:    PERRL, conjunctiva/corneas clear, EOM's intact   Ears:    Normal TM's and external ear canals, both ears   Nose:   Nares normal, septum midline, mucosa normal, no drainage   or sinus tenderness   Throat:   Lips, mucosa, and tongue normal; teeth and gums normal   Neck:   Supple, symmetrical, trachea midline, no adenopathy;        thyroid:  No enlargement/tenderness/nodules; no carotid    bruit or JVD   Back:     Symmetric, no curvature, ROM normal, no CVA tenderness   Lungs:     Clear to auscultation bilaterally, respirations unlabored   Chest wall:    No tenderness or deformity   Heart:    Regular rate and rhythm, S1 and S2 normal, no murmur,        rub or gallop   Abdomen:     Soft, non-tender, bowel sounds active all four quadrants,     no masses, no organomegaly   Extremities:   Extremities normal, atraumatic, no cyanosis or edema   Pulses:   2+ and symmetric all extremities   Skin:   Skin color, texture, turgor normal, no rashes or lesions   Lymph nodes:   Cervical, supraclavicular, and axillary nodes normal   Neurologic:   CNII-XII intact. Normal strength, sensation and reflexes       throughout      Results for orders placed or performed in visit on 04/30/21   Comprehensive Metabolic Panel    Specimen: Blood   Result Value Ref Range    Glucose 256 (H) 65 - 99 mg/dL    BUN 10 6 - 24 mg/dL    Creatinine 0.96 0.76 - 1.27 mg/dL    eGFR Non African Am 91 >59 mL/min/1.73    eGFR African Am 105 >59 mL/min/1.73    BUN/Creatinine Ratio 10 9 - 20    Sodium 138 134 - 144 mmol/L    Potassium 5.1 3.5 - 5.2 mmol/L    Chloride 101 96 - 106 " mmol/L    Total CO2 24 20 - 29 mmol/L    Calcium 9.3 8.7 - 10.2 mg/dL    Total Protein 6.4 6.0 - 8.5 g/dL    Albumin 4.1 3.8 - 4.9 g/dL    Globulin 2.3 1.5 - 4.5 g/dL    A/G Ratio 1.8 1.2 - 2.2    Total Bilirubin 0.5 0.0 - 1.2 mg/dL    Alkaline Phosphatase 88 48 - 121 IU/L    AST (SGOT) 28 0 - 40 IU/L    ALT (SGPT) 30 0 - 44 IU/L   Hemoglobin A1c    Specimen: Blood   Result Value Ref Range    Hemoglobin A1C 9.0 (H) 4.8 - 5.6 %         Assessment/Plan   Dm not well controlled.    L[address] [place] [address]   arthrits and pain in multiple joints.        Diagnoses and all orders for this visit:    1. Type 2 diabetes mellitus without complication, with long-term current use of insulin (CMS/[facility]) (Primary)  -     [place] Spotted Fever, IgM  -     [place] Spotted Fever, IgG  -     Lyme Disease, PCR  -     Ehrlichia Antibody Panel  -     Cyclic Citrul Peptide Antibody, IgG / IgA  -     Rheumatoid Factor  -     Uric Acid  -     Sedimentation Rate  -     C-reactive Protein  -     Antistreptolysin O Titer    2. Arthritis  -     [place] Spotted Fever, IgM  -     [place] Spotted Fever, IgG  -     Lyme Disease, PCR  -     Ehrlichia Antibody Panel  -     Cyclic Citrul Peptide Antibody, IgG / IgA  -     Rheumatoid Factor  -     Uric Acid  -     Sedimentation Rate  -     C-reactive Protein  -     Antistreptolysin O Titer    3. Fatigue, unspecified type  -     [place] Spotted Fever, IgM  -     [place] Spotted Fever, IgG  -     Lyme Disease, PCR  -     [place]lichia Antibody Panel  -     Cyclic Citrul Peptide Antibody, IgG / IgA  -     Rheumatoid Factor  -     Uric Acid  -     Sedimentation Rate  -     C-reactive Protein  -     Antistreptolysin O Titer    Other orders  -     Semaglutide (Rybelsus) 3 MG tablet; Take 1 tablet by mouth Daily.  Dispense: 30 tablet; Refill: 11  -     Semaglutide (Rybelsus) 3 MG tablet; Take 1 tablet by mouth Daily.  Dispense: 30 tablet; Refill: 11  -     Diclofenac Sodium (VOLTAREN) 1 %  gel gel; Apply 4 g topically to the appropriate area as directed 4 (Four) Times a Day As Needed (pain).  Dispense: 100 g; Refill: 1      Return in about 6 weeks (around 11/5/2021).          There are no Patient Instructions on file for this visit.     Jw Guido MD    Assessment/Plan

## 2021-10-12 ENCOUNTER — TELEPHONE (OUTPATIENT)
Dept: INTERNAL MEDICINE | Facility: CLINIC | Age: 51
End: 2021-10-12

## 2021-10-12 NOTE — TELEPHONE ENCOUNTER
MAKSIM FROM [facility] CALLED IN REGARDS TO PRIOR AUTHORIZATION FOR Semaglutide (Rybelsus) 3 MG tablet. PHARMACY STATES THEY ARE STILL WAITING ON .    MAKSIM STATES YOU CAN GO ONTO [facility] AND TAKE CARE OF THIS.    REF KEY IS: REF KEY: [id number]    MAKSIM - [phone]

## 2021-10-13 ENCOUNTER — TELEPHONE (OUTPATIENT)
Dept: INTERNAL MEDICINE | Facility: CLINIC | Age: 51
End: 2021-10-13

## 2021-10-13 RX ORDER — SILDENAFIL CITRATE 20 MG/1
20 TABLET ORAL 3 TIMES DAILY
Qty: 60 TABLET | Refills: 0 | Status: SHIPPED | OUTPATIENT
Start: 2021-10-13 | End: 2021-11-22

## 2021-10-13 RX ORDER — SILDENAFIL CITRATE 20 MG/1
TABLET ORAL
Qty: 60 TABLET | Refills: 0 | Status: SHIPPED | OUTPATIENT
Start: 2021-10-13 | End: 2021-11-22 | Stop reason: SDUPTHER

## 2021-10-13 NOTE — TELEPHONE ENCOUNTER
Caller: Juarez Hunt    Relationship: Self    Medication requested (name and dosage):     sildenafil (REVATIO) 20 MG tablet    IT WAS NOTED THAT THERE IS A PENDED REORDER FOR THE MEDICATION LISTED ABOVE    Pharmacy where request should be sent:     [place], [place]    TELEPHONE CONTACT:    [phone]    Additional details provided by patient:     CALLER STATED PATIENT IS COMPLETELY OUT OF THE MEDICATION    Best call back number:     [phone]    Does the patient have less than a 3 day supply:  [x] Yes  [] No    Geraldine Ariza Rep   10/13/21 09:07 EDT     DR MARTINEZ

## 2021-11-01 ENCOUNTER — TELEPHONE (OUTPATIENT)
Dept: INTERNAL MEDICINE | Facility: CLINIC | Age: 51
End: 2021-11-01

## 2021-11-01 NOTE — TELEPHONE ENCOUNTER
Caller: Juarez Hunt    Relationship to patient: Self    Best call back number: [phone]    Patient is needing: PATIENT WOULD LIKE TO KNOW IF  WOULD WANT TO SEE HIM FOR WOUND CARE REFERRAL OR COULD HE COULD GET A REFERRAL FOR THIS     PLEASE ADVISE

## 2021-11-22 ENCOUNTER — TELEPHONE (OUTPATIENT)
Dept: INTERNAL MEDICINE | Facility: CLINIC | Age: 51
End: 2021-11-22

## 2021-11-22 RX ORDER — SILDENAFIL CITRATE 20 MG/1
20 TABLET ORAL 3 TIMES DAILY
Qty: 60 TABLET | Refills: 0 | Status: SHIPPED | OUTPATIENT
Start: 2021-11-22 | End: 2021-12-30 | Stop reason: SDUPTHER

## 2021-11-22 NOTE — TELEPHONE ENCOUNTER
Caller: GilbertNu segovia    Relationship: Emergency Contact    Best call back number: [phone] (H)    Requested Prescriptions:   sildenafil (REVATIO) 20 MG tablet   0 ordered         Summary: TAKE 1 TABLET BY MOUTH THREE TIMES A DAY, Normal        Pharmacy where request should be sent:    [id number], [place] - [address] - [phone]  - [phone]      Additional details provided by patient:   PATIENT WIFE STATES THAT PATIENT HAS LESS THEN 3 DAY SUPPLY ON HAND.     Does the patient have less than a 3 day supply:  [x] Yes  [] No    Geraldine Bustillos Rep   11/22/21 11:59 EST       PATIENT HAS BEEN ADVISED THAT THIS REQUEST HAS BEEN MARKED AS A HIGH PRIORITY, PLEASE ALLOW 48 HOURS FOR OUR CLINICAL TEAM TO FOLLOW UP ON THIS REQUEST.

## 2021-12-10 ENCOUNTER — TELEPHONE (OUTPATIENT)
Dept: INTERNAL MEDICINE | Facility: CLINIC | Age: 51
End: 2021-12-10

## 2021-12-10 RX ORDER — APIXABAN 5 MG/1
TABLET, FILM COATED ORAL
Qty: 60 TABLET | Refills: 0 | Status: SHIPPED | OUTPATIENT
Start: 2021-12-10 | End: 2022-01-04

## 2021-12-10 NOTE — TELEPHONE ENCOUNTER
Caller: Nu Hunt    Relationship: Emergency Contact    Best call back number:     [phone]     Requested Prescriptions:      metFORMIN (GLUCOPHAGE) 1000 MG tablet    Pharmacy where request should be sent:      [place], [place]    TELEPHONE CONTACT:    [phone]    Additional details provided by patient:     PATIENT IS COMPLETELY OUT OF THE MEDICATION    Does the patient have less than a 3 day supply:  [x] Yes  [] No    Geraldine Ariza Rep   12/10/21 09:35 EST     DR MARTINEZ

## 2021-12-10 NOTE — TELEPHONE ENCOUNTER
Rx Refill Note  Requested Prescriptions     Pending Prescriptions Disp Refills   • Eliquis 5 MG tablet tablet [Pharmacy Med Name: Eliquis Oral Tablet 5 MG] 60 tablet 0     Sig: TAKE 1 TABLET BY MOUTH EVERY TWELVE HOURS      Last office visit with prescribing clinician: 9/24/2021      Next office visit with prescribing clinician: 12/10/2021            EARL CROOKS MA  12/10/21, 10:29 EST

## 2021-12-30 RX ORDER — SILDENAFIL CITRATE 20 MG/1
20 TABLET ORAL 3 TIMES DAILY
Qty: 60 TABLET | Refills: 0 | Status: SHIPPED | OUTPATIENT
Start: 2021-12-30 | End: 2022-03-15 | Stop reason: SDUPTHER

## 2021-12-30 NOTE — TELEPHONE ENCOUNTER
Caller: GilbertNu segovia    Relationship: Emergency Contact    Best call back number: [phone]    Requested Prescriptions:   Requested Prescriptions     Pending Prescriptions Disp Refills   • sildenafil (REVATIO) 20 MG tablet 60 tablet 0     Sig: Take 1 tablet by mouth 3 (Three) Times a Day.        Pharmacy where request should be sent: [id number], [place] - [address] - [phone]  - [phone] FX     Additional details provided by patient:  PATIENT IS OUT OF MEDICATION.  THEY WOULD LIKE REFILLS ON THIS IF POSSIBLE.      Does the patient have less than a 3 day supply:  [x] Yes  [] No    Lizabeth Parra   12/30/21 10:41 EST

## 2021-12-30 NOTE — TELEPHONE ENCOUNTER
Rx Refill Note  Requested Prescriptions     Pending Prescriptions Disp Refills   • sildenafil (REVATIO) 20 MG tablet 60 tablet 0     Sig: Take 1 tablet by mouth 3 (Three) Times a Day.      Last office visit with prescribing clinician: 9/24/2021      Next office visit with prescribing clinician: Visit date not found            Laura Rose CMA  12/30/21, 15:35 EST

## 2022-01-04 RX ORDER — APIXABAN 5 MG/1
TABLET, FILM COATED ORAL
Qty: 60 TABLET | Refills: 0 | Status: SHIPPED | OUTPATIENT
Start: 2022-01-04 | End: 2022-03-03

## 2022-01-04 NOTE — TELEPHONE ENCOUNTER
Rx Refill Note  Requested Prescriptions     Pending Prescriptions Disp Refills   • Eliquis 5 MG tablet tablet [Pharmacy Med Name: Eliquis Oral Tablet 5 MG] 60 tablet 0     Sig: TAKE 1 TABLET BY MOUTH EVERY TWELVE HOURS      Last office visit with prescribing clinician: 9/24/2021      Next office visit with prescribing clinician: Visit date not found            EARL CROOKS MA  01/04/22, 09:42 EST

## 2022-02-11 ENCOUNTER — TELEPHONE (OUTPATIENT)
Dept: INTERNAL MEDICINE | Facility: CLINIC | Age: 52
End: 2022-02-11

## 2022-02-11 RX ORDER — CALCIUM CITRATE/VITAMIN D3 200MG-6.25
1 TABLET ORAL
Qty: 200 EACH | Refills: 12 | Status: SHIPPED | OUTPATIENT
Start: 2022-02-11

## 2022-02-11 NOTE — TELEPHONE ENCOUNTER
Caller: GilbertNu    Relationship: Emergency Contact    Best call back number: [phone]    Requested Prescriptions:  TEST STRIPS    Pharmacy where request should be sent: [id number] - [place], [place] - [address] [facility]  - [phone]  - [phone] FX     Additional details provided by patient: ORDERING THE TEST STRIPS FOR HIS GLUCOSE METER    Does the patient have less than a 3 day supply:  [] Yes  [x] No    Brijesh Carpenter, PCT   02/11/22 14:41 EST

## 2022-02-18 ENCOUNTER — OFFICE VISIT (OUTPATIENT)
Dept: INTERNAL MEDICINE | Facility: CLINIC | Age: 52
End: 2022-02-18

## 2022-02-18 VITALS
HEART RATE: 90 BPM | SYSTOLIC BLOOD PRESSURE: 114 MMHG | DIASTOLIC BLOOD PRESSURE: 78 MMHG | HEIGHT: 74 IN | WEIGHT: 312 LBS | BODY MASS INDEX: 40.04 KG/M2 | TEMPERATURE: 97.3 F | OXYGEN SATURATION: 98 % | RESPIRATION RATE: 16 BRPM

## 2022-02-18 DIAGNOSIS — Z79.4 TYPE 2 DIABETES MELLITUS WITHOUT COMPLICATION, WITH LONG-TERM CURRENT USE OF INSULIN: Primary | ICD-10-CM

## 2022-02-18 DIAGNOSIS — E11.9 TYPE 2 DIABETES MELLITUS WITHOUT COMPLICATION, WITH LONG-TERM CURRENT USE OF INSULIN: Primary | ICD-10-CM

## 2022-02-18 DIAGNOSIS — E79.0 HYPERURICEMIA: ICD-10-CM

## 2022-02-18 PROCEDURE — 99214 OFFICE O/P EST MOD 30 MIN: CPT | Performed by: INTERNAL MEDICINE

## 2022-02-18 RX ORDER — MELOXICAM 7.5 MG/1
7.5 TABLET ORAL DAILY
COMMUNITY
Start: 2022-02-01 | End: 2022-03-31

## 2022-02-18 RX ORDER — ALLOPURINOL 100 MG/1
100 TABLET ORAL DAILY
Qty: 90 TABLET | Refills: 3 | Status: SHIPPED | OUTPATIENT
Start: 2022-02-18

## 2022-02-18 RX ORDER — DULAGLUTIDE 0.75 MG/.5ML
0.75 INJECTION, SOLUTION SUBCUTANEOUS WEEKLY
Qty: 1 PEN | Refills: 11 | OUTPATIENT
Start: 2022-02-18 | End: 2022-05-16

## 2022-02-18 NOTE — PROGRESS NOTES
Subjective     Patient ID: Juarez Hunt is a 51 y.o. male. Patient is here for management of multiple medical problems.     Chief Complaint   Patient presents with   • Diabetes     History of Present Illness     DM    Wants to change off metformin if possible. Nausea and diarrhea.    Pt not using etoh pads to clean and reusing needles for DM        The following portions of the patient's history were reviewed and updated as appropriate: allergies, current medications, past family history, past medical history, past social history, past surgical history and problem list.    Review of Systems   Constitutional: Negative for fatigue.   Respiratory: Negative for shortness of breath and stridor.    Musculoskeletal: Negative for arthralgias.   Psychiatric/Behavioral: Negative for self-injury and sleep disturbance. The patient is not nervous/anxious.    All other systems reviewed and are negative.      Current Outpatient Medications:   •  Diclofenac Sodium (VOLTAREN) 1 % gel gel, Apply 4 g topically to the appropriate area as directed 4 (Four) Times a Day As Needed (pain)., Disp: 100 g, Rfl: 1  •  Eliquis 5 MG tablet tablet, TAKE 1 TABLET BY MOUTH EVERY TWELVE HOURS, Disp: 60 tablet, Rfl: 0  •  glucose blood (True Metrix Blood Glucose Test) test strip, 1 each by Other route 6 (Six) Times a Day. Use as instructed, Disp: 200 each, Rfl: 12  •  HYDROcod Polst-CPM Polst ER (Tussionex Pennkinetic ER) 10-8 MG/5ML ER suspension, Take 5 mL by mouth Every 12 (Twelve) Hours As Needed for Cough., Disp: 115 mL, Rfl: 0  •  Insulin Glargine (LANTUS SOLOSTAR) 100 UNIT/ML injection pen, Inject 30 Units under the skin into the appropriate area as directed 2 (Two) Times a Day., Disp: 10 pen, Rfl: 11  •  [employer] Lancets Universal 33G misc, USE TO CHECK BLOOD GLUCOSE THREE TIMES DAILY OR AS DIRECTED, Disp: , Rfl:   •  meloxicam (MOBIC) 7.5 MG tablet, Take 7.5 mg by mouth Daily., Disp: , Rfl:   •  metoprolol tartrate (LOPRESSOR) 50 MG tablet,  "Take 1 tablet by mouth 2 (Two) Times a Day., Disp: 180 tablet, Rfl: 3  •  quinapril (ACCUPRIL) 20 MG tablet, Take 1 tablet by mouth Every Night., Disp: 90 tablet, Rfl: 3  •  sildenafil (REVATIO) 20 MG tablet, Take 1 tablet by mouth 3 (Three) Times a Day., Disp: 60 tablet, Rfl: 0  •  allopurinol (Zyloprim) 100 MG tablet, Take 1 tablet by mouth Daily., Disp: 90 tablet, Rfl: 3  •  Dulaglutide (Trulicity) 0.75 MG/0.5ML solution pen-injector, Inject 0.75 mg under the skin into the appropriate area as directed 1 (One) Time Per Week., Disp: 1 pen, Rfl: 11  •  metFORMIN (Glucophage) 500 MG tablet, Take 1 tablet by mouth 2 (Two) Times a Day With Meals., Disp: 60 tablet, Rfl: 11    Objective      Blood pressure 114/78, pulse 90, temperature 97.3 °F (36.3 °C), resp. rate 16, height 188 cm (74.02\"), weight (!) 142 kg (312 lb), SpO2 98 %.    Physical Exam     General Appearance:    Alert, cooperative, no distress, appears stated age   Head:    Normocephalic, without obvious abnormality, atraumatic   Eyes:    PERRL, conjunctiva/corneas clear, EOM's intact   Ears:    Normal TM's and external ear canals, both ears   Nose:   Nares normal, septum midline, mucosa normal, no drainage   or sinus tenderness   Throat:   Lips, mucosa, and tongue normal; teeth and gums normal   Neck:   Supple, symmetrical, trachea midline, no adenopathy;        thyroid:  No enlargement/tenderness/nodules; no carotid    bruit or JVD   Back:     Symmetric, no curvature, ROM normal, no CVA tenderness   Lungs:     Clear to auscultation bilaterally, respirations unlabored   Chest wall:    No tenderness or deformity   Heart:    Regular rate and rhythm, S1 and S2 normal, no murmur,        rub or gallop   Abdomen:     Soft, non-tender, bowel sounds active all four quadrants,     no masses, no organomegaly   Extremities:   Extremities normal, atraumatic, no cyanosis or edema   Pulses:   2+ and symmetric all extremities   Skin:   Skin color, texture, turgor normal, " no rashes or lesions   Lymph nodes:   Cervical, supraclavicular, and axillary nodes normal   Neurologic:   CNII-XII intact. Normal strength, sensation and reflexes       throughout      Results for orders placed or performed in visit on 09/24/21   [place] Spotted Fever, IgM    Specimen: Blood   Result Value Ref Range    RMSF IgM     [facility] Spotted Fever, IgG    Specimen: Blood   Result Value Ref Range    RMSF IgG     Lyme Disease, PCR - Cerebrospinal Fluid, Lumbar Puncture    Specimen: Lumbar Puncture; Cerebrospinal Fluid   Result Value Ref Range    Lyme Disease(B.burgdorferi)PCR     Ehrlichia Antibody Panel    Specimen: Blood   Result Value Ref Range    E. chaffeensis (HME) IgG Titer      E. chaffeensis (HME) IgM Titer      HGE IgG Titer      HGE IgM Titer     Cyclic Citrul Peptide Antibody, IgG / IgA    Specimen: Blood   Result Value Ref Range    CCP Antibodies IgG/IgA     Rheumatoid Factor    Specimen: Blood   Result Value Ref Range    RA Latex Turbid <10.0 <14.0 IU/mL   Uric Acid    Specimen: Blood   Result Value Ref Range    Uric Acid 7.0 3.4 - 7.0 mg/dL   Sedimentation Rate    Specimen: Blood   Result Value Ref Range    Sed Rate 3 0 - 20 mm/hr   C-reactive Protein    Specimen: Blood   Result Value Ref Range    C-Reactive Protein <0.30 0.00 - 0.50 mg/dL   Antistreptolysin O Titer    Specimen: Blood   Result Value Ref Range    ASO 30.3 0.0 - 200.0 IU/mL         Assessment/Plan         High uric acid and arthritis.  Pt will make diet changes and avoid high fructose corn syrup.,     diarrhea.        Diagnoses and all orders for this visit:    1. Type 2 diabetes mellitus without complication, with long-term current use of insulin (HCC) (Primary)  -     Comprehensive Metabolic Panel  -     Hemoglobin A1c    2. Hyperuricemia  -     Comprehensive Metabolic Panel  -     Hemoglobin A1c  -     Uric Acid    Other orders  -     Dulaglutide (Trulicity) 0.75 MG/0.5ML solution pen-injector; Inject 0.75 mg under the  skin into the appropriate area as directed 1 (One) Time Per Week.  Dispense: 1 pen; Refill: 11  -     allopurinol (Zyloprim) 100 MG tablet; Take 1 tablet by mouth Daily.  Dispense: 90 tablet; Refill: 3  -     metFORMIN (Glucophage) 500 MG tablet; Take 1 tablet by mouth 2 (Two) Times a Day With Meals.  Dispense: 60 tablet; Refill: 11      Return in about 3 months (around 5/18/2022).          There are no Patient Instructions on file for this visit.     Jw Guido MD    Assessment/Plan

## 2022-02-21 LAB
A PHAGOCYTOPH IGG TITR SER IF: NEGATIVE {TITER}
A PHAGOCYTOPH IGM TITR SER IF: NEGATIVE {TITER}
ASO AB SERPL-ACNC: 30.3 IU/ML (ref 0–200)
B BURGDOR DNA SPEC QL NAA+PROBE: NEGATIVE
CCP IGA+IGG SERPL IA-ACNC: 8 UNITS (ref 0–19)
CRP SERPL-MCNC: <0.3 MG/DL (ref 0–0.5)
E CHAFFEENSIS IGG TITR SER IF: NEGATIVE {TITER}
E CHAFFEENSIS IGM TITR SER IF: NEGATIVE {TITER}
ERYTHROCYTE [SEDIMENTATION RATE] IN BLOOD BY WESTERGREN METHOD: 3 MM/HR (ref 0–20)
R RICKETTSI IGG SER QL IA: NEGATIVE
R RICKETTSI IGM SER-ACNC: 0.37 INDEX (ref 0–0.89)
RHEUMATOID FACT SERPL-ACNC: <10 IU/ML
URATE SERPL-MCNC: 7 MG/DL (ref 3.4–7)

## 2022-03-03 DIAGNOSIS — I82.469 DEEP VEIN THROMBOSIS (DVT) OF CALF MUSCLE VEIN, UNSPECIFIED CHRONICITY, UNSPECIFIED LATERALITY: Primary | ICD-10-CM

## 2022-03-03 RX ORDER — APIXABAN 5 MG/1
TABLET, FILM COATED ORAL
Qty: 60 TABLET | Refills: 3 | Status: SHIPPED | OUTPATIENT
Start: 2022-03-03 | End: 2022-08-05

## 2022-03-03 NOTE — TELEPHONE ENCOUNTER
Rx Refill Note  Requested Prescriptions     Pending Prescriptions Disp Refills   • Eliquis 5 MG tablet tablet [Pharmacy Med Name: Eliquis Oral Tablet 5 MG] 60 tablet 0     Sig: TAKE 1 TABLET BY MOUTH EVERY TWELVE HOURS      Last office visit with prescribing clinician: 2/18/2022      Next office visit with prescribing clinician: 5/24/2022            EARL CROOKS MA  03/03/22, 10:50 EST

## 2022-03-09 NOTE — TELEPHONE ENCOUNTER
Caller: Nu Hunt    Relationship: Emergency Contact    Best call back number: [phone]    Requested Prescriptions:   Requested Prescriptions     Pending Prescriptions Disp Refills   • Insulin Glargine (LANTUS SOLOSTAR) 100 UNIT/ML injection pen 10 pen 11     Sig: Inject 30 Units under the skin into the appropriate area as directed 2 (Two) Times a Day.        Pharmacy where request should be sent: [id number], [place] - [address] [facility]  - [phone] [facility] [phone]      Additional details provided by patient: patient is out of medication after 03/09/22   Pharmacy is stating that insurance doesn't want to pay for medication, possibility it may need a prior authorization    Does the patient have less than a 3 day supply:  [x] Yes  [] No    Geraldine Barrios Rep   03/09/22 10:36 EST

## 2022-03-15 NOTE — TELEPHONE ENCOUNTER
Caller: Nu Hunt    Relationship: Emergency Contact    Best call back number: [phone]    Requested Prescriptions:   Requested Prescriptions     Pending Prescriptions Disp Refills   • quinapril (ACCUPRIL) 20 MG tablet 90 tablet 3     Sig: Take 1 tablet by mouth Every Night.   • sildenafil (REVATIO) 20 MG tablet 60 tablet 0     Sig: Take 1 tablet by mouth 3 (Three) Times a Day.        Pharmacy where request should be sent: [facility] [address] [facility], [place] - [address] [facility]   [phone] [facility] [phone] FX     Additional details provided by patient: PATIENT IS OUT OF MEDICATIONS    Does the patient have less than a 3 day supply:  [x] Yes  [] No    Geraldine Joseph Rep   03/15/22 13:12 EDT

## 2022-03-16 RX ORDER — QUINAPRIL 20 MG/1
20 TABLET ORAL NIGHTLY
Qty: 90 TABLET | Refills: 3 | Status: SHIPPED | OUTPATIENT
Start: 2022-03-16 | End: 2022-12-09 | Stop reason: SDUPTHER

## 2022-03-16 RX ORDER — SILDENAFIL CITRATE 20 MG/1
20 TABLET ORAL 3 TIMES DAILY
Qty: 60 TABLET | Refills: 0 | Status: SHIPPED | OUTPATIENT
Start: 2022-03-16 | End: 2022-04-29

## 2022-03-29 RX ORDER — METOPROLOL TARTRATE 50 MG/1
TABLET, FILM COATED ORAL
Qty: 180 TABLET | Refills: 0 | Status: SHIPPED | OUTPATIENT
Start: 2022-03-29 | End: 2022-06-21

## 2022-03-30 NOTE — TELEPHONE ENCOUNTER
PATIENTS WIFE IS CALLING CHECKING THE STATUS ON THE REFILL     meloxicam (MOBIC) 7.5 MG tablet    IF THERE IS AN ISSUE PLEASE CALL THEM BACK AT  [phone]

## 2022-03-31 RX ORDER — MELOXICAM 7.5 MG/1
TABLET ORAL
Qty: 30 TABLET | Refills: 0 | Status: SHIPPED | OUTPATIENT
Start: 2022-03-31 | End: 2022-05-04

## 2022-03-31 NOTE — TELEPHONE ENCOUNTER
Rx Refill Note  Requested Prescriptions     Pending Prescriptions Disp Refills   • meloxicam (MOBIC) 7.5 MG tablet [Pharmacy Med Name: Meloxicam Oral Tablet 7.5 MG] 30 tablet 0     Sig: TAKE 1 TABLET BY MOUTH EVERY DAY      Last office visit with prescribing clinician: 2/18/2022      Next office visit with prescribing clinician: 3/30/2022            EARL CROOKS MA  03/31/22, 14:49 EDT

## 2022-04-28 NOTE — TELEPHONE ENCOUNTER
Rx Refill Note  Requested Prescriptions     Pending Prescriptions Disp Refills   • sildenafil (REVATIO) 20 MG tablet [Pharmacy Med Name: Sildenafil Citrate Oral Tablet 20 MG] 60 tablet 0     Sig: TAKE 1 TABLET BY MOUTH THREE TIMES A DAY      Last office visit with prescribing clinician: 2/18/2022      Next office visit with prescribing clinician: 5/24/2022            EARL CROOKS MA  04/28/22, 17:17 EDT

## 2022-04-29 RX ORDER — SILDENAFIL CITRATE 20 MG/1
TABLET ORAL
Qty: 60 TABLET | Refills: 0 | Status: SHIPPED | OUTPATIENT
Start: 2022-04-29 | End: 2022-06-13

## 2022-05-03 NOTE — TELEPHONE ENCOUNTER
Rx Refill Note  Requested Prescriptions     Pending Prescriptions Disp Refills   • meloxicam (MOBIC) 7.5 MG tablet [Pharmacy Med Name: Meloxicam Oral Tablet 7.5 MG] 30 tablet 0     Sig: TAKE 1 TABLET BY MOUTH EVERY DAY      Last office visit with prescribing clinician: 2/18/2022      Next office visit with prescribing clinician: 5/24/2022            EARL CROOKS MA  05/03/22, 13:06 EDT

## 2022-05-04 RX ORDER — MELOXICAM 7.5 MG/1
TABLET ORAL
Qty: 30 TABLET | Refills: 0 | Status: SHIPPED | OUTPATIENT
Start: 2022-05-04 | End: 2022-06-02

## 2022-06-02 RX ORDER — MELOXICAM 7.5 MG/1
TABLET ORAL
Qty: 30 TABLET | Refills: 0 | Status: SHIPPED | OUTPATIENT
Start: 2022-06-02 | End: 2022-06-21 | Stop reason: SDUPTHER

## 2022-06-07 ENCOUNTER — TELEPHONE (OUTPATIENT)
Dept: INTERNAL MEDICINE | Facility: CLINIC | Age: 52
End: 2022-06-07

## 2022-06-07 NOTE — TELEPHONE ENCOUNTER
Caller: Nu Hunt    Relationship: Emergency Contact    Best call back number: [phone]    What orders are you requesting (i.e. lab or imaging): BLOOD WORK     In what timeframe would the patient need to come in: N/A    Where will you receive your lab/imaging services: N/A    Additional notes:NU STATED THAT PATIENT WOULD LIKE TO BLOOD WORK PRIOR TO APPOINTMENT ON 06/16/22     PLEASE ADVISE

## 2022-06-13 RX ORDER — SILDENAFIL CITRATE 20 MG/1
TABLET ORAL
Qty: 90 TABLET | Refills: 3 | Status: SHIPPED | OUTPATIENT
Start: 2022-06-13 | End: 2022-08-01 | Stop reason: SDUPTHER

## 2022-06-13 NOTE — TELEPHONE ENCOUNTER
Rx Refill Note  Requested Prescriptions     Pending Prescriptions Disp Refills   • sildenafil (REVATIO) 20 MG tablet [Pharmacy Med Name: Sildenafil Citrate Oral Tablet 20 MG] 60 tablet 0     Sig: TAKE 1 TABLET BY MOUTH THREE TIMES A DAY      Last office visit with prescribing clinician: 2/18/2022      Next office visit with prescribing clinician: 6/16/2022            Aura Johnson LPN  06/13/22, 13:26 EDT

## 2022-06-15 LAB
ALBUMIN SERPL-MCNC: 4.2 G/DL (ref 3.5–5.2)
ALBUMIN/GLOB SERPL: 1.8 G/DL
ALP SERPL-CCNC: 86 U/L (ref 39–117)
ALT SERPL-CCNC: 26 U/L (ref 1–41)
AST SERPL-CCNC: 23 U/L (ref 1–40)
BILIRUB SERPL-MCNC: 0.4 MG/DL (ref 0–1.2)
BUN SERPL-MCNC: 12 MG/DL (ref 6–20)
BUN/CREAT SERPL: 12.8 (ref 7–25)
CALCIUM SERPL-MCNC: 9.1 MG/DL (ref 8.6–10.5)
CHLORIDE SERPL-SCNC: 103 MMOL/L (ref 98–107)
CO2 SERPL-SCNC: 22.9 MMOL/L (ref 22–29)
CREAT SERPL-MCNC: 0.94 MG/DL (ref 0.76–1.27)
EGFRCR SERPLBLD CKD-EPI 2021: 97.5 ML/MIN/1.73
GLOBULIN SER CALC-MCNC: 2.4 GM/DL
GLUCOSE SERPL-MCNC: 152 MG/DL (ref 65–99)
HBA1C MFR BLD: 8.4 % (ref 4.8–5.6)
POTASSIUM SERPL-SCNC: 4.9 MMOL/L (ref 3.5–5.2)
PROT SERPL-MCNC: 6.6 G/DL (ref 6–8.5)
SODIUM SERPL-SCNC: 140 MMOL/L (ref 136–145)
URATE SERPL-MCNC: 7.6 MG/DL (ref 3.4–7)

## 2022-06-16 ENCOUNTER — OFFICE VISIT (OUTPATIENT)
Dept: INTERNAL MEDICINE | Facility: CLINIC | Age: 52
End: 2022-06-16

## 2022-06-16 VITALS
HEART RATE: 85 BPM | BODY MASS INDEX: 39.27 KG/M2 | OXYGEN SATURATION: 94 % | SYSTOLIC BLOOD PRESSURE: 130 MMHG | TEMPERATURE: 96.2 F | HEIGHT: 74 IN | WEIGHT: 306 LBS | DIASTOLIC BLOOD PRESSURE: 82 MMHG | RESPIRATION RATE: 16 BRPM

## 2022-06-16 DIAGNOSIS — E11.65 TYPE 2 DIABETES MELLITUS WITH HYPERGLYCEMIA, WITHOUT LONG-TERM CURRENT USE OF INSULIN: Primary | ICD-10-CM

## 2022-06-16 DIAGNOSIS — E79.0 HYPERURICEMIA: ICD-10-CM

## 2022-06-16 PROCEDURE — 99214 OFFICE O/P EST MOD 30 MIN: CPT | Performed by: INTERNAL MEDICINE

## 2022-06-21 RX ORDER — MELOXICAM 7.5 MG/1
7.5 TABLET ORAL DAILY
Qty: 30 TABLET | Refills: 0 | Status: SHIPPED | OUTPATIENT
Start: 2022-06-21 | End: 2022-08-01

## 2022-06-21 RX ORDER — METOPROLOL TARTRATE 50 MG/1
TABLET, FILM COATED ORAL
Qty: 180 TABLET | Refills: 3 | Status: SHIPPED | OUTPATIENT
Start: 2022-06-21 | End: 2022-06-21 | Stop reason: SDUPTHER

## 2022-06-21 RX ORDER — METOPROLOL TARTRATE 50 MG/1
50 TABLET, FILM COATED ORAL 2 TIMES DAILY
Qty: 180 TABLET | Refills: 3 | Status: SHIPPED | OUTPATIENT
Start: 2022-06-21

## 2022-06-21 NOTE — TELEPHONE ENCOUNTER
Rx Refill Note  Requested Prescriptions     Pending Prescriptions Disp Refills   • metoprolol tartrate (LOPRESSOR) 50 MG tablet [Pharmacy Med Name: Metoprolol Tartrate Oral Tablet 50 MG] 180 tablet 0     Sig: TAKE 1 TABLET BY MOUTH TWO TIMES A DAY      Last office visit with prescribing clinician: 6/16/2022      Next office visit with prescribing clinician: 8/3/2022            Andreea Mon MA  06/21/22, 09:20 EDT

## 2022-06-21 NOTE — TELEPHONE ENCOUNTER
Caller: Nu Hunt    Relationship: Emergency Contact    Best call back number: [phone]     Requested Prescriptions:   Requested Prescriptions     Pending Prescriptions Disp Refills   • metoprolol tartrate (LOPRESSOR) 50 MG tablet 180 tablet 3     Sig: Take 1 tablet by mouth 2 (Two) Times a Day.   • meloxicam (MOBIC) 7.5 MG tablet 30 tablet 0     Sig: Take 1 tablet by mouth Daily.        Pharmacy where request should be sent: [facility] [place], [place] - [address] [facility] [phone] [facility] [phone] FX     Additional details provided by patient:     Does the patient have less than a 3 day supply:  [x] Yes  [] No

## 2022-07-15 NOTE — TELEPHONE ENCOUNTER
Medicare Annual Wellness Visit    [address] Parker is here for Medicare AWV    Assessment & Plan   Initial Medicare annual wellness visit    Recommendations for Preventive Services Due: see orders and patient instructions/AVS.  Recommended screening schedule for the next 5-10 years is provided to the patient in written form: see Patient Instructions/AVS.     Return for Medicare Annual Wellness Visit in 1 year. Subjective       Patient's complete Health Risk Assessment and screening values have been reviewed and are found in Flowsheets. The following problems were reviewed today and where indicated follow up appointments were made and/or referrals ordered.     Positive Risk Factor Screenings with Interventions:             General Health and ACP:  General  In general, how would you say your health is?: Very Good  In the past 7 days, have you experienced any of the following: New or Increased Pain, New or Increased Fatigue, Loneliness, Social Isolation, Stress or Anger?: No  Do you get the social and emotional support that you need?: Yes  Do you have a Living Will?: Yes    Advance Directives       Power of  Living Will ACP-Advance Directive ACP-Power of     Not on File Not on File Not on File Not on File          General Health Risk Interventions:  None    Health Habits/Nutrition:  Physical Activity: Sufficiently Active    Days of Exercise per Week: 5 days    Minutes of Exercise per Session: 90 min     Have you lost any weight without trying in the past 3 months?: No  Body mass index: (!) 33.96  Have you seen the dentist within the past year?: (!) No  Health Habits/Nutrition Interventions:  Dental exam overdue:  patient encouraged to make appointment with his/her dentist     Safety:  Do you have working smoke detectors?: Yes  Do you have any tripping hazards - loose or unsecured carpets or rugs?: No  Do you have any tripping hazards - clutter in doorways, halls, or stairs?: No  Do you have either Rx Refill Note  Requested Prescriptions     Pending Prescriptions Disp Refills   • quinapril (ACCUPRIL) 20 MG tablet 90 tablet 3     Sig: Take 1 tablet by mouth Every Night.   • sildenafil (REVATIO) 20 MG tablet 60 tablet 0     Sig: Take 1 tablet by mouth 3 (Three) Times a Day.      Last office visit with prescribing clinician: 2/18/2022      Next office visit with prescribing clinician: 5/24/2022            EARL CROOKS MA  03/15/22, 16:16 EDT   shower bars, grab bars, non-slip mats or non-slip surfaces in your shower or bathtub?: Yes  Do all of your stairways have a railing or banister?: (!) No  Do you always fasten your seatbelt when you are in a car?: Yes  Safety Interventions:  Home safety tips provided           Objective   Vitals:    07/15/22 0912   BP: 136/82   Site: Left Upper Arm   Position: Sitting   Cuff Size: Medium Adult   Pulse: 96   Temp: 97.9 °F (36.6 °C)   TempSrc: Temporal   SpO2: 98%   Weight: 236 lb 11.2 oz (107.4 kg)   Height: 5' 10\" (1.778 m)      Body mass index is 33.96 kg/m². Allergies   Allergen Reactions    Bromine Rash     Usually used in pools      Prior to Visit Medications    Medication Sig Taking? Authorizing Provider   naproxen sodium (ANAPROX) 220 MG tablet Take 220 mg by mouth 2 times daily (with meals) Yes Historical Provider, MD   Cholecalciferol (VITAMIN D3) 50 MCG (2000 UT) CAPS Take 1 capsule by mouth daily Yes Historical Provider, MD   b complex vitamins capsule Take 1 capsule by mouth daily Yes Historical Provider, MD   aspirin EC 81 MG EC tablet Take 81 mg by mouth daily Yes Historical Provider, MD   Tadalafil 2.5 MG TABS Take 1 tablet by mouth daily Yes Historical Provider, MD   vitamin B-12 (CYANOCOBALAMIN) 500 MCG tablet Take 500 mcg by mouth daily  Patient not taking: Reported on 7/15/2022  Historical Provider, MD Oden (Including outside providers/suppliers regularly involved in providing care):   Patient Care Team:  Ju Burnette DO as PCP - General (Family Medicine)  Ju Burnette DO as PCP - REHABILITATION [facility] Empaneled Provider     Reviewed and updated this visit:  Tobacco  Allergies  Meds  Med Hx  Surg Hx  Soc Hx  Fam Hx               Medicare Annual Wellness Visit    Jasen Parker is here for Medicare AWV    Assessment & Plan   Initial Medicare annual wellness visit  Medicare annual wellness visit, subsequent    Recommendations for Preventive Services Due: see orders and patient instructions/AVS.  Recommended screening schedule for the next 5-10 years is provided to the patient in written form: see Patient Instructions/AVS.     Return in 1 year (on 7/15/2023) for Medicare Annual Wellness Visit in 1 year. Subjective       Patient's complete Health Risk Assessment and screening values have been reviewed and are found in Flowsheets. The following problems were reviewed today and where indicated follow up appointments were made and/or referrals ordered.     Positive Risk Factor Screenings with Interventions:             General Health and ACP:  General  In general, how would you say your health is?: Very Good  In the past 7 days, have you experienced any of the following: New or Increased Pain, New or Increased Fatigue, Loneliness, Social Isolation, Stress or Anger?: No  Do you get the social and emotional support that you need?: Yes  Do you have a Living Will?: Yes    Advance Directives       Power of  Living Will ACP-Advance Directive ACP-Power of     Not on File Not on File Not on File Not on File          General Health Risk Interventions:  None    Health Habits/Nutrition:  Physical Activity: Sufficiently Active    Days of Exercise per Week: 5 days    Minutes of Exercise per Session: 90 min     Have you lost any weight without trying in the past 3 months?: No  Body mass index: (!) 33.96  Have you seen the dentist within the past year?: (!) No  Health Habits/Nutrition Interventions:  Dental exam overdue:  patient encouraged to make appointment with his/her dentist     Safety:  Do you have working smoke detectors?: Yes  Do you have any tripping hazards - loose or unsecured carpets or rugs?: No  Do you have any tripping hazards - clutter in doorways, halls, or stairs?: No  Do you have either shower bars, grab bars, non-slip mats or non-slip surfaces in your shower or bathtub?: Yes  Do all of your stairways have a railing or banister?: (!) No  Do you always fasten your seatbelt when you are in a car?: Yes  Safety Interventions:  Home safety tips provided           Objective   Vitals:    07/15/22 0912   BP: 136/82   Site: Left Upper Arm   Position: Sitting   Cuff Size: Medium Adult   Pulse: 96   Temp: 97.9 °F (36.6 °C)   TempSrc: Temporal   SpO2: 98%   Weight: 236 lb 11.2 oz (107.4 kg)   Height: 5' 10\" (1.778 m)      Body mass index is 33.96 kg/m². Allergies   Allergen Reactions    Bromine Rash     Usually used in pools      Prior to Visit Medications    Medication Sig Taking?  Authorizing Provider   naproxen sodium (ANAPROX) 220 MG tablet Take 220 mg by mouth 2 times daily (with meals) Yes Historical Provider, MD   Cholecalciferol (VITAMIN D3) 50 MCG (2000 UT) CAPS Take 1 capsule by mouth daily Yes Historical Provider, MD   b complex vitamins capsule Take 1 capsule by mouth daily Yes Historical Provider, MD   aspirin EC 81 MG EC tablet Take 81 mg by mouth daily Yes Historical Provider, MD   Tadalafil 2.5 MG TABS Take 1 tablet by mouth daily Yes Historical Provider, MD   vitamin B-12 (CYANOCOBALAMIN) 500 MCG tablet Take 500 mcg by mouth daily  Patient not taking: Reported on 7/15/2022  Historical Provider, MD Oden (Including outside providers/suppliers regularly involved in providing care):   Patient Care Team:  Tanesha Fairbanks DO as PCP - General (Family Medicine)  Tanesha Fairbanks DO as PCP - [facility] Empaneled Provider     Reviewed and updated this visit:  Tobacco  Allergies  Meds  Med Hx  Surg Hx  Soc Hx  Fam Hx

## 2022-08-01 RX ORDER — SILDENAFIL CITRATE 20 MG/1
20 TABLET ORAL 3 TIMES DAILY
Qty: 90 TABLET | Refills: 3 | Status: SHIPPED | OUTPATIENT
Start: 2022-08-01

## 2022-08-01 RX ORDER — MELOXICAM 7.5 MG/1
TABLET ORAL
Qty: 90 TABLET | Refills: 3 | Status: SHIPPED | OUTPATIENT
Start: 2022-08-01 | End: 2022-08-01 | Stop reason: SDUPTHER

## 2022-08-01 RX ORDER — MELOXICAM 7.5 MG/1
7.5 TABLET ORAL DAILY
Qty: 90 TABLET | Refills: 3 | Status: SHIPPED | OUTPATIENT
Start: 2022-08-01 | End: 2022-08-05

## 2022-08-01 NOTE — TELEPHONE ENCOUNTER
Rx Refill Note  Requested Prescriptions     Pending Prescriptions Disp Refills   • sildenafil (REVATIO) 20 MG tablet 90 tablet 3     Sig: Take 1 tablet by mouth 3 (Three) Times a Day.      Last office visit with prescribing clinician: 6/16/2022      Next office visit with prescribing clinician: 8/3/2022            Nicole Briscoe LPN  08/01/22, 11:12 EDT

## 2022-08-01 NOTE — TELEPHONE ENCOUNTER
Caller: Nu Hunt    Relationship: Emergency Contact    Best call back number:   [phone]    Requested Prescriptions:   Requested Prescriptions     Pending Prescriptions Disp Refills   • meloxicam (MOBIC) 7.5 MG tablet 90 tablet 3     Sig: Take 1 tablet by mouth Daily.        Pharmacy where request should be sent: [facility], [place] - [address] [facility] - [phone]  - [phone] FX     Additional details provided by patient: PATIENT IS OUT OF THE MEDICATION     Does the patient have less than a 3 day supply:  [x] Yes  [] No

## 2022-08-01 NOTE — TELEPHONE ENCOUNTER
Incoming Refill Request      Medication requested (name and dose): sildenafil (REVATIO) 20 MG tablet    Pharmacy where request should be sent: [facility]    Additional details provided by patient: PATIENT HAS LESS THAN THREE DAYS LEFT    Best call back number: [phone]    Does the patient have less than a 3 day supply:  [x] Yes  [] No    Geraldine Garsia Rep  08/01/22, 11:06 EDT

## 2022-08-01 NOTE — TELEPHONE ENCOUNTER
Rx Refill Note  Requested Prescriptions     Pending Prescriptions Disp Refills   • meloxicam (MOBIC) 7.5 MG tablet 90 tablet 3     Sig: Take 1 tablet by mouth Daily.      Last office visit with prescribing clinician: 6/16/2022      Next office visit with prescribing clinician: 8/3/2022            Nicole Briscoe LPN  08/01/22, 11:23 EDT

## 2022-08-01 NOTE — TELEPHONE ENCOUNTER
Caller: Nu Hunt    Relationship to patient: Emergency Contact    Best call back number: [phone]    What is the call regarding:  PATIENT'S WIFE STATES THAT HE IS OUT OF THE MEDICATION.

## 2022-08-01 NOTE — TELEPHONE ENCOUNTER
Rx Refill Note  Requested Prescriptions     Pending Prescriptions Disp Refills   • meloxicam (MOBIC) 7.5 MG tablet [Pharmacy Med Name: Meloxicam Oral Tablet 7.5 MG] 30 tablet 0     Sig: TAKE 1 TABLET BY MOUTH EVERY DAY      Last office visit with prescribing clinician: 6/16/2022      Next office visit with prescribing clinician: 8/3/2022            Nicole Briscoe LPN  08/01/22, 10:55 EDT

## 2022-08-05 DIAGNOSIS — I82.469 DEEP VEIN THROMBOSIS (DVT) OF CALF MUSCLE VEIN, UNSPECIFIED CHRONICITY, UNSPECIFIED LATERALITY: ICD-10-CM

## 2022-08-05 RX ORDER — APIXABAN 5 MG/1
TABLET, FILM COATED ORAL
Qty: 60 TABLET | Refills: 0 | Status: SHIPPED | OUTPATIENT
Start: 2022-08-05 | End: 2022-08-05 | Stop reason: SDUPTHER

## 2022-08-05 RX ORDER — APIXABAN 5 MG/1
5 TABLET, FILM COATED ORAL EVERY 12 HOURS
Qty: 60 TABLET | Refills: 11 | Status: SHIPPED | OUTPATIENT
Start: 2022-08-05

## 2022-08-09 ENCOUNTER — TELEPHONE (OUTPATIENT)
Dept: INTERNAL MEDICINE | Facility: CLINIC | Age: 52
End: 2022-08-09

## 2022-08-09 RX ORDER — BLOOD SUGAR DIAGNOSTIC
2 STRIP MISCELLANEOUS 2 TIMES DAILY
Qty: 100 EACH | Refills: 1 | Status: SHIPPED | OUTPATIENT
Start: 2022-08-09 | End: 2023-01-09

## 2022-08-09 NOTE — TELEPHONE ENCOUNTER
Caller: Nu Hunt    Relationship: Emergency Contact    Best call back number: [phone]    Requested Prescriptions: PEN NEEDLES    Pharmacy where request should be sent: [id number] - [place], [place] - [address] - [phone]  - [phone] FX     Additional details provided by patient: PATIENTS WIFE STATES THAT HE NEEDS THESE CALLED IN.    Does the patient have less than a 3 day supply:  [x] Yes  [] No    Geraldine Nichols Rep   08/09/22 16:12 EDT

## 2022-08-16 ENCOUNTER — TELEPHONE (OUTPATIENT)
Dept: INTERNAL MEDICINE | Facility: CLINIC | Age: 52
End: 2022-08-16

## 2022-08-16 NOTE — TELEPHONE ENCOUNTER
Spoke with wife per signed release, advised symptomatic treatment: otc tyl/ibu, rest and increase fluids.

## 2022-08-16 NOTE — TELEPHONE ENCOUNTER
Caller: Nu Hunt    Relationship to patient: Emergency Contact    Best call back number: [phone]    Date of positive COVID19 test: 08/16/2022    COVID19 symptoms:   COUGH AND CONGESTION, FATIGUE, HEADACHE, RUNNY NOSE, WATERY EYES     Date of initial quarantine:08/16/2022    Additional information or concerns:   PATIENT (WIFE) NU STATED THAT PATIENT WOULD LIKE FOR MEDICATION TO BE CALLED INTO THE PHARMACY TO HELP WITH COVID SYMPTOMS     What is the patients preferred pharmacy:   [id number] - [place], [place] - [address] - [phone] [facility] [phone]   [phone]

## 2022-09-28 ENCOUNTER — OFFICE VISIT (OUTPATIENT)
Dept: INTERNAL MEDICINE | Facility: CLINIC | Age: 52
End: 2022-09-28

## 2022-09-28 VITALS
SYSTOLIC BLOOD PRESSURE: 114 MMHG | WEIGHT: 298 LBS | BODY MASS INDEX: 38.24 KG/M2 | TEMPERATURE: 96.6 F | HEART RATE: 87 BPM | OXYGEN SATURATION: 97 % | RESPIRATION RATE: 16 BRPM | HEIGHT: 74 IN | DIASTOLIC BLOOD PRESSURE: 68 MMHG

## 2022-09-28 DIAGNOSIS — E11.9 TYPE 2 DIABETES MELLITUS WITHOUT COMPLICATION, WITH LONG-TERM CURRENT USE OF INSULIN: Primary | ICD-10-CM

## 2022-09-28 DIAGNOSIS — Z79.4 TYPE 2 DIABETES MELLITUS WITHOUT COMPLICATION, WITH LONG-TERM CURRENT USE OF INSULIN: Primary | ICD-10-CM

## 2022-09-28 PROCEDURE — 99213 OFFICE O/P EST LOW 20 MIN: CPT | Performed by: INTERNAL MEDICINE

## 2022-09-28 NOTE — PROGRESS NOTES
Subjective     Patient ID: Juarez Hunt is a 51 y.o. male. Patient is here for management of multiple medical problems.     Chief Complaint   Patient presents with   • Diabetes     History of Present Illness   DM      Had covid.    Wt down    The following portions of the patient's history were reviewed and updated as appropriate: allergies, current medications, past family history, past medical history, past social history, past surgical history and problem list.    Review of Systems   Constitutional: Negative for fatigue.   Psychiatric/Behavioral: Negative for sleep disturbance. The patient is not nervous/anxious.    All other systems reviewed and are negative.      Current Outpatient Medications:   •  albuterol sulfate  (90 Base) MCG/ACT inhaler, Inhale 2 puffs Every 6 (Six) Hours As Needed for Wheezing or Shortness of Air., Disp: 18 g, Rfl: 0  •  allopurinol (Zyloprim) 100 MG tablet, Take 1 tablet by mouth Daily., Disp: 90 tablet, Rfl: 3  •  Diclofenac Sodium (VOLTAREN) 1 % gel gel, Apply 4 g topically to the appropriate area as directed 4 (Four) Times a Day As Needed (pain)., Disp: 100 g, Rfl: 1  •  Eliquis 5 MG tablet tablet, Take 1 tablet by mouth Every 12 (Twelve) Hours., Disp: 60 tablet, Rfl: 11  •  glucose blood (True Metrix Blood Glucose Test) test strip, 1 each by Other route 6 (Six) Times a Day. Use as instructed, Disp: 200 each, Rfl: 12  •  Insulin Glargine (LANTUS SOLOSTAR) 100 UNIT/ML injection pen, Inject 30 Units under the skin into the appropriate area as directed 2 (Two) Times a Day., Disp: 10 pen, Rfl: 11  •  Insulin Pen Needle (Pen Needles) 32G X 5 MM misc, 2 each 2 (Two) Times a Day., Disp: 100 each, Rfl: 1  •  [employer] Lancets Universal 33G misc, USE TO CHECK BLOOD GLUCOSE THREE TIMES DAILY OR AS DIRECTED, Disp: , Rfl:   •  metFORMIN (Glucophage) 500 MG tablet, Take 1 tablet by mouth 2 (Two) Times a Day With Meals., Disp: 60 tablet, Rfl: 11  •  metoprolol tartrate (LOPRESSOR) 50 MG  "tablet, Take 1 tablet by mouth 2 (Two) Times a Day., Disp: 180 tablet, Rfl: 3  •  quinapril (ACCUPRIL) 20 MG tablet, Take 1 tablet by mouth Every Night., Disp: 90 tablet, Rfl: 3  •  sildenafil (REVATIO) 20 MG tablet, Take 1 tablet by mouth 3 (Three) Times a Day., Disp: 90 tablet, Rfl: 3    Objective      Blood pressure 114/68, pulse 87, temperature 96.6 °F (35.9 °C), resp. rate 16, height 188 cm (74\"), weight 135 kg (298 lb), SpO2 97 %.    Physical Exam     General Appearance:    Alert, cooperative, no distress, appears stated age   Head:    Normocephalic, without obvious abnormality, atraumatic   Eyes:    PERRL, conjunctiva/corneas clear, EOM's intact   Ears:    Normal TM's and external ear canals, both ears   Nose:   Nares normal, septum midline, mucosa normal, no drainage   or sinus tenderness   Throat:   Lips, mucosa, and tongue normal; teeth and gums normal   Neck:   Supple, symmetrical, trachea midline, no adenopathy;        thyroid:  No enlargement/tenderness/nodules; no carotid    bruit or JVD   Back:     Symmetric, no curvature, ROM normal, no CVA tenderness   Lungs:     Clear to auscultation bilaterally, respirations unlabored   Chest wall:    No tenderness or deformity   Heart:    Regular rate and rhythm, S1 and S2 normal, no murmur,        rub or gallop   Abdomen:     Soft, non-tender, bowel sounds active all four quadrants,     no masses, no organomegaly   Extremities:   Extremities normal, atraumatic, no cyanosis or edema   Pulses:   2+ and symmetric all extremities   Skin:   Skin color, texture, turgor normal, no rashes or lesions   Lymph nodes:   Cervical, supraclavicular, and axillary nodes normal   Neurologic:   CNII-XII intact. Normal strength, sensation and reflexes       throughout      Results for orders placed or performed in visit on 02/18/22   Comprehensive Metabolic Panel    Specimen: Blood   Result Value Ref Range    Glucose 152 (H) 65 - 99 mg/dL    BUN 12 6 - 20 mg/dL    Creatinine 0.94 " 0.76 - 1.27 mg/dL    EGFR Result 97.5 >60.0 mL/min/1.73    BUN/Creatinine Ratio 12.8 7.0 - 25.0    Sodium 140 136 - 145 mmol/L    Potassium 4.9 3.5 - 5.2 mmol/L    Chloride 103 98 - 107 mmol/L    Total CO2 22.9 22.0 - 29.0 mmol/L    Calcium 9.1 8.6 - 10.5 mg/dL    Total Protein 6.6 6.0 - 8.5 g/dL    Albumin 4.20 3.50 - 5.20 g/dL    Globulin 2.4 gm/dL    A/G Ratio 1.8 g/dL    Total Bilirubin 0.4 0.0 - 1.2 mg/dL    Alkaline Phosphatase 86 39 - 117 U/L    AST (SGOT) 23 1 - 40 U/L    ALT (SGPT) 26 1 - 41 U/L   Hemoglobin A1c    Specimen: Blood   Result Value Ref Range    Hemoglobin A1C 8.40 (H) 4.80 - 5.60 %   Uric Acid   Result Value Ref Range    Uric Acid 7.6 (H) 3.4 - 7.0 mg/dL         Assessment & Plan     Keto genic diet.    Wt loss going well.        Diagnoses and all orders for this visit:    1. Type 2 diabetes mellitus without complication, with long-term current use of insulin (HCC) (Primary)      Return in about 7 months (around 4/28/2023).          There are no Patient Instructions on file for this visit.     Jw Gudio MD    Assessment & Plan

## 2022-10-19 ENCOUNTER — APPOINTMENT (OUTPATIENT)
Dept: CT IMAGING | Facility: HOSPITAL | Age: 52
End: 2022-10-19

## 2022-10-19 ENCOUNTER — HOSPITAL ENCOUNTER (EMERGENCY)
Facility: HOSPITAL | Age: 52
Discharge: HOME | End: 2022-10-19
Attending: FAMILY MEDICINE | Admitting: EMERGENCY MEDICINE

## 2022-10-19 VITALS
TEMPERATURE: 98.1 F | HEIGHT: 74 IN | BODY MASS INDEX: 38.5 KG/M2 | HEART RATE: 99 BPM | RESPIRATION RATE: 18 BRPM | SYSTOLIC BLOOD PRESSURE: 106 MMHG | OXYGEN SATURATION: 99 % | DIASTOLIC BLOOD PRESSURE: 74 MMHG | WEIGHT: 300 LBS

## 2022-10-19 DIAGNOSIS — H53.8 BLURRY VISION: Primary | ICD-10-CM

## 2022-10-19 LAB
ALBUMIN SERPL-MCNC: 4 G/DL (ref 3.5–5.2)
ALBUMIN/GLOB SERPL: 1.7 G/DL
ALP SERPL-CCNC: 83 U/L (ref 39–117)
ALT SERPL W P-5'-P-CCNC: 24 U/L (ref 1–41)
ANION GAP SERPL CALCULATED.3IONS-SCNC: 8.1 MMOL/L (ref 5–15)
AST SERPL-CCNC: 21 U/L (ref 1–40)
BASOPHILS # BLD AUTO: 0.07 10*3/MM3 (ref 0–0.2)
BASOPHILS NFR BLD AUTO: 1.2 % (ref 0–1.5)
BILIRUB SERPL-MCNC: 0.4 MG/DL (ref 0–1.2)
BUN SERPL-MCNC: 12 MG/DL (ref 6–20)
BUN/CREAT SERPL: 13.5 (ref 7–25)
CALCIUM SPEC-SCNC: 8.9 MG/DL (ref 8.6–10.5)
CHLORIDE SERPL-SCNC: 105 MMOL/L (ref 98–107)
CO2 SERPL-SCNC: 24.9 MMOL/L (ref 22–29)
CREAT SERPL-MCNC: 0.89 MG/DL (ref 0.76–1.27)
CRP SERPL-MCNC: <0.3 MG/DL (ref 0–0.5)
DEPRECATED RDW RBC AUTO: 40.9 FL (ref 37–54)
EGFRCR SERPLBLD CKD-EPI 2021: 103.1 ML/MIN/1.73
EOSINOPHIL # BLD AUTO: 0.15 10*3/MM3 (ref 0–0.4)
EOSINOPHIL NFR BLD AUTO: 2.7 % (ref 0.3–6.2)
ERYTHROCYTE [DISTWIDTH] IN BLOOD BY AUTOMATED COUNT: 12.5 % (ref 12.3–15.4)
GLOBULIN UR ELPH-MCNC: 2.4 GM/DL
GLUCOSE SERPL-MCNC: 238 MG/DL (ref 65–99)
HCT VFR BLD AUTO: 44.9 % (ref 37.5–51)
HGB BLD-MCNC: 15.8 G/DL (ref 13–17.7)
IMM GRANULOCYTES # BLD AUTO: 0.03 10*3/MM3 (ref 0–0.05)
IMM GRANULOCYTES NFR BLD AUTO: 0.5 % (ref 0–0.5)
INR PPP: 1.05 (ref 0.9–1.1)
LYMPHOCYTES # BLD AUTO: 1.53 10*3/MM3 (ref 0.7–3.1)
LYMPHOCYTES NFR BLD AUTO: 27.3 % (ref 19.6–45.3)
MCH RBC QN AUTO: 31.8 PG (ref 26.6–33)
MCHC RBC AUTO-ENTMCNC: 35.2 G/DL (ref 31.5–35.7)
MCV RBC AUTO: 90.3 FL (ref 79–97)
MONOCYTES # BLD AUTO: 0.51 10*3/MM3 (ref 0.1–0.9)
MONOCYTES NFR BLD AUTO: 9.1 % (ref 5–12)
NEUTROPHILS NFR BLD AUTO: 3.32 10*3/MM3 (ref 1.7–7)
NEUTROPHILS NFR BLD AUTO: 59.2 % (ref 42.7–76)
NRBC BLD AUTO-RTO: 0 /100 WBC (ref 0–0.2)
NT-PROBNP SERPL-MCNC: 30.8 PG/ML (ref 0–900)
PLATELET # BLD AUTO: 194 10*3/MM3 (ref 140–450)
PMV BLD AUTO: 9.9 FL (ref 6–12)
POTASSIUM SERPL-SCNC: 4.5 MMOL/L (ref 3.5–5.2)
PROT SERPL-MCNC: 6.4 G/DL (ref 6–8.5)
PROTHROMBIN TIME: 14 SECONDS (ref 12.5–14.5)
RBC # BLD AUTO: 4.97 10*6/MM3 (ref 4.14–5.8)
SODIUM SERPL-SCNC: 138 MMOL/L (ref 136–145)
TROPONIN T SERPL-MCNC: <0.01 NG/ML (ref 0–0.03)
WBC NRBC COR # BLD: 5.61 10*3/MM3 (ref 3.4–10.8)

## 2022-10-19 PROCEDURE — 93005 ELECTROCARDIOGRAM TRACING: CPT | Performed by: FAMILY MEDICINE

## 2022-10-19 PROCEDURE — 84484 ASSAY OF TROPONIN QUANT: CPT | Performed by: FAMILY MEDICINE

## 2022-10-19 PROCEDURE — 80053 COMPREHEN METABOLIC PANEL: CPT | Performed by: FAMILY MEDICINE

## 2022-10-19 PROCEDURE — 83880 ASSAY OF NATRIURETIC PEPTIDE: CPT | Performed by: FAMILY MEDICINE

## 2022-10-19 PROCEDURE — 70450 CT HEAD/BRAIN W/O DYE: CPT

## 2022-10-19 PROCEDURE — 86140 C-REACTIVE PROTEIN: CPT | Performed by: FAMILY MEDICINE

## 2022-10-19 PROCEDURE — 99283 EMERGENCY DEPT VISIT LOW MDM: CPT

## 2022-10-19 PROCEDURE — 36415 COLL VENOUS BLD VENIPUNCTURE: CPT

## 2022-10-19 PROCEDURE — 85025 COMPLETE CBC W/AUTO DIFF WBC: CPT | Performed by: FAMILY MEDICINE

## 2022-10-19 PROCEDURE — 85610 PROTHROMBIN TIME: CPT | Performed by: FAMILY MEDICINE

## 2022-10-19 RX ORDER — SODIUM CHLORIDE 0.9 % (FLUSH) 0.9 %
10 SYRINGE (ML) INJECTION AS NEEDED
Status: DISCONTINUED | OUTPATIENT
Start: 2022-10-19 | End: 2022-10-19 | Stop reason: HOSPADM

## 2022-10-19 NOTE — ED PROVIDER NOTES
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  Patient reports that 3 weeks ago he had left eye cataract removed as well as a YAG procedure.  Patient reports for the last 3 to 4 days he has had intermittent episodes of blurry vision states that he has not contacted his ophthalmologist because he thought that it would just go away that maybe he was watching too much television.  He reports that he woke up this morning had breakfast had been out for about an hour was sitting in a recliner and decided he needed to get up when he stood up he said he felt a little off balance and then felt like his left eye was moving in circles and his vision was blurred he said it lasted for about 12 seconds and then it was back to normal.  Patient went at that time and woke his wife up and had her drive him to the emergency department.  Patient has no complaints at this time.    History provided by:  Patient  Illness  Location:  Left eye vision change  Severity:  Moderate  Onset quality:  Sudden  Duration: 12 seconds.  Timing:  Constant  Progression:  Resolved  Chronicity:  New  Context:  See hpi  Relieved by:  Nothing  Worsened by:  Nothing  Ineffective treatments:  None  Associated symptoms: no abdominal pain, no chest pain and no fever        Review of Systems   Constitutional: Negative.  Negative for fever.   HENT: Negative.    Respiratory: Negative.    Cardiovascular: Negative.  Negative for chest pain.   Gastrointestinal: Negative.  Negative for abdominal pain.   Endocrine: Negative.    Genitourinary: Negative.  Negative for dysuria.   Skin: Negative.    Neurological: Negative.    Psychiatric/Behavioral: Negative.    All other systems reviewed and are negative.      Past Medical History:   Diagnosis Date   • Arthritis    • Blood clot in vein    • Diabetes  mellitus (HCC)    • Hypertension        No Known Allergies    Past Surgical History:   Procedure Laterality Date   • CARDIAC CATHETERIZATION  2004   • IVC FILTER RETRIEVAL     • KNEE SURGERY Left 1986   • SKIN BIOPSY  06/13/2020    toe       Family History   Problem Relation Age of Onset   • Diabetes Mother    • Heart disease Mother    • Heart attack Mother    • Hypertension Mother    • Arthritis Father    • Heart attack Maternal Grandfather    • Heart attack Maternal Uncle    • Liver cancer Maternal Aunt        Social History     Socioeconomic History   • Marital status:    Tobacco Use   • Smoking status: Former   • Smokeless tobacco: Current     Types: Chew   Vaping Use   • Vaping Use: Never used   Substance and Sexual Activity   • Alcohol use: Yes     Comment: 1/4 glass per week   • Drug use: No   • Sexual activity: Defer           Objective   Physical Exam  Vitals and nursing note reviewed.   Constitutional:       General: He is not in acute distress.     Appearance: Normal appearance. He is obese. He is not ill-appearing, toxic-appearing or diaphoretic.   HENT:      Head: Normocephalic and atraumatic.      Right Ear: Tympanic membrane normal.      Left Ear: Tympanic membrane normal.      Nose: Nose normal.      Mouth/Throat:      Mouth: Mucous membranes are moist.   Eyes:      General: Lids are normal. Lids are everted, no foreign bodies appreciated. No visual field deficit or scleral icterus.     Extraocular Movements: Extraocular movements intact.      Conjunctiva/sclera:      Left eye: Left conjunctiva is injected.      Pupils: Pupils are equal, round, and reactive to light.     Cardiovascular:      Rate and Rhythm: Normal rate and regular rhythm.   Musculoskeletal:      Cervical back: Normal range of motion.   Neurological:      Mental Status: He is alert.         Procedures           ED Course  ED Course as of 10/20/22 0414   Wed Oct 19, 2022   0704 EKG interpretation time is 6:54 AM sinus rhythm 86  bpm QRSd 110  QTc 410 no acute ST elevation or depression [MH]   0707 Endorsed to dayshift attending at shift change []      ED Course User Index  [] Lizabeth Villagomez DO                                           [facility]    Final diagnoses:   Blurry vision       ED Disposition  ED Disposition     ED Disposition   Discharge    Condition   Stable    Comment   --             Jw Guido MD  [address] [place], [place] [address] [phone]               Medication List      No changes were made to your prescriptions during this visit.          Lizabeth Villagomez DO  10/20/22 0414

## 2022-10-26 NOTE — TELEPHONE ENCOUNTER
Rx Refill Note  Requested Prescriptions     Pending Prescriptions Disp Refills   • metFORMIN (GLUCOPHAGE) 1000 MG tablet [Pharmacy Med Name: metFORMIN HCl Oral Tablet 1000 MG] 180 tablet 0     Sig: TAKE 1 TABLET BY MOUTH TWO TIMES A DAY      Last office visit with prescribing clinician: 9/28/2022      Next office visit with prescribing clinician: Visit date not found            Stephani Mosley MA  10/26/22, 08:42 EDT

## 2022-12-09 ENCOUNTER — TELEPHONE (OUTPATIENT)
Dept: INTERNAL MEDICINE | Facility: CLINIC | Age: 52
End: 2022-12-09

## 2022-12-09 RX ORDER — QUINAPRIL 20 MG/1
20 TABLET ORAL NIGHTLY
Qty: 90 TABLET | Refills: 3 | Status: SHIPPED | OUTPATIENT
Start: 2022-12-09

## 2022-12-09 NOTE — TELEPHONE ENCOUNTER
Caller: Juarez Hunt    Relationship: Self    Best call back number: [phone]    What is the best time to reach you: ANYTIME    Who are you requesting to speak with (clinical staff, provider,  specific staff member): PROVIDER     What was the call regarding: PATIENT STATES THAT PHARMACY WILL NO LONGER HAVE THE MEDICATION QUINAPRIL DUE TO MANUFACTURE NOT MAKING IT AND PATIENT WOULD LIKE TO DISCUSS AN ALTERNATIVE MEDICATION.     Do you require a callback: YES

## 2022-12-09 NOTE — TELEPHONE ENCOUNTER
Caller: Juarez Hunt    Relationship: Self    Best call back number:    [phone]          Requested Prescriptions:   Requested Prescriptions     Pending Prescriptions Disp Refills   • quinapril (ACCUPRIL) 20 MG tablet 90 tablet 3     Sig: Take 1 tablet by mouth Every Night.      [facility] STATED NO LONGER AVAILABLE   Pharmacy where request should be sent: [id number] - [place], [place] - [address] - [phone] [facility] [phone] FX       Does the patient have less than a 3 day supply:  [x] Yes  [] No    Would you like a call back once the refill request has been completed: [] Yes [x] No    If the office needs to give you a call back, can they leave a voicemail: [] Yes [x] No    Geraldine Fernandez Rep   12/09/22 10:26 EST

## 2022-12-13 ENCOUNTER — TELEPHONE (OUTPATIENT)
Dept: INTERNAL MEDICINE | Facility: CLINIC | Age: 52
End: 2022-12-13

## 2022-12-13 RX ORDER — LISINOPRIL 20 MG/1
20 TABLET ORAL DAILY
Qty: 90 TABLET | Refills: 3 | Status: SHIPPED | OUTPATIENT
Start: 2022-12-13 | End: 2023-03-13 | Stop reason: SDUPTHER

## 2022-12-13 NOTE — TELEPHONE ENCOUNTER
Caller: Nu Hunt    Relationship: Emergency Contact    Best call back number:     [phone]    Which medication are you concerned about:    quinapril (ACCUPRIL) 20 MG tablet    Who prescribed you this medication:     DR MARTINEZ    What are your concerns:     CALLER STATED MEDICATION LISTED ABOVE IS NOT AVAILABLE AT [facility]    CALLER REQUESTED ANOTHER MEDICATION TO BE PRESCRIBED TO REPLACE THE QUINAPRIL SINCE IT IS NOT AVAILABLE AND PATIENT HAS BEEN OUT FOR A COUPLE DAYS

## 2022-12-28 RX ORDER — INSULIN GLARGINE 100 [IU]/ML
INJECTION, SOLUTION SUBCUTANEOUS
Qty: 15 ML | Refills: 0 | Status: SHIPPED | OUTPATIENT
Start: 2022-12-28 | End: 2023-01-23

## 2022-12-28 NOTE — TELEPHONE ENCOUNTER
Caller: Nu Hunt    Relationship: Emergency Contact     Best call back number:   [phone]    Requested Prescriptions:   Requested Prescriptions     Pending Prescriptions Disp Refills   • Lantus SoloStar 100 UNIT/ML injection pen [Pharmacy Med Name: Lantus SoloStar Subcutaneous Solution Pen-injector 100 UNIT/ML] 15 mL 0     Sig: INJECT 30 UNITS SUBCUTANEOUSLY TWO TIMES A DAY AS DIRECTED        Pharmacy where request should be sent: [facility] - [place], [place] - [address]  [phone] [facility] [phone] FX     Additional details provided by patient:  PLEASE CHECK IF THERE IS ANYTHING ELSE THAT NEEDS A REFILL ON IT     Does the patient have less than a 3 day supply:  [x] Yes  [] No    Would you like a call back once the refill request has been completed: [x] Yes [] No

## 2023-01-09 RX ORDER — PEN NEEDLE, DIABETIC 32GX 5/32"
NEEDLE, DISPOSABLE MISCELLANEOUS
Qty: 100 EACH | Refills: 0 | Status: SHIPPED | OUTPATIENT
Start: 2023-01-09 | End: 2023-02-24

## 2023-01-09 NOTE — TELEPHONE ENCOUNTER
Sent patient a [employer] Read[employer] message informing prescription for pen needles had been sent to the pharmacy.

## 2023-01-23 RX ORDER — INSULIN GLARGINE 100 [IU]/ML
INJECTION, SOLUTION SUBCUTANEOUS
Qty: 15 ML | Refills: 0 | OUTPATIENT
Start: 2023-01-23

## 2023-01-23 RX ORDER — INSULIN GLARGINE 100 [IU]/ML
INJECTION, SOLUTION SUBCUTANEOUS
Qty: 15 ML | Refills: 0 | Status: SHIPPED | OUTPATIENT
Start: 2023-01-23 | End: 2023-02-16

## 2023-01-23 NOTE — TELEPHONE ENCOUNTER
Rx Refill Note  Requested Prescriptions     Pending Prescriptions Disp Refills   • Lantus SoloStar 100 UNIT/ML injection pen [Pharmacy Med Name: Lantus SoloStar Subcutaneous Solution Pen-injector 100 UNIT/ML] 15 mL 0     Sig: INJECT 30 UNITS under the skin TWO TIMES A DAY AS DIRECTED      Last office visit with prescribing clinician: 9/28/2022   Last telemedicine visit with prescribing clinician: 1/23/2023   Next office visit with prescribing clinician: 1/23/2023                         Would you like a call back once the refill request has been completed: [] Yes [] No    If the office needs to give you a call back, can they leave a voicemail: [] Yes [] No    Stephani Mosley MA  01/23/23, 15:49 EST

## 2023-01-23 NOTE — TELEPHONE ENCOUNTER
Caller: Nu Hunt    Relationship: Emergency Contact    Best call back number: [phone]    Requested Prescriptions:   Requested Prescriptions     Pending Prescriptions Disp Refills   • Insulin Glargine (Lantus SoloStar) 100 UNIT/ML injection pen 15 mL 0        Pharmacy where request should be sent: [facility], [place] - [address] [facility] - [phone] [facility] [phone] FX     Additional details provided by patient: PATIENT ONLY HAS ONE DAY LEFT OF THIS MEDICATION.    Does the patient have less than a 3 day supply:  [x] Yes  [] No    Would you like a call back once the refill request has been completed: [x] Yes [] No    If the office needs to give you a call back, can they leave a voicemail: [] Yes [x] No    Geraldine Marquez Rep   01/23/23 15:11 EST

## 2023-01-24 RX ORDER — PEN NEEDLE, DIABETIC 32GX 5/32"
NEEDLE, DISPOSABLE MISCELLANEOUS
Qty: 100 EACH | Refills: 0 | OUTPATIENT
Start: 2023-01-24

## 2023-02-16 RX ORDER — INSULIN GLARGINE 100 [IU]/ML
INJECTION, SOLUTION SUBCUTANEOUS
Qty: 15 ML | Refills: 0 | Status: SHIPPED | OUTPATIENT
Start: 2023-02-16 | End: 2023-03-13 | Stop reason: SDUPTHER

## 2023-02-16 NOTE — TELEPHONE ENCOUNTER
Caller: Nu Hunt    Relationship: Emergency Contact    Best call back number: [phone]    Requested Prescriptions:   Requested Prescriptions     Pending Prescriptions Disp Refills   • Lantus SoloStar 100 UNIT/ML injection pen [Pharmacy Med Name: Lantus SoloStar Subcutaneous Solution Pen-injector 100 UNIT/ML] 15 mL 0     Sig: INJECT 30 UNITS UNDER THE SKIN TWO TIMES A DAY AS DIRECTED        Pharmacy where request should be sent: [facility] - [place], [place] - [address] [facility] [address] [facility]   [phone] [facility] [phone] FX     Additional details provided by patient:  HAS 2 DAYS REMAINING.    Does the patient have less than a 3 day supply:  [x] Yes  [] No    Would you like a call back once the refill request has been completed: [] Yes [] No    If the office needs to give you a call back, can they leave a voicemail: [] Yes [] No    Geraldine Carmona Rep   02/16/23 14:21 EST

## 2023-02-24 RX ORDER — PEN NEEDLE, DIABETIC 32GX 5/32"
NEEDLE, DISPOSABLE MISCELLANEOUS
Qty: 100 EACH | Refills: 0 | OUTPATIENT
Start: 2023-02-24

## 2023-02-24 RX ORDER — PEN NEEDLE, DIABETIC 32GX 5/32"
NEEDLE, DISPOSABLE MISCELLANEOUS
Qty: 200 EACH | Refills: 3 | Status: SHIPPED | OUTPATIENT
Start: 2023-02-24

## 2023-02-24 NOTE — TELEPHONE ENCOUNTER
Caller: Nu Hunt    Relationship: Emergency Contact    Best call back number: [phone]    Requested Prescriptions:   Requested Prescriptions     Pending Prescriptions Disp Refills   • Insulin Pen Needle (BD Pen Needle Tina 2nd Gen) 32G X 4 MM misc 100 each 0        Pharmacy where request should be sent: [facility], [place] - [address] [facility]  - [phone] [facility] [phone] FX     Additional details provided by patient: PATIENT HAS 1 DAY LEFT OF THIS SUPPLY.    Does the patient have less than a 3 day supply:  [x] Yes  [] No    Would you like a call back once the refill request has been completed: [] Yes [x] No    If the office needs to give you a call back, can they leave a voicemail: [] Yes [x] No    Geraldine Marquez Rep   02/24/23 13:11 EST

## 2023-02-24 NOTE — TELEPHONE ENCOUNTER
Rx Refill Note  Requested Prescriptions     Pending Prescriptions Disp Refills   • BD Pen Needle Tina 2nd Gen 32G X 4 MM misc [Pharmacy Med Name: BD Pen Needle Tina 2nd Gen Miscellaneous 32G X 4 MM] 100 each 0     Sig: USE TWICE DAILY WITH INJECTIONS AS DIRECTED      Last office visit with prescribing clinician: 9/28/2022   Last telemedicine visit with prescribing clinician: 2/24/2023   Next office visit with prescribing clinician: 2/24/2023                         Would you like a call back once the refill request has been completed: [] Yes [] No    If the office needs to give you a call back, can they leave a voicemail: [] Yes [] No    Aura Johnson LPN  02/24/23, 13:38 EST

## 2023-03-01 LAB
ALBUMIN SERPL-MCNC: 4.4 G/DL (ref 3.5–5.2)
ALBUMIN/GLOB SERPL: 1.9 G/DL
ALP SERPL-CCNC: 76 U/L (ref 39–117)
ALT SERPL-CCNC: 31 U/L (ref 1–41)
AST SERPL-CCNC: 25 U/L (ref 1–40)
BASOPHILS # BLD AUTO: 0.1 10*3/MM3 (ref 0–0.2)
BASOPHILS NFR BLD AUTO: 1.9 % (ref 0–1.5)
BILIRUB SERPL-MCNC: 0.6 MG/DL (ref 0–1.2)
BUN SERPL-MCNC: 19 MG/DL (ref 6–20)
BUN/CREAT SERPL: 17 (ref 7–25)
CALCIUM SERPL-MCNC: 9.8 MG/DL (ref 8.6–10.5)
CHLORIDE SERPL-SCNC: 104 MMOL/L (ref 98–107)
CO2 SERPL-SCNC: 29.1 MMOL/L (ref 22–29)
CREAT SERPL-MCNC: 1.12 MG/DL (ref 0.76–1.27)
EGFRCR SERPLBLD CKD-EPI 2021: 78.6 ML/MIN/1.73
EOSINOPHIL # BLD AUTO: 0.14 10*3/MM3 (ref 0–0.4)
EOSINOPHIL NFR BLD AUTO: 2.6 % (ref 0.3–6.2)
ERYTHROCYTE [DISTWIDTH] IN BLOOD BY AUTOMATED COUNT: 11.6 % (ref 12.3–15.4)
GLOBULIN SER CALC-MCNC: 2.3 GM/DL
GLUCOSE SERPL-MCNC: 164 MG/DL (ref 65–99)
HBA1C MFR BLD: 8.1 % (ref 4.8–5.6)
HCT VFR BLD AUTO: 44.8 % (ref 37.5–51)
HGB BLD-MCNC: 16 G/DL (ref 13–17.7)
IMM GRANULOCYTES # BLD AUTO: 0.03 10*3/MM3 (ref 0–0.05)
IMM GRANULOCYTES NFR BLD AUTO: 0.6 % (ref 0–0.5)
LYMPHOCYTES # BLD AUTO: 2.17 10*3/MM3 (ref 0.7–3.1)
LYMPHOCYTES NFR BLD AUTO: 40.7 % (ref 19.6–45.3)
MCH RBC QN AUTO: 32.3 PG (ref 26.6–33)
MCHC RBC AUTO-ENTMCNC: 35.7 G/DL (ref 31.5–35.7)
MCV RBC AUTO: 90.3 FL (ref 79–97)
MONOCYTES # BLD AUTO: 0.52 10*3/MM3 (ref 0.1–0.9)
MONOCYTES NFR BLD AUTO: 9.8 % (ref 5–12)
NEUTROPHILS # BLD AUTO: 2.37 10*3/MM3 (ref 1.7–7)
NEUTROPHILS NFR BLD AUTO: 44.4 % (ref 42.7–76)
NRBC BLD AUTO-RTO: 0 /100 WBC (ref 0–0.2)
PLATELET # BLD AUTO: 241 10*3/MM3 (ref 140–450)
POTASSIUM SERPL-SCNC: 5.4 MMOL/L (ref 3.5–5.2)
PROT SERPL-MCNC: 6.7 G/DL (ref 6–8.5)
RBC # BLD AUTO: 4.96 10*6/MM3 (ref 4.14–5.8)
SODIUM SERPL-SCNC: 140 MMOL/L (ref 136–145)
URATE SERPL-MCNC: 9.3 MG/DL (ref 3.4–7)
WBC # BLD AUTO: 5.33 10*3/MM3 (ref 3.4–10.8)

## 2023-03-09 ENCOUNTER — OFFICE VISIT (OUTPATIENT)
Dept: INTERNAL MEDICINE | Facility: CLINIC | Age: 53
End: 2023-03-09
Payer: MEDICARE

## 2023-03-09 VITALS
SYSTOLIC BLOOD PRESSURE: 114 MMHG | OXYGEN SATURATION: 94 % | TEMPERATURE: 98 F | WEIGHT: 312 LBS | DIASTOLIC BLOOD PRESSURE: 80 MMHG | HEART RATE: 86 BPM | RESPIRATION RATE: 16 BRPM | HEIGHT: 74 IN | BODY MASS INDEX: 40.04 KG/M2

## 2023-03-09 DIAGNOSIS — E11.9 TYPE 2 DIABETES MELLITUS WITHOUT COMPLICATION, WITH LONG-TERM CURRENT USE OF INSULIN: Primary | ICD-10-CM

## 2023-03-09 DIAGNOSIS — Z79.4 TYPE 2 DIABETES MELLITUS WITHOUT COMPLICATION, WITH LONG-TERM CURRENT USE OF INSULIN: Primary | ICD-10-CM

## 2023-03-09 DIAGNOSIS — G47.33 OSA (OBSTRUCTIVE SLEEP APNEA): ICD-10-CM

## 2023-03-09 PROCEDURE — 3052F HG A1C>EQUAL 8.0%<EQUAL 9.0%: CPT | Performed by: INTERNAL MEDICINE

## 2023-03-09 PROCEDURE — 99214 OFFICE O/P EST MOD 30 MIN: CPT | Performed by: INTERNAL MEDICINE

## 2023-03-09 RX ORDER — MELOXICAM 7.5 MG/1
1 TABLET ORAL DAILY
COMMUNITY
Start: 2023-01-23

## 2023-03-09 NOTE — PROGRESS NOTES
Subjective     Patient ID: Juarez Hunt is a 52 y.o. male. Patient is here for management of multiple medical problems.     Chief Complaint   Patient presents with   • Hypertension     History of Present Illness   htn        The following portions of the patient's history were reviewed and updated as appropriate: allergies, current medications, past family history, past medical history, past social history, past surgical history and problem list.    Review of Systems    Current Outpatient Medications:   •  albuterol sulfate  (90 Base) MCG/ACT inhaler, Inhale 2 puffs Every 6 (Six) Hours As Needed for Wheezing or Shortness of Air., Disp: 18 g, Rfl: 0  •  allopurinol (Zyloprim) 100 MG tablet, Take 1 tablet by mouth Daily., Disp: 90 tablet, Rfl: 3  •  BD Pen Needle Tina 2nd Gen 32G X 4 MM misc, USE TWICE DAILY WITH INJECTIONS AS DIRECTED, Disp: 200 each, Rfl: 3  •  Diclofenac Sodium (VOLTAREN) 1 % gel gel, Apply 4 g topically to the appropriate area as directed 4 (Four) Times a Day As Needed (pain)., Disp: 100 g, Rfl: 1  •  Eliquis 5 MG tablet tablet, Take 1 tablet by mouth Every 12 (Twelve) Hours., Disp: 60 tablet, Rfl: 11  •  glucose blood (True Metrix Blood Glucose Test) test strip, 1 each by Other route 6 (Six) Times a Day. Use as instructed, Disp: 200 each, Rfl: 12  •  Lantus SoloStar 100 UNIT/ML injection pen, INJECT 30 UNITS UNDER THE SKIN TWO TIMES A DAY AS DIRECTED, Disp: 15 mL, Rfl: 0  •  lisinopril (PRINIVIL,ZESTRIL) 20 MG tablet, Take 1 tablet by mouth Daily., Disp: 90 tablet, Rfl: 3  •  [employer] Lancets Universal 33G misc, USE TO CHECK BLOOD GLUCOSE THREE TIMES DAILY OR AS DIRECTED, Disp: , Rfl:   •  meloxicam (MOBIC) 7.5 MG tablet, Take 1 tablet by mouth Daily., Disp: , Rfl:   •  metFORMIN (Glucophage) 500 MG tablet, Take 1 tablet by mouth 2 (Two) Times a Day With Meals., Disp: 60 tablet, Rfl: 11  •  metoprolol tartrate (LOPRESSOR) 50 MG tablet, Take 1 tablet by mouth 2 (Two) Times a Day., Disp: 180  "tablet, Rfl: 3  •  quinapril (ACCUPRIL) 20 MG tablet, Take 1 tablet by mouth Every Night., Disp: 90 tablet, Rfl: 3  •  sildenafil (REVATIO) 20 MG tablet, Take 1 tablet by mouth 3 (Three) Times a Day., Disp: 90 tablet, Rfl: 3    Objective      Blood pressure 114/80, pulse 86, temperature 98 °F (36.7 °C), resp. rate 16, height 188 cm (74\"), weight (!) 142 kg (312 lb), SpO2 94 %.    Physical Exam     General Appearance:    Alert, cooperative, no distress, appears stated age   Head:    Normocephalic, without obvious abnormality, atraumatic   Eyes:    PERRL, conjunctiva/corneas clear, EOM's intact   Ears:    Normal TM's and external ear canals, both ears   Nose:   Nares normal, septum midline, mucosa normal, no drainage   or sinus tenderness   Throat:   Lips, mucosa, and tongue normal; teeth and gums normal   Neck:   Supple, symmetrical, trachea midline, no adenopathy;        thyroid:  No enlargement/tenderness/nodules; no carotid    bruit or JVD   Back:     Symmetric, no curvature, ROM normal, no CVA tenderness   Lungs:     Clear to auscultation bilaterally, respirations unlabored   Chest wall:    No tenderness or deformity   Heart:    Regular rate and rhythm, S1 and S2 normal, no murmur,        rub or gallop   Abdomen:     Soft, non-tender, bowel sounds active all four quadrants,     no masses, no organomegaly   Extremities:   Extremities normal, atraumatic, no cyanosis or edema   Pulses:   2+ and symmetric all extremities   Skin:   Skin color, texture, turgor normal, no rashes or lesions   Lymph nodes:   Cervical, supraclavicular, and axillary nodes normal   Neurologic:   CNII-XII intact. Normal strength, sensation and reflexes       throughout      Results for orders placed or performed during the hospital encounter of 10/19/22   Comprehensive Metabolic Panel    Specimen: Blood   Result Value Ref Range    Glucose 238 (H) 65 - 99 mg/dL    BUN 12 6 - 20 mg/dL    Creatinine 0.89 0.76 - 1.27 mg/dL    Sodium 138 136 - 145 " mmol/L    Potassium 4.5 3.5 - 5.2 mmol/L    Chloride 105 98 - 107 mmol/L    CO2 24.9 22.0 - 29.0 mmol/L    Calcium 8.9 8.6 - 10.5 mg/dL    Total Protein 6.4 6.0 - 8.5 g/dL    Albumin 4.00 3.50 - 5.20 g/dL    ALT (SGPT) 24 1 - 41 U/L    AST (SGOT) 21 1 - 40 U/L    Alkaline Phosphatase 83 39 - 117 U/L    Total Bilirubin 0.4 0.0 - 1.2 mg/dL    Globulin 2.4 gm/dL    A/G Ratio 1.7 g/dL    BUN/Creatinine Ratio 13.5 7.0 - 25.0    Anion Gap 8.1 5.0 - 15.0 mmol/L    eGFR 103.1 >60.0 mL/min/1.73   Protime-INR    Specimen: Blood   Result Value Ref Range    Protime 14.0 12.5 - 14.5 Seconds    INR 1.05 0.90 - 1.10   Troponin    Specimen: Blood   Result Value Ref Range    Troponin T <0.010 0.000 - 0.030 ng/mL   BNP    Specimen: Blood   Result Value Ref Range    proBNP 30.8 0.0 - 900.0 pg/mL   C-reactive Protein    Specimen: Blood   Result Value Ref Range    C-Reactive Protein <0.30 0.00 - 0.50 mg/dL   CBC Auto Differential    Specimen: Blood   Result Value Ref Range    WBC 5.61 3.40 - 10.80 10*3/mm3    RBC 4.97 4.14 - 5.80 10*6/mm3    Hemoglobin 15.8 13.0 - 17.7 g/dL    Hematocrit 44.9 37.5 - 51.0 %    MCV 90.3 79.0 - 97.0 fL    MCH 31.8 26.6 - 33.0 pg    MCHC 35.2 31.5 - 35.7 g/dL    RDW 12.5 12.3 - 15.4 %    RDW-SD 40.9 37.0 - 54.0 fl    MPV 9.9 6.0 - 12.0 fL    Platelets 194 140 - 450 10*3/mm3    Neutrophil % 59.2 42.7 - 76.0 %    Lymphocyte % 27.3 19.6 - 45.3 %    Monocyte % 9.1 5.0 - 12.0 %    Eosinophil % 2.7 0.3 - 6.2 %    Basophil % 1.2 0.0 - 1.5 %    Immature Grans % 0.5 0.0 - 0.5 %    Neutrophils, Absolute 3.32 1.70 - 7.00 10*3/mm3    Lymphocytes, Absolute 1.53 0.70 - 3.10 10*3/mm3    Monocytes, Absolute 0.51 0.10 - 0.90 10*3/mm3    Eosinophils, Absolute 0.15 0.00 - 0.40 10*3/mm3    Basophils, Absolute 0.07 0.00 - 0.20 10*3/mm3    Immature Grans, Absolute 0.03 0.00 - 0.05 10*3/mm3    nRBC 0.0 0.0 - 0.2 /100 WBC         Assessment & Plan   bs better since taking lali MVI.    SS in the labs with poor efficacy.   Pt will  get hep b and a at pharmacy with tdap.     ha1c improving slow.        Diagnoses and all orders for this visit:    1. Type 2 diabetes mellitus without complication, with long-term current use of insulin (HCC) (Primary)  -     Lipid Panel  -     CBC & Differential  -     Comprehensive Metabolic Panel  -     TSH  -     Vitamin B12  -     T4, Free  -     Hemoglobin A1c  -     MicroAlbumin, Urine, Random - Urine, Clean Catch    2. ERIK (obstructive sleep apnea)  -     Home Sleep Study  -     Lipid Panel  -     CBC & Differential  -     Comprehensive Metabolic Panel  -     TSH  -     Vitamin B12  -     T4, Free  -     Hemoglobin A1c  -     MicroAlbumin, Urine, Random - Urine, Clean Catch    Other orders  -     Pneumococcal Conjugate Vaccine 20-Valent (PCV20)      Return in about 3 months (around 6/9/2023) for Annual.          There are no Patient Instructions on file for this visit.     Jw Guido MD    Assessment & Plan

## 2023-03-13 ENCOUNTER — TELEPHONE (OUTPATIENT)
Dept: INTERNAL MEDICINE | Facility: CLINIC | Age: 53
End: 2023-03-13
Payer: MEDICARE

## 2023-03-13 RX ORDER — INSULIN GLARGINE 100 [IU]/ML
INJECTION, SOLUTION SUBCUTANEOUS
Qty: 15 ML | Refills: 0 | Status: SHIPPED | OUTPATIENT
Start: 2023-03-13 | End: 2023-04-03

## 2023-03-13 RX ORDER — LISINOPRIL 20 MG/1
20 TABLET ORAL DAILY
Qty: 90 TABLET | Refills: 3 | Status: SHIPPED | OUTPATIENT
Start: 2023-03-13

## 2023-03-13 RX ORDER — INSULIN GLARGINE 100 [IU]/ML
INJECTION, SOLUTION SUBCUTANEOUS
Qty: 15 ML | Refills: 0 | OUTPATIENT
Start: 2023-03-13

## 2023-03-13 NOTE — TELEPHONE ENCOUNTER
Rx Refill Note  Requested Prescriptions     Pending Prescriptions Disp Refills   • lisinopril (PRINIVIL,ZESTRIL) 20 MG tablet 90 tablet 3     Sig: Take 1 tablet by mouth Daily.   • Insulin Glargine (Lantus SoloStar) 100 UNIT/ML injection pen 15 mL 0     Sig: Inject 30 units under the skin two times per day as directed      Last office visit with prescribing clinician: 3/9/2023   Last telemedicine visit with prescribing clinician: 3/13/2023   Next office visit with prescribing clinician: 3/13/2023                         Would you like a call back once the refill request has been completed: [] Yes [] No    If the office needs to give you a call back, can they leave a voicemail: [] Yes [] No    Nicole Briscoe LPN  03/13/23, 13:00 EDT

## 2023-03-13 NOTE — TELEPHONE ENCOUNTER
Spoke with patient, advised [employer] will contact patient once referral has been processed.

## 2023-03-13 NOTE — TELEPHONE ENCOUNTER
Caller: Nu Hunt    Relationship: Emergency Contact    Best call back number: [phone]    Requested Prescriptions:   Requested Prescriptions      No prescriptions requested or ordered in this encounter      lisinopril (PRINIVIL,ZESTRIL) 20 MG tablet    Lantus SoloStar 100 UNIT/ML injection pen    Pharmacy where request should be sent:      [id number], [place] - [address] - [phone] [address] [phone] FX     Does the patient have less than a 3 day supply:      [x] Yes  [] No    Would you like a call back once the refill request has been completed: [] Yes [x] No    If the office needs to give you a call back, can they leave a voicemail: [] Yes [x] No    Yris Blanco, PCT   03/13/23 11:57 EDT

## 2023-03-13 NOTE — TELEPHONE ENCOUNTER
Caller: Nu Hunt    Relationship: Emergency Contact    Best call back number:  [phone]    What specialty or service is being requested:     SLEEP STUDY    Any additional details:     DO THEY NEED TO  EQUIPMENT FROM SOMEWHERE FOR IN-HOME SLEEP STUDY?    PLEASE CALL TO DISCUSS SPECIFICS FOR SLEEP STUDY

## 2023-04-03 RX ORDER — INSULIN GLARGINE 100 [IU]/ML
INJECTION, SOLUTION SUBCUTANEOUS
Qty: 15 ML | Refills: 3 | Status: SHIPPED | OUTPATIENT
Start: 2023-04-03

## 2023-04-03 NOTE — TELEPHONE ENCOUNTER
Caller: Nu Hunt    Relationship: Emergency Contact    Best call back number: [phone]    Requested Prescriptions:   Requested Prescriptions     Pending Prescriptions Disp Refills   • Lantus SoloStar 100 UNIT/ML injection pen [Pharmacy Med Name: Lantus SoloStar Subcutaneous Solution Pen-injector 100 UNIT/ML] 15 mL 0     Sig: Inject 30 units under the skin two times per day as directed        Pharmacy where request should be sent: [id number] - [place], [place] - [address]   [phone] [facility] [phone] FX     Last office visit with prescribing clinician: 3/9/2023   Last telemedicine visit with prescribing clinician: Visit date not found   Next office visit with prescribing clinician: Visit date not found     Additional details provided by patient:     Does the patient have less than a 3 day supply:  [x] Yes  [] No    Would you like a call back once the refill request has been completed: [x] Yes [] No    If the office needs to give you a call back, can they leave a voicemail: [] Yes [x] No    Geraldine Esteves Rep   04/03/23 12:57 EDT

## 2023-04-03 NOTE — TELEPHONE ENCOUNTER
Rx Refill Note  Requested Prescriptions     Pending Prescriptions Disp Refills   • Lantus SoloStar 100 UNIT/ML injection pen [Pharmacy Med Name: Lantus SoloStar Subcutaneous Solution Pen-injector 100 UNIT/ML] 15 mL 0     Sig: Inject 30 units under the skin two times per day as directed      Last office visit with prescribing clinician: 3/9/2023   Next office visit with prescribing clinician: Visit date not found         Macarena Restrepo MA  04/03/23, 13:28 EDT

## 2023-05-18 RX ORDER — SILDENAFIL CITRATE 20 MG/1
20 TABLET ORAL 3 TIMES DAILY
Qty: 90 TABLET | Refills: 3 | Status: SHIPPED | OUTPATIENT
Start: 2023-05-18

## 2023-05-18 NOTE — TELEPHONE ENCOUNTER
Rx Refill Note  Requested Prescriptions     Pending Prescriptions Disp Refills    sildenafil (REVATIO) 20 MG tablet 90 tablet 3     Sig: Take 1 tablet by mouth 3 (Three) Times a Day.      Last office visit with prescribing clinician: 3/9/2023   Last telemedicine visit with prescribing clinician: 4/3/2023   Next office visit with prescribing clinician: 6/26/2023                         Would you like a call back once the refill request has been completed: [] Yes [] No    If the office needs to give you a call back, can they leave a voicemail: [] Yes [] No    Geraldine García Rep  05/18/23, 12:35 EDT

## 2023-05-18 NOTE — TELEPHONE ENCOUNTER
Caller: Nu Hunt    Relationship: Emergency Contact    Best call back number: [phone]     Requested Prescriptions:   Requested Prescriptions     Pending Prescriptions Disp Refills   • sildenafil (REVATIO) 20 MG tablet 90 tablet 3     Sig: Take 1 tablet by mouth 3 (Three) Times a Day.        Pharmacy where request should be sent: [id number] - [place], [place] - [address] - [phone] [facility] [phone]      Last office visit with prescribing clinician: 3/9/2023   Last telemedicine visit with prescribing clinician: 4/3/2023   Next office visit with prescribing clinician: 6/26/2023     Additional details provided by patient: PATIENT IS OUT OF MEDICATION    Does the patient have less than a 3 day supply:  [x] Yes  [] No    Would you like a call back once the refill request has been completed: [x] Yes [] No    If the office needs to give you a call back, can they leave a voicemail: [x] Yes [] No    Geraldine Scott Rep   05/18/23 11:49 EDT

## 2023-06-08 ENCOUNTER — APPOINTMENT (OUTPATIENT)
Dept: GENERAL RADIOLOGY | Facility: HOSPITAL | Age: 53
End: 2023-06-08
Payer: MEDICARE

## 2023-06-08 ENCOUNTER — HOSPITAL ENCOUNTER (EMERGENCY)
Facility: HOSPITAL | Age: 53
Discharge: HOME | End: 2023-06-08
Attending: EMERGENCY MEDICINE
Payer: MEDICARE

## 2023-06-08 VITALS
BODY MASS INDEX: 39.78 KG/M2 | HEART RATE: 92 BPM | RESPIRATION RATE: 18 BRPM | OXYGEN SATURATION: 98 % | WEIGHT: 310 LBS | SYSTOLIC BLOOD PRESSURE: 147 MMHG | DIASTOLIC BLOOD PRESSURE: 94 MMHG | HEIGHT: 74 IN | TEMPERATURE: 98.4 F

## 2023-06-08 DIAGNOSIS — E11.621 DIABETIC ULCER OF TOE OF RIGHT FOOT ASSOCIATED WITH TYPE 2 DIABETES MELLITUS, UNSPECIFIED ULCER STAGE: Primary | ICD-10-CM

## 2023-06-08 DIAGNOSIS — L97.519 DIABETIC ULCER OF TOE OF RIGHT FOOT ASSOCIATED WITH TYPE 2 DIABETES MELLITUS, UNSPECIFIED ULCER STAGE: Primary | ICD-10-CM

## 2023-06-08 PROCEDURE — 99283 EMERGENCY DEPT VISIT LOW MDM: CPT

## 2023-06-08 PROCEDURE — 73660 X-RAY EXAM OF TOE(S): CPT

## 2023-06-08 RX ORDER — AMOXICILLIN AND CLAVULANATE POTASSIUM 875; 125 MG/1; MG/1
1 TABLET, FILM COATED ORAL 2 TIMES DAILY
Qty: 14 TABLET | Refills: 0 | Status: SHIPPED | OUTPATIENT
Start: 2023-06-08 | End: 2023-06-15

## 2023-06-08 RX ORDER — AMOXICILLIN AND CLAVULANATE POTASSIUM 875; 125 MG/1; MG/1
1 TABLET, FILM COATED ORAL ONCE
Status: COMPLETED | OUTPATIENT
Start: 2023-06-08 | End: 2023-06-08

## 2023-06-08 RX ADMIN — AMOXICILLIN AND CLAVULANATE POTASSIUM 1 TABLET: 875; 125 TABLET, FILM COATED ORAL at 22:01

## 2023-06-09 ENCOUNTER — OFFICE VISIT (OUTPATIENT)
Dept: INTERNAL MEDICINE | Facility: CLINIC | Age: 53
End: 2023-06-09
Payer: MEDICARE

## 2023-06-09 ENCOUNTER — TELEPHONE (OUTPATIENT)
Dept: INTERNAL MEDICINE | Facility: CLINIC | Age: 53
End: 2023-06-09

## 2023-06-09 VITALS
HEART RATE: 90 BPM | OXYGEN SATURATION: 95 % | HEIGHT: 74 IN | SYSTOLIC BLOOD PRESSURE: 136 MMHG | WEIGHT: 311 LBS | DIASTOLIC BLOOD PRESSURE: 68 MMHG | TEMPERATURE: 97.1 F | BODY MASS INDEX: 39.91 KG/M2

## 2023-06-09 DIAGNOSIS — L97.511 ULCER OF RIGHT FOOT, LIMITED TO BREAKDOWN OF SKIN: Primary | ICD-10-CM

## 2023-06-09 RX ORDER — INSULIN GLARGINE 100 [IU]/ML
INJECTION, SOLUTION SUBCUTANEOUS
Qty: 15 ML | Refills: 11 | Status: SHIPPED | OUTPATIENT
Start: 2023-06-09

## 2023-06-09 NOTE — DISCHARGE INSTRUCTIONS
First dose of Augmentin in provided in the ER. Start the prescription tomorrow and take as directed. Follow-up with a podiatrist.

## 2023-06-09 NOTE — PROGRESS NOTES
Subjective  Juarez Hunt is a 52 y.o. male    HPI 52 a male with a longstanding history of diabetes with reasonably good control on basal insulin, metformin.  Presented to the emergency room with a right foot ulcer.  Underwent imaging studies there with evidence of soft tissue swelling but no bone involvement on plain x-ray.  Further imaging studies were not done.  Lab work was not done but he was placed on empiric antibiotic therapy with Augmentin.  Today he is coming in here for a follow-up.  Requesting a wound care consult.  He denies direct trauma has minimal discomfort at the site of the ulceration    The following portions of the patient's history were reviewed and updated as appropriate: allergies, current medications, past family history, past medical history, past social history, past surgical history, and problem list.     Review of Systems   Constitutional: Negative.  Negative for activity change, appetite change, fatigue and fever.   HENT:  Negative for congestion, ear discharge, ear pain and trouble swallowing.    Eyes:  Negative for photophobia and visual disturbance.   Respiratory:  Negative for cough and shortness of breath.    Cardiovascular:  Negative for chest pain and palpitations.   Gastrointestinal:  Negative for abdominal distention, abdominal pain, constipation, diarrhea, nausea and vomiting.   Endocrine: Negative.    Genitourinary:  Negative for dysuria, hematuria and urgency.   Musculoskeletal:  Negative for arthralgias, back pain, joint swelling and myalgias.   Skin:  Positive for wound. Negative for color change and rash.   Allergic/Immunologic: Negative.    Neurological:  Negative for dizziness, weakness, light-headedness and headaches.   Hematological:  Negative for adenopathy. Does not bruise/bleed easily.   Psychiatric/Behavioral:  Negative for agitation, confusion and dysphoric mood. The patient is not nervous/anxious.      Visit Vitals  /68   Pulse 90   Temp 97.1 °F (36.2 °C)  "  Ht 188 cm (74\")   Wt (!) 141 kg (311 lb)   SpO2 95%   BMI 39.93 kg/m²       Objective  Physical Exam  Constitutional:       General: He is not in acute distress.     Appearance: He is well-developed.   HENT:      Nose: Nose normal.   Eyes:      General: No scleral icterus.     Conjunctiva/sclera: Conjunctivae normal.   Neck:      Thyroid: No thyromegaly.      Trachea: No tracheal deviation.   Cardiovascular:      Rate and Rhythm: Normal rate and regular rhythm.      Heart sounds: No murmur heard.    No friction rub.   Pulmonary:      Effort: No respiratory distress.      Breath sounds: No wheezing or rales.   Abdominal:      General: There is no distension.      Palpations: Abdomen is soft. There is no mass.      Tenderness: There is no abdominal tenderness. There is no guarding.   Musculoskeletal:         General: No deformity. Normal range of motion.        Feet:    Lymphadenopathy:      Cervical: No cervical adenopathy.   Skin:     General: Skin is warm and dry.      Findings: No erythema or rash.   Neurological:      Mental Status: He is alert and oriented to person, place, and time.      Cranial Nerves: No cranial nerve deficit.      Coordination: Coordination normal.      Deep Tendon Reflexes: Reflexes are normal and symmetric.   Psychiatric:         Behavior: Behavior normal.         Thought Content: Thought content normal.         Judgment: Judgment normal.       Diagnoses and all orders for this visit:    Ulcer of right foot, limited to breakdown of skin in a patient with suspected diabetic neuropathy.  Wound appears to be clean without foul discharge continue with Augmentin.  Referral for wound care.  Counseled about keeping the wound clean and covered.  Most recent A1c about 3 months ago was at 8.1        "

## 2023-06-09 NOTE — ED PROVIDER NOTES
"Subjective:  History of Present Illness:    Patient is a 52-year-old male here today for a toe wound.  He states that he took off his sock today and realized he had a wound to his right great toe.  His sock was saturated with fluid.  Prior to today he was unaware of this wound, but states that he has a history of diabetic neuropathy and has significantly decreased sensation in his feet.  No fevers, no erythema radiating up his foot or leg, there is localized erythema around the cuticle of the great toe.  The toe appears partially covered with broken skin and macerated.      Nurses Notes reviewed and agree, including vitals, allergies, social history and prior medical history.     REVIEW OF SYSTEMS: All systems reviewed and not pertinent unless noted.  Review of Systems    Past Medical History:   Diagnosis Date    Arthritis     Blood clot in vein     Diabetes mellitus     Hypertension        Allergies:    Patient has no known allergies.      Past Surgical History:   Procedure Laterality Date    CARDIAC CATHETERIZATION  2004    IVC FILTER RETRIEVAL      KNEE SURGERY Left 1986    SKIN BIOPSY  06/13/2020    toe         Social History     Socioeconomic History    Marital status:    Tobacco Use    Smoking status: Former    Smokeless tobacco: Current     Types: Chew   Vaping Use    Vaping Use: Never used   Substance and Sexual Activity    Alcohol use: Yes     Comment: 1/4 glass per week    Drug use: No    Sexual activity: Defer         Family History   Problem Relation Age of Onset    Diabetes Mother     Heart disease Mother     Heart attack Mother     Hypertension Mother     Arthritis Father     Heart attack Maternal Grandfather     Heart attack Maternal Uncle     Liver cancer Maternal Aunt        Objective  Physical Exam:  /94 (BP Location: Left arm, Patient Position: Sitting)   Pulse 92   Temp 98.4 °F (36.9 °C) (Oral)   Resp 18   Ht 188 cm (74\")   Wt (!) 141 kg (310 lb)   SpO2 98%   BMI 39.80 kg/m²  "     Physical Exam  Vitals and nursing note reviewed.   Constitutional:       Appearance: Normal appearance. He is normal weight.   HENT:      Head: Normocephalic and atraumatic.   Cardiovascular:      Rate and Rhythm: Normal rate and regular rhythm.      Pulses: Normal pulses.      Heart sounds: Normal heart sounds.   Pulmonary:      Effort: Pulmonary effort is normal.      Breath sounds: Normal breath sounds.   Abdominal:      General: Abdomen is flat. Bowel sounds are normal. There is no distension.      Palpations: Abdomen is soft.      Tenderness: There is no abdominal tenderness.   Musculoskeletal:      Right lower leg: No edema.      Left lower leg: No edema.        Feet:    Feet:      Right foot:      Skin integrity: Ulcer present.      Toenail Condition: Right toenails are abnormally thick.   Skin:     General: Skin is warm and dry.      Capillary Refill: Capillary refill takes less than 2 seconds.   Neurological:      General: No focal deficit present.      Mental Status: He is alert and oriented to person, place, and time.   Psychiatric:         Mood and Affect: Mood normal.         Behavior: Behavior normal.       Procedures    ED Course:         Lab Results (last 24 hours)       ** No results found for the last 24 hours. **             No radiology results from the last 24 hrs       MDM      Initial impression of presenting illness: Patient is a 52-year-old male here today with a probable diabetic ulcer to his right great toe.    DDX: includes but is not limited to: Diabetic foot ulcer, cellulitis, abscess    Patient arrives hemodynamically stable with vitals interpreted by myself.     Pertinent features from physical exam: Toe wound described above..    Initial diagnostic plan: Foot x-ray    Results from initial plan were reviewed and interpreted by me revealing no involvement of the underlying bones.    Diagnostic information from other sources: Medical record    Interventions / Re-evaluation: Patient  was advised of his x-ray results and would prefer to receive treatment and be discharged home in order to follow-up with a podiatrist in [place].  I feel that this is an appropriate treatment plan.    Results/clinical rationale were discussed with Dr. Lassiter who recommended treating with Augmentin.    Consultations/Discussion of results with other physicians: None    Disposition plan: Patient discharged home in stable condition  -----        Final diagnoses:   Diabetic ulcer of toe of right foot associated with type 2 diabetes mellitus, unspecified ulcer stage          Florentino Sosa, APRN  06/08/23 [id number]

## 2023-06-09 NOTE — TELEPHONE ENCOUNTER
PT IS REQUESTING THE REFERRAL FOR WOUND CARE TO BE SENT TO [facility]FFILIATE LOCATION. THE PHYSICAL THERAPIST AT THE LOCATION ARE NAMED JENIFER. PHONE NUMBER TO LOCATION [phone]. PT STATED THAT THEY HAD AN APPOINTMENT ON 06/20/2023 AND WOULD LIKE THAT APPOINTMENT IF POSSIBLE. PLEASE ADVISE.

## 2023-06-13 ENCOUNTER — HOSPITAL ENCOUNTER (OUTPATIENT)
Dept: PHYSICAL THERAPY | Facility: HOSPITAL | Age: 53
Setting detail: THERAPIES SERIES
Discharge: HOME | End: 2023-06-13
Payer: MEDICARE

## 2023-06-13 DIAGNOSIS — S91.101D OPEN WOUND OF RIGHT GREAT TOE, SUBSEQUENT ENCOUNTER: Primary | ICD-10-CM

## 2023-06-13 PROCEDURE — 97597 DBRDMT OPN WND 1ST 20 CM/<: CPT

## 2023-06-13 PROCEDURE — 97161 PT EVAL LOW COMPLEX 20 MIN: CPT

## 2023-06-13 NOTE — THERAPY EVALUATION
Outpatient Rehabilitation - Wound/Debridement Initial Eval   Brooke     Patient Name: Juarez Hunt  : 1970  MRN: [id number]  Today's Date: 2023                    Admit Date: 2023    Visit Dx:    ICD-10-CM ICD-9-CM   1. Open wound of right great toe, subsequent encounter  S91.101D V58.89     893.0       Patient Active Problem List   Diagnosis    ERIK on CPAP    Type 2 diabetes mellitus without complication, with long-term current use of insulin    Acute pulmonary embolism without acute cor pulmonale        Past Medical History:   Diagnosis Date    Arthritis     Blood clot in vein     Diabetes mellitus     Hypertension         Past Surgical History:   Procedure Laterality Date    CARDIAC CATHETERIZATION      IVC FILTER RETRIEVAL      KNEE SURGERY Left     SKIN BIOPSY  2020    toe        Patient History       Row Name 23 1100             History    Chief Complaint Ulcer, wound or other skin conditions  -      Brief Description of Current Complaint Pt known to PT wound care from previous episode of care. Pt noticed a new wound to the R great toe last week, and immediately sought care due to history of diabetes and an open wound in the same area. Pt is on oral abx and presents here for follow up. He notes that he's not been good about checking his feet/diabetic foot care since the previous wound closed.  -      Patient/Caregiver Goals Heal wound;Know what to do to help the symptoms  -      Patient seeing anyone else for problem(s)? No  -      Related/Recent Hospitalizations No  -         Pain     Pain Location Toe (Comment which one)  -      Pain at Present 0  -         Services    Are you currently receiving Home Health services No  -MC      Do you plan to receive Home Health services in the near future No  -         Daily Activities    Primary Language English  -      Are you able to read Yes  -MC      Are you able to write Yes  -MC      How does patient  learn best? Listening;Demonstration  -      Teaching needs identified Management of Condition  -      Patient is concerned about/has problems with Other (comment)  wound mgmt  -      Barriers to learning None  -      Recommended Referrals Physician  get set up with [facility] for long-term care, potential [facility] need?  -      Pt Participated in POC and Goals Yes  -         Safety    Are you being hurt, hit, or frightened by anyone at home or in your life? No  -MC      Are you being neglected by a caregiver No  -                User Key  (r) = Recorded By, (t) = Taken By, (c) = Cosigned By      Initials Name Provider Type    Shanita Lance PT Physical Therapist                    EVALUATION   PT Ortho       Row Name 06/13/23 1100       Subjective Pain    Able to rate subjective pain? yes  -MC    Pre-Treatment Pain Level 0  -MC    Post-Treatment Pain Level 0  -MC       Transfers    Sit-Stand [place] (Transfers) independent  -MC    Stand-Sit [place] (Transfers) independent  -    Comment, (Transfers) long sitting for tx  -       Gait/Stairs (Locomotion)    [place] Level (Gait) independent  -              User Key  (r) = Recorded By, (t) = Taken By, (c) = Cosigned By      Initials Name Provider Type    Shanita Lance PT Physical Therapist                   LDA Wound       Row Name 06/13/23 1100             Wound 06/13/23 0800 Right posterior great toe Diabetic Ulcer    Wound - Properties Group Placement Date: 06/13/23  - Placement Time: 0800  - Present on Hospital Admission: Y  - Side: Right  - Orientation: posterior  - Location: great toe  - Primary Wound Type: Diabetic ulc  -    Wound Image Images linked: 2  -MC      Dressing Appearance open to air  -      Base moist;pink;yellow;slough;subcutaneous;red  -      Periwound intact;moist;pink;redness;warm  -      Periwound Temperature warm  -      Periwound Skin Turgor firm  -      Edges irregular;open  -       Wound Length (cm) 2.4 cm  -      Wound Width (cm) 2.1 cm  -      Wound Depth (cm) --  obscured  -      Wound Surface Area (cm^2) 5.04 cm^2  -      Drainage Characteristics/Odor serous  -      Drainage Amount small  -MC      Care, Wound cleansed with;wound cleanser;debrided  -      Dressing Care dressing applied;silver impregnated;collagen;antimicrobial agent applied;foam;low-adherent  rey, HFBt, optifoam Ag, PF tape  -      Periwound Care cleansed with pH balanced cleanser;dry periwound area maintained  -      Retired Wound - Properties Group Placement Date: 06/13/23  - Placement Time: 0800  - Present on Hospital Admission: Y  - Side: Right  - Orientation: posterior  - Location: great toe  -MC Primary Wound Type: Diabetic ulc  -    Retired Wound - Properties Group Date first assessed: 06/13/23  - Time first assessed: 0800  - Present on Hospital Admission: Y  - Side: Right  - Location: great toe  -MC Primary Wound Type: Diabetic ulc  -              User Key  (r) = Recorded By, (t) = Taken By, (c) = Cosigned By      Initials Name Provider Type    Shanita Lance, PT Physical Therapist                      WOUND DEBRIDEMENT  Total area of Debridement: 8 cm2  Debridement Site 1  Location- Site 1: R great toe wound and periwound  Selective Debridement- Site 1: Wound Surface <20cmsq  Instruments- Site 1: #10, #15, scapel, tweezers  Excised Tissue Description- Site 1: minimum, slough, maximum, other (comment) (callus)  Bleeding- Site 1: none              Therapy Education       Row Name 06/13/23 [facility]             Therapy Education    Education Details Reviewed s/sx of infection and PT role in wound care. Present to PCP or back to ER with any systemic concerns or worsening local s/sx of infection. Change every 2-3 days with theraworx/gauze to clean, and dressings as instructed. Use offloading shoe (from last episode of care) and limit WB as much as possible.  -MC      Given  Symptoms/condition management;Bandaging/dressing change  -      Program New  -      How Provided Verbal;Demonstration  -      Provided to Patient;Other (comment)  family  -      Level of Understanding Teach back education performed;Verbalized  -                User Key  (r) = Recorded By, (t) = Taken By, (c) = Cosigned By      Initials Name Provider Type     Shanita Massey, PT Physical Therapist                    Recommendation and Plan   PT Assessment/Plan       Row Name 06/13/23 1100          PT Assessment    Functional Limitations Other (comment);Performance in self-care ADL  wound mgmt  -     Impairments Integumentary integrity;Impaired sensory integrity  -     Assessment Comments Pt presents with a large diabetic ulcer to the plantar surface of the R great toe. He has periwound erythema and warmth consistent with infection, for which he is on oral abx. This is a recurrence of an ulcer from several years ago in the same area. Pt will benefit from skilled PT wound care for debridement, advanced dressings management, and other appropriate interventions to promote healing. Pt may benefit from MIST ultrasound to increase blood flow, decrease bioburden, and promote cellular activity.  -     Rehab Potential Fair  -     Patient/caregiver participated in establishment of treatment plan and goals Yes  -     Patient would benefit from skilled therapy intervention Yes  -        PT Plan    PT Frequency 1x/week;2x/week  -     Predicted Duration of Therapy Intervention (PT) 24 visits  -     Planned CPT's? PT EVAL LOW COMPLEXITY: 25052;PT SELF CARE/MGMT/TRAIN 15 MIN: 37197;PT NONSELECT DEBRIDE 15 MIN: 43401;PT EAMON DEBRIDE OPEN WOUND UP TO 20 CM: 92637;PT NLFU MIST: 29917  -     Physical Therapy Interventions (Optional Details) wound care;patient/family education  -     PT Plan Comments debridement, dressings, MIST if limited progress  -               User Key  (r) = Recorded By, (t) =  Taken By, (c) = Cosigned By      Initials Name Provider Type    Shanita Lance, PT Physical Therapist                      Goals   PT OP Goals       Row Name 06/13/23 1100          PT Short Term Goals    STG 1 Pt will verbalize s/sx of infection.  -     STG 1 Progress New  -     STG 2 Pt will demonstrate 25% reduction in wound area to indicate healing progress.  -     STG 2 Progress New  -        Long Term Goals    LTG 1 Pt will demonstrate independence with clean home dressing changes.  -     LTG 1 Progress New  -     LTG 2 Pt will demonstrate 75% reduction in wound area to indicate healing progress.  -     LTG 2 Progress New  -     LTG 3 Pt will demonstrate/verbalize independence with daily diabetic foot care.  -     LTG 3 Progress New  -        Time Calculation    PT Goal Re-Cert Due Date 09/11/23  -               User Key  (r) = Recorded By, (t) = Taken By, (c) = Cosigned By      Initials Name Provider Type    Shanita Lance, PT Physical Therapist                    Time Calculation: Start Time: 0800  Untimed Charges  PT Eval/Re-eval Minutes: 40  Wound Care: 58299 Selective debridement  [id number] debridement: 15  Total Minutes  Untimed Charges Total Minutes: 55   Total Minutes: 55            Shanita Massey, ENEDELIA  6/13/2023

## 2023-06-15 ENCOUNTER — TELEPHONE (OUTPATIENT)
Dept: CASE MANAGEMENT | Facility: OTHER | Age: 53
End: 2023-06-15
Payer: MEDICARE

## 2023-06-15 NOTE — TELEPHONE ENCOUNTER
TOO CM outreach with Patient. Left a voice mail with contact information requesting a return call to Nereida  at [phone].

## 2023-06-20 ENCOUNTER — HOSPITAL ENCOUNTER (INPATIENT)
Facility: HOSPITAL | Age: 53
LOS: 3 days | Discharge: HOME | DRG: 593 | End: 2023-06-23
Attending: EMERGENCY MEDICINE | Admitting: INTERNAL MEDICINE
Payer: MEDICARE

## 2023-06-20 ENCOUNTER — APPOINTMENT (OUTPATIENT)
Dept: MRI IMAGING | Facility: HOSPITAL | Age: 53
DRG: 593 | End: 2023-06-20
Payer: MEDICARE

## 2023-06-20 DIAGNOSIS — Z79.4 TYPE 2 DIABETES MELLITUS WITHOUT COMPLICATION, WITH LONG-TERM CURRENT USE OF INSULIN: ICD-10-CM

## 2023-06-20 DIAGNOSIS — L97.519 DIABETIC ULCER OF TOE OF RIGHT FOOT ASSOCIATED WITH TYPE 2 DIABETES MELLITUS, UNSPECIFIED ULCER STAGE: Primary | ICD-10-CM

## 2023-06-20 DIAGNOSIS — E11.621 DIABETIC ULCER OF TOE OF RIGHT FOOT ASSOCIATED WITH TYPE 2 DIABETES MELLITUS, UNSPECIFIED ULCER STAGE: Primary | ICD-10-CM

## 2023-06-20 DIAGNOSIS — E11.9 TYPE 2 DIABETES MELLITUS WITHOUT COMPLICATION, WITH LONG-TERM CURRENT USE OF INSULIN: ICD-10-CM

## 2023-06-20 PROBLEM — E66.9 OBESITY (BMI 30-39.9): Status: ACTIVE | Noted: 2023-06-20

## 2023-06-20 PROBLEM — Z86.711 HISTORY OF PULMONARY EMBOLISM: Status: ACTIVE | Noted: 2023-06-20

## 2023-06-20 PROBLEM — Z79.01 CHRONIC ANTICOAGULATION: Status: ACTIVE | Noted: 2023-06-20

## 2023-06-20 LAB
ALBUMIN SERPL-MCNC: 3.5 G/DL (ref 3.5–5.2)
ALBUMIN/GLOB SERPL: 1.3 G/DL
ALP SERPL-CCNC: 77 U/L (ref 39–117)
ALT SERPL W P-5'-P-CCNC: 12 U/L (ref 1–41)
ANION GAP SERPL CALCULATED.3IONS-SCNC: 10 MMOL/L (ref 5–15)
AST SERPL-CCNC: 16 U/L (ref 1–40)
BASOPHILS # BLD AUTO: 0.06 10*3/MM3 (ref 0–0.2)
BASOPHILS NFR BLD AUTO: 0.9 % (ref 0–1.5)
BILIRUB SERPL-MCNC: 0.5 MG/DL (ref 0–1.2)
BUN SERPL-MCNC: 19 MG/DL (ref 6–20)
BUN/CREAT SERPL: 19.6 (ref 7–25)
CALCIUM SPEC-SCNC: 9.1 MG/DL (ref 8.6–10.5)
CHLORIDE SERPL-SCNC: 104 MMOL/L (ref 98–107)
CO2 SERPL-SCNC: 23 MMOL/L (ref 22–29)
CREAT SERPL-MCNC: 0.97 MG/DL (ref 0.76–1.27)
CRP SERPL-MCNC: 0.5 MG/DL (ref 0–0.5)
D-LACTATE SERPL-SCNC: 0.9 MMOL/L (ref 0.5–2)
DEPRECATED RDW RBC AUTO: 40.2 FL (ref 37–54)
EGFRCR SERPLBLD CKD-EPI 2021: 93.3 ML/MIN/1.73
EOSINOPHIL # BLD AUTO: 0.15 10*3/MM3 (ref 0–0.4)
EOSINOPHIL NFR BLD AUTO: 2.3 % (ref 0.3–6.2)
ERYTHROCYTE [DISTWIDTH] IN BLOOD BY AUTOMATED COUNT: 11.8 % (ref 12.3–15.4)
ERYTHROCYTE [SEDIMENTATION RATE] IN BLOOD: 15 MM/HR (ref 0–20)
GLOBULIN UR ELPH-MCNC: 2.8 GM/DL
GLUCOSE BLDC GLUCOMTR-MCNC: 188 MG/DL (ref 70–130)
GLUCOSE SERPL-MCNC: 174 MG/DL (ref 65–99)
HCT VFR BLD AUTO: 43.8 % (ref 37.5–51)
HGB BLD-MCNC: 14.9 G/DL (ref 13–17.7)
HOLD SPECIMEN: NORMAL
IMM GRANULOCYTES # BLD AUTO: 0.03 10*3/MM3 (ref 0–0.05)
IMM GRANULOCYTES NFR BLD AUTO: 0.5 % (ref 0–0.5)
LYMPHOCYTES # BLD AUTO: 2.04 10*3/MM3 (ref 0.7–3.1)
LYMPHOCYTES NFR BLD AUTO: 31.9 % (ref 19.6–45.3)
MCH RBC QN AUTO: 31.6 PG (ref 26.6–33)
MCHC RBC AUTO-ENTMCNC: 34 G/DL (ref 31.5–35.7)
MCV RBC AUTO: 92.8 FL (ref 79–97)
MONOCYTES # BLD AUTO: 0.58 10*3/MM3 (ref 0.1–0.9)
MONOCYTES NFR BLD AUTO: 9.1 % (ref 5–12)
NEUTROPHILS NFR BLD AUTO: 3.53 10*3/MM3 (ref 1.7–7)
NEUTROPHILS NFR BLD AUTO: 55.3 % (ref 42.7–76)
NRBC BLD AUTO-RTO: 0 /100 WBC (ref 0–0.2)
PLATELET # BLD AUTO: 224 10*3/MM3 (ref 140–450)
PMV BLD AUTO: 10.3 FL (ref 6–12)
POTASSIUM SERPL-SCNC: 5.1 MMOL/L (ref 3.5–5.2)
PROCALCITONIN SERPL-MCNC: 0.02 NG/ML (ref 0–0.25)
PROT SERPL-MCNC: 6.3 G/DL (ref 6–8.5)
RBC # BLD AUTO: 4.72 10*6/MM3 (ref 4.14–5.8)
SODIUM SERPL-SCNC: 137 MMOL/L (ref 136–145)
WBC NRBC COR # BLD: 6.39 10*3/MM3 (ref 3.4–10.8)
WHOLE BLOOD HOLD COAG: NORMAL
WHOLE BLOOD HOLD SPECIMEN: NORMAL

## 2023-06-20 PROCEDURE — 0 GADOBENATE DIMEGLUMINE 529 MG/ML SOLUTION: Performed by: INTERNAL MEDICINE

## 2023-06-20 PROCEDURE — 73720 MRI LWR EXTREMITY W/O&W/DYE: CPT

## 2023-06-20 PROCEDURE — 63710000001 INSULIN LISPRO (HUMAN) PER 5 UNITS: Performed by: PHYSICIAN ASSISTANT

## 2023-06-20 PROCEDURE — 25010000002 PIPERACILLIN SOD-TAZOBACTAM PER 1 G: Performed by: PHYSICIAN ASSISTANT

## 2023-06-20 PROCEDURE — 87040 BLOOD CULTURE FOR BACTERIA: CPT | Performed by: PHYSICIAN ASSISTANT

## 2023-06-20 PROCEDURE — 99284 EMERGENCY DEPT VISIT MOD MDM: CPT

## 2023-06-20 PROCEDURE — 80053 COMPREHEN METABOLIC PANEL: CPT

## 2023-06-20 PROCEDURE — 85652 RBC SED RATE AUTOMATED: CPT | Performed by: INTERNAL MEDICINE

## 2023-06-20 PROCEDURE — 63710000001 INSULIN DETEMIR PER 5 UNITS: Performed by: PHYSICIAN ASSISTANT

## 2023-06-20 PROCEDURE — 86140 C-REACTIVE PROTEIN: CPT | Performed by: INTERNAL MEDICINE

## 2023-06-20 PROCEDURE — 85025 COMPLETE CBC W/AUTO DIFF WBC: CPT

## 2023-06-20 PROCEDURE — 84145 PROCALCITONIN (PCT): CPT | Performed by: PHYSICIAN ASSISTANT

## 2023-06-20 PROCEDURE — A9577 INJ MULTIHANCE: HCPCS | Performed by: INTERNAL MEDICINE

## 2023-06-20 PROCEDURE — 82948 REAGENT STRIP/BLOOD GLUCOSE: CPT

## 2023-06-20 PROCEDURE — 83605 ASSAY OF LACTIC ACID: CPT | Performed by: PHYSICIAN ASSISTANT

## 2023-06-20 PROCEDURE — 36415 COLL VENOUS BLD VENIPUNCTURE: CPT

## 2023-06-20 PROCEDURE — 25010000002 VANCOMYCIN 10 G RECONSTITUTED SOLUTION: Performed by: PHYSICIAN ASSISTANT

## 2023-06-20 RX ORDER — NICOTINE POLACRILEX 4 MG
15 LOZENGE BUCCAL
Status: DISCONTINUED | OUTPATIENT
Start: 2023-06-20 | End: 2023-06-21

## 2023-06-20 RX ORDER — SODIUM CHLORIDE 0.9 % (FLUSH) 0.9 %
10 SYRINGE (ML) INJECTION AS NEEDED
Status: DISCONTINUED | OUTPATIENT
Start: 2023-06-20 | End: 2023-06-23 | Stop reason: SDUPTHER

## 2023-06-20 RX ORDER — IBUPROFEN 600 MG/1
1 TABLET ORAL
Status: DISCONTINUED | OUTPATIENT
Start: 2023-06-20 | End: 2023-06-21

## 2023-06-20 RX ORDER — SODIUM CHLORIDE 0.9 % (FLUSH) 0.9 %
10 SYRINGE (ML) INJECTION AS NEEDED
Status: DISCONTINUED | OUTPATIENT
Start: 2023-06-20 | End: 2023-06-21 | Stop reason: SDUPTHER

## 2023-06-20 RX ORDER — LISINOPRIL 20 MG/1
20 TABLET ORAL DAILY
Status: DISCONTINUED | OUTPATIENT
Start: 2023-06-21 | End: 2023-06-23 | Stop reason: HOSPADM

## 2023-06-20 RX ORDER — ONDANSETRON 4 MG/1
4 TABLET, FILM COATED ORAL EVERY 6 HOURS PRN
Status: DISCONTINUED | OUTPATIENT
Start: 2023-06-20 | End: 2023-06-23 | Stop reason: HOSPADM

## 2023-06-20 RX ORDER — INSULIN LISPRO 100 [IU]/ML
1-200 INJECTION, SOLUTION INTRAVENOUS; SUBCUTANEOUS AS NEEDED
Status: DISCONTINUED | OUTPATIENT
Start: 2023-06-20 | End: 2023-06-21

## 2023-06-20 RX ORDER — ONDANSETRON 2 MG/ML
4 INJECTION INTRAMUSCULAR; INTRAVENOUS EVERY 6 HOURS PRN
Status: DISCONTINUED | OUTPATIENT
Start: 2023-06-20 | End: 2023-06-23 | Stop reason: HOSPADM

## 2023-06-20 RX ORDER — SODIUM CHLORIDE 9 MG/ML
40 INJECTION, SOLUTION INTRAVENOUS AS NEEDED
Status: DISCONTINUED | OUTPATIENT
Start: 2023-06-20 | End: 2023-06-23 | Stop reason: HOSPADM

## 2023-06-20 RX ORDER — DEXTROSE MONOHYDRATE 25 G/50ML
10-50 INJECTION, SOLUTION INTRAVENOUS
Status: DISCONTINUED | OUTPATIENT
Start: 2023-06-20 | End: 2023-06-21

## 2023-06-20 RX ORDER — SILDENAFIL CITRATE 20 MG/1
20 TABLET ORAL 3 TIMES DAILY
Status: DISCONTINUED | OUTPATIENT
Start: 2023-06-20 | End: 2023-06-23 | Stop reason: HOSPADM

## 2023-06-20 RX ORDER — ACETAMINOPHEN 325 MG/1
650 TABLET ORAL EVERY 4 HOURS PRN
Status: DISCONTINUED | OUTPATIENT
Start: 2023-06-20 | End: 2023-06-23 | Stop reason: HOSPADM

## 2023-06-20 RX ORDER — INSULIN LISPRO 100 [IU]/ML
1-200 INJECTION, SOLUTION INTRAVENOUS; SUBCUTANEOUS
Status: DISCONTINUED | OUTPATIENT
Start: 2023-06-20 | End: 2023-06-21

## 2023-06-20 RX ORDER — ALLOPURINOL 100 MG/1
100 TABLET ORAL DAILY
Status: DISCONTINUED | OUTPATIENT
Start: 2023-06-21 | End: 2023-06-23 | Stop reason: HOSPADM

## 2023-06-20 RX ORDER — METOPROLOL TARTRATE 50 MG/1
50 TABLET, FILM COATED ORAL 2 TIMES DAILY
Status: DISCONTINUED | OUTPATIENT
Start: 2023-06-20 | End: 2023-06-23 | Stop reason: HOSPADM

## 2023-06-20 RX ORDER — SODIUM CHLORIDE 0.9 % (FLUSH) 0.9 %
10 SYRINGE (ML) INJECTION EVERY 12 HOURS SCHEDULED
Status: DISCONTINUED | OUTPATIENT
Start: 2023-06-20 | End: 2023-06-23 | Stop reason: SDUPTHER

## 2023-06-20 RX ADMIN — VANCOMYCIN HYDROCHLORIDE 2750 MG: 10 INJECTION, POWDER, LYOPHILIZED, FOR SOLUTION INTRAVENOUS at 20:56

## 2023-06-20 RX ADMIN — PIPERACILLIN SODIUM AND TAZOBACTAM SODIUM 3.38 G: 3; .375 INJECTION, SOLUTION INTRAVENOUS at 19:59

## 2023-06-20 RX ADMIN — INSULIN DETEMIR 28 UNITS: 100 INJECTION, SOLUTION SUBCUTANEOUS at 21:06

## 2023-06-20 RX ADMIN — SILDENAFIL 20 MG: 20 TABLET ORAL at 20:54

## 2023-06-20 RX ADMIN — METOPROLOL TARTRATE 50 MG: 50 TABLET ORAL at 20:54

## 2023-06-20 RX ADMIN — GADOBENATE DIMEGLUMINE 20 ML: 529 INJECTION, SOLUTION INTRAVENOUS at 18:15

## 2023-06-20 RX ADMIN — INSULIN LISPRO 2 UNITS: 100 INJECTION, SOLUTION INTRAVENOUS; SUBCUTANEOUS at 21:06

## 2023-06-20 RX ADMIN — Medication 10 ML: at 20:09

## 2023-06-20 NOTE — ED PROVIDER NOTES
Subjective   History of Present Illness  Mr. Hunt is a 52-year-old male with a history of insulin-dependent diabetes, hypertension and previous pulmonary embolus (on Eliquis), who presents to the emergency department with ongoing infection in the right great toe and second toe.  The patient states that he was seen at [facility] emergency department in [place] last week for the infection and was started on Augmentin.  He finished the course of Augmentin and was seen at wound care today where he was advised to come to the emergency department for further evaluation.  The patient states that he has no pain in the foot as he has a diabetic neuropathy.  He has had no fever.  He states that his blood sugars have been reasonably controlled.  He is without other complaint.    Review of Systems   Constitutional:  Negative for chills and fever.   HENT:  Negative for sore throat.    Respiratory:  Negative for cough and shortness of breath.    Cardiovascular:  Negative for chest pain.   Gastrointestinal:  Negative for abdominal pain, nausea and vomiting.   Genitourinary:  Negative for dysuria.   Musculoskeletal:  Negative for arthralgias.   Skin:  Positive for wound (Infected right great toe and second toe.).   Neurological:  Positive for numbness (Secondary to diabetic neuropathy).   Hematological:  Bruises/bleeds easily (On Eliquis).   Psychiatric/Behavioral:  Negative for confusion.      Past Medical History:   Diagnosis Date    Arthritis     Blood clot in vein     Diabetes mellitus     Hypertension        No Known Allergies    Past Surgical History:   Procedure Laterality Date    CARDIAC CATHETERIZATION  2004    IVC FILTER RETRIEVAL      KNEE SURGERY Left 1986    SKIN BIOPSY  06/13/2020    toe       Family History   Problem Relation Age of Onset    Diabetes Mother     Heart disease Mother     Heart attack Mother     Hypertension Mother     Arthritis Father     Heart attack Maternal Grandfather     Heart attack Maternal  Uncle     Liver cancer Maternal Aunt        Social History     Socioeconomic History    Marital status:    Tobacco Use    Smoking status: Former    Smokeless tobacco: Current     Types: Chew   Vaping Use    Vaping Use: Never used   Substance and Sexual Activity    Alcohol use: Yes     Comment: 1/4 glass per week    Drug use: No    Sexual activity: Defer           Objective   Physical Exam  Constitutional:       General: He is not in acute distress.     Appearance: Normal appearance.   HENT:      Head: Normocephalic.      Nose: Nose normal.      Mouth/Throat:      Mouth: Mucous membranes are moist.   Eyes:      Conjunctiva/sclera: Conjunctivae normal.      Pupils: Pupils are equal, round, and reactive to light.   Cardiovascular:      Rate and Rhythm: Normal rate.      Pulses: Normal pulses.      Comments: Normal pedal pulses  Pulmonary:      Effort: Pulmonary effort is normal.      Breath sounds: Normal breath sounds.   Musculoskeletal:      Cervical back: Normal range of motion.      Comments: Right great toe distal phalanx and right second toe distal phalanx appear infected with moderate erythema and some necrotic appearance to the most distal and plantar aspects.  No drainage.  No red streaking on the foot.   Skin:     General: Skin is warm and dry.   Neurological:      General: No focal deficit present.      Mental Status: He is alert.   Psychiatric:         Mood and Affect: Mood normal.       Procedures           ED Course      In summary, 52-year-old male with history of insulin-dependent diabetes, hypertension and previous PE (on Eliquis), presents with 1-1/2-week history of right great toe redness.  The patient states that he was seen at [facility] emergency department in [place] last week for the same and was discharged on Augmentin after getting x-rays of the foot.  The patient states he has had no real improvement and was seen at wound care today who sent him to our emergency department for further  evaluation.  The patient has no pain as he has a diabetic neuropathy.  No fever.  No red streaking up the foot.    MDM: Differential includes diabetic wound to the right great and second toes, possible osteomyelitis, possible gangrene, etc.    Pt was examined in triage as there were no beds open in the ED at present time.    I reviewed x-rays from [facility] that were performed on 6/8/2023.  They indicated that there was some concern for gas gangrene in the right distal first and second toe phalanges.    Given his diabetes and failure on outpatient antibiotics as well as concerns for gas gangrene, the patient will require admission for further evaluation with MRI imaging.  He will be started on IV Zosyn and vancomycin while awaiting imaging.  I will speak with the hospitalist for admission.  Labs obtained.    White count is 6.39.  Normal H&H.  Glucose is 174.  Normal renal and hepatic functions.  No concerning chemistries.      Will speak with hospitalist for admission.                                             Medical Decision Making  Amount and/or Complexity of Data Reviewed  Radiology: ordered.    Risk  Prescription drug management.        Final diagnoses:   Diabetic ulcer of toe of right foot associated with type 2 diabetes mellitus, unspecified ulcer stage       ED Disposition  ED Disposition       ED Disposition   Decision to Admit    Condition   --    Comment   --               No follow-up provider specified.       Medication List      No changes were made to your prescriptions during this visit.            Miguel Rivera, PA  06/20/23 7163

## 2023-06-20 NOTE — H&P
[facility]  HISTORY AND PHYSICAL    Patient Name: Juarez Hunt  : 1970  MRN: [id number]  Primary Care Physician: Jw Guido MD  Date of admission: (Not on file)    Subjective     Chief Complaint: diabetic ulcer    HPI:  Juarez Hunt is a 52 y.o. male with PMH significant for HTN, insulin-dependent DMII with diabetic peripheral neuropathy, prior PE (on Eliquis), obesity (BMI 39) and ERIK (CPAP). He was evaluated at [facility] ED on 23 due to a R great toe wound that he was unaware of until his sock was saturated with fluid. There was no evidence of cellulitis and he was discharged home on PO Augmentin. He saw his PCP the following day and referred to [facility] PT wound care. He first saw PT wound care on 23 - he underwent debridement and was provided an offloading shoe. He followed up as scheduled on 23. Due to concerns for infection vs gout and general malaise, he opted to present to [facility] ED on 23 for further evaluation.     Labs and vital signs in the ED were reassuring. No imaging obtained in the ED but an MRI is pending. He was given IV Vanc/Zosyn and hospital medicine was asked to admit.         Review of Systems   Denies any weight loss or weight gain recently, no lumps or bumps, no night sweats, no unusual headaches, no visual changes, no difficulty with balance, no chest pain or shortness of breath, no nausea vomiting or diarrhea.    Personal History     Past Medical History:   Diagnosis Date    Arthritis     Blood clot in vein     Diabetes mellitus     Hypertension      Past Surgical History:   Procedure Laterality Date    CARDIAC CATHETERIZATION      IVC FILTER RETRIEVAL      KNEE SURGERY Left 1986    SKIN BIOPSY  2020    toe     Family History: family history includes Arthritis in his father; Diabetes in his mother; Heart attack in his maternal grandfather, maternal  uncle, and mother; Heart disease in his mother; Hypertension in his mother; Liver cancer in his maternal aunt.     Social History:  reports that he has quit smoking. His smokeless tobacco use includes chew. He reports current alcohol use. He reports that he does not use drugs.  Social History     Social History Narrative    Not on file     Medications:  Diclofenac Sodium, Insulin Glargine, Insulin Pen Needle, [employer] Lancets Universal 33G, albuterol sulfate HFA, allopurinol, apixaban, glucose blood, lisinopril, meloxicam, metFORMIN, metoprolol tartrate, quinapril, and sildenafil    No Known Allergies    Objective     Vital Signs:   Temp:  [98 °F (36.7 °C)] 98 °F (36.7 °C)  Heart Rate:  [74] 74  Resp:  [18] 18  BP: (149)/(83) 149/83    Physical Exam   Constitutional: No acute distress, looks comfortable  HENT: NCAT, mucous membranes moist  Respiratory: Clear to auscultation bilaterally, respiratory effort normal   Cardiovascular: RRR, no murmurs, rubs, or gallops  Gastrointestinal: Positive bowel sounds, soft, nontender, nondistended  Musculoskeletal: bilateral ankle edema, left great toe ulcer  Psychiatric: Appropriate affect, cooperative  Neurologic: Oriented x 3, strength symmetric in all extremities, Cranial Nerves grossly intact to confrontation, speech clear  Skin: No rashes     Result Review:  I have personally reviewed the results from the time of this admission to 6/20/2023 16:37 EDT and agree with these findings:  [x]  Laboratory list / accordion  []  Microbiology  [x]  Radiology  []  EKG/Telemetry   []  Cardiology/Vascular   []  Pathology  []  Old records  []  Other:  Most notable findings include: Labs are pretty bland.  Sed rate is 15, C-reactive protein is 0.50, procalcitonin is 0.02.  MRI of the foot is somewhat suspicious for marrow edema and osteomyelitis.  However, probability is low in light of low sed rate.    LAB RESULTS:      Lab 06/20/23  1456   WBC 6.39   HEMOGLOBIN 14.9   HEMATOCRIT 43.8    PLATELETS 224   NEUTROS ABS 3.53   IMMATURE GRANS (ABS) 0.03   LYMPHS ABS 2.04   MONOS ABS 0.58   EOS ABS 0.15   MCV 92.8   SED RATE 15   CRP 0.50   LACTATE 0.9         Lab 06/20/23  1456   SODIUM 137   POTASSIUM 5.1   CHLORIDE 104   CO2 23.0   ANION GAP 10.0   BUN 19   CREATININE 0.97   EGFR 93.3   GLUCOSE 174*   CALCIUM 9.1         Lab 06/20/23  1456   TOTAL PROTEIN 6.3   ALBUMIN 3.5   GLOBULIN 2.8   ALT (SGPT) 12   AST (SGOT) 16   BILIRUBIN 0.5   ALK PHOS 77     Brief Urine Lab Results       None          Microbiology Results (last 10 days)       ** No results found for the last 240 hours. **          No radiology results from the last 24 hrs    Assessment & Plan       Diabetic ulcer of toe of right foot associated with type 2 diabetes mellitus, unspecified ulcer stage    Type 2 diabetes mellitus without complication, with long-term current use of insulin    History of pulmonary embolism    Chronic anticoagulation    Obesity (BMI 30-39.9)    Juarez Hunt is a 52 y.o. male with PMH significant for HTN, insulin-dependent DMII with diabetic peripheral neuropathy, prior PE (on Eliquis), obesity (BMI 39) and ERIK (CPAP). He was evaluated at [facility] ED on 6/8/23 due to a R great toe wound that he was unaware of until his sock was saturated with fluid. There was no evidence of cellulitis and he was discharged home on PO Augmentin. He saw his PCP the following day and referred to [facility] PT wound care. He first saw PT wound care on 6/13/23 - he underwent debridement and was provided an offloading shoe. He followed up as scheduled on 6/20/23. Due to concerns for infection vs gout and general malaise, he opted to present to [facility] ED on 6/20/23 for further evaluation.    Acute diabetic ulcer of R great toe  - Failure of outpatient treatment on PO Augmentin   - Continue IV Vanc/Zosyn for now  - Consult ID, CT surgery and PT wound care  - MRI is pending  - Hold [facility]  overnight until CT surgery sees in case he needs operative intervention    Insulin-dependent DMII  Diabetic peripheral neuropathy  - Last A1c was 8.1%  - He admits he is not good about checking his feet  - Consult diabetes educator   - Home regimen appears to be Lantus 30 units daily, Metformin 1000mg BID  - Start Glucommander basal/bolus     Hypertension  - Continue Lisinopril / Metoprolol     History of DVT/PE  - IVC filter in place   - History of recurrent VTE - follows with Dr. Woods   - On Eliquis. Holding overnight     ? Pulmonary hypertension  - Data deficit / no ECHO on file  - Continue home Sildenafil 20mg TID     Obesity, complicates all aspects of care   ERIK, CPAP QHS and PRN. OK to use home machine     DVT prophylaxis: SCDs     CODE STATUS: CPR, full support       Expected Discharge home, within 2-3 days  Expected discharge date/ time has not been documented.    Patient seen and examined at the bedside.  Patient is a 52-year-old  male with past medical history significant for hypertension, history of DVT and PE, diabetes mellitus.  Patient has had redness, swelling, ulcer of the right great toe.  Evidently patient was seen at local emergency room on eighth of this month and was sent home with Augmentin.  Evidently Augmentin has not had much effect.  Patient decided come to the emergency room at [facility]  Today as outpatient management has failed.  Patient denies any fever or chills.  No nausea vomiting or diarrhea.    Constitutional: No acute distress, awake, alert  HENT: NCAT, mucous membranes moist  Respiratory: Clear to auscultation bilaterally, respiratory effort normal   Cardiovascular: RRR, no murmurs, rubs, or gallops  Gastrointestinal: Positive bowel sounds, soft, nontender, nondistended  Musculoskeletal:  bilateral ankle edema, right great toe edema, redness, ulcer  Psychiatric: Appropriate affect, cooperative  Neurologic: Oriented x 3, strength symmetric in all extremities, Cranial  Nerves grossly intact to confrontation, speech clear  Skin: No rashes     Assessment and plan:  52-year-old  male with diabetes mellitus.  Patient has had an ulcer of the right great toe and was treated with Augmentin as outpatient without much improvement.  Patient will be admitted for IV antibiotic treatment and infectious disease consultation.  In case amputation is needed vascular surgery should be also consulted.  Please see the above for more details.    Total time spent was 30 minutes.

## 2023-06-21 LAB
ANION GAP SERPL CALCULATED.3IONS-SCNC: 9 MMOL/L (ref 5–15)
BUN SERPL-MCNC: 23 MG/DL (ref 6–20)
BUN/CREAT SERPL: 22.3 (ref 7–25)
CALCIUM SPEC-SCNC: 8.8 MG/DL (ref 8.6–10.5)
CHLORIDE SERPL-SCNC: 106 MMOL/L (ref 98–107)
CK SERPL-CCNC: 115 U/L (ref 20–200)
CO2 SERPL-SCNC: 26 MMOL/L (ref 22–29)
CREAT SERPL-MCNC: 1.03 MG/DL (ref 0.76–1.27)
DEPRECATED RDW RBC AUTO: 39.4 FL (ref 37–54)
EGFRCR SERPLBLD CKD-EPI 2021: 86.9 ML/MIN/1.73
ERYTHROCYTE [DISTWIDTH] IN BLOOD BY AUTOMATED COUNT: 11.8 % (ref 12.3–15.4)
GLUCOSE BLDC GLUCOMTR-MCNC: 142 MG/DL (ref 70–130)
GLUCOSE BLDC GLUCOMTR-MCNC: 186 MG/DL (ref 70–130)
GLUCOSE BLDC GLUCOMTR-MCNC: 193 MG/DL (ref 70–130)
GLUCOSE BLDC GLUCOMTR-MCNC: 215 MG/DL (ref 70–130)
GLUCOSE SERPL-MCNC: 163 MG/DL (ref 65–99)
HBA1C MFR BLD: 8.1 % (ref 4.8–5.6)
HCT VFR BLD AUTO: 38.7 % (ref 37.5–51)
HGB BLD-MCNC: 13.5 G/DL (ref 13–17.7)
MCH RBC QN AUTO: 32.2 PG (ref 26.6–33)
MCHC RBC AUTO-ENTMCNC: 34.9 G/DL (ref 31.5–35.7)
MCV RBC AUTO: 92.4 FL (ref 79–97)
PLATELET # BLD AUTO: 180 10*3/MM3 (ref 140–450)
PMV BLD AUTO: 10.4 FL (ref 6–12)
POTASSIUM SERPL-SCNC: 4.8 MMOL/L (ref 3.5–5.2)
RBC # BLD AUTO: 4.19 10*6/MM3 (ref 4.14–5.8)
SODIUM SERPL-SCNC: 141 MMOL/L (ref 136–145)
WBC NRBC COR # BLD: 6.84 10*3/MM3 (ref 3.4–10.8)

## 2023-06-21 PROCEDURE — 83036 HEMOGLOBIN GLYCOSYLATED A1C: CPT | Performed by: PHYSICIAN ASSISTANT

## 2023-06-21 PROCEDURE — 82948 REAGENT STRIP/BLOOD GLUCOSE: CPT

## 2023-06-21 PROCEDURE — 63710000001 INSULIN DETEMIR PER 5 UNITS: Performed by: INTERNAL MEDICINE

## 2023-06-21 PROCEDURE — 99222 1ST HOSP IP/OBS MODERATE 55: CPT | Performed by: STUDENT IN AN ORGANIZED HEALTH CARE EDUCATION/TRAINING PROGRAM

## 2023-06-21 PROCEDURE — 87205 SMEAR GRAM STAIN: CPT | Performed by: PHYSICIAN ASSISTANT

## 2023-06-21 PROCEDURE — 87147 CULTURE TYPE IMMUNOLOGIC: CPT | Performed by: PHYSICIAN ASSISTANT

## 2023-06-21 PROCEDURE — 25010000002 PIPERACILLIN SOD-TAZOBACTAM PER 1 G: Performed by: INTERNAL MEDICINE

## 2023-06-21 PROCEDURE — 82550 ASSAY OF CK (CPK): CPT | Performed by: INTERNAL MEDICINE

## 2023-06-21 PROCEDURE — 87186 SC STD MICRODIL/AGAR DIL: CPT | Performed by: PHYSICIAN ASSISTANT

## 2023-06-21 PROCEDURE — 80048 BASIC METABOLIC PNL TOTAL CA: CPT | Performed by: PHYSICIAN ASSISTANT

## 2023-06-21 PROCEDURE — 25010000002 PIPERACILLIN SOD-TAZOBACTAM PER 1 G

## 2023-06-21 PROCEDURE — 99233 SBSQ HOSP IP/OBS HIGH 50: CPT | Performed by: INTERNAL MEDICINE

## 2023-06-21 PROCEDURE — 63710000001 INSULIN LISPRO (HUMAN) PER 5 UNITS: Performed by: INTERNAL MEDICINE

## 2023-06-21 PROCEDURE — 87070 CULTURE OTHR SPECIMN AEROBIC: CPT | Performed by: PHYSICIAN ASSISTANT

## 2023-06-21 PROCEDURE — 85027 COMPLETE CBC AUTOMATED: CPT | Performed by: PHYSICIAN ASSISTANT

## 2023-06-21 PROCEDURE — 97165 OT EVAL LOW COMPLEX 30 MIN: CPT

## 2023-06-21 PROCEDURE — 25010000002 HEPARIN (PORCINE) PER 1000 UNITS: Performed by: INTERNAL MEDICINE

## 2023-06-21 PROCEDURE — 97161 PT EVAL LOW COMPLEX 20 MIN: CPT

## 2023-06-21 PROCEDURE — 25010000002 DAPTOMYCIN PER 1 MG: Performed by: INTERNAL MEDICINE

## 2023-06-21 RX ORDER — SODIUM CHLORIDE 0.9 % (FLUSH) 0.9 %
10 SYRINGE (ML) INJECTION AS NEEDED
Status: CANCELLED | OUTPATIENT
Start: 2023-06-21

## 2023-06-21 RX ORDER — SODIUM CHLORIDE 0.9 % (FLUSH) 0.9 %
10 SYRINGE (ML) INJECTION EVERY 12 HOURS SCHEDULED
Status: CANCELLED | OUTPATIENT
Start: 2023-06-21

## 2023-06-21 RX ORDER — SODIUM CHLORIDE 9 MG/ML
40 INJECTION, SOLUTION INTRAVENOUS AS NEEDED
Status: CANCELLED | OUTPATIENT
Start: 2023-06-21

## 2023-06-21 RX ORDER — SODIUM CHLORIDE, SODIUM LACTATE, POTASSIUM CHLORIDE, CALCIUM CHLORIDE 600; 310; 30; 20 MG/100ML; MG/100ML; MG/100ML; MG/100ML
9 INJECTION, SOLUTION INTRAVENOUS CONTINUOUS
Status: CANCELLED | OUTPATIENT
Start: 2023-06-21

## 2023-06-21 RX ORDER — FAMOTIDINE 20 MG/1
20 TABLET, FILM COATED ORAL ONCE
Status: CANCELLED | OUTPATIENT
Start: 2023-06-21 | End: 2023-06-21

## 2023-06-21 RX ORDER — HEPARIN SODIUM 5000 [USP'U]/ML
5000 INJECTION, SOLUTION INTRAVENOUS; SUBCUTANEOUS EVERY 8 HOURS SCHEDULED
Status: DISCONTINUED | OUTPATIENT
Start: 2023-06-21 | End: 2023-06-22

## 2023-06-21 RX ORDER — IBUPROFEN 600 MG/1
1 TABLET ORAL
Status: DISCONTINUED | OUTPATIENT
Start: 2023-06-21 | End: 2023-06-23 | Stop reason: HOSPADM

## 2023-06-21 RX ORDER — LIDOCAINE HYDROCHLORIDE 10 MG/ML
0.5 INJECTION, SOLUTION EPIDURAL; INFILTRATION; INTRACAUDAL; PERINEURAL ONCE AS NEEDED
Status: CANCELLED | OUTPATIENT
Start: 2023-06-21

## 2023-06-21 RX ORDER — DEXTROSE MONOHYDRATE 25 G/50ML
25 INJECTION, SOLUTION INTRAVENOUS
Status: DISCONTINUED | OUTPATIENT
Start: 2023-06-21 | End: 2023-06-23 | Stop reason: HOSPADM

## 2023-06-21 RX ORDER — FAMOTIDINE 10 MG/ML
20 INJECTION, SOLUTION INTRAVENOUS ONCE
Status: CANCELLED | OUTPATIENT
Start: 2023-06-21 | End: 2023-06-21

## 2023-06-21 RX ORDER — MIDAZOLAM HYDROCHLORIDE 1 MG/ML
1 INJECTION INTRAMUSCULAR; INTRAVENOUS
Status: CANCELLED | OUTPATIENT
Start: 2023-06-21

## 2023-06-21 RX ORDER — NICOTINE POLACRILEX 4 MG
15 LOZENGE BUCCAL
Status: DISCONTINUED | OUTPATIENT
Start: 2023-06-21 | End: 2023-06-23 | Stop reason: HOSPADM

## 2023-06-21 RX ORDER — INSULIN LISPRO 100 [IU]/ML
2-9 INJECTION, SOLUTION INTRAVENOUS; SUBCUTANEOUS
Status: DISCONTINUED | OUTPATIENT
Start: 2023-06-21 | End: 2023-06-23 | Stop reason: HOSPADM

## 2023-06-21 RX ADMIN — INSULIN DETEMIR 20 UNITS: 100 INJECTION, SOLUTION SUBCUTANEOUS at 08:35

## 2023-06-21 RX ADMIN — SILDENAFIL 20 MG: 20 TABLET ORAL at 15:25

## 2023-06-21 RX ADMIN — PIPERACILLIN SODIUM AND TAZOBACTAM SODIUM 4.5 G: 4; .5 INJECTION, SOLUTION INTRAVENOUS at 02:01

## 2023-06-21 RX ADMIN — Medication 10 ML: at 08:30

## 2023-06-21 RX ADMIN — INSULIN LISPRO 2 UNITS: 100 INJECTION, SOLUTION INTRAVENOUS; SUBCUTANEOUS at 12:14

## 2023-06-21 RX ADMIN — PIPERACILLIN SODIUM AND TAZOBACTAM SODIUM 3.38 G: 3; .375 INJECTION, SOLUTION INTRAVENOUS at 22:09

## 2023-06-21 RX ADMIN — INSULIN LISPRO 2 UNITS: 100 INJECTION, SOLUTION INTRAVENOUS; SUBCUTANEOUS at 21:06

## 2023-06-21 RX ADMIN — METOPROLOL TARTRATE 50 MG: 50 TABLET ORAL at 21:07

## 2023-06-21 RX ADMIN — PIPERACILLIN SODIUM AND TAZOBACTAM SODIUM 3.38 G: 3; .375 INJECTION, SOLUTION INTRAVENOUS at 12:15

## 2023-06-21 RX ADMIN — SILDENAFIL 20 MG: 20 TABLET ORAL at 21:06

## 2023-06-21 RX ADMIN — INSULIN DETEMIR 20 UNITS: 100 INJECTION, SOLUTION SUBCUTANEOUS at 21:07

## 2023-06-21 RX ADMIN — HEPARIN SODIUM 5000 UNITS: 5000 INJECTION INTRAVENOUS; SUBCUTANEOUS at 21:06

## 2023-06-21 RX ADMIN — INSULIN LISPRO 4 UNITS: 100 INJECTION, SOLUTION INTRAVENOUS; SUBCUTANEOUS at 18:13

## 2023-06-21 RX ADMIN — DAPTOMYCIN 650 MG: 500 INJECTION, POWDER, LYOPHILIZED, FOR SOLUTION INTRAVENOUS at 21:06

## 2023-06-21 RX ADMIN — SILDENAFIL 20 MG: 20 TABLET ORAL at 08:29

## 2023-06-21 RX ADMIN — ALLOPURINOL 100 MG: 100 TABLET ORAL at 08:30

## 2023-06-21 RX ADMIN — Medication 10 ML: at 21:07

## 2023-06-21 RX ADMIN — METOPROLOL TARTRATE 50 MG: 50 TABLET ORAL at 08:30

## 2023-06-21 RX ADMIN — HEPARIN SODIUM 5000 UNITS: 5000 INJECTION INTRAVENOUS; SUBCUTANEOUS at 15:26

## 2023-06-21 NOTE — NURSING NOTE
Reason for Wound, Ostomy and Continence (WOC) Nursing Consult: Right foot ulcer      Upon review of chart, noted that patient is being followed by PT wound care as an outpatient for this wound.  Also noted that CT surgery had been consulted regarding this wound.  Called and spoke with PT wound care who will graciously follow patient inpatient after CT surgery has made their assessment and care plan.  Called and explained above to bedside RN and she ask about dressing the wound now reviewed what had been placed on the patient yesterday while in PT wound care and recommended application of either a Hydrofiber AG and Optifoam dressing or gauze dressing after cleansing with normal saline and can apply an Optifoam AG dressing.      Sensory Perception: 3-->slightly limited  Moisture: 4-->rarely moist  Activity: 3-->walks occasionally  Mobility: 3-->slightly limited  Nutrition: 3-->adequate  Friction and Shear: 3-->no apparent problem  Nikos Score: 19 (06/20/23 2000)       Please implement pressure injury prevention interventions per protocol.  See orders for recommendations.    Discussed plan of care with RN.      Thank you for consulting the WOC Nurse.  WOC Team will sign off.    If alteration to skin integrity or change in wound bed presentation, please consult WOC team.    Please note that parts of this note were completed with a voice recognition program. Review of the dictation was done, however, occasionally words are mistranscribed.

## 2023-06-21 NOTE — PROGRESS NOTES
"Pharmacy Consult-Zosyn Dosing  Juarez Hunt is a  52 y.o. male receiving Zosyn therapy.     Indication: bone and joint infection  Consulting Provider: [facility] medicine  ID Consult: yes    Current Antimicrobial Therapy  Anti-Infectives (From admission, onward)      Ordered     Dose/Rate Route Frequency Start Stop    06/20/23 2115  vancomycin 1500 mg/500 mL 0.9% NS IVPB ([facility])        Ordering Provider: Luis Carlos Hernandez PharmD    1,500 mg  333.3 mL/hr over 90 Minutes Intravenous Every 12 Hours 06/21/23 0900 06/28/23 0859    06/20/23 2102  piperacillin-tazobactam (ZOSYN) 4.5 g in iso-osmotic dextrose 100 mL IVPB (premix)        Ordering Provider: Luis Carlos Hernandez PharmD    4.5 g  over 4 Hours Intravenous Every 8 Hours 06/21/23 0200 06/28/23 0159    06/20/23 1859  Pharmacy to dose vancomycin        Ordering Provider: Lionel Phan MD     Does not apply Continuous PRN 06/20/23 1858 06/27/23 1857    06/20/23 1531  piperacillin-tazobactam (ZOSYN) 3.375 g in iso-osmotic dextrose 50 ml (premix)        Ordering Provider: Miguel Rivera PA    3.375 g  over 30 Minutes Intravenous Once 06/20/23 1547 06/20/23 2029 06/20/23 1531  vancomycin 2750 mg/500 mL 0.9% NS IVPB ([facility])        Ordering Provider: Miguel Rivera PA    20 mg/kg × 141 kg Intravenous Once 06/20/23 1547 06/20/23 2056            Allergies  Allergies as of 06/20/2023    (No Known Allergies)       Labs    Results from last 7 days   Lab Units 06/20/23  1456   BUN mg/dL 19   CREATININE mg/dL 0.97       Results from last 7 days   Lab Units 06/20/23  1456   WBC 10*3/mm3 6.39       Evaluation of Dosing     Last Dose Received in the ED/Outside Facility: zosyn 3.375 gm iv given today @ 2000  Is Patient on Dialysis or Renal Replacement: no    Ht - 188 cm (74\")  Wt -   (!) 141 kg (310 lb)    Estimated Creatinine Clearance: 132 mL/min (by C-G formula based on SCr of 0.97 mg/dL).    Intake & Output (last 3 days)       None            Microbiology and " Radiology  Microbiology Results (last 10 days)       ** No results found for the last 240 hours. **              Assessment/Plan:  Due to large patient stature(188 cm and 141 kg) will dose zosyn extended infusion @ 4.5 gm iv every 8 hours. Will continue to monitor.  Luis Carlos Hernandez, PharmD

## 2023-06-21 NOTE — PAYOR COMM NOTE
"Laisha OCONNOR [facility]   phone: [phone]  fax: [phone]       Ref# [id number]    GilbertDustin segovia (52 y.o. Male)       Date of Birth   1970    Social Security Number       Address   [address] [facility] [address]    Home Phone   [phone]    MRN   [id number]       Holiness   Restoration    Marital Status                               Admission Date   6/20/23    Admission Type   Emergency    Admitting Provider   Carroll Biggs DO    Attending Provider   Carroll Biggs DO    Department, Room/Bed   [facility] 3E, S337/1       Discharge Date       Discharge Disposition       Discharge Destination                                 Attending Provider: Carroll Biggs DO    Allergies: No Known Allergies    Isolation: None   Infection: None   Code Status: CPR    Ht: 188 cm (74\")   Wt: 141 kg (310 lb)    Admission Cmt: None   Principal Problem: Diabetic ulcer of toe of right foot associated with type 2 diabetes mellitus, unspecified ulcer stage [E11.621,L97.519]                   Active Insurance as of 6/20/2023       Primary Coverage       Payor Plan Insurance Group Employer/Plan Group    ANTHEM MEDICARE REPLACEMENT ANTHEM MEDICARE ADVANTAGE [id number]       Payor Plan Address Payor Plan Phone Number Payor Plan Fax Number Effective Dates    [address] [phone]  4/1/2022 - None Entered    [facility] [address]         Subscriber Name Subscriber Birth Date Member ID       DUSTIN LAI 1970 [id number]               Secondary Coverage       Payor Plan Insurance Group Employer/Plan Group    ANTHEM MEDICAID ANTHEM MEDICAID [id number]       Payor Plan Address Payor Plan Phone Number Payor Plan Fax Number Effective Dates    [address] [address] [phone]  1/1/2021 - None Entered    [facility] [address]         Subscriber Name Subscriber Birth Date Member ID       DUSTIN LAI 1970 [id number]                     Emergency Contacts       Contact " Person (Rel.) Home Phone Work Phone Mobile Phone    Nu Hunt (Spouse) [phone]                 History & Physical        Lionel hPan MD at 23 1637              [facility]  HISTORY AND PHYSICAL    Patient Name: Juarez Hunt  : 1970  MRN: [id number]  Primary Care Physician: Jw Guido MD  Date of admission: (Not on file)    Subjective     Chief Complaint: diabetic ulcer    HPI:  Juarez Hunt is a 52 y.o. male with PMH significant for HTN, insulin-dependent DMII with diabetic peripheral neuropathy, prior PE (on Eliquis), obesity (BMI 39) and ERIK (CPAP). He was evaluated at [facility] ED on 23 due to a R great toe wound that he was unaware of until his sock was saturated with fluid. There was no evidence of cellulitis and he was discharged home on PO Augmentin. He saw his PCP the following day and referred to [facility] PT wound care. He first saw PT wound care on 23 - he underwent debridement and was provided an offloading shoe. He followed up as scheduled on 23. Due to concerns for infection vs gout and general malaise, he opted to present to [facility] ED on 23 for further evaluation.     Labs and vital signs in the ED were reassuring. No imaging obtained in the ED but an MRI is pending. He was given IV Vanc/Zosyn and hospital medicine was asked to admit.         Review of Systems   Denies any weight loss or weight gain recently, no lumps or bumps, no night sweats, no unusual headaches, no visual changes, no difficulty with balance, no chest pain or shortness of breath, no nausea vomiting or diarrhea.    Personal History     Past Medical History:   Diagnosis Date    Arthritis     Blood clot in vein     Diabetes mellitus     Hypertension      Past Surgical History:   Procedure Laterality Date    CARDIAC CATHETERIZATION      IVC FILTER RETRIEVAL       KNEE SURGERY Left 1986    SKIN BIOPSY  06/13/2020    toe     Family History: family history includes Arthritis in his father; Diabetes in his mother; Heart attack in his maternal grandfather, maternal uncle, and mother; Heart disease in his mother; Hypertension in his mother; Liver cancer in his maternal aunt.     Social History:  reports that he has quit smoking. His smokeless tobacco use includes chew. He reports current alcohol use. He reports that he does not use drugs.  Social History     Social History Narrative    Not on file     Medications:  Diclofenac Sodium, Insulin Glargine, Insulin Pen Needle, [employer] Lancets Universal 33G, albuterol sulfate HFA, allopurinol, apixaban, glucose blood, lisinopril, meloxicam, metFORMIN, metoprolol tartrate, quinapril, and sildenafil    No Known Allergies    Objective     Vital Signs:   Temp:  [98 °F (36.7 °C)] 98 °F (36.7 °C)  Heart Rate:  [74] 74  Resp:  [18] 18  BP: (149)/(83) 149/83    Physical Exam   Constitutional: No acute distress, looks comfortable  HENT: NCAT, mucous membranes moist  Respiratory: Clear to auscultation bilaterally, respiratory effort normal   Cardiovascular: RRR, no murmurs, rubs, or gallops  Gastrointestinal: Positive bowel sounds, soft, nontender, nondistended  Musculoskeletal: bilateral ankle edema, left great toe ulcer  Psychiatric: Appropriate affect, cooperative  Neurologic: Oriented x 3, strength symmetric in all extremities, Cranial Nerves grossly intact to confrontation, speech clear  Skin: No rashes     Result Review:  I have personally reviewed the results from the time of this admission to 6/20/2023 16:37 EDT and agree with these findings:  [x]  Laboratory list / accordion  []  Microbiology  [x]  Radiology  []  EKG/Telemetry   []  Cardiology/Vascular   []  Pathology  []  Old records  []  Other:  Most notable findings include: Labs are pretty bland.  Sed rate is 15, C-reactive protein is 0.50, procalcitonin is 0.02.  MRI of the foot is  somewhat suspicious for marrow edema and osteomyelitis.  However, probability is low in light of low sed rate.    LAB RESULTS:      Lab 06/20/23  1456   WBC 6.39   HEMOGLOBIN 14.9   HEMATOCRIT 43.8   PLATELETS 224   NEUTROS ABS 3.53   IMMATURE GRANS (ABS) 0.03   LYMPHS ABS 2.04   MONOS ABS 0.58   EOS ABS 0.15   MCV 92.8   SED RATE 15   CRP 0.50   LACTATE 0.9         Lab 06/20/23  1456   SODIUM 137   POTASSIUM 5.1   CHLORIDE 104   CO2 23.0   ANION GAP 10.0   BUN 19   CREATININE 0.97   EGFR 93.3   GLUCOSE 174*   CALCIUM 9.1         Lab 06/20/23  1456   TOTAL PROTEIN 6.3   ALBUMIN 3.5   GLOBULIN 2.8   ALT (SGPT) 12   AST (SGOT) 16   BILIRUBIN 0.5   ALK PHOS 77     Brief Urine Lab Results       None          Microbiology Results (last 10 days)       ** No results found for the last 240 hours. **          No radiology results from the last 24 hrs    Assessment & Plan       Diabetic ulcer of toe of right foot associated with type 2 diabetes mellitus, unspecified ulcer stage    Type 2 diabetes mellitus without complication, with long-term current use of insulin    History of pulmonary embolism    Chronic anticoagulation    Obesity (BMI 30-39.9)    Juarez Hunt is a 52 y.o. male with PMH significant for HTN, insulin-dependent DMII with diabetic peripheral neuropathy, prior PE (on Eliquis), obesity (BMI 39) and ERIK (CPAP). He was evaluated at [facility] ED on 6/8/23 due to a R great toe wound that he was unaware of until his sock was saturated with fluid. There was no evidence of cellulitis and he was discharged home on PO Augmentin. He saw his PCP the following day and referred to [facility] PT wound care. He first saw PT wound care on 6/13/23 - he underwent debridement and was provided an offloading shoe. He followed up as scheduled on 6/20/23. Due to concerns for infection vs gout and general malaise, he opted to present to [facility] ED on 6/20/23 for further evaluation.    Acute  diabetic ulcer of R great toe  - Failure of outpatient treatment on PO Augmentin   - Continue IV Vanc/Zosyn for now  - Consult ID, CT surgery and PT wound care  - MRI is pending  - Hold Eliquis overnight until CT surgery sees in case he needs operative intervention    Insulin-dependent DMII  Diabetic peripheral neuropathy  - Last A1c was 8.1%  - He admits he is not good about checking his feet  - Consult diabetes educator   - Home regimen appears to be Lantus 30 units daily, Metformin 1000mg BID  - Start Glucommander basal/bolus     Hypertension  - Continue Lisinopril / Metoprolol     History of DVT/PE  - IVC filter in place   - History of recurrent VTE - follows with Dr. Woods   - On Eliquis. Holding overnight     ? Pulmonary hypertension  - Data deficit / no ECHO on file  - Continue home Sildenafil 20mg TID     Obesity, complicates all aspects of care   ERIK, CPAP QHS and PRN. OK to use home machine     DVT prophylaxis: SCDs     CODE STATUS: CPR, full support       Expected Discharge home, within 2-3 days  Expected discharge date/ time has not been documented.    Patient seen and examined at the bedside.  Patient is a 52-year-old  male with past medical history significant for hypertension, history of DVT and PE, diabetes mellitus.  Patient has had redness, swelling, ulcer of the right great toe.  Evidently patient was seen at local emergency room on eighth of this month and was sent home with Augmentin.  Evidently Augmentin has not had much effect.  Patient decided come to the emergency room at [facility]  Today as outpatient management has failed.  Patient denies any fever or chills.  No nausea vomiting or diarrhea.    Constitutional: No acute distress, awake, alert  HENT: NCAT, mucous membranes moist  Respiratory: Clear to auscultation bilaterally, respiratory effort normal   Cardiovascular: RRR, no murmurs, rubs, or gallops  Gastrointestinal: Positive bowel sounds, soft, nontender,  nondistended  Musculoskeletal:  bilateral ankle edema, right great toe edema, redness, ulcer  Psychiatric: Appropriate affect, cooperative  Neurologic: Oriented x 3, strength symmetric in all extremities, Cranial Nerves grossly intact to confrontation, speech clear  Skin: No rashes     Assessment and plan:  52-year-old  male with diabetes mellitus.  Patient has had an ulcer of the right great toe and was treated with Augmentin as outpatient without much improvement.  Patient will be admitted for IV antibiotic treatment and infectious disease consultation.  In case amputation is needed vascular surgery should be also consulted.  Please see the above for more details.    Total time spent was 30 minutes.                    Electronically signed by Lionel Phan MD at 06/20/23 2344       Lab Results (last 24 hours)       Procedure Component Value Units Date/Time    POC Glucose Once [[id number]]  (Abnormal) Collected: 06/21/23 1201    Specimen: Blood Updated: 06/21/23 1202     Glucose 186 mg/dL      Comment: Meter: [id number] : [id number] Juan Lucas       CK [[id number]]  (Normal) Collected: 06/21/23 0459    Specimen: Blood Updated: 06/21/23 1146     Creatine Kinase 115 U/L     Wound Culture - Wound, Toe [[id number]] Collected: 06/21/23 0859    Specimen: Wound from Toe Updated: 06/21/23 1043     Gram Stain No WBCs or organisms seen    POC Glucose Once [[id number]]  (Abnormal) Collected: 06/21/23 0749    Specimen: Blood Updated: 06/21/23 0750     Glucose 142 mg/dL      Comment: Meter: [id number] : [id number] Juan Lucas       Hemoglobin A1c [[id number]]  (Abnormal) Collected: 06/21/23 0357    Specimen: Blood Updated: 06/21/23 0535     Hemoglobin A1C 8.10 %     Narrative:      Hemoglobin A1C Ranges:    Increased Risk for Diabetes  5.7% to 6.4%  Diabetes                     >= 6.5%  Diabetic Goal                < 7.0%    Basic Metabolic Panel [[id number]]  (Abnormal) Collected: 06/21/23 0459    Specimen:  "Blood Updated: 06/21/23 0534     Glucose 163 mg/dL      BUN 23 mg/dL      Creatinine 1.03 mg/dL      Sodium 141 mmol/L      Potassium 4.8 mmol/L      Comment: Slight hemolysis detected by analyzer. Results may be affected.        Chloride 106 mmol/L      CO2 26.0 mmol/L      Calcium 8.8 mg/dL      BUN/Creatinine Ratio 22.3     Anion Gap 9.0 mmol/L      eGFR 86.9 mL/min/1.73     Narrative:      GFR Normal >60  Chronic Kidney Disease <60  Kidney Failure <15      CBC (No Diff) [[id number]]  (Abnormal) Collected: 06/21/23 0357    Specimen: Blood Updated: 06/21/23 0426     WBC 6.84 10*3/mm3      RBC 4.19 10*6/mm3      Hemoglobin 13.5 g/dL      Hematocrit 38.7 %      MCV 92.4 fL      MCH 32.2 pg      MCHC 34.9 g/dL      RDW 11.8 %      RDW-SD 39.4 fl      MPV 10.4 fL      Platelets 180 10*3/mm3     POC Glucose Once [[id number]]  (Abnormal) Collected: 06/20/23 2049    Specimen: Blood Updated: 06/20/23 2055     Glucose 188 mg/dL      Comment: Meter: [id number] : [id number] Mary Evans       Procalcitonin [[id number]]  (Normal) Collected: 06/20/23 1456    Specimen: Blood Updated: 06/20/23 1937     Procalcitonin 0.02 ng/mL     Narrative:      As a Marker for Sepsis (Non-Neonates):    1. <0.5 ng/mL represents a low risk of severe sepsis and/or septic shock.  2. >2 ng/mL represents a high risk of severe sepsis and/or septic shock.    As a Marker for Lower Respiratory Tract Infections that require antibiotic therapy:    PCT on Admission    Antibiotic Therapy       6-12 Hrs later    >0.5                Strongly Recommended  >0.25 - <0.5        Recommended   0.1 - 0.25          Discouraged              Remeasure/reassess PCT  <0.1                Strongly Discouraged     Remeasure/reassess PCT    As 28 day mortality risk marker: \"Change in Procalcitonin Result\" (>80% or <=80%) if Day 0 (or Day 1) and Day 4 values are available. Refer to http://www.brahms-pct-calculator.com    Change in PCT <=80%  A decrease of PCT levels below " or equal to 80% defines a positive change in PCT test result representing a higher risk for 28-day all-cause mortality of patients diagnosed with severe sepsis for septic shock.    Change in PCT >80%  A decrease of PCT levels of more than 80% defines a negative change in PCT result representing a lower risk for 28-day all-cause mortality of patients diagnosed with severe sepsis or septic shock.       Blood Culture - Blood, Arm, Left [[id number]] Collected: 06/20/23 1859    Specimen: Blood from Arm, Left Updated: 06/20/23 1915    Blood Culture - Blood, Arm, Right [[id number]] Collected: 06/20/23 1859    Specimen: Blood from Arm, Right Updated: 06/20/23 1914    [place] Draw [[id number]] Collected: 06/20/23 1456    Specimen: Blood Updated: 06/20/23 1900    Narrative:      The following orders were created for panel order [place] Draw.  Procedure                               Abnormality         Status                     ---------                               -----------         ------                     Green Top (Gel)[[id number]]                                  Final result               Lavender Top[[id number]]                                     Final result               Gold Top - SST[[id number]]                                   Final result               Gray Top[[id number]]                                         Final result               Light Blue Top[[id number]]                                   Final result                 Please view results for these tests on the individual orders.    Gray Top [[id number]] Collected: 06/20/23 1456    Specimen: Blood Updated: 06/20/23 1900     Extra Tube Hold for add-ons.     Comment: Auto resulted.       C-reactive Protein [[id number]]  (Normal) Collected: 06/20/23 1456    Specimen: Blood Updated: 06/20/23 1636     C-Reactive Protein 0.50 mg/dL     Sedimentation Rate [[id number]]  (Normal) Collected: 06/20/23 1456    Specimen: Blood Updated: 06/20/23 1623     Sed Rate 15 mm/hr      Lavender Top [[id number]] Collected: 06/20/23 1456    Specimen: Blood Updated: 06/20/23 1601     Extra Tube hold for add-on     Comment: Auto resulted       Light Blue Top [[id number]] Collected: 06/20/23 1456    Specimen: Blood Updated: 06/20/23 1601     Extra Tube Hold for add-ons.     Comment: Auto resulted       Green Top (Gel) [[id number]] Collected: 06/20/23 1456    Specimen: Blood Updated: 06/20/23 1601     Extra Tube Hold for add-ons.     Comment: Auto resulted.       Gold Top - SST [[id number]] Collected: 06/20/23 1456    Specimen: Blood Updated: 06/20/23 1601     Extra Tube Hold for add-ons.     Comment: Auto resulted.             Imaging Results (Last 24 Hours)       Procedure Component Value Units Date/Time    MRI Foot Right With & Without Contrast [[id number]] Collected: 06/20/23 1845     Updated: 06/20/23 1857    Narrative:        MRI FOOT RIGHT W WO CONTRAST    Date of Exam: 6/20/2023 4:48 PM EDT    Indication: right great and 2nd toe distal phalynx infection.     Comparison: 6/8/2023 toe radiographs    Technique:  Routine multiplanar/multisequence sequence images of the right foot were obtained before and after the uneventful administration of 20 mL Multihance.        Findings:  Motion-degraded examination.    Bones: Marrow edema in the first distal phalanx. No definite loss of T1 signal within this area. No suspicious marrow placing lesions. No evidence of fracture. Degenerative subchondral edema at the first metatarsophalangeal joint.    Soft tissues: Soft tissue swelling and irregularity of the first toe. No rim-enhancing fluid collection seen. Fatty and edematous changes of the intrinsic foot muscles consistent with neuropathic changes. Diffuse subcutaneous edema of the foot. Achilles   tendon appears grossly intact. Plantar fascia appears grossly intact. Visualized flexor and extensor tendons appear grossly intact. Plantar plates appear grossly intact. No obvious interdigital neuroma or  bursitis.      Impression:      Impression:  Limited examination due to motion degradation.    Soft tissue irregularity and edema of the first toe. Marrow edema within the underlying distal first phalanx without obvious T1 signal abnormality. This is favored to represent reactive edema though early osteomyelitis cannot be fully excluded. No   rim-enhancing fluid collection seen.        Electronically Signed: Ehsan Vidhi    2023 6:54 PM EDT    Workstation ID: [id number]             Physician Progress Notes (last 24 hours)        Carroll Biggs DO at 23 1213              [facility]  PROGRESS NOTE    Patient Name: Juarez Hunt  : 1970  MRN: [id number]    Date of Admission: 2023  Primary Care Physician: Jw Guido MD    Subjective   Subjective     CC:  Follow up toe wound/infection    HPI:  No acute complaints, just spoke with surgeon regarding amputation and trying to consider his options. >40 min spent at the bedside counseling disease process, rationale for surgery, expected prolonged abx if no surgery, etc    ROS:  Gen- No fevers, chills  CV- No chest pain, palpitations  Resp- No cough, dyspnea  GI- No N/V/D, abd pain     Objective   Objective     Vital Signs:   Temp:  [97.9 °F (36.6 °C)-98.7 °F (37.1 °C)] 98.7 °F (37.1 °C)  Heart Rate:  [72-78] 75  Resp:  [18] 18  BP: (110-139)/(48-75) 122/64     Physical Exam:  Constitutional: Awake, alert, laying in bed in NAD  HENT: NCAT, mucous membranes moist  Respiratory: Clear to auscultation bilaterally, respiratory effort normal   Cardiovascular: RRR, no murmurs, rubs, or gallops  Gastrointestinal: Positive bowel sounds, soft, nontender, nondistended  Musculoskeletal: RT 1st toe with erythema, swelling, open wound w/ visible subcutaneous tissue  Psychiatric: Appropriate affect, cooperative    Results Reviewed:  LAB RESULTS:      Lab 23  0357 23  1456   WBC 6.84 6.39   HEMOGLOBIN  13.5 14.9   HEMATOCRIT 38.7 43.8   PLATELETS 180 224   NEUTROS ABS  --  3.53   IMMATURE GRANS (ABS)  --  0.03   LYMPHS ABS  --  2.04   MONOS ABS  --  0.58   EOS ABS  --  0.15   MCV 92.4 92.8   SED RATE  --  15   CRP  --  0.50   PROCALCITONIN  --  0.02   LACTATE  --  0.9         Lab 06/21/23  0459 06/21/23  0357 06/20/23  1456   SODIUM 141  --  137   POTASSIUM 4.8  --  5.1   CHLORIDE 106  --  104   CO2 26.0  --  23.0   ANION GAP 9.0  --  10.0   BUN 23*  --  19   CREATININE 1.03  --  0.97   EGFR 86.9  --  93.3   GLUCOSE 163*  --  174*   CALCIUM 8.8  --  9.1   HEMOGLOBIN A1C  --  8.10*  --          Lab 06/20/23  1456   TOTAL PROTEIN 6.3   ALBUMIN 3.5   GLOBULIN 2.8   ALT (SGPT) 12   AST (SGOT) 16   BILIRUBIN 0.5   ALK PHOS 77                     Brief Urine Lab Results       None            Microbiology Results Abnormal       Procedure Component Value - Date/Time    Wound Culture - Wound, Toe [[id number]] Collected: 06/21/23 0859    Lab Status: Preliminary result Specimen: Wound from Toe Updated: 06/21/23 1043     Gram Stain No WBCs or organisms seen            MRI Foot Right With & Without Contrast    Result Date: 6/20/2023  MRI FOOT RIGHT W WO CONTRAST Date of Exam: 6/20/2023 4:48 PM EDT Indication: right great and 2nd toe distal phalynx infection.  Comparison: 6/8/2023 toe radiographs Technique:  Routine multiplanar/multisequence sequence images of the right foot were obtained before and after the uneventful administration of 20 mL Multihance.  Findings: Motion-degraded examination. Bones: Marrow edema in the first distal phalanx. No definite loss of T1 signal within this area. No suspicious marrow placing lesions. No evidence of fracture. Degenerative subchondral edema at the first metatarsophalangeal joint. Soft tissues: Soft tissue swelling and irregularity of the first toe. No rim-enhancing fluid collection seen. Fatty and edematous changes of the intrinsic foot muscles consistent with neuropathic changes.  Diffuse subcutaneous edema of the foot. Achilles tendon appears grossly intact. Plantar fascia appears grossly intact. Visualized flexor and extensor tendons appear grossly intact. Plantar plates appear grossly intact. No obvious interdigital neuroma or bursitis.     Impression: Impression: Limited examination due to motion degradation. Soft tissue irregularity and edema of the first toe. Marrow edema within the underlying distal first phalanx without obvious T1 signal abnormality. This is favored to represent reactive edema though early osteomyelitis cannot be fully excluded. No rim-enhancing fluid collection seen. Electronically Signed: Ehsanavani Mosher  6/20/2023 6:54 PM EDT  Workstation ID: [id number]         Current medications:  Scheduled Meds:allopurinol, 100 mg, Oral, Daily  DAPTOmycin, 6 mg/kg (Adjusted), Intravenous, Q24H  insulin detemir, 20 Units, Subcutaneous, Q12H  insulin lispro, 2-9 Units, Subcutaneous, 4x Daily AC & at Bedtime  lisinopril, 20 mg, Oral, Daily  metoprolol tartrate, 50 mg, Oral, BID  piperacillin-tazobactam, 3.375 g, Intravenous, Q8H  sildenafil, 20 mg, Oral, TID  sodium chloride, 10 mL, Intravenous, Q12H      Continuous Infusions:   PRN Meds:.  acetaminophen    dextrose    dextrose    Diclofenac Sodium    glucagon (human recombinant)    ondansetron **OR** ondansetron    sodium chloride    sodium chloride    Assessment & Plan   Assessment & Plan     Active Hospital Problems    Diagnosis  POA    **Diabetic ulcer of toe of right foot associated with type 2 diabetes mellitus, unspecified ulcer stage [E11.621, L97.519]  Yes    History of pulmonary embolism [Z86.711]  Unknown    Chronic anticoagulation [Z79.01]  Not Applicable    Obesity (BMI 30-39.9) [E66.9]  Unknown    Type 2 diabetes mellitus without complication, with long-term current use of insulin [E11.9, Z79.4]  Not Applicable      Resolved Hospital Problems   No resolved problems to display.        Brief Hospital Course to  date:  Juarez Hunt is a 52 y.o. male w/ HTN, DM2 on insulin w/ neuropathy, obesity, ERIK (not able to tolerate sleep study and not on CPAP), hyperuricemia, prior PE's rec'd lifelong AC seen at [facility] ([place] 6/8/23 w/ RT 1st toe wound, placed on oral Augmentin and not improved, arrived to wound care appt and sent to the ED for further evaluation    The following problems are new to me today    Assessment/Plan    Acute RT 1st toe diabetic wound w/ cellulitis, possible early osteomyelitis  -not improved w/ PO augmentin  -MRI reviewed, ?early osteomyelitis vs reactive edema  -seen by Dr. Lundberg, recommending amputation, patient considering options  -ID consult pending  -normal WBC count, afebrile, not systemically ill, hold vanc/zosyn pending surgical cultures unless patient opts for no surgery or ID feels IV Abx needed more acutely  -eliquis held on arrival for possible surgery, has Hx PE's and should not delay longer than required    DM type 2, A1c 8.1%, w/ long term use of insulin, w/ neuropathy  -levemir w/ SSI    HTN  -cont lopressor  -home lisinopril held this am per nursing for borderline BP    Hx recurrent DVT/PE  -has IVC filter  -chronically on eliquis, held for possible surgery, start subQ heparin in the interim    Obesity, bmi 39.8 kg/m2  Untreated ERIK  ?Pulmonary HTN, inferred by home meds  -cont home sildenafil  -previously unable to tolerate sleep study and not approved for CPAP  -complicates all care    Expected Discharge Location and Transportation: home  Expected Discharge   Expected Discharge Date: 6/24/2023; Expected Discharge Time:      DVT prophylaxis:  Mechanical DVT prophylaxis orders are present.     AM-PAC 6 Clicks Score (PT): 24 (06/21/23 0800)    CODE STATUS:   Code Status and Medical Interventions:   Ordered at: 06/20/23 9296     Code Status (Patient has no pulse and is not breathing):    CPR (Attempt to Resuscitate)     Medical Interventions (Patient has pulse or is breathing):    Full Support      Release to patient:    Routine Release       Carroll Biggs DO  06/21/23        Electronically signed by Carroll Biggs DO at 06/21/23 1447          Consult Notes (last 24 hours)        Koffi Lundberg MD at 06/21/23 0912        Consult Orders    1. Inpatient Cardiothoracic Surgery Consult [[id number]] ordered by Ludivina Gil PA-C at 06/20/23 1712                 Cardiothoracic Surgery History & Physical           Chief complaint: Right great toe wound    HPI: Juarez Hunt is a 52-year-old male with past medical history of type 2 diabetes (on insulin), history of PE (on Eliquis-last taken 6/20) who presented to the emergency department with concern for worsening right great toe wound on 6/20.  Patient states that he first noticed the wound on 6/8 after which he was evaluated in the [facility] emergency department and discharged home on Augmentin.  He states that he noticed slight improvement after taking a course of Augmentin; however after finishing the course of antibiotics the toe worsened again.  He was following up outpatient with PT wound care where he was seen today and expressed concern about feeling overall malaise and the toes worsening redness, the PT wound care team recommended he present to the emergency department for further evaluation.  In the emergency department the patient did not have a leukocytosis.  He states he had some chills at home but never had a fever.      Past Medical History:   Diagnosis Date    Arthritis     Blood clot in vein     Diabetes mellitus     Hypertension      Past Surgical History:   Procedure Laterality Date    CARDIAC CATHETERIZATION  2004    IVC FILTER RETRIEVAL      KNEE SURGERY Left 1986    SKIN BIOPSY  06/13/2020    toe     Family History   Problem Relation Age of Onset    Diabetes Mother     Heart disease Mother     Heart attack Mother     Hypertension Mother     Arthritis Father     Heart attack Maternal Grandfather     Heart  attack Maternal Uncle     Liver cancer Maternal Aunt      Social History     Tobacco Use    Smoking status: Former    Smokeless tobacco: Current     Types: Chew   Vaping Use    Vaping Use: Never used   Substance Use Topics    Alcohol use: Yes     Comment: 1/4 glass per week    Drug use: No       Medications Prior to Admission   Medication Sig Dispense Refill Last Dose    albuterol sulfate  (90 Base) MCG/ACT inhaler Inhale 2 puffs Every 6 (Six) Hours As Needed for Wheezing or Shortness of Air. (Patient taking differently: Inhale 2 puffs Every 6 (Six) Hours As Needed for Wheezing or Shortness of Air. Done taking) 18 g 0 Patient Taking Differently    allopurinol (Zyloprim) 100 MG tablet Take 1 tablet by mouth Daily. 90 tablet 3 6/20/2023    Diclofenac Sodium (VOLTAREN) 1 % gel gel Apply 4 g topically to the appropriate area as directed 4 (Four) Times a Day As Needed (pain). 100 g 1 6/20/2023    Eliquis 5 MG tablet tablet Take 1 tablet by mouth Every 12 (Twelve) Hours. 60 tablet 11 6/20/2023    Lantus SoloStar 100 UNIT/ML injection pen Inject 30 units under the skin two times per day as directed 15 mL 11 6/20/2023    lisinopril (PRINIVIL,ZESTRIL) 20 MG tablet Take 1 tablet by mouth Daily. 90 tablet 3 6/20/2023    meloxicam (MOBIC) 7.5 MG tablet Take 1 tablet by mouth Daily.   6/20/2023    metFORMIN (Glucophage) 500 MG tablet Take 1 tablet by mouth 2 (Two) Times a Day With Meals. 60 tablet 11 6/20/2023    metoprolol tartrate (LOPRESSOR) 50 MG tablet Take 1 tablet by mouth 2 (Two) Times a Day. 180 tablet 3 6/20/2023    sildenafil (REVATIO) 20 MG tablet Take 1 tablet by mouth 3 (Three) Times a Day. 90 tablet 3 6/20/2023    BD Pen Needle Tina 2nd Gen 32G X 4 MM misc USE TWICE DAILY WITH INJECTIONS AS DIRECTED 200 each 3     glucose blood (True Metrix Blood Glucose Test) test strip 1 each by Other route 6 (Six) Times a Day. Use as instructed 200 each 12     [employer] Lancets Universal 33G misc USE TO CHECK BLOOD GLUCOSE  THREE TIMES DAILY OR AS DIRECTED        Allergies:  Patient has no known allergies.    Review of Systems:  A comprehensive review of systems was negative except for:   Constitutional: Malaise  Neurological: Neuropathy, chronic  Skin: Toe wound as described in HPI  All other systems were reviewed and were negative.    Vital Signs:  Temp:  [97.9 °F (36.6 °C)-98.2 °F (36.8 °C)] 97.9 °F (36.6 °C)  Heart Rate:  [72-78] 77  Resp:  [18] 18  BP: (110-149)/(48-83) 115/70    Physical Exam:  Physical Exam  HENT:      Head: Normocephalic and atraumatic.   Eyes:      Extraocular Movements: Extraocular movements intact.   Cardiovascular:      Rate and Rhythm: Normal rate and regular rhythm.      Pulses: Normal pulses.           Dorsalis pedis pulses are 2+ on the right side and 2+ on the left side.      Heart sounds: No murmur heard.    No friction rub.   Pulmonary:      Effort: Pulmonary effort is normal. No respiratory distress.      Breath sounds: Normal breath sounds. No wheezing.   Abdominal:      General: There is no distension.      Palpations: Abdomen is soft.      Tenderness: There is no abdominal tenderness.   Musculoskeletal:        Feet:    Skin:     General: Skin is warm and dry.   Neurological:      Mental Status: He is alert.        Labs:  Results from last 7 days   Lab Units 06/21/23  0357   WBC 10*3/mm3 6.84   HEMOGLOBIN g/dL 13.5   HEMATOCRIT % 38.7   PLATELETS 10*3/mm3 180     Results from last 7 days   Lab Units 06/21/23  0459   SODIUM mmol/L 141   POTASSIUM mmol/L 4.8   CHLORIDE mmol/L 106   CO2 mmol/L 26.0   BUN mg/dL 23*   CREATININE mg/dL 1.03   GLUCOSE mg/dL 163*   CALCIUM mg/dL 8.8             Imaging:   MRI right foot 6/20:  Findings:  Motion-degraded examination.     Bones: Marrow edema in the first distal phalanx. No definite loss of T1 signal within this area. No suspicious marrow placing lesions. No evidence of fracture. Degenerative subchondral edema at the first metatarsophalangeal joint.     Soft  tissues: Soft tissue swelling and irregularity of the first toe. No rim-enhancing fluid collection seen. Fatty and edematous changes of the intrinsic foot muscles consistent with neuropathic changes. Diffuse subcutaneous edema of the foot. Achilles   tendon appears grossly intact. Plantar fascia appears grossly intact. Visualized flexor and extensor tendons appear grossly intact. Plantar plates appear grossly intact. No obvious interdigital neuroma or bursitis.     IMPRESSION:  Impression:  Limited examination due to motion degradation.     Soft tissue irregularity and edema of the first toe. Marrow edema within the underlying distal first phalanx without obvious T1 signal abnormality. This is favored to represent reactive edema though early osteomyelitis cannot be fully excluded. No   rim-enhancing fluid collection seen.      Assessment:     Diabetic ulcer of toe of right foot associated with type 2 diabetes mellitus, unspecified ulcer stage    Type 2 diabetes mellitus without complication, with long-term current use of insulin    History of pulmonary embolism    Chronic anticoagulation    Obesity (BMI 30-39.9)         Plan:   Continue antibiotics, ID is consulted-we will defer to them for antibiotic plan  Overall medical management per hospitalist  Likely Plan for right great toe amputation with Dr Lundberg, scheduling tbd      Candy Willingham PA-C  06/21/23  09:12 EDT      --    I reviewed this documentation. I interviewed and examined this patient this morning. Briefly he is a 52 M with history of DM, PE (s/p IVC filter and on Eliquis) who presented to [facility] with likely diabetic foot wound and osteomyelitis at the right hallux. This has been worsening for 2-3 weeks and he has attempted oral antibiotics. ROS notable for chills at home but never had a fever. He has palpable pedal pulses bilaterally. I discussed with the patient the r/b/a of hallux amputation in the presence of his wife and Dr. Biggs Hospitalist  including risk of more proximal amputation with or without surgery. We will plan for OR 6/22/23 4pm. NPO after midnight. The patient demonstrated good understanding and all of his questions were answered satisfactorily.        Koffi Lundberg M.D., R.P.V.I.  Cardiothoracic and Vascular Surgeon  [facility]         Electronically signed by Koffi Lundberg MD at 06/21/23 4267

## 2023-06-21 NOTE — THERAPY DISCHARGE NOTE
Patient Name: Juarez Hunt  : 1970    MRN: [id number]                              Today's Date: 2023       Admit Date: 2023    Visit Dx:     ICD-10-CM ICD-9-CM   1. Diabetic ulcer of toe of right foot associated with type 2 diabetes mellitus, unspecified ulcer stage  E11.621 250.80    L97.519 707.15     Patient Active Problem List   Diagnosis    ERIK on CPAP    Type 2 diabetes mellitus without complication, with long-term current use of insulin    Acute pulmonary embolism without acute cor pulmonale    Diabetic ulcer of toe of right foot associated with type 2 diabetes mellitus, unspecified ulcer stage    History of pulmonary embolism    Chronic anticoagulation    Obesity (BMI 30-39.9)     Past Medical History:   Diagnosis Date    Arthritis     Blood clot in vein     Diabetes mellitus     Hypertension      Past Surgical History:   Procedure Laterality Date    CARDIAC CATHETERIZATION      IVC FILTER RETRIEVAL      KNEE SURGERY Left 1986    SKIN BIOPSY  2020    toe      General Information       Row Name 23 0948          Physical Therapy Time and Intention    Document Type discharge evaluation/summary  -CM     Mode of Treatment physical therapy;individual therapy  -CM       Row Name 23 0948          General Information    Patient Profile Reviewed yes  -CM     Prior Level of Function independent:;all household mobility;ADL's;driving  Paige with SPC at baseline, still driving, wife does assist him with his dressing changes  -CM     Existing Precautions/Restrictions other (see comments)  R great toe wound, offload shoe when OOB  -CM     Barriers to Rehab medically complex  -CM       Row Name 23 0948          Living Environment    People in Home spouse  -CM       Row Name 23 0948          Home Main Entrance    Number of Stairs, Main Entrance five  -CM     Stair Railings, Main Entrance railings on both sides of stairs  -CM       Row Name 23 0948          Stairs  Within Home, Primary    Number of Stairs, Within Home, Primary none  -CM       Row Name 06/21/23 0948          Cognition    Orientation Status (Cognition) oriented x 4  -CM       Row Name 06/21/23 0948          Safety Issues, Functional Mobility    Safety Issues Affecting Function (Mobility) safety precaution awareness  -CM     Impairments Affecting Function (Mobility) balance;sensation/sensory awareness  -CM               User Key  (r) = Recorded By, (t) = Taken By, (c) = Cosigned By      Initials Name Provider Type    Abby Childs PT Physical Therapist                   Mobility       Row Name 06/21/23 0951          Bed Mobility    Bed Mobility supine-sit-supine  -CM     Supine-Sit-Supine Winifred (Bed Mobility) modified independence  -CM     Assistive Device (Bed Mobility) head of bed elevated  -CM     Comment, (Bed Mobility) patient performs without difficulty  -CM       Row Name 06/21/23 0951          Transfers    Comment, (Transfers) patient donned his own offloading shoe and tennis shoe at EOB before completing OOB mobility  -CM       Row Name 06/21/23 0951          Sit-Stand Transfer    Sit-Stand Winifred (Transfers) standby assist  -CM     Comment, (Sit-Stand Transfer) no difficulty with no AD  -CM       Row Name 06/21/23 0951          Gait/Stairs (Locomotion)    Winifred Level (Gait) standby assist  -CM     Distance in Feet (Gait) 350  -CM     Deviations/Abnormal Patterns (Gait) gait speed decreased  -CM     Bilateral Gait Deviations forward flexed posture  -CM     Comment, (Gait/Stairs) Patient ambulated in johnson with a step through gait pattern with no AD. He was able to navigate multiple obstacles in the johnson without difficulty and no LOB. He reports he feels very close to his baseline mobility at this time.  -CM       Row Name 06/21/23 0951          Mobility    Extremity Weight-bearing Status right lower extremity  -CM     Right Lower Extremity (Weight-bearing Status) other (see  "comments);weight-bearing as tolerated (WBAT)  \"RLE offload shoe when OOB\"  -CM               User Key  (r) = Recorded By, (t) = Taken By, (c) = Cosigned By      Initials Name Provider Type    Abby Childs PT Physical Therapist                   Obj/Interventions       Row Name 06/21/23 0954          Range of Motion Comprehensive    General Range of Motion bilateral lower extremity ROM WFL  -CM       Row Name 06/21/23 0954          Strength Comprehensive (MMT)    General Manual Muscle Testing (MMT) Assessment no strength deficits identified  -CM       Row Name 06/21/23 0954          Balance    Balance Assessment sitting static balance;standing static balance;standing dynamic balance  -CM     Static Sitting Balance independent  -CM     Position, Sitting Balance unsupported;sitting edge of bed  -CM     Static Standing Balance standby assist  -CM     Dynamic Standing Balance standby assist  -CM     Position/Device Used, Standing Balance unsupported  -CM     Comment, Balance no LOB  -CM       Row Name 06/21/23 0954          Sensory Assessment (Somatosensory)    Sensory Assessment (Somatosensory) bilateral LE  -CM     Bilateral LE Sensory Assessment absent;other (see comments)  absent in feet bilaterally, intact proximal to ankle  -CM               User Key  (r) = Recorded By, (t) = Taken By, (c) = Cosigned By      Initials Name Provider Type    Abby Childs PT Physical Therapist                   Goals/Plan    No documentation.                  Clinical Impression       Row Name 06/21/23 0954          Pain    Additional Documentation Pain Scale: FACES Pre/Post-Treatment (Group)  -CM       Row Name 06/21/23 0954          Pain Scale: FACES Pre/Post-Treatment    Pain: FACES Scale, Pretreatment 0-->no hurt  -CM     Posttreatment Pain Rating 0-->no hurt  -CM       Row Name 06/21/23 0954          Plan of Care Review    Plan of Care Reviewed With patient;spouse  -CM     Outcome Evaluation Patient " presents at his baseline mobility with mild balance deficit. He completed ambulation 350' unsupported with SBA with his offloading shoe on his RLE with no LOB. He reports no mobility concerns at this time and is currently not a IPPT mobility candidate due to baseline mobility. Will sign off at this time with D/C rec for home with assist. Please reconsult if there is a change in medical/weightbearing status.  -       Row Name 06/21/23 0954          Therapy Assessment/Plan (PT)    Criteria for Skilled Interventions Met (PT) no;no problems identified which require skilled intervention  -     Therapy Frequency (PT) evaluation only  -CM       Row Name 06/21/23 0954          Vital Signs    Pre Systolic BP Rehab 115  -CM     Pre Treatment Diastolic BP 73  -CM     Pretreatment Heart Rate (beats/min) 75  -CM     Pre SpO2 (%) 92  -CM     O2 Delivery Pre Treatment room air  -CM     Post SpO2 (%) 95  -CM     O2 Delivery Post Treatment room air  -CM     Pre Patient Position Supine  -CM     Intra Patient Position Standing  -CM     Post Patient Position Supine  -CM       Row Name 06/21/23 0954          Positioning and Restraints    Pre-Treatment Position in bed  -CM     Post Treatment Position bed  -CM     In Bed fowlers;call light within reach;encouraged to call for assist;with family/caregiver;RLE elevated;notified nsg  no alarm upon entry  -               User Key  (r) = Recorded By, (t) = Taken By, (c) = Cosigned By      Initials Name Provider Type    Abby Childs, PT Physical Therapist                   Outcome Measures       Row Name 06/21/23 0957          How much help from another person do you currently need...    Moving from lying on back to sitting on the side of a flat bed without bedrails? 4  -CM     Moving to and from a bed to a chair (including a wheelchair)? 4  -CM     Standing up from a chair using your arms (e.g., wheelchair, bedside chair)? 4  -CM     Climbing 3-5 steps with a railing? 4  -CM      To walk in hospital room? 4  -CM               User Key  (r) = Recorded By, (t) = Taken By, (c) = Cosigned By      Initials Name Provider Type    Abby Childs PT Physical Therapist                  Physical Therapy Education       Title: PT OT SLP Therapies (Done)       Topic: Physical Therapy (Done)       Point: Mobility training (Done)       Learning Progress Summary             Patient Acceptance, E, VU by CM at 6/21/2023 0958   Significant Other Acceptance, E, VU by CM at 6/21/2023 0958                         Point: Home exercise program (Done)       Learning Progress Summary             Patient Acceptance, E, VU by CM at 6/21/2023 0958   Significant Other Acceptance, E, VU by CM at 6/21/2023 0958                         Point: Body mechanics (Done)       Learning Progress Summary             Patient Acceptance, E, VU by CM at 6/21/2023 0958   Significant Other Acceptance, E, VU by CM at 6/21/2023 0958                         Point: Precautions (Done)       Learning Progress Summary             Patient Acceptance, E, VU by CM at 6/21/2023 0958   Significant Other Acceptance, E, VU by CM at 6/21/2023 0958                                         User Key       Initials Effective Dates Name Provider Type Discipline     09/22/22 -  Abby Bills PT Physical Therapist PT                  PT Recommendation and Plan     Plan of Care Reviewed With: patient, spouse  Outcome Evaluation: Patient presents at his baseline mobility with mild balance deficit. He completed ambulation 350' unsupported with SBA with his offloading shoe on his RLE with no LOB. He reports no mobility concerns at this time and is currently not a IPPT mobility candidate due to baseline mobility. Will sign off at this time with D/C rec for home with assist. Please reconsult if there is a change in medical/weightbearing status.     Time Calculation:    PT Charges       Row Name 06/21/23 0912             Time Calculation     Start Time 0846  -CM      PT Received On 06/21/23  -CM         Untimed Charges    PT Eval/Re-eval Minutes 51  -CM         Total Minutes    Untimed Charges Total Minutes 51  -CM       Total Minutes 51  -CM                User Key  (r) = Recorded By, (t) = Taken By, (c) = Cosigned By      Initials Name Provider Type    Abby Childs, PT Physical Therapist                  Therapy Charges for Today       Code Description Service Date Service Provider Modifiers Qty    85881528857 HC PT EVAL LOW COMPLEXITY 4 6/21/2023 Abby Bills, PT GP 1            PT G-Codes  AM-PAC 6 Clicks Score (PT): 19    PT Discharge Summary  Anticipated Discharge Disposition (PT): home with assist    Abby Bills, ENEDELIA  6/21/2023

## 2023-06-21 NOTE — PLAN OF CARE
Goal Outcome Evaluation:  Plan of Care Reviewed With: patient, spouse        Progress: no change  Outcome Evaluation: Pt presents near his functional baseline with all ADLs and mobility at this time. No acute OT needs at this time. OT will dc.      Anticipated Discharge Disposition (OT): home

## 2023-06-21 NOTE — CASE MANAGEMENT/SOCIAL WORK
Discharge Planning Assessment  [facility]     Patient Name: Juarez Hunt  MRN: [id number]  Today's Date: 6/21/2023    Admit Date: 6/20/2023    Plan: Initial   Discharge Needs Assessment       Row Name 06/21/23 1349       Living Environment    People in Home spouse    Name(s) of People in Home alyson Judge    Current Living Arrangements home    Potentially Unsafe Housing Conditions unable to assess    Primary Care Provided by self    Provides Primary Care For no one    Family Caregiver if Needed spouse    Family Caregiver Names Nu, wife    Quality of Family Relationships helpful;involved;supportive    Able to Return to Prior Arrangements yes       Resource/Environmental Concerns    Transportation Concerns none       Food Insecurity    Within the past 12 months, you worried that your food would run out before you got the money to buy more. Never true    Within the past 12 months, the food you bought just didn't last and you didn't have money to get more. Never true       Transition Planning    Patient/Family Anticipates Transition to home with family    Patient/Family Anticipated Services at Transition     Transportation Anticipated family or friend will provide       Discharge Needs Assessment    Equipment Currently Used at Home cane, straight    Concerns to be Addressed discharge planning    Anticipated Changes Related to Illness none    Equipment Needed After Discharge none                   Discharge Plan       Row Name 06/21/23 9367       Plan    Plan Initial    Plan Comments Spoke with patient and spouse at bedside to initiate discharge planning.  Patient confirms he and his wife live in [facility]; PCP is Jw Guido; primary insurance is [employer] and secondary is [place] [employer].  Patient states he is independent with ADLs and mobility, he does have a cane.  Patient denies other DME and HH.  Family will transport at discharge.  CM will continue to follow.    Final Discharge  Disposition Code 30 - still a patient                  Continued Care and Services - Admitted Since 6/20/2023    Coordination has not been started for this encounter.       Selected Continued Care - Episodes Includes continued care and service providers with selected services from the active episodes listed below      Chronic Care Management Episode start date: 6/9/2023   There are no active outsourced providers for this episode.                 Expected Discharge Date and Time       Expected Discharge Date Expected Discharge Time    Jun 23, 2023            Demographic Summary       Row Name 06/21/23 1342       General Information    Arrived From home    Referral Source admission list    Reason for Consult discharge planning    Preferred Language English       Contact Information    Permission Granted to Share Info With                    Functional Status       Row Name 06/21/23 1349       Functional Status    Usual Activity Tolerance good    Current Activity Tolerance good       Functional Status, IADL    Medications independent    Meal Preparation independent    Housekeeping independent    Laundry independent    Shopping independent                   Psychosocial    No documentation.                  Abuse/Neglect    No documentation.                  Legal    No documentation.                  Substance Abuse    No documentation.                  Patient Forms    No documentation.                     Brenda Machado RN

## 2023-06-21 NOTE — PROGRESS NOTES
"Pharmacy Consult-Vancomycin Dosing  Juarez Hunt is a  52 y.o. male receiving vancomycin therapy.     Indication: bone and joint infection  Consulting Provider: [facility]  ID Consult: yes    Goal AUC: 400 - 600 mg/L*hr    Current Antimicrobial Therapy  Anti-Infectives (From admission, onward)      Ordered     Dose/Rate Route Frequency Start Stop    06/20/23 2115  vancomycin 1500 mg/500 mL 0.9% NS IVPB ([facility])        Ordering Provider: Luis Carlos Hernandez PharmD    1,500 mg  333.3 mL/hr over 90 Minutes Intravenous Every 12 Hours 06/21/23 0900 06/28/23 0859    06/20/23 2102  piperacillin-tazobactam (ZOSYN) 4.5 g in iso-osmotic dextrose 100 mL IVPB (premix)        Ordering Provider: Luis Carlos Hernandez PharmD    4.5 g  over 4 Hours Intravenous Every 8 Hours 06/21/23 0200 06/28/23 0159    06/20/23 1859  Pharmacy to dose vancomycin        Ordering Provider: Lionel Phan MD     Does not apply Continuous PRN 06/20/23 1858 06/27/23 1857    06/20/23 1531  piperacillin-tazobactam (ZOSYN) 3.375 g in iso-osmotic dextrose 50 ml (premix)        Ordering Provider: Miguel Rivera PA    3.375 g  over 30 Minutes Intravenous Once 06/20/23 1547 06/20/23 2029 06/20/23 1531  vancomycin 2750 mg/500 mL 0.9% NS IVPB ([facility])        Ordering Provider: Miguel Rivera PA    20 mg/kg × 141 kg Intravenous Once 06/20/23 1547 06/20/23 2056            Allergies  Allergies as of 06/20/2023    (No Known Allergies)       Labs    Results from last 7 days   Lab Units 06/20/23  1456   BUN mg/dL 19   CREATININE mg/dL 0.97       Results from last 7 days   Lab Units 06/20/23  1456   WBC 10*3/mm3 6.39       Evaluation of Dosing     Last Dose Received in the ED/Outside Facility: vancomycin 2750 mg(dose: 20 mg/kg) given today @ 2056  Is Patient on Dialysis or Renal Replacement: no    Ht - 188 cm (74\")  Wt -   (!) 141 kg (310 lb)    Estimated Creatinine Clearance: 132 mL/min (by C-G formula based on SCr of 0.97 mg/dL).    Intake & Output (last 3 " days)       None            Microbiology and Radiology  Microbiology Results (last 10 days)       ** No results found for the last 240 hours. **            Reported Vancomycin Levels                         Insight[employer] AUC Calculation:    Current AUC:   na mg/L*hr    Predicted Steady State AUC on Current Dose: na mg/L*hr  _________________________________    Predicted Steady State AUC on New Dose: 548 mg/L*hr associated steady state trough: 18.2 mg/L    Assessment/Plan:  Will start scheduled vancomycin at 1500 mg(dose: 11 mg/kg) iv every 12 hours; will check vancomycin trough prior to am dose on 6/22 to verify vancomycin pharmacokinetics.   Luis Carlos Hernandez, PharmD

## 2023-06-21 NOTE — PROGRESS NOTES
[facility] Medicine Services  PROGRESS NOTE    Patient Name: Juarez Hunt  : 1970  MRN: [id number]    Date of Admission: 2023  Primary Care Physician: Jw Guido MD    Subjective   Subjective     CC:  Follow up toe wound/infection    HPI:  No acute complaints, just spoke with surgeon regarding amputation and trying to consider his options. >40 min spent at the bedside counseling disease process, rationale for surgery, expected prolonged abx if no surgery, etc    ROS:  Gen- No fevers, chills  CV- No chest pain, palpitations  Resp- No cough, dyspnea  GI- No N/V/D, abd pain     Objective   Objective     Vital Signs:   Temp:  [97.9 °F (36.6 °C)-98.7 °F (37.1 °C)] 98.7 °F (37.1 °C)  Heart Rate:  [72-78] 75  Resp:  [18] 18  BP: (110-139)/(48-75) 122/64     Physical Exam:  Constitutional: Awake, alert, laying in bed in NAD  HENT: NCAT, mucous membranes moist  Respiratory: Clear to auscultation bilaterally, respiratory effort normal   Cardiovascular: RRR, no murmurs, rubs, or gallops  Gastrointestinal: Positive bowel sounds, soft, nontender, nondistended  Musculoskeletal: RT 1st toe with erythema, swelling, open wound w/ visible subcutaneous tissue  Psychiatric: Appropriate affect, cooperative    Results Reviewed:  LAB RESULTS:      Lab 23  0357 23  1456   WBC 6.84 6.39   HEMOGLOBIN 13.5 14.9   HEMATOCRIT 38.7 43.8   PLATELETS 180 224   NEUTROS ABS  --  3.53   IMMATURE GRANS (ABS)  --  0.03   LYMPHS ABS  --  2.04   MONOS ABS  --  0.58   EOS ABS  --  0.15   MCV 92.4 92.8   SED RATE  --  15   CRP  --  0.50   PROCALCITONIN  --  0.02   LACTATE  --  0.9         Lab 23  0459 23  0357 23  1456   SODIUM 141  --  137   POTASSIUM 4.8  --  5.1   CHLORIDE 106  --  104   CO2 26.0  --  23.0   ANION GAP 9.0  --  10.0   BUN 23*  --  19   CREATININE 1.03  --  0.97   EGFR 86.9  --  93.3   GLUCOSE 163*  --  174*   CALCIUM 8.8  --  9.1   HEMOGLOBIN A1C  --  8.10*   --          Lab 06/20/23  1456   TOTAL PROTEIN 6.3   ALBUMIN 3.5   GLOBULIN 2.8   ALT (SGPT) 12   AST (SGOT) 16   BILIRUBIN 0.5   ALK PHOS 77                     Brief Urine Lab Results       None            Microbiology Results Abnormal       Procedure Component Value - Date/Time    Wound Culture - Wound, Toe [[id number]] Collected: 06/21/23 0859    Lab Status: Preliminary result Specimen: Wound from Toe Updated: 06/21/23 1043     Gram Stain No WBCs or organisms seen            MRI Foot Right With & Without Contrast    Result Date: 6/20/2023  MRI FOOT RIGHT W WO CONTRAST Date of Exam: 6/20/2023 4:48 PM EDT Indication: right great and 2nd toe distal phalynx infection.  Comparison: 6/8/2023 toe radiographs Technique:  Routine multiplanar/multisequence sequence images of the right foot were obtained before and after the uneventful administration of 20 mL Multihance.  Findings: Motion-degraded examination. Bones: Marrow edema in the first distal phalanx. No definite loss of T1 signal within this area. No suspicious marrow placing lesions. No evidence of fracture. Degenerative subchondral edema at the first metatarsophalangeal joint. Soft tissues: Soft tissue swelling and irregularity of the first toe. No rim-enhancing fluid collection seen. Fatty and edematous changes of the intrinsic foot muscles consistent with neuropathic changes. Diffuse subcutaneous edema of the foot. Achilles tendon appears grossly intact. Plantar fascia appears grossly intact. Visualized flexor and extensor tendons appear grossly intact. Plantar plates appear grossly intact. No obvious interdigital neuroma or bursitis.     Impression: Impression: Limited examination due to motion degradation. Soft tissue irregularity and edema of the first toe. Marrow edema within the underlying distal first phalanx without obvious T1 signal abnormality. This is favored to represent reactive edema though early osteomyelitis cannot be fully excluded. No  rim-enhancing fluid collection seen. Electronically Signed: Ehsan Mosher  6/20/2023 6:54 PM EDT  Workstation ID: [id number]         Current medications:  Scheduled Meds:allopurinol, 100 mg, Oral, Daily  DAPTOmycin, 6 mg/kg (Adjusted), Intravenous, Q24H  insulin detemir, 20 Units, Subcutaneous, Q12H  insulin lispro, 2-9 Units, Subcutaneous, 4x Daily AC & at Bedtime  lisinopril, 20 mg, Oral, Daily  metoprolol tartrate, 50 mg, Oral, BID  piperacillin-tazobactam, 3.375 g, Intravenous, Q8H  sildenafil, 20 mg, Oral, TID  sodium chloride, 10 mL, Intravenous, Q12H      Continuous Infusions:   PRN Meds:.  acetaminophen    dextrose    dextrose    Diclofenac Sodium    glucagon (human recombinant)    ondansetron **OR** ondansetron    sodium chloride    sodium chloride    Assessment & Plan   Assessment & Plan     Active Hospital Problems    Diagnosis  POA    **Diabetic ulcer of toe of right foot associated with type 2 diabetes mellitus, unspecified ulcer stage [E11.621, L97.519]  Yes    History of pulmonary embolism [Z86.711]  Unknown    Chronic anticoagulation [Z79.01]  Not Applicable    Obesity (BMI 30-39.9) [E66.9]  Unknown    Type 2 diabetes mellitus without complication, with long-term current use of insulin [E11.9, Z79.4]  Not Applicable      Resolved Hospital Problems   No resolved problems to display.        Brief Hospital Course to date:  Juarez Hunt is a 52 y.o. male w/ HTN, DM2 on insulin w/ neuropathy, obesity, ERIK (not able to tolerate sleep study and not on CPAP), hyperuricemia, prior PE's rec'd lifelong AC seen at [facility] 6/8/23 w/ RT 1st toe wound, placed on oral Augmentin and not improved, arrived to wound care appt and sent to the ED for further evaluation    The following problems are new to me today    Assessment/Plan    Acute RT 1st toe diabetic wound w/ cellulitis, possible early osteomyelitis  -not improved w/ PO augmentin  -MRI reviewed, ?early osteomyelitis vs reactive edema  -seen by Dr. Lundberg,  recommending amputation, patient considering options  -ID consult pending  -normal WBC count, afebrile, not systemically ill, hold vanc/zosyn pending surgical cultures unless patient opts for no surgery or ID feels IV Abx needed more acutely  -eliquis held on arrival for possible surgery, has Hx PE's and should not delay longer than required    DM type 2, A1c 8.1%, w/ long term use of insulin, w/ neuropathy  -levemir w/ SSI    HTN  -cont lopressor  -home lisinopril held this am per nursing for borderline BP    Hx recurrent DVT/PE  -has IVC filter  -chronically on eliquis, held for possible surgery, start subQ heparin in the interim    Obesity, bmi 39.8 kg/m2  Untreated ERIK  ?Pulmonary HTN, inferred by home meds  -cont home sildenafil  -previously unable to tolerate sleep study and not approved for CPAP  -complicates all care    Expected Discharge Location and Transportation: home  Expected Discharge   Expected Discharge Date: 6/24/2023; Expected Discharge Time:      DVT prophylaxis:  Mechanical DVT prophylaxis orders are present.     AM-PAC 6 Clicks Score (PT): 24 (06/21/23 0800)    CODE STATUS:   Code Status and Medical Interventions:   Ordered at: 06/20/23 8135     Code Status (Patient has no pulse and is not breathing):    CPR (Attempt to Resuscitate)     Medical Interventions (Patient has pulse or is breathing):    Full Support     Release to patient:    Routine Release       Carroll Biggs, DO  06/21/23

## 2023-06-21 NOTE — CONSULTS
Cardiothoracic Surgery History & Physical           Chief complaint: Right great toe wound    HPI: Juarez Hunt is a 52-year-old male with past medical history of type 2 diabetes (on insulin), history of PE (on Eliquis-last taken 6/20) who presented to the emergency department with concern for worsening right great toe wound on 6/20.  Patient states that he first noticed the wound on 6/8 after which he was evaluated in the [facility] emergency department and discharged home on Augmentin.  He states that he noticed slight improvement after taking a course of Augmentin; however after finishing the course of antibiotics the toe worsened again.  He was following up outpatient with PT wound care where he was seen today and expressed concern about feeling overall malaise and the toes worsening redness, the PT wound care team recommended he present to the emergency department for further evaluation.  In the emergency department the patient did not have a leukocytosis.  He states he had some chills at home but never had a fever.      Past Medical History:   Diagnosis Date    Arthritis     Blood clot in vein     Diabetes mellitus     Hypertension      Past Surgical History:   Procedure Laterality Date    CARDIAC CATHETERIZATION  2004    IVC FILTER RETRIEVAL      KNEE SURGERY Left 1986    SKIN BIOPSY  06/13/2020    toe     Family History   Problem Relation Age of Onset    Diabetes Mother     Heart disease Mother     Heart attack Mother     Hypertension Mother     Arthritis Father     Heart attack Maternal Grandfather     Heart attack Maternal Uncle     Liver cancer Maternal Aunt      Social History     Tobacco Use    Smoking status: Former    Smokeless tobacco: Current     Types: Chew   Vaping Use    Vaping Use: Never used   Substance Use Topics    Alcohol use: Yes     Comment: 1/4 glass per week    Drug use: No       Medications Prior to Admission   Medication Sig Dispense Refill Last Dose    albuterol sulfate   (90 Base) MCG/ACT inhaler Inhale 2 puffs Every 6 (Six) Hours As Needed for Wheezing or Shortness of Air. (Patient taking differently: Inhale 2 puffs Every 6 (Six) Hours As Needed for Wheezing or Shortness of Air. Done taking) 18 g 0 Patient Taking Differently    allopurinol (Zyloprim) 100 MG tablet Take 1 tablet by mouth Daily. 90 tablet 3 6/20/2023    Diclofenac Sodium (VOLTAREN) 1 % gel gel Apply 4 g topically to the appropriate area as directed 4 (Four) Times a Day As Needed (pain). 100 g 1 6/20/2023    Eliquis 5 MG tablet tablet Take 1 tablet by mouth Every 12 (Twelve) Hours. 60 tablet 11 6/20/2023    Lantus SoloStar 100 UNIT/ML injection pen Inject 30 units under the skin two times per day as directed 15 mL 11 6/20/2023    lisinopril (PRINIVIL,ZESTRIL) 20 MG tablet Take 1 tablet by mouth Daily. 90 tablet 3 6/20/2023    meloxicam (MOBIC) 7.5 MG tablet Take 1 tablet by mouth Daily.   6/20/2023    metFORMIN (Glucophage) 500 MG tablet Take 1 tablet by mouth 2 (Two) Times a Day With Meals. 60 tablet 11 6/20/2023    metoprolol tartrate (LOPRESSOR) 50 MG tablet Take 1 tablet by mouth 2 (Two) Times a Day. 180 tablet 3 6/20/2023    sildenafil (REVATIO) 20 MG tablet Take 1 tablet by mouth 3 (Three) Times a Day. 90 tablet 3 6/20/2023    BD Pen Needle Tina 2nd Gen 32G X 4 MM misc USE TWICE DAILY WITH INJECTIONS AS DIRECTED 200 each 3     glucose blood (True Metrix Blood Glucose Test) test strip 1 each by Other route 6 (Six) Times a Day. Use as instructed 200 each 12     [employer] Lancets Universal 33G misc USE TO CHECK BLOOD GLUCOSE THREE TIMES DAILY OR AS DIRECTED        Allergies:  Patient has no known allergies.    Review of Systems:  A comprehensive review of systems was negative except for:   Constitutional: Malaise  Neurological: Neuropathy, chronic  Skin: Toe wound as described in HPI  All other systems were reviewed and were negative.    Vital Signs:  Temp:  [97.9 °F (36.6 °C)-98.2 °F (36.8 °C)] 97.9 °F (36.6  °C)  Heart Rate:  [72-78] 77  Resp:  [18] 18  BP: (110-149)/(48-83) 115/70    Physical Exam:  Physical Exam  HENT:      Head: Normocephalic and atraumatic.   Eyes:      Extraocular Movements: Extraocular movements intact.   Cardiovascular:      Rate and Rhythm: Normal rate and regular rhythm.      Pulses: Normal pulses.           Dorsalis pedis pulses are 2+ on the right side and 2+ on the left side.      Heart sounds: No murmur heard.    No friction rub.   Pulmonary:      Effort: Pulmonary effort is normal. No respiratory distress.      Breath sounds: Normal breath sounds. No wheezing.   Abdominal:      General: There is no distension.      Palpations: Abdomen is soft.      Tenderness: There is no abdominal tenderness.   Musculoskeletal:        Feet:    Skin:     General: Skin is warm and dry.   Neurological:      Mental Status: He is alert.        Labs:  Results from last 7 days   Lab Units 06/21/23  0357   WBC 10*3/mm3 6.84   HEMOGLOBIN g/dL 13.5   HEMATOCRIT % 38.7   PLATELETS 10*3/mm3 180     Results from last 7 days   Lab Units 06/21/23  0459   SODIUM mmol/L 141   POTASSIUM mmol/L 4.8   CHLORIDE mmol/L 106   CO2 mmol/L 26.0   BUN mg/dL 23*   CREATININE mg/dL 1.03   GLUCOSE mg/dL 163*   CALCIUM mg/dL 8.8             Imaging:   MRI right foot 6/20:  Findings:  Motion-degraded examination.     Bones: Marrow edema in the first distal phalanx. No definite loss of T1 signal within this area. No suspicious marrow placing lesions. No evidence of fracture. Degenerative subchondral edema at the first metatarsophalangeal joint.     Soft tissues: Soft tissue swelling and irregularity of the first toe. No rim-enhancing fluid collection seen. Fatty and edematous changes of the intrinsic foot muscles consistent with neuropathic changes. Diffuse subcutaneous edema of the foot. Achilles   tendon appears grossly intact. Plantar fascia appears grossly intact. Visualized flexor and extensor tendons appear grossly intact. Plantar  plates appear grossly intact. No obvious interdigital neuroma or bursitis.     IMPRESSION:  Impression:  Limited examination due to motion degradation.     Soft tissue irregularity and edema of the first toe. Marrow edema within the underlying distal first phalanx without obvious T1 signal abnormality. This is favored to represent reactive edema though early osteomyelitis cannot be fully excluded. No   rim-enhancing fluid collection seen.      Assessment:     Diabetic ulcer of toe of right foot associated with type 2 diabetes mellitus, unspecified ulcer stage    Type 2 diabetes mellitus without complication, with long-term current use of insulin    History of pulmonary embolism    Chronic anticoagulation    Obesity (BMI 30-39.9)         Plan:   Continue antibiotics, ID is consulted-we will defer to them for antibiotic plan  Overall medical management per hospitalist  Likely Plan for right great toe amputation with Dr Lundberg, scheduling tbd      Candy Willingham PA-C  06/21/23  09:12 EDT      --    I reviewed this documentation. I interviewed and examined this patient this morning. Briefly he is a 52 M with history of DM, PE (s/p IVC filter and on Eliquis) who presented to [facility] with likely diabetic foot wound and osteomyelitis at the right hallux. This has been worsening for 2-3 weeks and he has attempted oral antibiotics. ROS notable for chills at home but never had a fever. He has palpable pedal pulses bilaterally. I discussed with the patient the r/b/a of hallux amputation in the presence of his wife and Dr. Biggs Hospitalist including risk of more proximal amputation with or without surgery. We will plan for OR 6/22/23 4pm. NPO after midnight. The patient demonstrated good understanding and all of his questions were answered satisfactorily.        Koffi Lundberg M.D., R.P.V.I.  Cardiothoracic and Vascular Surgeon  [facility]

## 2023-06-21 NOTE — PLAN OF CARE
Goal Outcome Evaluation:  Plan of Care Reviewed With: patient, spouse           Outcome Evaluation: Patient presents at his baseline mobility with mild balance deficit. He completed ambulation 350' unsupported with SBA with his offloading shoe on his RLE with no LOB. He reports no mobility concerns at this time and is currently not a IPPT mobility candidate due to baseline mobility. Will sign off at this time with D/C rec for home with assist. Please reconsult if there is a change in medical/weightbearing status.      Anticipated Discharge Disposition (PT): home with assist

## 2023-06-21 NOTE — CONSULTS
INFECTIOUS DISEASE CONSULT/INITIAL HOSPITAL VISIT    Juarez Hunt  1970  [id number]    Date of consult: 6/21/2023    Admit date: 6/20/2023    Requesting Provider: Ludivina JOE  Evaluating physician: Sathya Babb MD  Reason for Consultation: diabetic ulcer  Chief Complaint: diabetic ulcer      Subjective   History of present illness:  Juarez Hunt is a  52 y.o.  Yr old male with a past medical history of hypertension, obesity, obstructive sleep apnea on CPAP at home, prior PTE on Eliquis, and diabetes mellitus type 2 with peripheral neuropathy who was evaluated by [facility] on 6/8 due to a right great toe wound that he had previously been unaware of until his sock was saturated with fluid. He was not thought to have severe cellulitis at that time and was sent home on by mouth Augmentin.  He was evaluated by his primary care provider on 6/9 and was referred to [facility] wound care.  He first saw PT wound care on 6/13 and underwent debridement and was provided an offloading shoe.  He followed up as scheduled on 6/20 and due to concerns for possible worsening infection he was referred to the [facility] ER for further evaluation.    Since arrival, the patient has remained afebrile. White blood cell count has remained normal.  CRP was 0.5, sed rate was 15, lactic acid was 0.9, and pro-calcitonin was 0.02. creatinine and LFTs are within normal limits.  Blood cultures have been sent and are in progress. hemoglobin A1c was 8.1. wound culture has been sent from his right great toe. X-ray of the right foot showed findings concerning for gas gangrene/soft tissue infection/ulceration involving the first and second digital distal aspects/distal phalanges.  No radiographic evidence of osteo-myelitis. MRI of the right foot was limited due to motion degradation.  He did have soft tissue irregularity and edema the first toe with marrow edema within the underlying distal  first phalanx without obvious T1 signal abnormality.  Radiology favored this to represent reactive edema though early osteomyelitis could not fully be excluded.  No rim-enhancing fluid collection was seen. The patient was given IV vancomycin and Zosyn in the ER. Dr. Lundberg with CT/Vascular surgery has been consulted and likely plan for right great toe amputation procedure.  ID has been consulted for evaluation.    Past Medical History:   Diagnosis Date   • Arthritis    • Blood clot in vein    • Diabetes mellitus    • Hypertension        Past Surgical History:   Procedure Laterality Date   • CARDIAC CATHETERIZATION  2004   • IVC FILTER RETRIEVAL     • KNEE SURGERY Left 1986   • SKIN BIOPSY  06/13/2020    toe       Pediatric History   Patient Parents   • Not on file     Other Topics Concern   • Not on file   Social History Narrative   • Not on file       family history includes Arthritis in his father; Diabetes in his mother; Heart attack in his maternal grandfather, maternal uncle, and mother; Heart disease in his mother; Hypertension in his mother; Liver cancer in his maternal aunt.    No Known Allergies    Immunization History   Administered Date(s) Administered   • COVID-19 (PFIZER) BIVALENT 12+YRS 11/30/2022   • COVID-19 (PFIZER) Purple Cap Monovalent 02/27/2021, 03/19/2021, 09/29/2021   • Covid-19 (Pfizer) Gray Cap Monovalent 04/03/2022   • Flu Vaccine Quad PF >36MO 11/20/2021, 09/16/2022   • FluLaval/Fluzone >6mos 11/20/2021, 09/16/2022       Medication:    Current Facility-Administered Medications:   •  acetaminophen (TYLENOL) tablet 650 mg, 650 mg, Oral, Q4H PRN, Ludivina Gil PA-C  •  allopurinol (ZYLOPRIM) tablet 100 mg, 100 mg, Oral, Daily, Ludivina Gil PA-C, 100 mg at 06/21/23 0830  •  dextrose (D50W) (25 g/50 mL) IV injection 25 g, 25 g, Intravenous, Q15 Min PRN, Carroll Biggs, DO  •  dextrose (GLUTOSE) oral gel 15 g, 15 g, Oral, Q15 Min PRN, Carroll Biggs, DO  •   Diclofenac Sodium (VOLTAREN) 1 % gel 4 g, 4 g, Topical, 4x Daily PRN, Ludivina Gil PA-C  •  glucagon (GLUCAGEN) injection 1 mg, 1 mg, Intramuscular, Q15 Min PRN, Carroll Biggs DO  •  insulin detemir (LEVEMIR) injection 20 Units, 20 Units, Subcutaneous, Q12H, Carroll Biggs DO, 20 Units at 06/21/23 0835  •  Insulin Lispro (humaLOG) injection 2-9 Units, 2-9 Units, Subcutaneous, 4x Daily AC & at Bedtime, Carroll Biggs DO  •  lisinopril (PRINIVIL,ZESTRIL) tablet 20 mg, 20 mg, Oral, Daily, Ludivina Gil PA-C  •  metoprolol tartrate (LOPRESSOR) tablet 50 mg, 50 mg, Oral, BID, Ludivina Gil PA-C, 50 mg at 06/21/23 0830  •  ondansetron (ZOFRAN) tablet 4 mg, 4 mg, Oral, Q6H PRN **OR** ondansetron (ZOFRAN) injection 4 mg, 4 mg, Intravenous, Q6H PRN, Ludivina Gil PA-C  •  sildenafil (REVATIO) tablet 20 mg, 20 mg, Oral, TID, Ludivina Gil PA-C, 20 mg at 06/21/23 0829  •  sodium chloride 0.9 % flush 10 mL, 10 mL, Intravenous, Q12H, Ludivina Gil PA-C, 10 mL at 06/21/23 0830  •  sodium chloride 0.9 % flush 10 mL, 10 mL, Intravenous, PRN, Ludivina Gil PA-C  •  sodium chloride 0.9 % infusion 40 mL, 40 mL, Intravenous, PRN, Ludivina Gil PA-C    Please refer to the medical record for a full medication list    Review of Systems:    Constitutional-- No Fever, chills or sweats.  Appetite good, and no malaise. No fatigue.  HEENT-- No new vision, hearing or throat complaints.  No epistaxis or oral sores.  Denies odynophagia or dysphagia.  No odynophagia or dysphagia. No headache, photophobia or neck stiffness.  CV-- No chest pain, palpitation or syncope  Resp-- No SOB/cough/Hemoptysis  GI- No nausea, vomiting, or diarrhea.  No hematochezia, melena, or hematemesis. Denies jaundice or chronic liver disease.  -- No dysuria, hematuria, or flank pain.  Denies hesitancy, urgency or flank pain.  Lymph- no swollen lymph nodes in neck/axilla or  "groin.   Heme- No active bruising or bleeding; no Hx of DVT or PE.  MS-- no swelling or pain in the bones or joints of arms/legs.  No new back pain.  Neuro-- No acute focal weakness or numbness in the arms or legs.  No seizures.  Skin--No rashes or lesions    Physical Exam:   Vital Signs   Temp:  [97.9 °F (36.6 °C)-98.2 °F (36.8 °C)] 97.9 °F (36.6 °C)  Heart Rate:  [72-78] 77  Resp:  [18] 18  BP: (110-149)/(48-83) 115/70    Temp  Min: 97.9 °F (36.6 °C)  Max: 98.2 °F (36.8 °C)  BP  Min: 110/53  Max: 149/83  Pulse  Min: 72  Max: 78  Resp  Min: 18  Max: 18  SpO2  Min: 93 %  Max: 95 %    Blood pressure 115/70, pulse 77, temperature 97.9 °F (36.6 °C), temperature source Oral, resp. rate 18, height 188 cm (74\"), weight (!) 141 kg (310 lb), SpO2 94 %.  GENERAL: Awake and alert, in no acute distress. Appears stated age.  Frail  HEENT:  Normocephalic, atraumatic. No external oral lesions noted. Ears externally normal, Nose externally normal.  EYES: PERRL. No conjunctival injection. No icterus.   HEART: RRR, no murmur  LUNGS: Clear to auscultation and percussion. No respiratory distress, no use of accessory muscles.  ABDOMEN: Soft, nontender, nondistended. No appreciable HSM.  Bowel sounds normal.  GENITAL: no Krishnan catheter  SKIN: no generalized rashes.  No peripheral stigmata of infective endocarditis noted.  PSYCHIATRIC: cooperative.  Appropriate mood and affect  EXT:  Right great toe with significant erythema and swelling with an ulceration over the distal/plantar aspect with necrosis but no significant active drainage.  Has some necrosis over the nailbed of the right second toe without much surrounding erythema.  NEURO: awake alert and oriented ×4.  Normal speech and cognition    Results Review:   I reviewed the patient's new clinical results.    Results from last 7 days   Lab Units 06/21/23  0357 06/20/23  1456   WBC 10*3/mm3 6.84 6.39   HEMOGLOBIN g/dL 13.5 14.9   HEMATOCRIT % 38.7 43.8   PLATELETS 10*3/mm3 180 224 "     Results from last 7 days   Lab Units 06/21/23  0459   SODIUM mmol/L 141   POTASSIUM mmol/L 4.8   CHLORIDE mmol/L 106   CO2 mmol/L 26.0   BUN mg/dL 23*   CREATININE mg/dL 1.03   GLUCOSE mg/dL 163*   CALCIUM mg/dL 8.8     Results from last 7 days   Lab Units 06/20/23  1456   ALK PHOS U/L 77   BILIRUBIN mg/dL 0.5   ALT (SGPT) U/L 12   AST (SGOT) U/L 16     Results from last 7 days   Lab Units 06/20/23  1456   SED RATE mm/hr 15     Results from last 7 days   Lab Units 06/20/23  1456   CRP mg/dL 0.50         Results from last 7 days   Lab Units 06/20/23  1456   LACTATE mmol/L 0.9     Estimated Creatinine Clearance: 124.4 mL/min (by C-G formula based on SCr of 1.03 mg/dL).     Procalitonin Results:      Lab 06/20/23  1456   PROCALCITONIN 0.02      Brief Urine Lab Results     None         No results found for: SITE, ALLENTEST, PHART, PKJ9BYF, PO2ART, OKZ0JHL, BASEEXCESS, X9DSKFST, HGBBG, HCTABG, OXYHEMOGLOBI, METHHGBN, CARBOXYHGB, CO2CT, BAROMETRIC, MODALITY, FIO2     Microbiology:    6/20 blood culture x2: in progress    6/20 wound cx: in progress      Radiology:  Imaging Results (Last 72 Hours)     Procedure Component Value Units Date/Time    MRI Foot Right With & Without Contrast [[id number]] Collected: 06/20/23 1845     Updated: 06/20/23 1857    Narrative:        MRI FOOT RIGHT W WO CONTRAST    Date of Exam: 6/20/2023 4:48 PM EDT    Indication: right great and 2nd toe distal phalynx infection.     Comparison: 6/8/2023 toe radiographs    Technique:  Routine multiplanar/multisequence sequence images of the right foot were obtained before and after the uneventful administration of 20 mL Multihance.        Findings:  Motion-degraded examination.    Bones: Marrow edema in the first distal phalanx. No definite loss of T1 signal within this area. No suspicious marrow placing lesions. No evidence of fracture. Degenerative subchondral edema at the first metatarsophalangeal joint.    Soft tissues: Soft tissue swelling and  irregularity of the first toe. No rim-enhancing fluid collection seen. Fatty and edematous changes of the intrinsic foot muscles consistent with neuropathic changes. Diffuse subcutaneous edema of the foot. Achilles   tendon appears grossly intact. Plantar fascia appears grossly intact. Visualized flexor and extensor tendons appear grossly intact. Plantar plates appear grossly intact. No obvious interdigital neuroma or bursitis.      Impression:      Impression:  Limited examination due to motion degradation.    Soft tissue irregularity and edema of the first toe. Marrow edema within the underlying distal first phalanx without obvious T1 signal abnormality. This is favored to represent reactive edema though early osteomyelitis cannot be fully excluded. No   rim-enhancing fluid collection seen.        Electronically Signed: Ehsan Mosher    6/20/2023 6:54 PM EDT    Workstation ID: [id number]          IMPRESSION:     Problems:  1. Acute diabetic ulcer of the right great toe with cellulitis and probable underlying acute osteomyelitis  2. Insulin-dependent diabetes mellitus type 2 with peripheral neuropathy-A1c was 8.1.  This is a contributing factor to poor wound healing and infection. I have emphasized to the patient that this needs to be better controlled  3. History of DVT/PTE-has an IVC filter in place.  On Eliquis.  4. Hypertension  5. Obesity  6. Obstructive sleep apnea on CPAP    RECOMMENDATIONS:    -send baseline CPK level  -follow pending blood cultures and right great toe wound culture  -Dr. Lundberg has been consulted. I will defer need/timing for surgery to surgical team. Possible right great toe amputation soon per them  -start IV daptomycin 6mg/kg IV q24h  -restart IV Zosyn    If the patient elects for no amputation procedure then we will need a long course of IV abx. I have explained to the patient and his wife today that abx alone are likely to not work given his underlying diabetes. He would need to  completely offload from the foot while his ulcer heals. Will continue to discuss with the patient.    I discussed in length with the patient and his wife at bedside today    I discussed with Dr. Lundberg today    Thank you for asking me to see Juarez Hunt.  Our group would be pleased to follow this patient over the course of their hospitalization and assist with outpatient antimicrobial therapy, as indicated.  Further recommendations depend on the results of the cultures and clinical course.    Complex MDM. At risk for loss of limb    Sathya Babb MD  6/21/2023

## 2023-06-21 NOTE — THERAPY DISCHARGE NOTE
Acute Care - Occupational Therapy Discharge  [facility]    Patient Name: Juarez Hunt  : 1970    MRN: [id number]                              Today's Date: 2023       Admit Date: 2023    Visit Dx:     ICD-10-CM ICD-9-CM   1. Diabetic ulcer of toe of right foot associated with type 2 diabetes mellitus, unspecified ulcer stage  E11.621 250.80    L97.519 707.15     Patient Active Problem List   Diagnosis    ERIK on CPAP    Type 2 diabetes mellitus without complication, with long-term current use of insulin    Acute pulmonary embolism without acute cor pulmonale    Diabetic ulcer of toe of right foot associated with type 2 diabetes mellitus, unspecified ulcer stage    History of pulmonary embolism    Chronic anticoagulation    Obesity (BMI 30-39.9)     Past Medical History:   Diagnosis Date    Arthritis     Blood clot in vein     Diabetes mellitus     Hypertension      Past Surgical History:   Procedure Laterality Date    CARDIAC CATHETERIZATION      IVC FILTER RETRIEVAL      KNEE SURGERY Left 1986    SKIN BIOPSY  2020    toe      General Information       Row Name 23 1419          OT Time and Intention    Document Type discharge evaluation/summary  -AN     Mode of Treatment occupational therapy  -AN       Row Name 23 1419          General Information    Patient Profile Reviewed yes  -AN     Prior Level of Function independent:;all household mobility;ADL's;community mobility;work;driving  -AN     Existing Precautions/Restrictions other (see comments);fall  R great toe wound, offload shoe when OOB  -AN     Barriers to Rehab medically complex  -AN       Row Name 23 1419          Living Environment    People in Home spouse  -AN       Row Name 23 1419          Home Main Entrance    Number of Stairs, Main Entrance five  -AN     Stair Railings, Main Entrance railings on both sides of stairs  -AN       Row Name 23 1419          Stairs Within Home, Primary    Number  of Stairs, Within Home, Primary none  -AN       Row Name 06/21/23 1419          Cognition    Orientation Status (Cognition) oriented x 4  -AN               User Key  (r) = Recorded By, (t) = Taken By, (c) = Cosigned By      Initials Name Provider Type    Ginette Green OT Occupational Therapist                   Mobility/ADL's       Row Name 06/21/23 1423          Bed Mobility    Bed Mobility supine-sit-supine  -AN     Supine-Sit-Supine [place] (Bed Mobility) modified independence  -AN     Assistive Device (Bed Mobility) head of bed elevated  -AN       Row Name 06/21/23 1423          Transfers    Transfers sit-stand transfer;stand-sit transfer  -AN       Row Name 06/21/23 1423          Sit-Stand Transfer    Sit-Stand [place] (Transfers) supervision  -AN       Row Name 06/21/23 1423          Stand-Sit Transfer    Stand-Sit [place] (Transfers) supervision  -AN       Row Name 06/21/23 1423          Functional Mobility    Functional Mobility- Ind. Level supervision required  -AN     Functional Mobility-Distance (Feet) --  >household distance  -AN     Functional Mobility- Comment pt ambulated >household distance with supervision with no LOB  -AN       Row Name 06/21/23 1423          Activities of Daily Living    BADL Assessment/Intervention lower body dressing  -AN       Row Name 06/21/23 1423          Mobility    Extremity Weight-bearing Status right lower extremity  -AN     Right Lower Extremity (Weight-bearing Status) other (see comments)  R offloading shoe when mobilizing  -AN       Row Name 06/21/23 1423          Lower Body Dressing Assessment/Training    [place] Level (Lower Body Dressing) doff;don;shoes/slippers  -AN     Position (Lower Body Dressing) edge of bed sitting  -AN               User Key  (r) = Recorded By, (t) = Taken By, (c) = Cosigned By      Initials Name Provider Type    Ginette Green OT Occupational Therapist                   Obj/Interventions       Row Name  06/21/23 1426          Sensory Assessment (Somatosensory)    Sensory Assessment (Somatosensory) UE sensation intact  -AN       Row Name 06/21/23 1426          Vision Assessment/Intervention    Visual Impairment/Limitations WFL  -AN       Row Name 06/21/23 1426          Range of Motion Comprehensive    General Range of Motion no range of motion deficits identified  -AN       Row Name 06/21/23 1426          Strength Comprehensive (MMT)    General Manual Muscle Testing (MMT) Assessment no strength deficits identified  -AN       Row Name 06/21/23 1426          Balance    Balance Assessment sitting static balance;sitting dynamic balance;standing static balance;sit to stand dynamic balance;standing dynamic balance  -AN     Static Sitting Balance independent  -AN     Dynamic Sitting Balance independent  -AN     Position, Sitting Balance unsupported;sitting edge of bed  -AN     Static Standing Balance supervision  -AN     Dynamic Standing Balance supervision  -AN     Position/Device Used, Standing Balance unsupported  -AN     Comment, Balance no LOB  -AN               User Key  (r) = Recorded By, (t) = Taken By, (c) = Cosigned By      Initials Name Provider Type    AN Ginette Najera OT Occupational Therapist                   Goals/Plan    No documentation.                  Clinical Impression       Row Name 06/21/23 1427          Pain Assessment    Pretreatment Pain Rating 0/10 - no pain  -AN     Posttreatment Pain Rating 0/10 - no pain  -AN     Pre/Posttreatment Pain Comment asymptomatic  -AN     Pain Intervention(s) Repositioned;Ambulation/increased activity  -AN       Row Name 06/21/23 1427          Plan of Care Review    Plan of Care Reviewed With patient;spouse  -AN     Progress no change  -AN     Outcome Evaluation Pt presents near his functional baseline with all ADLs and mobility at this time. No acute OT needs at this time. OT will dc.  -AN       Row Name 06/21/23 1427          Therapy Assessment/Plan (OT)     Therapy Frequency (OT) evaluation only  -AN       Row Name 06/21/23 1427          Therapy Plan Review/Discharge Plan (OT)    Anticipated Discharge Disposition (OT) home  -AN       Row Name 06/21/23 1427          Vital Signs    Pre Systolic BP Rehab --  VSS  -AN     O2 Delivery Pre Treatment room air  -AN     O2 Delivery Intra Treatment room air  -AN     O2 Delivery Post Treatment room air  -AN     Pre Patient Position Supine  -AN     Intra Patient Position Standing  -AN     Post Patient Position Supine  -AN       Row Name 06/21/23 1427          Positioning and Restraints    Pre-Treatment Position in bed  -AN     Post Treatment Position bed  -AN     In Bed notified nsg;supine;sitting EOB;call light within reach;encouraged to call for assist;with family/caregiver  -AN               User Key  (r) = Recorded By, (t) = Taken By, (c) = Cosigned By      Initials Name Provider Type    Ginette Green, LILIAN Occupational Therapist                   Outcome Measures       Row Name 06/21/23 1429          How much help from another is currently needed...    Putting on and taking off regular lower body clothing? 4  -AN     Bathing (including washing, rinsing, and drying) 4  -AN     Toileting (which includes using toilet bed pan or urinal) 4  -AN     Putting on and taking off regular upper body clothing 4  -AN     Taking care of personal grooming (such as brushing teeth) 4  -AN     Eating meals 4  -AN     AM-PAC 6 Clicks Score (OT) 24  -AN       Row Name 06/21/23 0957 06/21/23 0800       How much help from another person do you currently need...    Turning from your back to your side while in flat bed without using bedrails? -- 4  -HS    Moving from lying on back to sitting on the side of a flat bed without bedrails? 4  -CM 4  -HS    Moving to and from a bed to a chair (including a wheelchair)? 4  -CM 4  -HS    Standing up from a chair using your arms (e.g., wheelchair, bedside chair)? 4  -CM 4  -HS    Climbing 3-5 steps with a  railing? 4  -CM 4  -HS    To walk in hospital room? 4  -CM 4  -HS    AM-PAC 6 Clicks Score (PT) -- 24  -HS    Highest level of mobility -- 8 --> Walked 250 feet or more  -HS      Row Name 06/21/23 1429          Functional Assessment    Outcome Measure Options AM-PAC 6 Clicks Daily Activity (OT)  -AN               User Key  (r) = Recorded By, (t) = Taken By, (c) = Cosigned By      Initials Name Provider Type     Mayuri Dodge, RN Registered Nurse    Ginette Green, OT Occupational Therapist    CM Abby Bills, PT Physical Therapist                  Occupational Therapy Education       Title: PT OT SLP Therapies (In Progress)       Topic: Occupational Therapy (In Progress)       Point: ADL training (Done)       Description:   Instruct learner(s) on proper safety adaptation and remediation techniques during self care or transfers.   Instruct in proper use of assistive devices.                  Learning Progress Summary             Patient Acceptance, E, VU by AN at 6/21/2023 1429                         Point: Home exercise program (Not Started)       Description:   Instruct learner(s) on appropriate technique for monitoring, assisting and/or progressing therapeutic exercises/activities.                  Learner Progress:  Not documented in this visit.              Point: Precautions (Done)       Description:   Instruct learner(s) on prescribed precautions during self-care and functional transfers.                  Learning Progress Summary             Patient Acceptance, E, VU by AN at 6/21/2023 1429                         Point: Body mechanics (Done)       Description:   Instruct learner(s) on proper positioning and spine alignment during self-care, functional mobility activities and/or exercises.                  Learning Progress Summary             Patient Acceptance, E, VU by AN at 6/21/2023 1429                                         User Key       Initials Effective Dates Name Provider Type  Discipline    AN 09/21/21 -  Ginette Najera OT Occupational Therapist OT                  OT Recommendation and Plan  Therapy Frequency (OT): evaluation only  Plan of Care Review  Plan of Care Reviewed With: patient, spouse  Progress: no change  Outcome Evaluation: Pt presents near his functional baseline with all ADLs and mobility at this time. No acute OT needs at this time. OT will dc.  Plan of Care Reviewed With: patient, spouse  Outcome Evaluation: Pt presents near his functional baseline with all ADLs and mobility at this time. No acute OT needs at this time. OT will dc.     Time Calculation:    Time Calculation- OT       Row Name 06/21/23 1430             Time Calculation- OT    OT Start Time 1340  -AN      OT Received On 06/21/23  -AN         Untimed Charges    OT Eval/Re-eval Minutes 46  -AN         Total Minutes    Untimed Charges Total Minutes 46  -AN       Total Minutes 46  -AN                User Key  (r) = Recorded By, (t) = Taken By, (c) = Cosigned By      Initials Name Provider Type    AN Ginette Najera OT Occupational Therapist                  Therapy Charges for Today       Code Description Service Date Service Provider Modifiers Qty    [id number]  OT EVAL LOW COMPLEXITY 4 6/21/2023 Ginette Najera OT GO 1               OT Discharge Summary  Anticipated Discharge Disposition (OT): home  Reason for Discharge: Independent, At baseline function  Discharge Destination: Home    Ginette Najera OT  6/21/2023

## 2023-06-22 PROBLEM — L97.519: Status: RESOLVED | Noted: 2023-06-20 | Resolved: 2023-06-22

## 2023-06-22 PROBLEM — E11.621: Status: RESOLVED | Noted: 2023-06-20 | Resolved: 2023-06-22

## 2023-06-22 LAB
GLUCOSE BLDC GLUCOMTR-MCNC: 156 MG/DL (ref 70–130)
GLUCOSE BLDC GLUCOMTR-MCNC: 175 MG/DL (ref 70–130)
GLUCOSE BLDC GLUCOMTR-MCNC: 190 MG/DL (ref 70–130)
GLUCOSE BLDC GLUCOMTR-MCNC: 282 MG/DL (ref 70–130)

## 2023-06-22 PROCEDURE — 63710000001 INSULIN LISPRO (HUMAN) PER 5 UNITS: Performed by: INTERNAL MEDICINE

## 2023-06-22 PROCEDURE — 25010000002 HEPARIN (PORCINE) PER 1000 UNITS: Performed by: INTERNAL MEDICINE

## 2023-06-22 PROCEDURE — 97597 DBRDMT OPN WND 1ST 20 CM/<: CPT

## 2023-06-22 PROCEDURE — 63710000001 INSULIN DETEMIR PER 5 UNITS: Performed by: INTERNAL MEDICINE

## 2023-06-22 PROCEDURE — C1894 INTRO/SHEATH, NON-LASER: HCPCS

## 2023-06-22 PROCEDURE — 25010000002 PIPERACILLIN SOD-TAZOBACTAM PER 1 G: Performed by: INTERNAL MEDICINE

## 2023-06-22 PROCEDURE — 99232 SBSQ HOSP IP/OBS MODERATE 35: CPT | Performed by: INTERNAL MEDICINE

## 2023-06-22 PROCEDURE — 02HV33Z INSERTION OF INFUSION DEVICE INTO SUPERIOR VENA CAVA, PERCUTANEOUS APPROACH: ICD-10-PCS | Performed by: INTERNAL MEDICINE

## 2023-06-22 PROCEDURE — 25010000002 DAPTOMYCIN PER 1 MG: Performed by: INTERNAL MEDICINE

## 2023-06-22 PROCEDURE — C1751 CATH, INF, PER/CENT/MIDLINE: HCPCS

## 2023-06-22 PROCEDURE — 82948 REAGENT STRIP/BLOOD GLUCOSE: CPT

## 2023-06-22 PROCEDURE — 97164 PT RE-EVAL EST PLAN CARE: CPT

## 2023-06-22 RX ORDER — SODIUM CHLORIDE 0.9 % (FLUSH) 0.9 %
10 SYRINGE (ML) INJECTION EVERY 12 HOURS SCHEDULED
Status: DISCONTINUED | OUTPATIENT
Start: 2023-06-22 | End: 2023-06-23 | Stop reason: HOSPADM

## 2023-06-22 RX ORDER — SODIUM CHLORIDE 0.9 % (FLUSH) 0.9 %
10 SYRINGE (ML) INJECTION AS NEEDED
Status: DISCONTINUED | OUTPATIENT
Start: 2023-06-22 | End: 2023-06-23 | Stop reason: HOSPADM

## 2023-06-22 RX ADMIN — SILDENAFIL 20 MG: 20 TABLET ORAL at 15:01

## 2023-06-22 RX ADMIN — APIXABAN 5 MG: 5 TABLET, FILM COATED ORAL at 13:05

## 2023-06-22 RX ADMIN — PIPERACILLIN SODIUM AND TAZOBACTAM SODIUM 3.38 G: 3; .375 INJECTION, SOLUTION INTRAVENOUS at 06:06

## 2023-06-22 RX ADMIN — INSULIN DETEMIR 20 UNITS: 100 INJECTION, SOLUTION SUBCUTANEOUS at 09:15

## 2023-06-22 RX ADMIN — ALLOPURINOL 100 MG: 100 TABLET ORAL at 09:14

## 2023-06-22 RX ADMIN — INSULIN LISPRO 6 UNITS: 100 INJECTION, SOLUTION INTRAVENOUS; SUBCUTANEOUS at 21:21

## 2023-06-22 RX ADMIN — INSULIN LISPRO 2 UNITS: 100 INJECTION, SOLUTION INTRAVENOUS; SUBCUTANEOUS at 13:04

## 2023-06-22 RX ADMIN — Medication 10 ML: at 09:15

## 2023-06-22 RX ADMIN — HEPARIN SODIUM 5000 UNITS: 5000 INJECTION INTRAVENOUS; SUBCUTANEOUS at 06:06

## 2023-06-22 RX ADMIN — Medication 10 ML: at 21:22

## 2023-06-22 RX ADMIN — SILDENAFIL 20 MG: 20 TABLET ORAL at 09:14

## 2023-06-22 RX ADMIN — METOPROLOL TARTRATE 50 MG: 50 TABLET ORAL at 09:14

## 2023-06-22 RX ADMIN — INSULIN LISPRO 2 UNITS: 100 INJECTION, SOLUTION INTRAVENOUS; SUBCUTANEOUS at 09:15

## 2023-06-22 RX ADMIN — METOPROLOL TARTRATE 50 MG: 50 TABLET ORAL at 21:22

## 2023-06-22 RX ADMIN — SILDENAFIL 20 MG: 20 TABLET ORAL at 21:22

## 2023-06-22 RX ADMIN — APIXABAN 5 MG: 5 TABLET, FILM COATED ORAL at 21:22

## 2023-06-22 RX ADMIN — INSULIN LISPRO 2 UNITS: 100 INJECTION, SOLUTION INTRAVENOUS; SUBCUTANEOUS at 18:26

## 2023-06-22 RX ADMIN — DAPTOMYCIN 650 MG: 500 INJECTION, POWDER, LYOPHILIZED, FOR SOLUTION INTRAVENOUS at 14:56

## 2023-06-22 RX ADMIN — INSULIN DETEMIR 20 UNITS: 100 INJECTION, SOLUTION SUBCUTANEOUS at 21:22

## 2023-06-22 NOTE — THERAPY RE-EVALUATION
Acute Care - Wound/Debridement Initial Evaluation  [facility]     Patient Name: Juarez Hunt  : 1970  MRN: [id number]  Today's Date: 2023                Admit Date: 2023    Visit Dx:    ICD-10-CM ICD-9-CM   1. Diabetic ulcer of toe of right foot associated with type 2 diabetes mellitus, unspecified ulcer stage  E11.621 250.80    L97.519 707.15     R distal great toe        Patient Active Problem List   Diagnosis    ERIK on CPAP    Type 2 diabetes mellitus without complication, with long-term current use of insulin    Acute pulmonary embolism without acute cor pulmonale    History of pulmonary embolism    Chronic anticoagulation    Obesity (BMI 30-39.9)        Past Medical History:   Diagnosis Date    Arthritis     Blood clot in vein     Diabetes mellitus     Hypertension         Past Surgical History:   Procedure Laterality Date    CARDIAC CATHETERIZATION      IVC FILTER RETRIEVAL      KNEE SURGERY Left 1986    SKIN BIOPSY  2020    toe           Wound 23 0800 Right posterior great toe Diabetic Ulcer (Active)   Wound Image   23 1113   Dressing Appearance open to air;other (see comments) 23 1113   Base moist;necrotic;yellow;slough;red;other (see comments) 23 1113   Red (%), Wound Tissue Color 5 23 1113   Yellow (%), Wound Tissue Color 95 23 1113   Periwound intact;dry;other (see comments) 23 1113   Periwound Temperature warm 23 1113   Periwound Skin Turgor firm 23 1113   Edges irregular;open 23 1113   Wound Length (cm) 2.1 cm 23 1113   Wound Width (cm) 2.1 cm 23 1113   Wound Surface Area (cm^2) 4.41 cm^2 23 1113   Drainage Characteristics/Odor serosanguineous 23 1113   Drainage Amount scant 23 1113   Care, Wound cleansed with;wound cleanser;debrided 23 1113   Dressing Care dressing applied;silver impregnated;collagen;antimicrobial agent applied;foam 23 1113   Periwound Care cleansed with  pH balanced cleanser;dry periwound area maintained 06/22/23 1113       Wound 06/21/23 0700 Right anterior second toe (Active)   Dressing Appearance open to air 06/22/23 0400   Closure Open to air 06/21/23 2000   Base necrotic;maroon/purple 06/21/23 2000         WOUND DEBRIDEMENT  Total area of Debridement: 4cm2  Debridement Site 1  Location- Site 1: R great toe wound and periwound  Selective Debridement- Site 1: Wound Surface <20cmsq  Instruments- Site 1: #15, scapel, tweezers, scissors  Excised Tissue Description- Site 1: minimum, slough, moderate, other (comment) (nonviable periwound callus)  Bleeding- Site 1: seeping, held pressure, 2 minutes               PT Assessment (last 12 hours)       PT Evaluation and Treatment       Row Name 06/22/23 1113          Physical Therapy Time and Intention    Subjective Information no complaints  -     Document Type evaluation;wound care  -     Mode of Treatment physical therapy;individual therapy  -       Row Name 06/22/23 1113          General Information    Patient Profile Reviewed yes  -     Patient Observations alert;cooperative;agree to therapy  -     Pertinent History of Current Functional Problem Pt well known to  OP wound care for R distal great toe diabetic ulcer with recent re-occurance.  -     Risks Reviewed patient:;spouse/S.O.:;increased discomfort  -     Benefits Reviewed patient:;spouse/S.O.:;increase knowledge;improve skin integrity  -     Barriers to Rehab medically complex  -       Row Name 06/22/23 1113          Pain    Pretreatment Pain Rating 0/10 - no pain  -     Posttreatment Pain Rating 0/10 - no pain  -       Row Name 06/22/23 1113          Cognition    Affect/Mental Status (Cognition) WNL  -     Orientation Status (Cognition) oriented x 4  -       Row Name 06/22/23 1113          Wound 06/13/23 0800 Right posterior great toe Diabetic Ulcer    Wound - Properties Group Placement Date: 06/13/23  - Placement Time: 0800  -  Present on Hospital Admission: Y  -MC Side: Right  - Orientation: posterior  -MC Location: great toe  -MC Primary Wound Type: Diabetic ulc  -    Wound Image Images linked: 1  -     Dressing Appearance open to air;other (see comments)  black sock fibers/debris inbedded in wound base  -     Base moist;necrotic;yellow;slough;red;other (see comments)  small rim of red tissue, centrally necrotic  -     Red (%), Wound Tissue Color 5  -LH     Yellow (%), Wound Tissue Color 95  -     Periwound intact;dry;other (see comments)  callused  -     Periwound Temperature warm  -     Periwound Skin Turgor firm  -     Edges irregular;open  -     Wound Length (cm) 2.1 cm  -     Wound Width (cm) 2.1 cm  -     Wound Depth (cm) --  obscured  -     Wound Surface Area (cm^2) 4.41 cm^2  -     Drainage Characteristics/Odor serosanguineous  -     Drainage Amount scant  -     Care, Wound cleansed with;wound cleanser;debrided  -     Dressing Care dressing applied;silver impregnated;collagen;antimicrobial agent applied;foam  Ayde Ag to bleeding, HFBt, optifoam Ag, primafix tape  -     Periwound Care cleansed with pH balanced cleanser;dry periwound area maintained  -     Retired Wound - Properties Group Placement Date: 06/13/23  - Placement Time: 0800  - Present on Hospital Admission: Y  - Side: Right  - Orientation: posterior  - Location: great toe  -MC Primary Wound Type: Diabetic ulc  -    Retired Wound - Properties Group Date first assessed: 06/13/23  - Time first assessed: 0800  - Present on Hospital Admission: Y  -MC Side: Right  - Location: great toe  -MC Primary Wound Type: Diabetic ulc  -      Row Name             Wound 06/21/23 0700 Right anterior second toe    Wound - Properties Group Placement Date: 06/21/23  - Placement Time: 0700  -HS Present on Hospital Admission: Y  -HS Side: Right  -HS Orientation: anterior  -HS Location: second toe  -HS    Retired Wound - Properties  Group Placement Date: 06/21/23  - Placement Time: 0700  -HS Present on Hospital Admission: Y  -HS Side: Right  -HS Orientation: anterior  -HS Location: second toe  -HS    Retired Wound - Properties Group Date first assessed: 06/21/23  -HS Time first assessed: 0700  -HS Present on Hospital Admission: Y  -HS Side: Right  -HS Location: second toe  -HS      Row Name 06/22/23 1113          Coping    Observed Emotional State calm;cooperative;pleasant  -     Verbalized Emotional State acceptance  -     Trust Relationship/Rapport care explained;questions answered  -     Family/Support Persons spouse  -     Involvement in Care at bedside;attentive to patient  -       Row Name 06/22/23 1113          Plan of Care Review    Plan of Care Reviewed With patient;spouse  -     Progress no change  -     Outcome Evaluation PT wound care initial evaluation complete. Pt well known to  OP wound care with history of recurring R distal great toe diabetic ulceration. Pt recently with imaging concerning for signs of early osteomyelitis although pt declining amputation at this time. Pt with mild/moderate periwound blanchable erythema extending to the IP joint. PT able to debride moderate amount of periwound callused tissue to help reduce further tissue breakdown. PT spent extensive time educating pt on importance of NWB to allow for optimal wound healing. Pt would continue to benefit from  OP wound care follow up for further debridement and advanced wound dressing management with potential MIST treatment. Pt will need new outpatient wound care referral.  -       Row Name 06/22/23 1113          Positioning and Restraints    Pre-Treatment Position in bed  -     Post Treatment Position bed  -     In Bed supine;call light within reach;encouraged to call for assist;with family/caregiver  -       Row Name 06/22/23 1113          Therapy Assessment/Plan (PT)    Patient/Family Therapy Goals Statement (PT) Wound healing,  wants to avoid amputation.  -     PT Diagnosis (PT) R great toe diabetic ulcer with infection.  -     Rehab Potential (PT) fair, will monitor progress closely  -     Criteria for Skilled Interventions Met (PT) yes;meets criteria;skilled treatment is necessary  -       Row Name 06/22/23 1113          Therapy Plan Review/Discharge Plan (PT)    Therapy Plan Review (PT) evaluation/treatment results reviewed;care plan/treatment goals reviewed;risks/benefits reviewed;current/potential barriers reviewed;participants voiced agreement with care plan;participants included;patient;spouse/significant other  -       Row Name 06/22/23 1113          Physical Therapy Goals    Wound Care Goal Selection (PT) wound care, PT goal 1;wound care, PT goal 2  -       Row Name 06/22/23 1113          Wound Care Goal 1 (PT)    Wound Care Goal 1 (PT) Demonstrate >50% granulation of wound base to improve wound healing potential.  -     Time Frame (Wound Care Goal 1, PT) long term goal (LTG);10 days  -       Row Name 06/22/23 1113          Wound Care Goal 2 (PT)    Wound Care Goal 2 (PT) Decrease area of wound by 25% as evidence of wound healing.  -     Time Frame (Wound Care Goal 2, PT) long term goal (LTG);10 days  -               User Key  (r) = Recorded By, (t) = Taken By, (c) = Cosigned By      Initials Name Provider Type    Shanita Lance, PT Physical Therapist    Mayuri Stiles, RN Registered Nurse     Miky aMlhotra, PT Physical Therapist                  Physical Therapy Education       Title: PT OT SLP Therapies (In Progress)       Topic: Physical Therapy (Done)       Point: Mobility training (Done)       Learning Progress Summary             Patient Acceptance, E, VU by CM at 6/21/2023 0958   Significant Other Acceptance, E, VU by CM at 6/21/2023 0958                         Point: Home exercise program (Done)       Learning Progress Summary             Patient Acceptance, E, VU by CM at 6/21/2023  0958   Significant Other Acceptance, E, VU by CM at 6/21/2023 0958                         Point: Body mechanics (Done)       Learning Progress Summary             Patient Acceptance, E, VU by CM at 6/21/2023 0958   Significant Other Acceptance, E, VU by CM at 6/21/2023 0958                         Point: Precautions (Done)       Learning Progress Summary             Patient Acceptance, E, VU by CM at 6/21/2023 0958   Significant Other Acceptance, E, VU by CM at 6/21/2023 0958                                         User Key       Initials Effective Dates Name Provider Type Discipline    CM 09/22/22 -  Abby Bills, PT Physical Therapist PT                    Recommendation and Plan  Anticipated Discharge Disposition (PT): home with assist  Planned Therapy Interventions (PT): wound care, patient/family education  Therapy Frequency (PT): evaluation only  Plan of Care Reviewed With: patient, spouse   Progress: no change       Progress: no change  Outcome Evaluation: PT wound care initial evaluation complete. Pt well known to  OP wound care with history of recurring R distal great toe diabetic ulceration. Pt recently with imaging concerning for signs of early osteomyelitis although pt declining amputation at this time. Pt with mild/moderate periwound blanchable erythema extending to the IP joint. PT able to debride moderate amount of periwound callused tissue to help reduce further tissue breakdown. PT spent extensive time educating pt on importance of NWB to allow for optimal wound healing. Pt would continue to benefit from  OP wound care follow up for further debridement and advanced wound dressing management with potential MIST treatment. Pt will need new outpatient wound care referral.  Plan of Care Reviewed With: patient, spouse            Time Calculation   PT Charges       Row Name 06/22/23 1209             Time Calculation    Start Time 1138  -LH      PT Goal Re-Cert Due Date 07/02/23  -          Untimed Charges    PT Eval/Re-eval Minutes 30  -LH      Wound Care 71193 Selective debridement  -      [id number] debridement 25  -LH         Total Minutes    Untimed Charges Total Minutes 55  -LH       Total Minutes 55  -LH                User Key  (r) = Recorded By, (t) = Taken By, (c) = Cosigned By      Initials Name Provider Type     Miky Malhotra, PT Physical Therapist                      Therapy Charges for Today       Code Description Service Date Service Provider Modifiers Qty    69643292296 HC PT RE-EVAL ESTABLISHED PLAN 2 6/22/2023 Miky Malhotra, PT GP 1    98766748344 HC EAMON DEBRIDE OPEN WOUND UP TO 20CM 6/22/2023 Miky Malhotra, PT GP 1              PT G-Codes  Outcome Measure Options: AM-PAC 6 Clicks Daily Activity (OT)  AM-PAC 6 Clicks Score (PT): 24  AM-PAC 6 Clicks Score (OT): 24       Miky Malhotra PT  6/22/2023

## 2023-06-22 NOTE — CASE MANAGEMENT/SOCIAL WORK
Continued Stay Note  [facility]     Patient Name: Juarez Hunt  MRN: [id number]  Today's Date: 6/22/2023    Admit Date: 6/20/2023    Plan: Home   Discharge Plan       Row Name 06/22/23 7420       Plan    Plan Home    Plan Comments Case mgt f/u.Discussed d/c plan with Mr Hunt and spouse at bedside. His plan is to go home on IV antibitoics. He also wants to do outpt wound care at [facility] outpt PT, so will not be eligible for Home Health. He plans to do weekly visits to [facility] office for PICC care. ID note noted, plan is home IV Daptomycin, referred to [facility] home infusion. Referral to outpt PT wound care, his initial appt will be 6/29,PT has recommended weekly visits for now, with Mr Hunt and spouse doing dressing changes in between outpt visits.  His f/u with Dr Babb will by 6/29. Await any other final orders.  Also advised he can rent or purchase a  knee scooter.(This is not covered under his insurance). He plans to purchase one on line                   Discharge Codes    No documentation.                 Expected Discharge Date and Time       Expected Discharge Date Expected Discharge Time    Jun 22, 2023               Sonja C Kellerman, RN

## 2023-06-22 NOTE — PROGRESS NOTES
[facility]  PROGRESS NOTE    Patient Name: Juarez Hunt  : 1970  MRN: [id number]    Date of Admission: 2023  Primary Care Physician: Jw Guido MD    Subjective   Subjective     CC: f/u foot wound    HPI: Up in chair with wife in room. He has now decided to forego surgery and wants to go home with IV abx.     ROS:  Gen- No fevers, chills  CV- No chest pain, palpitations  Resp- No cough, dyspnea  GI- No N/V/D, abd pain     Objective   Objective     Vital Signs:   Temp:  [98 °F (36.7 °C)-98.7 °F (37.1 °C)] 98 °F (36.7 °C)  Heart Rate:  [73-82] 82  Resp:  [18] 18  BP: (105-138)/(48-82) 105/55     Physical Exam:  Constitutional: No acute distress, awake, alert  HENT: NCAT, mucous membranes moist  Respiratory: Clear to auscultation bilaterally, respiratory effort normal   Cardiovascular: RRR, no murmurs, rubs, or gallops  Gastrointestinal: Positive bowel sounds, soft, nontender, nondistended  Musculoskeletal: Left great toe with ongoing redness/edema, small necrotic area  Psychiatric: Appropriate affect, cooperative  Neurologic: Oriented x 3, strength symmetric in all extremities, Cranial Nerves grossly intact to confrontation, speech clear  Skin: No rashes     Results Reviewed:  LAB RESULTS:      Lab 23  0357 23  1456   WBC 6.84 6.39   HEMOGLOBIN 13.5 14.9   HEMATOCRIT 38.7 43.8   PLATELETS 180 224   NEUTROS ABS  --  3.53   IMMATURE GRANS (ABS)  --  0.03   LYMPHS ABS  --  2.04   MONOS ABS  --  0.58   EOS ABS  --  0.15   MCV 92.4 92.8   SED RATE  --  15   CRP  --  0.50   PROCALCITONIN  --  0.02   LACTATE  --  0.9         Lab 23  0459 23  0357 23  1456   SODIUM 141  --  137   POTASSIUM 4.8  --  5.1   CHLORIDE 106  --  104   CO2 26.0  --  23.0   ANION GAP 9.0  --  10.0   BUN 23*  --  19   CREATININE 1.03  --  0.97   EGFR 86.9  --  93.3   GLUCOSE 163*  --  174*   CALCIUM 8.8  --  9.1   HEMOGLOBIN A1C  --  8.10*  --          Lab  06/20/23  1456   TOTAL PROTEIN 6.3   ALBUMIN 3.5   GLOBULIN 2.8   ALT (SGPT) 12   AST (SGOT) 16   BILIRUBIN 0.5   ALK PHOS 77                     Brief Urine Lab Results       None            Microbiology Results Abnormal       Procedure Component Value - Date/Time    Blood Culture - Blood, Arm, Left [[id number]]  (Normal) Collected: 06/20/23 1859    Lab Status: Preliminary result Specimen: Blood from Arm, Left Updated: 06/21/23 1932     Blood Culture No growth at 24 hours    Blood Culture - Blood, Arm, Right [[id number]]  (Normal) Collected: 06/20/23 1859    Lab Status: Preliminary result Specimen: Blood from Arm, Right Updated: 06/21/23 1915     Blood Culture No growth at 24 hours            MRI Foot Right With & Without Contrast    Result Date: 6/20/2023  MRI FOOT RIGHT W WO CONTRAST Date of Exam: 6/20/2023 4:48 PM EDT Indication: right great and 2nd toe distal phalynx infection.  Comparison: 6/8/2023 toe radiographs Technique:  Routine multiplanar/multisequence sequence images of the right foot were obtained before and after the uneventful administration of 20 mL Multihance.  Findings: Motion-degraded examination. Bones: Marrow edema in the first distal phalanx. No definite loss of T1 signal within this area. No suspicious marrow placing lesions. No evidence of fracture. Degenerative subchondral edema at the first metatarsophalangeal joint. Soft tissues: Soft tissue swelling and irregularity of the first toe. No rim-enhancing fluid collection seen. Fatty and edematous changes of the intrinsic foot muscles consistent with neuropathic changes. Diffuse subcutaneous edema of the foot. Achilles tendon appears grossly intact. Plantar fascia appears grossly intact. Visualized flexor and extensor tendons appear grossly intact. Plantar plates appear grossly intact. No obvious interdigital neuroma or bursitis.     Impression: Impression: Limited examination due to motion degradation. Soft tissue irregularity and edema of  the first toe. Marrow edema within the underlying distal first phalanx without obvious T1 signal abnormality. This is favored to represent reactive edema though early osteomyelitis cannot be fully excluded. No rim-enhancing fluid collection seen. Electronically Signed: Ehsan Vidhi  6/20/2023 6:54 PM EDT  Workstation ID: [id number]         Current medications:  Scheduled Meds:allopurinol, 100 mg, Oral, Daily  apixaban, 5 mg, Oral, Q12H  DAPTOmycin, 6 mg/kg (Adjusted), Intravenous, Q24H  insulin detemir, 20 Units, Subcutaneous, Q12H  insulin lispro, 2-9 Units, Subcutaneous, 4x Daily AC & at Bedtime  lisinopril, 20 mg, Oral, Daily  metoprolol tartrate, 50 mg, Oral, BID  piperacillin-tazobactam, 3.375 g, Intravenous, Q8H  sildenafil, 20 mg, Oral, TID  sodium chloride, 10 mL, Intravenous, Q12H      Continuous Infusions:   PRN Meds:.  acetaminophen    dextrose    dextrose    Diclofenac Sodium    glucagon (human recombinant)    ondansetron **OR** ondansetron    sodium chloride    sodium chloride    Assessment & Plan   Assessment & Plan     Active Hospital Problems    Diagnosis  POA    History of pulmonary embolism [Z86.711]  Unknown    Chronic anticoagulation [Z79.01]  Not Applicable    Obesity (BMI 30-39.9) [E66.9]  Unknown    Type 2 diabetes mellitus without complication, with long-term current use of insulin [E11.9, Z79.4]  Not Applicable      Resolved Hospital Problems    Diagnosis Date Resolved POA    Diabetic ulcer of toe of right foot associated with type 2 diabetes mellitus, unspecified ulcer stage [E11.621, L97.519] 06/22/2023 Yes        Brief Hospital Course to date:  Juarez Hunt is a 52 y.o. male w/ HTN, DM2 on insulin w/ neuropathy, obesity, ERIK (not able to tolerate sleep study and not on CPAP), hyperuricemia, prior PE's rec'd lifelong AC seen at [facility] 6/8/23 w/ RT 1st toe wound, placed on oral Augmentin and not improved, arrived to wound care appt and sent to the ED for further evaluation.     Acute RT 1st  toe diabetic wound w/ cellulitis, possible early osteomyelitis  -Not improved w/ PO augmentin as outpatient. MRI reviewed, ?early osteomyelitis vs reactive edema  -Seen by Dr. Lundberg, recommending amputation however patient refusing. Opting to go home and try IV abx.  -ID following, d/w him. Recommended PICC and IV abx at home. PICC ordered. If PICC placed and IV abx arranged can go home later today, otherwise will plan for first thing tomorrow.     DM type 2, A1c 8.1%, w/ long term use of insulin, w/ neuropathy  -levemir w/ SSI     HTN  -cont lopressor, lisinopril at d/c.     Hx recurrent DVT/PE  -has IVC filter  -resume eliquis     Obesity, bmi 39.8 kg/m2  Untreated ERIK  ?Pulmonary HTN, inferred by home meds  -cont home sildenafil  -previously unable to tolerate sleep study and not approved for CPAP  -complicates all care    Expected Discharge Location and Transportation:   Expected Discharge   Expected Discharge Date: 6/22/2023; Expected Discharge Time:      DVT prophylaxis:  Medical and mechanical DVT prophylaxis orders are present.     AM-PAC 6 Clicks Score (PT): 24 (06/21/23 0800)    CODE STATUS:   Code Status and Medical Interventions:   Ordered at: 06/20/23 7643     Code Status (Patient has no pulse and is not breathing):    CPR (Attempt to Resuscitate)     Medical Interventions (Patient has pulse or is breathing):    Full Support     Release to patient:    Routine Release       Sarah Ireland II, DO  06/22/23

## 2023-06-22 NOTE — PROGRESS NOTES
[facility] Cardiothoracic Surgery In-Patient Progress Note       LOS: 2 days     Chief Complaint: Osteomyelitis of right hallux    Subjective  Does not want to proceed with surgery now.       Objective  Vital Signs  Temp:  [98.1 °F (36.7 °C)-98.7 °F (37.1 °C)] 98.2 °F (36.8 °C)  Heart Rate:  [73-81] 81  Resp:  [18] 18  BP: (112-138)/(48-82) 112/48        Physical Exam:   General Appearance: alert, appears stated age and cooperative   Lungs: clear to auscultation, respirations regular, respirations even, and respirations unlabored   Heart: regular rhythm & normal rate, normal S1, S2, no murmur, no gallop, no rub, and no click   Skin: Right great toe ulceration-necrosis and erythema noted   Pulses: Palpable DP bilaterally  Results     Results from last 7 days   Lab Units 06/21/23  0357   WBC 10*3/mm3 6.84   HEMOGLOBIN g/dL 13.5   HEMATOCRIT % 38.7   PLATELETS 10*3/mm3 180     Results from last 7 days   Lab Units 06/21/23  0459   SODIUM mmol/L 141   POTASSIUM mmol/L 4.8   CHLORIDE mmol/L 106   CO2 mmol/L 26.0   BUN mg/dL 23*   CREATININE mg/dL 1.03   GLUCOSE mg/dL 163*   CALCIUM mg/dL 8.8     Assessment    Diabetic ulcer of toe of right foot associated with type 2 diabetes mellitus, unspecified ulcer stage    Type 2 diabetes mellitus without complication, with long-term current use of insulin    History of pulmonary embolism    Chronic anticoagulation    Obesity (BMI 30-39.9)      Plan   Continue antibiotics per ID  He does not want to proceed with surgery at this time, we will be available as needed    CORNELIA Young  06/22/23  10:24 EDT

## 2023-06-22 NOTE — PLAN OF CARE
Goal Outcome Evaluation:  Plan of Care Reviewed With: patient, spouse        Progress: no change  Outcome Evaluation: PT wound care initial evaluation complete. Pt well known to  OP wound care with history of recurring R distal great toe diabetic ulceration. Pt recently with imaging concerning for signs of early osteomyelitis although pt declining amputation at this time. Pt with mild/moderate periwound blanchable erythema extending to the IP joint. PT able to debride moderate amount of periwound callused tissue to help reduce further tissue breakdown. PT spent extensive time educating pt on importance of NWB to allow for optimal wound healing. Pt would continue to benefit from  OP wound care follow up for further debridement and advanced wound dressing management with potential MIST treatment. Pt will need new outpatient wound care referral.

## 2023-06-22 NOTE — CASE MANAGEMENT/SOCIAL WORK
Continued Stay Note  [facility]     Patient Name: Juarez Hunt  MRN: [id number]  Today's Date: 6/22/2023    Admit Date: 6/20/2023    Plan: Home   Discharge Plan       Row Name 06/22/23 6300       Plan    Plan Home    Plan Comments Case mgt f/u.Discussed d/c plan with Mr Hunt and spouse at bedside. His plan is to go home on IV antibitoics. He also wants to do outpt wound care at [facility] outpt PT, so will not be eligible for Home Health. He plans to do weekly visits to [facility] office for PICC care. ID note noted, plan is home IV Daptomycin, referred to [facility] home infusion. Referral to outpt PT wound care, his initial appt will be 6/29,PT has recommended weekly visits for now, with Mr Hunt and spouse doing dressing changes in between outpt visits.  His f/u with Dr Babb will by 6/29. Await any other final orders.                   Discharge Codes    No documentation.                 Expected Discharge Date and Time       Expected Discharge Date Expected Discharge Time    Jun 22, 2023               Sonja C Kellerman, RN

## 2023-06-22 NOTE — PROGRESS NOTES
INFECTIOUS DISEASE PROGRESS NOTE    Juarez Hunt  1970  [id number]    Date of consult: 6/21/23  Admit date: 6/20/2023    Requesting Provider: Ludivina JOE  Evaluating physician: Sathya Babb MD  Reason for Consultation: diabetic ulcer  Chief Complaint: diabetic ulcer      Subjective   History of present illness:  Juarez Hunt is a  52 y.o.  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 He was not thought to have severe cellulitis at that time and was sent home on by mouth Augmentin.  He was evaluated by his primary care provider on 6/9 and was referred to [facility] PT wound care.  He first saw PT wound care on 6/13 and underwent debridement and was provided an offloading shoe.  He followed up as scheduled on 6/20 and due to concerns for possible worsening infection he was referred to the [facility] ER for further evaluation.    Since arrival, the patient has remained afebrile. White blood cell count has remained normal.  CRP was 0.5, sed rate was 15, lactic acid was 0.9, and pro-calcitonin was 0.02. creatinine and LFTs are within normal limits.  Blood cultures have been sent and are in progress. hemoglobin A1c was 8.1. wound culture has been sent from his right great toe. X-ray of the right foot showed findings concerning for gas gangrene/soft tissue infection/ulceration involving the first and second digital distal aspects/distal phalanges.  No radiographic evidence of osteo-myelitis. MRI of the right foot was limited due to motion degradation.  He did have soft tissue irregularity and edema the first toe with marrow edema within the underlying distal first phalanx without  obvious T1 signal abnormality.  Radiology favored this to represent reactive edema though early osteomyelitis could not fully be excluded.  No rim-enhancing fluid collection was seen. The patient was given IV vancomycin and Zosyn in the ER. Dr. Lundberg with CT/Vascular surgery has been consulted and likely plan for right great toe amputation procedure.  ID has been consulted for evaluation.    Subjective:    6/22/23: The patient feels likely his right great toe is less red and tender today. He has decided that he does not want amputation procedure at this time and would like to try a course of IV antibiotics. He is not having any fevers. No n/v/d. No rash    6/23/23: the patient feels like his right great toe pain, swelling, and redness continues to improve. No fevers. No n/v/d. PICC in place in right upper extremity    Past Medical History:   Diagnosis Date   • Arthritis    • Blood clot in vein    • Diabetes mellitus    • Hypertension        Past Surgical History:   Procedure Laterality Date   • CARDIAC CATHETERIZATION  2004   • IVC FILTER RETRIEVAL     • KNEE SURGERY Left 1986   • SKIN BIOPSY  06/13/2020    toe       Pediatric History   Patient Parents   • Not on file     Other Topics Concern   • Not on file   Social History Narrative   • Not on file       family history includes Arthritis in his father; Diabetes in his mother; Heart attack in his maternal grandfather, maternal uncle, and mother; Heart disease in his mother; Hypertension in his mother; Liver cancer in his maternal aunt.    No Known Allergies    Immunization History   Administered Date(s) Administered   • COVID-19 (PFIZER) BIVALENT 12+YRS 11/30/2022   • COVID-19 (PFIZER) Purple Cap Monovalent 02/27/2021, 03/19/2021, 09/29/2021   • Covid-19 (Pfizer) Gray Cap Monovalent 04/03/2022   • Flu Vaccine Quad PF >36MO 11/20/2021, 09/16/2022   • FluLaval/Fluzone >6mos 11/20/2021, 09/16/2022       Medication:    Current Facility-Administered Medications:   •   acetaminophen (TYLENOL) tablet 650 mg, 650 mg, Oral, Q4H PRN, Ludivina Gil PA-C  •  allopurinol (ZYLOPRIM) tablet 100 mg, 100 mg, Oral, Daily, Ludivina Gil PA-C, 100 mg at 06/22/23 0914  •  apixaban (ELIQUIS) tablet 5 mg, 5 mg, Oral, Q12H, Sarah Ireland II, DO  •  DAPTOmycin (CUBICIN) 650 mg in sodium chloride 0.9 % 50 mL IVPB, 6 mg/kg (Adjusted), Intravenous, Q24H, Sathya Babb MD, Last Rate: 100 mL/hr at 06/21/23 2106, 650 mg at 06/21/23 2106  •  dextrose (D50W) (25 g/50 mL) IV injection 25 g, 25 g, Intravenous, Q15 Min PRN, Carroll Biggs,   •  dextrose (GLUTOSE) oral gel 15 g, 15 g, Oral, Q15 Min PRN, Carroll Biggs DO  •  Diclofenac Sodium (VOLTAREN) 1 % gel 4 g, 4 g, Topical, 4x Daily PRN, Ludivina Gil PA-C  •  glucagon (GLUCAGEN) injection 1 mg, 1 mg, Intramuscular, Q15 Min PRN, Carroll Biggs DO  •  insulin detemir (LEVEMIR) injection 20 Units, 20 Units, Subcutaneous, Q12H, Carroll Biggs DO, 20 Units at 06/22/23 0915  •  Insulin Lispro (humaLOG) injection 2-9 Units, 2-9 Units, Subcutaneous, 4x Daily AC & at Bedtime, Carroll Biggs DO, 2 Units at 06/22/23 0915  •  lisinopril (PRINIVIL,ZESTRIL) tablet 20 mg, 20 mg, Oral, Daily, Ludivina Gil PA-C  •  metoprolol tartrate (LOPRESSOR) tablet 50 mg, 50 mg, Oral, BID, Ludivina Gil PA-C, 50 mg at 06/22/23 0914  •  ondansetron (ZOFRAN) tablet 4 mg, 4 mg, Oral, Q6H PRN **OR** ondansetron (ZOFRAN) injection 4 mg, 4 mg, Intravenous, Q6H PRN, Ludivina Gil PA-C  •  sildenafil (REVATIO) tablet 20 mg, 20 mg, Oral, TID, Ludivina Gil PA-C, 20 mg at 06/22/23 0914  •  sodium chloride 0.9 % flush 10 mL, 10 mL, Intravenous, Q12H, Ludivina Gil PA-C, 10 mL at 06/22/23 0915  •  sodium chloride 0.9 % flush 10 mL, 10 mL, Intravenous, PRN, Ludivina Gil PA-C  •  sodium chloride 0.9 % infusion 40 mL, 40 mL, Intravenous, PRN, Ludivina Gil,  "BORIS    Please refer to the medical record for a full medication list    Review of Systems:    As above    Physical Exam:   Vital Signs   Temp:  [98 °F (36.7 °C)-98.7 °F (37.1 °C)] 98 °F (36.7 °C)  Heart Rate:  [73-82] 82  Resp:  [18] 18  BP: (105-138)/(48-82) 105/55    Temp  Min: 98 °F (36.7 °C)  Max: 98.7 °F (37.1 °C)  BP  Min: 105/55  Max: 138/82  Pulse  Min: 73  Max: 82  Resp  Min: 18  Max: 18  SpO2  Min: 94 %  Max: 96 %    Blood pressure 105/55, pulse 82, temperature 98 °F (36.7 °C), temperature source Oral, resp. rate 18, height 188 cm (74\"), weight (!) 141 kg (310 lb), SpO2 94 %.  GENERAL: Awake and alert, in no acute distress. Appears stated age. Sitting up in bed  HEENT:  Normocephalic, atraumatic. No external oral lesions  EYES:  No conjunctival injection. No icterus.   HEART: RRR, no murmur  LUNGS: CTAB. nonlabored breathing on room air  ABDOMEN: nondistended  GENITAL: no Krishnan catheter  SKIN: no generalized rashes.  No peripheral stigmata of infective endocarditis noted.  PSYCHIATRIC: cooperative.  Appropriate mood and affect  EXT:  Right great toe with dressing in place, no erythema extending outside of dressing area  NEURO: awake alert and oriented ×4.  Normal speech and cognition    RUE PICC in place, no erythema at exit site    Results Review:   I reviewed the patient's new clinical results.    Results from last 7 days   Lab Units 06/21/23  0357 06/20/23  1456   WBC 10*3/mm3 6.84 6.39   HEMOGLOBIN g/dL 13.5 14.9   HEMATOCRIT % 38.7 43.8   PLATELETS 10*3/mm3 180 224     Results from last 7 days   Lab Units 06/21/23  0459   SODIUM mmol/L 141   POTASSIUM mmol/L 4.8   CHLORIDE mmol/L 106   CO2 mmol/L 26.0   BUN mg/dL 23*   CREATININE mg/dL 1.03   GLUCOSE mg/dL 163*   CALCIUM mg/dL 8.8     Results from last 7 days   Lab Units 06/20/23  1456   ALK PHOS U/L 77   BILIRUBIN mg/dL 0.5   ALT (SGPT) U/L 12   AST (SGOT) U/L 16     Results from last 7 days   Lab Units 06/20/23  1456   SED RATE mm/hr 15     Results " from last 7 days   Lab Units 06/20/23  1456   CRP mg/dL 0.50         Results from last 7 days   Lab Units 06/20/23  1456   LACTATE mmol/L 0.9     Estimated Creatinine Clearance: 124.4 mL/min (by C-G formula based on SCr of 1.03 mg/dL).  CPK        6/21/2023    04:59   Common Labsle   Creatine Kinase 115       Procalitonin Results:      Lab 06/20/23  1456   PROCALCITONIN 0.02      Brief Urine Lab Results     None         No results found for: SITE, ALLENTEST, PHART, ESP9OIJ, PO2ART, JUT0THZ, BASEEXCESS, M1USKMUH, HGBBG, HCTABG, OXYHEMOGLOBI, METHHGBN, CARBOXYHGB, CO2CT, BAROMETRIC, MODALITY, FIO2     Microbiology:    6/20 blood culture x2: NGTD    6/20 wound cx: MSSA      Radiology:  Imaging Results (Last 72 Hours)     Procedure Component Value Units Date/Time    MRI Foot Right With & Without Contrast [[id number]] Collected: 06/20/23 1845     Updated: 06/20/23 1857    Narrative:        MRI FOOT RIGHT W WO CONTRAST    Date of Exam: 6/20/2023 4:48 PM EDT    Indication: right great and 2nd toe distal phalynx infection.     Comparison: 6/8/2023 toe radiographs    Technique:  Routine multiplanar/multisequence sequence images of the right foot were obtained before and after the uneventful administration of 20 mL Multihance.        Findings:  Motion-degraded examination.    Bones: Marrow edema in the first distal phalanx. No definite loss of T1 signal within this area. No suspicious marrow placing lesions. No evidence of fracture. Degenerative subchondral edema at the first metatarsophalangeal joint.    Soft tissues: Soft tissue swelling and irregularity of the first toe. No rim-enhancing fluid collection seen. Fatty and edematous changes of the intrinsic foot muscles consistent with neuropathic changes. Diffuse subcutaneous edema of the foot. Achilles   tendon appears grossly intact. Plantar fascia appears grossly intact. Visualized flexor and extensor tendons appear grossly intact. Plantar plates appear grossly intact. No  obvious interdigital neuroma or bursitis.      Impression:      Impression:  Limited examination due to motion degradation.    Soft tissue irregularity and edema of the first toe. Marrow edema within the underlying distal first phalanx without obvious T1 signal abnormality. This is favored to represent reactive edema though early osteomyelitis cannot be fully excluded. No   rim-enhancing fluid collection seen.        Electronically Signed: Ehsan Mosher    6/20/2023 6:54 PM EDT    Workstation ID: [id number]          IMPRESSION:     Problems:  1. Acute diabetic ulcer of the right great toe with cellulitis and probable underlying acute osteomyelitis- now with MSSA from culture  2. Insulin-dependent diabetes mellitus type 2 with peripheral neuropathy-A1c was 8.1.  This is a contributing factor to poor wound healing and infection. I have emphasized to the patient that this needs to be better controlled  3. History of DVT/PTE-has an IVC filter in place.  On Eliquis.  4. Hypertension  5. Obesity  6. Obstructive sleep apnea on CPAP    RECOMMENDATIONS:    -follow pending blood cultures and right great toe wound culture- growing MSSA from wound cx. Blood cultures remain NGTD  -Dr. Lundberg has been consulted. Patient has declined amputation procedure  -stop daptomycin  -start ceftriaxone 2g IV q24h  -PICC placement      Ok for discharge home today with the below antibiotic plan    UM/ADELAIDE:  1. Ceftriaxone 2g IV q24h. Anticipate continuing this antibiotic for a 6 week course at least until 8/3/23  2. Weekly cbc with diff, cmp, crp  3. Change the PICC line dressing weekly with a Biopatch or Tegaderm CHG gel dressing  4. Follow up in my clinic within 1 week of discharge  5. Please fax a copy of my orders to [facility] at [phone] and call [phone] with final discharge plans    I discussed with Dr. Ireland today    Thank you for asking me to see Juarez Hunt.      Complex MDM. At risk for loss of limb    Sathya Babb,  MD  6/22/2023

## 2023-06-23 VITALS
BODY MASS INDEX: 39.78 KG/M2 | OXYGEN SATURATION: 95 % | TEMPERATURE: 98 F | SYSTOLIC BLOOD PRESSURE: 124 MMHG | HEART RATE: 80 BPM | WEIGHT: 310 LBS | RESPIRATION RATE: 16 BRPM | DIASTOLIC BLOOD PRESSURE: 71 MMHG | HEIGHT: 74 IN

## 2023-06-23 LAB
ANION GAP SERPL CALCULATED.3IONS-SCNC: 11 MMOL/L (ref 5–15)
BACTERIA SPEC AEROBE CULT: ABNORMAL
BASOPHILS # BLD AUTO: 0.06 10*3/MM3 (ref 0–0.2)
BASOPHILS NFR BLD AUTO: 1.2 % (ref 0–1.5)
BUN SERPL-MCNC: 20 MG/DL (ref 6–20)
BUN/CREAT SERPL: 23 (ref 7–25)
CALCIUM SPEC-SCNC: 8.6 MG/DL (ref 8.6–10.5)
CHLORIDE SERPL-SCNC: 107 MMOL/L (ref 98–107)
CO2 SERPL-SCNC: 22 MMOL/L (ref 22–29)
CREAT SERPL-MCNC: 0.87 MG/DL (ref 0.76–1.27)
DEPRECATED RDW RBC AUTO: 40.2 FL (ref 37–54)
EGFRCR SERPLBLD CKD-EPI 2021: 103.2 ML/MIN/1.73
EOSINOPHIL # BLD AUTO: 0.16 10*3/MM3 (ref 0–0.4)
EOSINOPHIL NFR BLD AUTO: 3.1 % (ref 0.3–6.2)
ERYTHROCYTE [DISTWIDTH] IN BLOOD BY AUTOMATED COUNT: 11.9 % (ref 12.3–15.4)
GLUCOSE BLDC GLUCOMTR-MCNC: 164 MG/DL (ref 70–130)
GLUCOSE BLDC GLUCOMTR-MCNC: 204 MG/DL (ref 70–130)
GLUCOSE SERPL-MCNC: 162 MG/DL (ref 65–99)
GRAM STN SPEC: ABNORMAL
HCT VFR BLD AUTO: 40.2 % (ref 37.5–51)
HGB BLD-MCNC: 13.6 G/DL (ref 13–17.7)
IMM GRANULOCYTES # BLD AUTO: 0.03 10*3/MM3 (ref 0–0.05)
IMM GRANULOCYTES NFR BLD AUTO: 0.6 % (ref 0–0.5)
LYMPHOCYTES # BLD AUTO: 1.8 10*3/MM3 (ref 0.7–3.1)
LYMPHOCYTES NFR BLD AUTO: 34.6 % (ref 19.6–45.3)
MAGNESIUM SERPL-MCNC: 1.8 MG/DL (ref 1.6–2.6)
MCH RBC QN AUTO: 31.6 PG (ref 26.6–33)
MCHC RBC AUTO-ENTMCNC: 33.8 G/DL (ref 31.5–35.7)
MCV RBC AUTO: 93.3 FL (ref 79–97)
MONOCYTES # BLD AUTO: 0.56 10*3/MM3 (ref 0.1–0.9)
MONOCYTES NFR BLD AUTO: 10.8 % (ref 5–12)
NEUTROPHILS NFR BLD AUTO: 2.59 10*3/MM3 (ref 1.7–7)
NEUTROPHILS NFR BLD AUTO: 49.7 % (ref 42.7–76)
NRBC BLD AUTO-RTO: 0 /100 WBC (ref 0–0.2)
PLATELET # BLD AUTO: 197 10*3/MM3 (ref 140–450)
PMV BLD AUTO: 10.2 FL (ref 6–12)
POTASSIUM SERPL-SCNC: 4.8 MMOL/L (ref 3.5–5.2)
RBC # BLD AUTO: 4.31 10*6/MM3 (ref 4.14–5.8)
SODIUM SERPL-SCNC: 140 MMOL/L (ref 136–145)
WBC NRBC COR # BLD: 5.2 10*3/MM3 (ref 3.4–10.8)

## 2023-06-23 PROCEDURE — 82948 REAGENT STRIP/BLOOD GLUCOSE: CPT

## 2023-06-23 PROCEDURE — 85025 COMPLETE CBC W/AUTO DIFF WBC: CPT | Performed by: INTERNAL MEDICINE

## 2023-06-23 PROCEDURE — 63710000001 INSULIN DETEMIR PER 5 UNITS: Performed by: INTERNAL MEDICINE

## 2023-06-23 PROCEDURE — 25010000002 CEFTRIAXONE PER 250 MG: Performed by: INTERNAL MEDICINE

## 2023-06-23 PROCEDURE — 83735 ASSAY OF MAGNESIUM: CPT | Performed by: INTERNAL MEDICINE

## 2023-06-23 PROCEDURE — 63710000001 INSULIN LISPRO (HUMAN) PER 5 UNITS: Performed by: INTERNAL MEDICINE

## 2023-06-23 PROCEDURE — 99239 HOSP IP/OBS DSCHRG MGMT >30: CPT | Performed by: INTERNAL MEDICINE

## 2023-06-23 PROCEDURE — 80048 BASIC METABOLIC PNL TOTAL CA: CPT | Performed by: INTERNAL MEDICINE

## 2023-06-23 RX ADMIN — INSULIN LISPRO 2 UNITS: 100 INJECTION, SOLUTION INTRAVENOUS; SUBCUTANEOUS at 08:47

## 2023-06-23 RX ADMIN — METOPROLOL TARTRATE 50 MG: 50 TABLET ORAL at 08:50

## 2023-06-23 RX ADMIN — Medication 10 ML: at 08:51

## 2023-06-23 RX ADMIN — APIXABAN 5 MG: 5 TABLET, FILM COATED ORAL at 08:50

## 2023-06-23 RX ADMIN — LISINOPRIL 20 MG: 20 TABLET ORAL at 08:50

## 2023-06-23 RX ADMIN — SILDENAFIL 20 MG: 20 TABLET ORAL at 08:50

## 2023-06-23 RX ADMIN — SODIUM CHLORIDE 2 G: 900 INJECTION INTRAVENOUS at 11:48

## 2023-06-23 RX ADMIN — INSULIN LISPRO 4 UNITS: 100 INJECTION, SOLUTION INTRAVENOUS; SUBCUTANEOUS at 11:43

## 2023-06-23 RX ADMIN — ALLOPURINOL 100 MG: 100 TABLET ORAL at 08:50

## 2023-06-23 RX ADMIN — Medication 10 ML: at 08:52

## 2023-06-23 RX ADMIN — INSULIN DETEMIR 20 UNITS: 100 INJECTION, SOLUTION SUBCUTANEOUS at 08:48

## 2023-06-23 NOTE — DISCHARGE PLACEMENT REQUEST
"Dustin Lai (52 y.o. Male)       Date of Birth   1970    Social Security Number       Address   [address] [facility] [address]    Home Phone   [phone]    MRN   [id number]       Sabianism   Jew    Marital Status                               Admission Date   6/20/23    Admission Type   Emergency    Admitting Provider   Sarah Ireland II, DO    Attending Provider   Sarah Ireland II, DO    Department, Room/Bed   [facility] 3E, S337/1       Discharge Date       Discharge Disposition   Home or Self Care    Discharge Destination                                 Attending Provider: Sarah Ireland II, DO    Allergies: No Known Allergies    Isolation: None   Infection: None   Code Status: CPR    Ht: 188 cm (74\")   Wt: 141 kg (310 lb)    Admission Cmt: None   Principal Problem: None                  Active Insurance as of 6/20/2023       Primary Coverage       Payor Plan Insurance Group Employer/Plan Group    ANTHEM MEDICARE REPLACEMENT ANTHEM MEDICARE ADVANTAGE [id number]       Payor Plan Address Payor Plan Phone Number Payor Plan Fax Number Effective Dates    [address] [phone]  4/1/2022 - None Entered    [facility] [address]         Subscriber Name Subscriber Birth Date Member ID       DUSTIN LAI 1970 [id number]               Secondary Coverage       Payor Plan Insurance Group Employer/Plan Group    ANTHEM MEDICAID ANTHEM MEDICAID [id number]       Payor Plan Address Payor Plan Phone Number Payor Plan Fax Number Effective Dates    [address] [address] [phone]  1/1/2021 - None Entered    [facility] [address]         Subscriber Name Subscriber Birth Date Member ID       DUSTIN LAI 1970 [id number]                     Emergency Contacts        (Rel.) Home Phone Work Phone Mobile Phone    Nu Lai (Spouse) [phone]             [address] [facility] " [place]  [facility] [address]  Phone:  [phone]  Fax:  [phone]        Patient: ROOM: [facility]   Juarez Hunt MRN:  [id number]   [address] [facility] [address] :  1970  SSN:    Phone: [phone] Sex:  M   PCP: Jw Guido                Emergency Contact Information      Name Relation Home Work Mobile     Nu Hunt Spouse [phone]          INSURANCE PAYOR PLAN GROUP # SUBSCRIBER ID   Primary:  Secondary:    ANTHEM MEDICARE REPLACEMENT  ANTH MEDICAID [id number]  [id number]  [id number]  [id number]   Admitting Diagnosis: Diabetic ulcer of toe of right foot associated with type 2 diabetes mellitus, unspecified ulcer stage [E11.621, L97.519]  Order Date:  2023        Case Management  Consult       (Order ID: [id number])     Diagnosis:         Priority:  Routine Expected Date:   Expiration Date:        Interval:  Once Count:    Comments: Ok for discharge home today with the below antibiotic plan. Needs a dose of ceftriaxone today prior to discharge as antibiotics were switched.     UM/ADELAIDE:  1. Ceftriaxone 2g IV q24h. Anticipate continuing this antibiotic for a 6 week course at least until 8/3/23  2. Weekly cbc with diff, cmp, crp  3. Change the PICC line dressing weekly with a Biopatch or Tegaderm CHG gel dressing  4. Follow up in my clinic within 1 week of discharge  5. Please fax a copy of my orders to [facility] at [phone] and call [phone] with final discharge plans  Reason for Consult: antibiotic plan        Authorizing Provider:Sathya Babb MD  Authorizing Provider's NPI: [id number]  Order Entered By: Sathya Babb MD 2023  9:03 AM     Electronically signed by: Sathya Babb MD 2023  9:03 AM          Discharge Summary        Sarah Ireland II, DO at 23 0730              [facility] Medicine Services  DISCHARGE SUMMARY    Patient Name: Juarez  Gilbert  : 1970  MRN: [id number]    Date of Admission: 2023  6:11 PM  Date of Discharge:  2022  Primary Care Physician: Jw Guido MD    Consults       Date and Time Order Name Status Description    2023  6:46 PM Inpatient Cardiothoracic Surgery Consult Completed     2023  6:46 PM Inpatient Infectious Diseases Consult Completed             Hospital Course     Presenting Problem: toe wound    Active Hospital Problems    Diagnosis  POA    History of pulmonary embolism [Z86.711]  Unknown    Chronic anticoagulation [Z79.01]  Not Applicable    Obesity (BMI 30-39.9) [E66.9]  Unknown    Type 2 diabetes mellitus without complication, with long-term current use of insulin [E11.9, Z79.4]  Not Applicable      Resolved Hospital Problems    Diagnosis Date Resolved POA    Diabetic ulcer of toe of right foot associated with type 2 diabetes mellitus, unspecified ulcer stage [E11.621, L97.519] 2023 Yes          Hospital Course:  Juarez Hunt is a 52 y.o. male w/ HTN, DM2 on insulin w/ neuropathy, obesity, ERIK (not able to tolerate sleep study and not on CPAP), hyperuricemia, prior PE's rec'd lifelong AC seen at [facility] 23 w/ RT 1st toe wound, placed on oral Augmentin and not improved, arrived to wound care appt and sent to the ED for further evaluation.     Acute RT 1st toe diabetic wound w/ cellulitis, possible early osteomyelitis  -Patient admitted due to failure to improve on Augmentin as outpatient. MRI reviewed, ?early osteomyelitis vs reactive edema.  -Dr. Lundberg CTS followed throughout stay. He recommended great toe amputation however patient refusing. Opting to go home and try IV abx.  -ID was consulted and followed through stay. I d/w him prior to d/c. Recommended PICC and IV abx at home w/ Rocephin 2g daily per C+S. Will follow up with Dr. Babb in 5-7 days.  -Long discussion with patient about offloading and warning signs for antibiotic failure.    Discharge Follow Up  Recommendations for outpatient labs/diagnostics:   Dr. Babb in 5-7 days    Day of Discharge     HPI:  Up in bed comfortably. Had a good night.     Review of Systems  Gen- No fevers, chills  CV- No chest pain, palpitations  Resp- No cough, dyspnea  GI- No N/V/D, abd pain    Vital Signs:   Temp:  [98 °F (36.7 °C)-98.5 °F (36.9 °C)] 98.1 °F (36.7 °C)  Heart Rate:  [73-84] 83  Resp:  [18] 18  BP: (105-142)/(55-81) 114/64      Physical Exam:  Constitutional: No acute distress, awake, alert  HENT: NCAT, mucous membranes moist  Respiratory: Clear to auscultation bilaterally, respiratory effort normal   Cardiovascular: RRR, no murmurs, rubs, or gallops  Gastrointestinal: Positive bowel sounds, soft, nontender, nondistended  Musculoskeletal: PICC. Right great toe dressed.  Psychiatric: Appropriate affect, cooperative  Neurologic: Oriented x 3, strength symmetric in all extremities, Cranial Nerves grossly intact to confrontation, speech clear  Skin: No rashes     Pertinent  and/or Most Recent Results     LAB RESULTS:      Lab 06/23/23  0830 06/21/23  0357 06/20/23  1456   WBC 5.20 6.84 6.39   HEMOGLOBIN 13.6 13.5 14.9   HEMATOCRIT 40.2 38.7 43.8   PLATELETS 197 180 224   NEUTROS ABS 2.59  --  3.53   IMMATURE GRANS (ABS) 0.03  --  0.03   LYMPHS ABS 1.80  --  2.04   MONOS ABS 0.56  --  0.58   EOS ABS 0.16  --  0.15   MCV 93.3 92.4 92.8   SED RATE  --   --  15   CRP  --   --  0.50   PROCALCITONIN  --   --  0.02   LACTATE  --   --  0.9         Lab 06/21/23  0459 06/21/23  0357 06/20/23  1456   SODIUM 141  --  137   POTASSIUM 4.8  --  5.1   CHLORIDE 106  --  104   CO2 26.0  --  23.0   ANION GAP 9.0  --  10.0   BUN 23*  --  19   CREATININE 1.03  --  0.97   EGFR 86.9  --  93.3   GLUCOSE 163*  --  174*   CALCIUM 8.8  --  9.1   HEMOGLOBIN A1C  --  8.10*  --          Lab 06/20/23  1456   TOTAL PROTEIN 6.3   ALBUMIN 3.5   GLOBULIN 2.8   ALT (SGPT) 12   AST (SGOT) 16   BILIRUBIN 0.5   ALK PHOS 77                     Brief Urine Lab  Results       None          Microbiology Results (last 10 days)       Procedure Component Value - Date/Time    Wound Culture - Wound, Toe [[id number]]  (Abnormal)  (Susceptibility) Collected: 06/21/23 0859    Lab Status: Final result Specimen: Wound from Toe Updated: 06/23/23 0608     Wound Culture Moderate growth (3+) Staphylococcus aureus     Gram Stain No WBCs or organisms seen    Susceptibility        Staphylococcus aureus      HESHAM      Clindamycin Susceptible      Erythromycin Susceptible      Inducible Clindamycin Resistance Negative      Oxacillin Susceptible      Rifampin Susceptible      Tetracycline Susceptible      Trimethoprim + Sulfamethoxazole Susceptible      Vancomycin Susceptible                       Susceptibility Comments       Staphylococcus aureus    This isolate does not demonstrate inducible clindamycin resistance in vitro.                 Blood Culture - Blood, Arm, Right [[id number]]  (Normal) Collected: 06/20/23 1859    Lab Status: Preliminary result Specimen: Blood from Arm, Right Updated: 06/22/23 1915     Blood Culture No growth at 2 days    Blood Culture - Blood, Arm, Left [[id number]]  (Normal) Collected: 06/20/23 1859    Lab Status: Preliminary result Specimen: Blood from Arm, Left Updated: 06/22/23 1932     Blood Culture No growth at 2 days            MRI Foot Right With & Without Contrast    Result Date: 6/20/2023  MRI FOOT RIGHT W WO CONTRAST Date of Exam: 6/20/2023 4:48 PM EDT Indication: right great and 2nd toe distal phalynx infection.  Comparison: 6/8/2023 toe radiographs Technique:  Routine multiplanar/multisequence sequence images of the right foot were obtained before and after the uneventful administration of 20 mL Multihance.  Findings: Motion-degraded examination. Bones: Marrow edema in the first distal phalanx. No definite loss of T1 signal within this area. No suspicious marrow placing lesions. No evidence of fracture. Degenerative subchondral edema at the first  metatarsophalangeal joint. Soft tissues: Soft tissue swelling and irregularity of the first toe. No rim-enhancing fluid collection seen. Fatty and edematous changes of the intrinsic foot muscles consistent with neuropathic changes. Diffuse subcutaneous edema of the foot. Achilles tendon appears grossly intact. Plantar fascia appears grossly intact. Visualized flexor and extensor tendons appear grossly intact. Plantar plates appear grossly intact. No obvious interdigital neuroma or bursitis.     Impression: Limited examination due to motion degradation. Soft tissue irregularity and edema of the first toe. Marrow edema within the underlying distal first phalanx without obvious T1 signal abnormality. This is favored to represent reactive edema though early osteomyelitis cannot be fully excluded. No rim-enhancing fluid collection seen. Electronically Signed: Ehsan Mosher  6/20/2023 6:54 PM EDT  Workstation ID: [id number]                 Plan for Follow-up of Pending Labs/Results:   Pending Labs       Order Current Status    Basic Metabolic Panel In process    Magnesium In process    Blood Culture - Blood, Arm, Left Preliminary result    Blood Culture - Blood, Arm, Right Preliminary result          Discharge Details        Discharge Medications        New Medications        Instructions Start Date   cefTRIAXone  Commonly known as: ROCEPHIN   2 g, Intravenous, Every 24 Hours             Continue These Medications        Instructions Start Date   allopurinol 100 MG tablet  Commonly known as: Zyloprim   100 mg, Oral, Daily      Diclofenac Sodium 1 % gel gel  Commonly known as: VOLTAREN   4 g, Topical, 4 Times Daily PRN      Eliquis 5 MG tablet tablet  Generic drug: apixaban   5 mg, Oral, Every 12 Hours      Lantus SoloStar 100 UNIT/ML injection pen  Generic drug: Insulin Glargine   Inject 30 units under the skin two times per day as directed      lisinopril 20 MG tablet  Commonly known as: PRINIVIL,ZESTRIL   20 mg, Oral,  Daily      metFORMIN 500 MG tablet  Commonly known as: Glucophage   500 mg, Oral, 2 Times Daily With Meals      metoprolol tartrate 50 MG tablet  Commonly known as: LOPRESSOR   50 mg, Oral, 2 Times Daily      sildenafil 20 MG tablet  Commonly known as: REVATIO   20 mg, Oral, 3 Times Daily             Stop These Medications      albuterol sulfate  (90 Base) MCG/ACT inhaler  Commonly known as: PROVENTIL HFA;VENTOLIN HFA;PROAIR HFA     meloxicam 7.5 MG tablet  Commonly known as: MOBIC              No Known Allergies      Discharge Disposition:  Home or Self Care    Diet:  Hospital:  Diet Order   Procedures    Diet: Cardiac Diets, Diabetic Diets; Healthy Heart (2-3 Na+); Consistent Carbohydrate; Texture: Regular Texture (IDDSI 7); Fluid Consistency: Thin (IDDSI 0)       Activity:      Restrictions or Other Recommendations:         CODE STATUS:    Code Status and Medical Interventions:   Ordered at: 06/20/23 9404     Code Status (Patient has no pulse and is not breathing):    CPR (Attempt to Resuscitate)     Medical Interventions (Patient has pulse or is breathing):    Full Support     Release to patient:    Routine Release       Future Appointments   Date Time Provider Department Center   6/26/2023  9:30 AM Jw Guido MD [facility]   6/29/2023  2:30 PM Karolyn Lomeli, PT [facility]       Additional Instructions for the Follow-ups that You Need to Schedule       Ambulatory Referral to Physical Therapy Evaluate and treat, Wound Care; (non weight bearing right foot)   As directed      Please coordinate visits on same day as f/u appts with Dr Sathya Babb    Order Comments: Please coordinate visits on same day as f/u appts with Dr Sathya Babb     Specialty needed: Evaluate and treat Wound Care    Weight Bearing Status:  (non weight bearing right foot)    Follow-up needed: Yes         Discharge Follow-up with Specified Provider: Dr. Sathya Babb; 5 Days   As directed      To: Dr. Mcdonnell  Skinny    Follow Up: 5 Days                       Sarah Ireland II, DO  06/23/23      Time Spent on Discharge:  I spent  34  minutes on this discharge activity which included: face-to-face encounter with the patient, reviewing the data in the system, coordination of the care with the nursing staff as well as consultants, documentation, and entering orders.           Electronically signed by Sarah Ireland II, DO at 06/23/23 0951

## 2023-06-23 NOTE — CASE MANAGEMENT/SOCIAL WORK
Case Management Discharge Note      Final Note: Patient will be discharged home today, family will transport.  [facility] will visit patient and wife in room to deliver ABX, supplies, and provide education.  Patient will follow up [facility] for weekly PICC care and labs.  Patient will see [facility] for wound care.  [facility] orders  and DC summary faxed to their office.         Selected Continued Care - Admitted Since 6/20/2023       Destination    No services have been selected for the patient.                Durable Medical Equipment    No services have been selected for the patient.                Dialysis/Infusion    No services have been selected for the patient.                Home Medical Care    No services have been selected for the patient.                Therapy    No services have been selected for the patient.                Community Resources    No services have been selected for the patient.                Community & DME    No services have been selected for the patient.                    Selected Continued Care - Episodes Includes continued care and service providers with selected services from the active episodes listed below      Chronic Care Management Episode start date: 6/9/2023   There are no active outsourced providers for this episode.                      Final Discharge Disposition Code: 01 - home or self-care

## 2023-06-23 NOTE — DISCHARGE SUMMARY
[facility] Medicine Services  DISCHARGE SUMMARY    Patient Name: Juarez Hunt  : 1970  MRN: [id number]    Date of Admission: 2023  6:11 PM  Date of Discharge:  2022  Primary Care Physician: Jw Guido MD    Consults       Date and Time Order Name Status Description    2023  6:46 PM Inpatient Cardiothoracic Surgery Consult Completed     2023  6:46 PM Inpatient Infectious Diseases Consult Completed             Hospital Course     Presenting Problem: toe wound    Active Hospital Problems    Diagnosis  POA    History of pulmonary embolism [Z86.711]  Unknown    Chronic anticoagulation [Z79.01]  Not Applicable    Obesity (BMI 30-39.9) [E66.9]  Unknown    Type 2 diabetes mellitus without complication, with long-term current use of insulin [E11.9, Z79.4]  Not Applicable      Resolved Hospital Problems    Diagnosis Date Resolved POA    Diabetic ulcer of toe of right foot associated with type 2 diabetes mellitus, unspecified ulcer stage [E11.621, L97.519] 2023 Yes          Hospital Course:  Juarez Hunt is a 52 y.o. male w/ HTN, DM2 on insulin w/ neuropathy, obesity, ERIK (not able to tolerate sleep study and not on CPAP), hyperuricemia, prior PE's rec'd lifelong AC seen at [facility] 23 w/ RT 1st toe wound, placed on oral Augmentin and not improved, arrived to wound care appt and sent to the ED for further evaluation.     Acute RT 1st toe diabetic wound w/ cellulitis, possible early osteomyelitis  -Patient admitted due to failure to improve on Augmentin as outpatient. MRI reviewed, ?early osteomyelitis vs reactive edema.  -Dr. Lundberg CTS followed throughout stay. He recommended great toe amputation however patient refusing. Opting to go home and try IV abx.  -ID was consulted and followed through stay. I d/w him prior to d/c. Recommended PICC and IV abx at home w/ Rocephin 2g daily per C+S. Will follow up with Dr. Babb in 5-7 days.  -Long discussion with  patient about offloading and warning signs for antibiotic failure.    Discharge Follow Up Recommendations for outpatient labs/diagnostics:   Dr. Babb in 5-7 days    Day of Discharge     HPI:  Up in bed comfortably. Had a good night.     Review of Systems  Gen- No fevers, chills  CV- No chest pain, palpitations  Resp- No cough, dyspnea  GI- No N/V/D, abd pain    Vital Signs:   Temp:  [98 °F (36.7 °C)-98.5 °F (36.9 °C)] 98.1 °F (36.7 °C)  Heart Rate:  [73-84] 83  Resp:  [18] 18  BP: (105-142)/(55-81) 114/64      Physical Exam:  Constitutional: No acute distress, awake, alert  HENT: NCAT, mucous membranes moist  Respiratory: Clear to auscultation bilaterally, respiratory effort normal   Cardiovascular: RRR, no murmurs, rubs, or gallops  Gastrointestinal: Positive bowel sounds, soft, nontender, nondistended  Musculoskeletal: PICC. Right great toe dressed.  Psychiatric: Appropriate affect, cooperative  Neurologic: Oriented x 3, strength symmetric in all extremities, Cranial Nerves grossly intact to confrontation, speech clear  Skin: No rashes     Pertinent  and/or Most Recent Results     LAB RESULTS:      Lab 06/23/23  0830 06/21/23  0357 06/20/23  1456   WBC 5.20 6.84 6.39   HEMOGLOBIN 13.6 13.5 14.9   HEMATOCRIT 40.2 38.7 43.8   PLATELETS 197 180 224   NEUTROS ABS 2.59  --  3.53   IMMATURE GRANS (ABS) 0.03  --  0.03   LYMPHS ABS 1.80  --  2.04   MONOS ABS 0.56  --  0.58   EOS ABS 0.16  --  0.15   MCV 93.3 92.4 92.8   SED RATE  --   --  15   CRP  --   --  0.50   PROCALCITONIN  --   --  0.02   LACTATE  --   --  0.9         Lab 06/21/23  0459 06/21/23  0357 06/20/23  1456   SODIUM 141  --  137   POTASSIUM 4.8  --  5.1   CHLORIDE 106  --  104   CO2 26.0  --  23.0   ANION GAP 9.0  --  10.0   BUN 23*  --  19   CREATININE 1.03  --  0.97   EGFR 86.9  --  93.3   GLUCOSE 163*  --  174*   CALCIUM 8.8  --  9.1   HEMOGLOBIN A1C  --  8.10*  --          Lab 06/20/23  1456   TOTAL PROTEIN 6.3   ALBUMIN 3.5   GLOBULIN 2.8   ALT  (SGPT) 12   AST (SGOT) 16   BILIRUBIN 0.5   ALK PHOS 77                     Brief Urine Lab Results       None          Microbiology Results (last 10 days)       Procedure Component Value - Date/Time    Wound Culture - Wound, Toe [[id number]]  (Abnormal)  (Susceptibility) Collected: 06/21/23 0859    Lab Status: Final result Specimen: Wound from Toe Updated: 06/23/23 0608     Wound Culture Moderate growth (3+) Staphylococcus aureus     Gram Stain No WBCs or organisms seen    Susceptibility        Staphylococcus aureus      HESHAM      Clindamycin Susceptible      Erythromycin Susceptible      Inducible Clindamycin Resistance Negative      Oxacillin Susceptible      Rifampin Susceptible      Tetracycline Susceptible      Trimethoprim + Sulfamethoxazole Susceptible      Vancomycin Susceptible                       Susceptibility Comments       Staphylococcus aureus    This isolate does not demonstrate inducible clindamycin resistance in vitro.                 Blood Culture - Blood, Arm, Right [[id number]]  (Normal) Collected: 06/20/23 1859    Lab Status: Preliminary result Specimen: Blood from Arm, Right Updated: 06/22/23 1915     Blood Culture No growth at 2 days    Blood Culture - Blood, Arm, Left [[id number]]  (Normal) Collected: 06/20/23 1859    Lab Status: Preliminary result Specimen: Blood from Arm, Left Updated: 06/22/23 1932     Blood Culture No growth at 2 days            MRI Foot Right With & Without Contrast    Result Date: 6/20/2023  MRI FOOT RIGHT W WO CONTRAST Date of Exam: 6/20/2023 4:48 PM EDT Indication: right great and 2nd toe distal phalynx infection.  Comparison: 6/8/2023 toe radiographs Technique:  Routine multiplanar/multisequence sequence images of the right foot were obtained before and after the uneventful administration of 20 mL Multihance.  Findings: Motion-degraded examination. Bones: Marrow edema in the first distal phalanx. No definite loss of T1 signal within this area. No suspicious marrow  placing lesions. No evidence of fracture. Degenerative subchondral edema at the first metatarsophalangeal joint. Soft tissues: Soft tissue swelling and irregularity of the first toe. No rim-enhancing fluid collection seen. Fatty and edematous changes of the intrinsic foot muscles consistent with neuropathic changes. Diffuse subcutaneous edema of the foot. Achilles tendon appears grossly intact. Plantar fascia appears grossly intact. Visualized flexor and extensor tendons appear grossly intact. Plantar plates appear grossly intact. No obvious interdigital neuroma or bursitis.     Impression: Limited examination due to motion degradation. Soft tissue irregularity and edema of the first toe. Marrow edema within the underlying distal first phalanx without obvious T1 signal abnormality. This is favored to represent reactive edema though early osteomyelitis cannot be fully excluded. No rim-enhancing fluid collection seen. Electronically Signed: Ehsan Mosher  6/20/2023 6:54 PM EDT  Workstation ID: [id number]                 Plan for Follow-up of Pending Labs/Results:   Pending Labs       Order Current Status    Basic Metabolic Panel In process    Magnesium In process    Blood Culture - Blood, Arm, Left Preliminary result    Blood Culture - Blood, Arm, Right Preliminary result          Discharge Details        Discharge Medications        New Medications        Instructions Start Date   cefTRIAXone  Commonly known as: ROCEPHIN   2 g, Intravenous, Every 24 Hours             Continue These Medications        Instructions Start Date   allopurinol 100 MG tablet  Commonly known as: Zyloprim   100 mg, Oral, Daily      Diclofenac Sodium 1 % gel gel  Commonly known as: VOLTAREN   4 g, Topical, 4 Times Daily PRN      Eliquis 5 MG tablet tablet  Generic drug: apixaban   5 mg, Oral, Every 12 Hours      Lantus SoloStar 100 UNIT/ML injection pen  Generic drug: Insulin Glargine   Inject 30 units under the skin two times per day as  directed      lisinopril 20 MG tablet  Commonly known as: PRINIVIL,ZESTRIL   20 mg, Oral, Daily      metFORMIN 500 MG tablet  Commonly known as: Glucophage   500 mg, Oral, 2 Times Daily With Meals      metoprolol tartrate 50 MG tablet  Commonly known as: LOPRESSOR   50 mg, Oral, 2 Times Daily      sildenafil 20 MG tablet  Commonly known as: REVATIO   20 mg, Oral, 3 Times Daily             Stop These Medications      albuterol sulfate  (90 Base) MCG/ACT inhaler  Commonly known as: PROVENTIL HFA;VENTOLIN HFA;PROAIR HFA     meloxicam 7.5 MG tablet  Commonly known as: MOBIC              No Known Allergies      Discharge Disposition:  Home or Self Care    Diet:  Hospital:  Diet Order   Procedures    Diet: Cardiac Diets, Diabetic Diets; Healthy Heart (2-3 Na+); Consistent Carbohydrate; Texture: Regular Texture (IDDSI 7); Fluid Consistency: Thin (IDDSI 0)       Activity:      Restrictions or Other Recommendations:         CODE STATUS:    Code Status and Medical Interventions:   Ordered at: 06/20/23 7626     Code Status (Patient has no pulse and is not breathing):    CPR (Attempt to Resuscitate)     Medical Interventions (Patient has pulse or is breathing):    Full Support     Release to patient:    Routine Release       Future Appointments   Date Time Provider Department Center   6/26/2023  9:30 AM Jw Guido MD [facility]   6/29/2023  2:30 PM Karolyn Lomeli, PT [facility]       Additional Instructions for the Follow-ups that You Need to Schedule       Ambulatory Referral to Physical Therapy Evaluate and treat, Wound Care; (non weight bearing right foot)   As directed      Please coordinate visits on same day as f/u appts with Dr Sathya Babb    Order Comments: Please coordinate visits on same day as f/u appts with Dr Sathya Babb     Specialty needed: Evaluate and treat Wound Care    Weight Bearing Status:  (non weight bearing right foot)    Follow-up needed: Yes         Discharge  Follow-up with Specified Provider: Dr. Sathya Babb; 5 Days   As directed      To: Dr. Sathya Babb    Follow Up: 5 Days                       Sarah Ireland II, DO  06/23/23      Time Spent on Discharge:  I spent  34  minutes on this discharge activity which included: face-to-face encounter with the patient, reviewing the data in the system, coordination of the care with the nursing staff as well as consultants, documentation, and entering orders.

## 2023-06-23 NOTE — PLAN OF CARE
Goal Outcome Evaluation:  Plan of Care Reviewed With: patient, spouse        Progress: improving  Outcome Evaluation: Home today w/ PICC (RUE) intact, pt & spouse have been instructed/trained to administer IV antibiotic and care for PICC per IV staff. Wound care to continue outpatient. Discharge instructions provided. Home today with follow-up appointments scheduled.

## 2023-06-25 LAB
BACTERIA SPEC AEROBE CULT: NORMAL
BACTERIA SPEC AEROBE CULT: NORMAL

## 2023-07-03 LAB
CHOLEST SERPL-MCNC: 165 MG/DL (ref 0–200)
HDLC SERPL-MCNC: 38 MG/DL (ref 40–60)
LDLC SERPL CALC-MCNC: 107 MG/DL (ref 0–100)
LDLC/HDLC SERPL: 2.77 {RATIO}
TRIGL SERPL-MCNC: 109 MG/DL (ref 0–150)
VLDLC SERPL-MCNC: 20 MG/DL (ref 5–40)

## 2023-07-04 ENCOUNTER — APPOINTMENT (OUTPATIENT)
Dept: PHYSICAL THERAPY | Facility: HOSPITAL | Age: 53
End: 2023-07-04
Payer: MEDICARE

## 2023-07-04 LAB
ALBUMIN UR-MCNC: 181.2 MG/DL
T4 FREE SERPL-MCNC: 1.1 NG/DL (ref 0.93–1.7)

## 2023-07-11 ENCOUNTER — APPOINTMENT (OUTPATIENT)
Dept: PHYSICAL THERAPY | Facility: HOSPITAL | Age: 53
End: 2023-07-11
Payer: MEDICARE

## 2023-07-13 ENCOUNTER — APPOINTMENT (OUTPATIENT)
Dept: PHYSICAL THERAPY | Facility: HOSPITAL | Age: 53
End: 2023-07-13
Payer: MEDICARE

## 2023-07-18 ENCOUNTER — APPOINTMENT (OUTPATIENT)
Dept: PHYSICAL THERAPY | Facility: HOSPITAL | Age: 53
End: 2023-07-18
Payer: MEDICARE

## 2023-07-20 ENCOUNTER — TRANSCRIBE ORDERS (OUTPATIENT)
Dept: LAB | Facility: HOSPITAL | Age: 53
End: 2023-07-20
Payer: MEDICARE

## 2023-07-20 ENCOUNTER — LAB (OUTPATIENT)
Dept: LAB | Facility: HOSPITAL | Age: 53
End: 2023-07-20
Payer: MEDICARE

## 2023-07-20 DIAGNOSIS — Z79.2 ENCOUNTER FOR LONG-TERM (CURRENT) USE OF ANTIBIOTICS: Primary | ICD-10-CM

## 2023-07-20 DIAGNOSIS — Z79.2 ENCOUNTER FOR LONG-TERM (CURRENT) USE OF ANTIBIOTICS: ICD-10-CM

## 2023-07-20 LAB
ALBUMIN SERPL-MCNC: 3.7 G/DL (ref 3.5–5.2)
ALBUMIN/GLOB SERPL: 1.4 G/DL
ALP SERPL-CCNC: 83 U/L (ref 39–117)
ALT SERPL W P-5'-P-CCNC: 18 U/L (ref 1–41)
ANION GAP SERPL CALCULATED.3IONS-SCNC: 11 MMOL/L (ref 5–15)
AST SERPL-CCNC: 20 U/L (ref 1–40)
BASOPHILS # BLD AUTO: 0.09 10*3/MM3 (ref 0–0.2)
BASOPHILS NFR BLD AUTO: 1.5 % (ref 0–1.5)
BILIRUB SERPL-MCNC: 0.5 MG/DL (ref 0–1.2)
BUN SERPL-MCNC: 15 MG/DL (ref 6–20)
BUN/CREAT SERPL: 13.8 (ref 7–25)
CALCIUM SPEC-SCNC: 9.2 MG/DL (ref 8.6–10.5)
CHLORIDE SERPL-SCNC: 104 MMOL/L (ref 98–107)
CO2 SERPL-SCNC: 26 MMOL/L (ref 22–29)
CREAT SERPL-MCNC: 1.09 MG/DL (ref 0.76–1.27)
CRP SERPL-MCNC: <0.3 MG/DL (ref 0–0.5)
DEPRECATED RDW RBC AUTO: 40.4 FL (ref 37–54)
EGFRCR SERPLBLD CKD-EPI 2021: 81.2 ML/MIN/1.73
EOSINOPHIL # BLD AUTO: 0.22 10*3/MM3 (ref 0–0.4)
EOSINOPHIL NFR BLD AUTO: 3.6 % (ref 0.3–6.2)
ERYTHROCYTE [DISTWIDTH] IN BLOOD BY AUTOMATED COUNT: 11.9 % (ref 12.3–15.4)
ERYTHROCYTE [SEDIMENTATION RATE] IN BLOOD: 11 MM/HR (ref 0–20)
GLOBULIN UR ELPH-MCNC: 2.7 GM/DL
GLUCOSE SERPL-MCNC: 174 MG/DL (ref 65–99)
HCT VFR BLD AUTO: 46.7 % (ref 37.5–51)
HGB BLD-MCNC: 16.1 G/DL (ref 13–17.7)
IMM GRANULOCYTES # BLD AUTO: 0.05 10*3/MM3 (ref 0–0.05)
IMM GRANULOCYTES NFR BLD AUTO: 0.8 % (ref 0–0.5)
LYMPHOCYTES # BLD AUTO: 2.28 10*3/MM3 (ref 0.7–3.1)
LYMPHOCYTES NFR BLD AUTO: 37.7 % (ref 19.6–45.3)
MCH RBC QN AUTO: 31.8 PG (ref 26.6–33)
MCHC RBC AUTO-ENTMCNC: 34.5 G/DL (ref 31.5–35.7)
MCV RBC AUTO: 92.1 FL (ref 79–97)
MONOCYTES # BLD AUTO: 0.64 10*3/MM3 (ref 0.1–0.9)
MONOCYTES NFR BLD AUTO: 10.6 % (ref 5–12)
NEUTROPHILS NFR BLD AUTO: 2.76 10*3/MM3 (ref 1.7–7)
NEUTROPHILS NFR BLD AUTO: 45.8 % (ref 42.7–76)
NRBC BLD AUTO-RTO: 0 /100 WBC (ref 0–0.2)
PLATELET # BLD AUTO: 204 10*3/MM3 (ref 140–450)
PMV BLD AUTO: 10.3 FL (ref 6–12)
POTASSIUM SERPL-SCNC: 4.9 MMOL/L (ref 3.5–5.2)
PROT SERPL-MCNC: 6.4 G/DL (ref 6–8.5)
RBC # BLD AUTO: 5.07 10*6/MM3 (ref 4.14–5.8)
SODIUM SERPL-SCNC: 141 MMOL/L (ref 136–145)
WBC NRBC COR # BLD: 6.04 10*3/MM3 (ref 3.4–10.8)

## 2023-07-20 PROCEDURE — 36415 COLL VENOUS BLD VENIPUNCTURE: CPT

## 2023-07-20 PROCEDURE — 86140 C-REACTIVE PROTEIN: CPT

## 2023-07-20 PROCEDURE — 85652 RBC SED RATE AUTOMATED: CPT

## 2023-07-20 PROCEDURE — 85025 COMPLETE CBC W/AUTO DIFF WBC: CPT

## 2023-07-20 PROCEDURE — 80053 COMPREHEN METABOLIC PANEL: CPT

## 2023-07-24 ENCOUNTER — TELEPHONE (OUTPATIENT)
Dept: CASE MANAGEMENT | Facility: OTHER | Age: 53
End: 2023-07-24
Payer: MEDICARE

## 2023-07-24 ENCOUNTER — TELEPHONE (OUTPATIENT)
Dept: INTERNAL MEDICINE | Facility: CLINIC | Age: 53
End: 2023-07-24
Payer: MEDICARE

## 2023-07-24 RX ORDER — CALCIUM CITRATE/VITAMIN D3 200MG-6.25
TABLET ORAL
Qty: 200 EACH | Refills: 12 | Status: SHIPPED | OUTPATIENT
Start: 2023-07-24

## 2023-07-24 RX ORDER — CALCIUM CITRATE/VITAMIN D3 200MG-6.25
TABLET ORAL
Qty: 200 EACH | Refills: 12 | Status: SHIPPED | OUTPATIENT
Start: 2023-07-24 | End: 2023-07-24 | Stop reason: SDUPTHER

## 2023-07-24 NOTE — TELEPHONE ENCOUNTER
Caller: Nu Hunt    Relationship: Emergency Contact    Best call back number:       [phone] (Home)     Which medication are you concerned about:       glucose blood (True Metrix Blood Glucose Test) test strip     CALLER/WIFE STATED PATIENT'S INSURANCE WILL  NOT COVER THE COST OF THE TEST STRIPS FOR THE FREQUENCY PRESCRIBED    CALLER STATED INSURANCE REQUIRES A PRIOR AUTHORIZATION IN ORDER TO BE REFILLED BY PHARMACY    Who prescribed you this medication:     DR MARTINEZ    PREFERRED PHARMACY:    [place], [place]    TELEPHONE CONTACT:    [phone]

## 2023-07-24 NOTE — TELEPHONE ENCOUNTER
Caller: Nu Hunt    Relationship: Emergency Contact    Best call back number: [phone]     What medication are you requesting: TRUE METRIX TEST STRIPS    If a prescription is needed, what is your preferred pharmacy and phone number: [id number] - [place], [place] - [address] - [phone]  - [phone]     Additional notes:

## 2023-07-24 NOTE — TELEPHONE ENCOUNTER
TOO CM outreach to Patient, spoke with wife who stated that Patient is currently not available but she will have him call back this afternoon or tomorrow.

## 2023-07-25 ENCOUNTER — APPOINTMENT (OUTPATIENT)
Dept: PHYSICAL THERAPY | Facility: HOSPITAL | Age: 53
End: 2023-07-25
Payer: MEDICARE

## 2023-07-27 ENCOUNTER — HOSPITAL ENCOUNTER (OUTPATIENT)
Dept: PHYSICAL THERAPY | Facility: HOSPITAL | Age: 53
Setting detail: THERAPIES SERIES
Discharge: HOME | End: 2023-07-27
Payer: MEDICARE

## 2023-07-27 ENCOUNTER — PATIENT OUTREACH (OUTPATIENT)
Dept: CASE MANAGEMENT | Facility: OTHER | Age: 53
End: 2023-07-27
Payer: MEDICARE

## 2023-07-27 ENCOUNTER — LAB (OUTPATIENT)
Dept: LAB | Facility: HOSPITAL | Age: 53
End: 2023-07-27
Payer: MEDICARE

## 2023-07-27 ENCOUNTER — TRANSCRIBE ORDERS (OUTPATIENT)
Dept: LAB | Facility: HOSPITAL | Age: 53
End: 2023-07-27
Payer: MEDICARE

## 2023-07-27 DIAGNOSIS — I10 HYPERTENSION, UNSPECIFIED TYPE: ICD-10-CM

## 2023-07-27 DIAGNOSIS — L03.031 ABSCESS OF FIFTH TOENAIL OF RIGHT FOOT: ICD-10-CM

## 2023-07-27 DIAGNOSIS — Z79.4 TYPE 2 DIABETES MELLITUS WITHOUT COMPLICATION, WITH LONG-TERM CURRENT USE OF INSULIN: Primary | ICD-10-CM

## 2023-07-27 DIAGNOSIS — E11.9 TYPE 2 DIABETES MELLITUS WITHOUT COMPLICATION, WITH LONG-TERM CURRENT USE OF INSULIN: Primary | ICD-10-CM

## 2023-07-27 DIAGNOSIS — S91.101D OPEN WOUND OF RIGHT GREAT TOE, SUBSEQUENT ENCOUNTER: Primary | ICD-10-CM

## 2023-07-27 DIAGNOSIS — E66.9 OBESITY (BMI 30-39.9): ICD-10-CM

## 2023-07-27 DIAGNOSIS — L03.031 ABSCESS OF FIFTH TOENAIL OF RIGHT FOOT: Primary | ICD-10-CM

## 2023-07-27 LAB
ALBUMIN SERPL-MCNC: 3.4 G/DL (ref 3.5–5.2)
ALBUMIN/GLOB SERPL: 1.2 G/DL
ALP SERPL-CCNC: 74 U/L (ref 39–117)
ALT SERPL W P-5'-P-CCNC: 17 U/L (ref 1–41)
ANION GAP SERPL CALCULATED.3IONS-SCNC: 11 MMOL/L (ref 5–15)
AST SERPL-CCNC: 21 U/L (ref 1–40)
BASOPHILS # BLD AUTO: 0.07 10*3/MM3 (ref 0–0.2)
BASOPHILS NFR BLD AUTO: 1.3 % (ref 0–1.5)
BILIRUB SERPL-MCNC: 0.4 MG/DL (ref 0–1.2)
BUN SERPL-MCNC: 14 MG/DL (ref 6–20)
BUN/CREAT SERPL: 14.9 (ref 7–25)
CALCIUM SPEC-SCNC: 8.8 MG/DL (ref 8.6–10.5)
CHLORIDE SERPL-SCNC: 107 MMOL/L (ref 98–107)
CO2 SERPL-SCNC: 22 MMOL/L (ref 22–29)
CREAT SERPL-MCNC: 0.94 MG/DL (ref 0.76–1.27)
CRP SERPL-MCNC: <0.3 MG/DL (ref 0–0.5)
DEPRECATED RDW RBC AUTO: 40 FL (ref 37–54)
EGFRCR SERPLBLD CKD-EPI 2021: 96.9 ML/MIN/1.73
EOSINOPHIL # BLD AUTO: 0.19 10*3/MM3 (ref 0–0.4)
EOSINOPHIL NFR BLD AUTO: 3.4 % (ref 0.3–6.2)
ERYTHROCYTE [DISTWIDTH] IN BLOOD BY AUTOMATED COUNT: 12 % (ref 12.3–15.4)
ERYTHROCYTE [SEDIMENTATION RATE] IN BLOOD: 8 MM/HR (ref 0–20)
GLOBULIN UR ELPH-MCNC: 2.8 GM/DL
GLUCOSE SERPL-MCNC: 180 MG/DL (ref 65–99)
HCT VFR BLD AUTO: 43.6 % (ref 37.5–51)
HGB BLD-MCNC: 15 G/DL (ref 13–17.7)
IMM GRANULOCYTES # BLD AUTO: 0.02 10*3/MM3 (ref 0–0.05)
IMM GRANULOCYTES NFR BLD AUTO: 0.4 % (ref 0–0.5)
LYMPHOCYTES # BLD AUTO: 1.72 10*3/MM3 (ref 0.7–3.1)
LYMPHOCYTES NFR BLD AUTO: 31.1 % (ref 19.6–45.3)
MCH RBC QN AUTO: 31.3 PG (ref 26.6–33)
MCHC RBC AUTO-ENTMCNC: 34.4 G/DL (ref 31.5–35.7)
MCV RBC AUTO: 90.8 FL (ref 79–97)
MONOCYTES # BLD AUTO: 0.63 10*3/MM3 (ref 0.1–0.9)
MONOCYTES NFR BLD AUTO: 11.4 % (ref 5–12)
NEUTROPHILS NFR BLD AUTO: 2.9 10*3/MM3 (ref 1.7–7)
NEUTROPHILS NFR BLD AUTO: 52.4 % (ref 42.7–76)
NRBC BLD AUTO-RTO: 0 /100 WBC (ref 0–0.2)
PLATELET # BLD AUTO: 195 10*3/MM3 (ref 140–450)
PMV BLD AUTO: 10 FL (ref 6–12)
POTASSIUM SERPL-SCNC: 4.6 MMOL/L (ref 3.5–5.2)
PROT SERPL-MCNC: 6.2 G/DL (ref 6–8.5)
RBC # BLD AUTO: 4.8 10*6/MM3 (ref 4.14–5.8)
SODIUM SERPL-SCNC: 140 MMOL/L (ref 136–145)
WBC NRBC COR # BLD: 5.53 10*3/MM3 (ref 3.4–10.8)

## 2023-07-27 PROCEDURE — 85652 RBC SED RATE AUTOMATED: CPT

## 2023-07-27 PROCEDURE — 85025 COMPLETE CBC W/AUTO DIFF WBC: CPT

## 2023-07-27 PROCEDURE — 97597 DBRDMT OPN WND 1ST 20 CM/<: CPT

## 2023-07-27 PROCEDURE — 86140 C-REACTIVE PROTEIN: CPT

## 2023-07-27 PROCEDURE — 80053 COMPREHEN METABOLIC PANEL: CPT

## 2023-07-27 PROCEDURE — 36415 COLL VENOUS BLD VENIPUNCTURE: CPT

## 2023-07-27 NOTE — OUTREACH NOTE
"AMBULATORY CASE MANAGEMENT NOTE    Name and Relationship of Patient/Support Person: GilbertJuarez - Self    Patient Outreach    AMB  outreach with Patient. Patient completed physical therapy/wound care and stated his wound is healing and 1 cm smaller than the last appointment. Reports his last dose of IV ABX is scheduled for the first week of August. Denies any S/S of infection at PICC site.     States that his BG levels have been ranging from 190-200 but not over 200. Reviewed goal of 180 BG two hours after meals and 6 to 8 bottles of water daily. Reviewed limiting gatorade zero packets, drip drop to 2 daily and to stay away from Liquid IV unless it specifically states diabetic safe. Reviewed the following free apps to help track water consumption, steps etc: [employer], [employer] and [employer].    Reviewed upcoming appointments. Reinforced contacting AMB CM for any questions or concerns. Next outreach scheduled.    Care Coordination    Care coordination with [facility] Education and Resources. [facility] to mail the following educational booklets \"Diabetes and You, Living with Diabetes, Staying on Track and Blood Glucose Tracker\" for diabetes self management.    Education Documentation  Prevention of Complications, taught by Nereida Hawkins, RN at 7/27/2023  2:47 PM.  Learner: Significant Other, Patient  Readiness: Acceptance  Method: Explanation, Teach Back  Response: Verbalizes Understanding, Needs Reinforcement    Benefits, taught by Nereida Hawkins RN at 7/27/2023  2:47 PM.  Learner: Significant Other, Patient  Readiness: Acceptance  Method: Explanation, Teach Back  Response: Verbalizes Understanding, Needs Reinforcement    Portion Management, taught by Nereida Hawkins RN at 7/27/2023  2:47 PM.  Learner: Significant Other, Patient  Readiness: Acceptance  Method: Explanation, Teach Back  Response: Verbalizes Understanding, Needs Reinforcement    Carbohydrate Counting, taught by Nereida Hawkins, ANASTASIA " at 7/27/2023  2:47 PM.  Learner: Significant Other, Patient  Readiness: Acceptance  Method: Explanation, Teach Back  Response: Verbalizes Understanding, Needs Reinforcement    Monitoring Carbohydrate Intake, taught by Nereida Hawkins, RN at 7/27/2023  2:47 PM.  Learner: Significant Other, Patient  Readiness: Acceptance  Method: Explanation, Teach Back  Response: Verbalizes Understanding, Needs Reinforcement    Importance of Glycemic Management, taught by Nereida Hawkins, RN at 7/27/2023  2:47 PM.  Learner: Significant Other, Patient  Readiness: Acceptance  Method: Explanation, Teach Back  Response: Verbalizes Understanding, Needs Reinforcement    food/fluid intake, taught by Nereida Hawkins, RN at 7/27/2023  2:47 PM.  Learner: Significant Other, Patient  Readiness: Acceptance  Method: Explanation, Teach Back  Response: Verbalizes Understanding, Needs Reinforcement          Nereida HENRY  Ambulatory Case Management    7/27/2023, 14:48 EDT

## 2023-07-27 NOTE — THERAPY PROGRESS REPORT/RE-CERT
Outpatient Rehabilitation - Wound/Debridement Progress Note  [facility]     Patient Name: Juarez Hunt  : 1970  MRN: [id number]  Today's Date: 2023                 Admit Date: 2023    Visit Dx:    ICD-10-CM ICD-9-CM   1. Open wound of right great toe, subsequent encounter  S91.101D V58.89     893.0       Patient Active Problem List   Diagnosis    ERIK on CPAP    Type 2 diabetes mellitus without complication, with long-term current use of insulin    Acute pulmonary embolism without acute cor pulmonale    History of pulmonary embolism    Chronic anticoagulation    Obesity (BMI 30-39.9)        Past Medical History:   Diagnosis Date    Arthritis     Blood clot in vein     Diabetes mellitus     Hypertension         Past Surgical History:   Procedure Laterality Date    CARDIAC CATHETERIZATION      IVC FILTER RETRIEVAL      KNEE SURGERY Left 1986    SKIN BIOPSY  2020    toe         EVALUATION   PT Ortho       Row Name 23 1300       Subjective Comments    Subjective Comments No new complaints, states PICC line may be d/c'd next week.  -       Subjective Pain    Able to rate subjective pain? yes  -    Pre-Treatment Pain Level 0  -    Post-Treatment Pain Level 0  -       Transfers    Sit-Stand Morton (Transfers) independent  -    Stand-Sit Morton (Transfers) independent  -    Comment, (Transfers) long sitting for tx  -       Gait/Stairs (Locomotion)    Morton Level (Gait) independent  -    Assistive Device (Gait) other (see comments)  offloading shoe  -              User Key  (r) = Recorded By, (t) = Taken By, (c) = Cosigned By      Initials Name Provider Type    Karolyn Montero PT Physical Therapist                     CALVIN Wound       Row Name 23 1300             Wound 23 0800 Right posterior great toe Diabetic Ulcer    Wound - Properties Group Placement Date: 23  - Placement Time: 800  - Present on Hospital  Admission: Y  - Side: Right  - Orientation: posterior  - Location: great toe  - Primary Wound Type: Diabetic [facility]    Wound Image Images linked: 1  -      Dressing Appearance intact;moist drainage  -      Base moist;pink;yellow;slough;subcutaneous;red  -      Periwound intact;moist;pink;redness;warm  -      Periwound Temperature warm  -      Periwound Skin Turgor firm  -      Edges irregular;open  -      Wound Length (cm) 1.3 cm  -      Wound Width (cm) 0.9 cm  -      Wound Depth (cm) 0.1 cm  -      Wound Surface Area (cm^2) 1.17 cm^2  -      Wound Volume (cm^3) 0.117 cm^3  -      Drainage Characteristics/Odor serosanguineous;yellow  -      Drainage Amount small  -      Care, Wound cleansed with;wound cleanser;debrided;honey applied  -      Dressing Care dressing applied;collagen;antimicrobial agent applied;foam;silver impregnated  rey, HFBt, optifoam ag, primafix  -      Periwound Care cleansed with pH balanced cleanser;dry periwound area maintained  -      Retired Wound - Properties Group Placement Date: 06/13/23  [facility] Placement Time: 0800  - Present on Hospital Admission: Y  - Side: Right  - Orientation: posterior  - Location: great toe  - Primary Wound Type: Diabetic [facility]    Retired Wound - Properties Group Date first assessed: 06/13/23  [facility] Time first assessed: 0800  - Present on Hospital Admission: Y  - Side: Right  - Location: great toe  - Primary Wound Type: Diabetic [facility]              User Key  (r) = Recorded By, (t) = Taken By, (c) = Cosigned By      Initials Name Provider Type    Shanita Lance PT Physical Therapist    Karolyn Montero, PT Physical Therapist                      WOUND DEBRIDEMENT  Total area of Debridement: 4cmsq  Debridement Site 1  Location- Site 1: R great toe wound and periwound  Selective Debridement- Site 1: Wound Surface <20cmsq  Instruments- Site 1: #15, scapel, tweezers, scissors  Excised Tissue  Description- Site 1: minimum, slough, other (comment) (callous)  Bleeding- Site 1: none              Therapy Education       Row Name 07/27/23 1300             Therapy Education    Education Details Add small amt of therahoney to wound, rey to fill wound bed, HFBt and optifoam ag to cover, secure with primafix.  Discussed daily DM foot care including callous management and inspection, need for custom inserts and diabetic shoes with podiatry for nail care.  -      Given Symptoms/condition management;Bandaging/dressing change  -      Program Reinforced;Progressed  -GERMAIN      How Provided Verbal;Demonstration  -      Provided to Patient;Other (comment)  family  -      Level of Understanding Teach back education performed;Verbalized  -                User Key  (r) = Recorded By, (t) = Taken By, (c) = Cosigned By      Initials Name Provider Type    Karolyn Montero, PT Physical Therapist                    Recommendation and Plan   PT Assessment/Plan       Row Name 07/27/23 1300          PT Assessment    Functional Limitations Other (comment);Performance in self-care ADL  wound management  -     Impairments Integumentary integrity;Impaired sensory integrity  -     Assessment Comments Small decrease in wound dimensions.  Pt continues to have callous buildup requiring debridement.  Slightly dry wound bed today, so PT added small amount of therahoney to maintain moisture of tissues, will assess response.  -     Rehab Potential Fair  -     Patient/caregiver participated in establishment of treatment plan and goals Yes  -     Patient would benefit from skilled therapy intervention Yes  -        PT Plan    PT Frequency 1x/week  -     Predicted Duration of Therapy Intervention (PT) 12 visits  -     Planned CPT's? PT EAMON DEBRIDE OPEN WOUND UP TO 20 CM: 04001;PT NONSELECT DEBRIDE 15 MIN: 38784;PT NLFU MIST: 18432;PT SELF CARE/MGMT/TRAIN 15 MIN: 12073  -     Physical Therapy Interventions  (Optional Details) patient/family education;wound care  -     PT Plan Comments debridement, consider MIST if pt agreeable to increase to 2x/week tx  -               User Key  (r) = Recorded By, (t) = Taken By, (c) = Cosigned By      Initials Name Provider Type    Karolyn Montero, PT Physical Therapist                    Goals   PT OP Goals       Row Name 07/27/23 1300          PT Short Term Goals    STG Date to Achieve 08/13/23  -     STG 1 Pt will verbalize s/sx of infection.  -     STG 1 Progress Met  -     STG 2 Pt will demonstrate 25% reduction in wound area to indicate healing progress.  -     STG 2 Progress Ongoing;Progressing  -        Long Term Goals    LTG Date to Achieve 09/27/23  -     LTG 1 Pt will demonstrate independence with clean home dressing changes.  -     LTG 1 Progress Ongoing;Progressing  -     LTG 2 Pt will demonstrate 75% reduction in wound area to indicate healing progress.  -     LTG 2 Progress Ongoing;Progressing  -     LTG 3 Pt will demonstrate/verbalize independence with daily diabetic foot care.  -     LTG 3 Progress Ongoing  -        Time Calculation    PT Goal Re-Cert Due Date 09/27/23  -               User Key  (r) = Recorded By, (t) = Taken By, (c) = Cosigned By      Initials Name Provider Type    Karolyn Montero, PT Physical Therapist                    PT Goal Re-Cert Due Date: 09/27/23  PT Short Term Goals  STG Date to Achieve: 08/13/23  STG 1: Pt will verbalize s/sx of infection.  STG 1 Progress: Met  STG 2: Pt will demonstrate 25% reduction in wound area to indicate healing progress.  STG 2 Progress: Ongoing, Progressing  Long Term Goals  LTG Date to Achieve: 09/27/23  LTG 1: Pt will demonstrate independence with clean home dressing changes.  LTG 1 Progress: Ongoing, Progressing  LTG 2: Pt will demonstrate 75% reduction in wound area to indicate healing progress.  LTG 2 Progress: Ongoing, Progressing  LTG 3: Pt will  demonstrate/verbalize independence with daily diabetic foot care.  LTG 3 Progress: Ongoing      Time Calculation: Start Time: 1300  Untimed Charges  86218-Ueirzpepl debridement: 25  Total Minutes  Untimed Charges Total Minutes: 25   Total Minutes: 25  Therapy Charges for Today       Code Description Service Date Service Provider Modifiers Qty    [id number] HC EAMON DEBRIDE OPEN WOUND UP TO 20CM 7/27/2023 Karolyn Lomeli, PT GP 1                    Karolyn Lomeli, PT  7/27/2023

## 2023-07-28 ENCOUNTER — PATIENT OUTREACH (OUTPATIENT)
Dept: CASE MANAGEMENT | Facility: OTHER | Age: 53
End: 2023-07-28
Payer: MEDICARE

## 2023-07-28 DIAGNOSIS — E11.9 TYPE 2 DIABETES MELLITUS WITHOUT COMPLICATION, WITH LONG-TERM CURRENT USE OF INSULIN: Primary | ICD-10-CM

## 2023-07-28 DIAGNOSIS — E66.9 OBESITY (BMI 30-39.9): ICD-10-CM

## 2023-07-28 DIAGNOSIS — Z79.4 TYPE 2 DIABETES MELLITUS WITHOUT COMPLICATION, WITH LONG-TERM CURRENT USE OF INSULIN: Primary | ICD-10-CM

## 2023-07-28 DIAGNOSIS — I10 HYPERTENSION, UNSPECIFIED TYPE: ICD-10-CM

## 2023-07-28 NOTE — OUTREACH NOTE
[facility] End of Month Documentation    This Chronic Medical Management Care Plan for Juarez Hunt, 52 y.o. male, has been reviewed and a new plan of care implemented for the month of July.  A cumulative time of 48  minutes was spent on this patient record this month, including phone call with patient; chart review.    Regarding the patient's problems: has ERIK on CPAP; Type 2 diabetes mellitus without complication, with long-term current use of insulin; Acute pulmonary embolism without acute cor pulmonale; History of pulmonary embolism; Chronic anticoagulation; and Obesity (BMI 30-39.9) on their problem list., the following items were addressed: medical records; medications; changes to medical care; transitions to medical care and any changes can be found within the plan section of the note.  A detailed listing of time spent for chronic care management is tracked within each outreach encounter.  Current medications include:  has a current medication list which includes the following prescription(s): allopurinol, diclofenac sodium, eliquis, true metrix blood glucose test, lantus solostar, lisinopril, metformin, metoprolol tartrate, and sildenafil. and the patient is reported to be patient is compliant with medication protocol,  Medications are reported to be non-effective in controlling symptoms and changes have been made to the medication protocol, A1c is 8.10.  Regarding these diagnoses, referrals were made to the following provider(s):  None.  All notes on chart for PCP to review.    The patient was monitored remotely for blood glucose; medications; weight.    The patient's physical needs include:  help taking medications as prescribed; medication education.     The patient's mental support needs include:  needs met    The patient's cognitive support needs include:  needs met    The patient's psychosocial support needs include:  medication management or adherence    The patient's functional needs include:  health care coverage; medication education    The patient's environmental needs include:  not applicable    Care Plan overall comments:  Currently receiving IV ABX 2xD via PICC to Right upper arm for osteo of great toe; managed by infectious disease, last ABX scheduled for first week of August.    Refer to previous outreach notes for more information on the areas listed above.    Monthly Billing Diagnoses  (E11.9,  Z79.4) Type 2 diabetes mellitus without complication, with long-term current use of insulin    (I10) Hypertension, unspecified type    (E66.9) Obesity (BMI 30-39.9)    Medications   Medications have been reconciled    Care Plan progress this month:      Recently Modified Care Plans Updates made since 6/27/2023 12:00 AM       Wellness (Adult)           Problem Priority Last Modified     Healthy Nutrition (Wellness) --  6/27/2023 10:48 AM by Nereida Hawkins RN              Goal Recent Progress Last Modified     Healthy Nutrition Achieved --  6/27/2023 10:48 AM by Nereida Hawkins RN     Evidence-based guidance:   Assess patient perspective on healthy weight, weight loss or weight gain, motivation and readiness for change.   Recommend or set healthy weight goal based on body mass index.   Review current dietary intake and exercise levels.   Encourage small steps toward making change to eating and exercising.   Provide individualized medical nutrition therapy.    Notes:            Task Due Date Last Modified     Alleviate Barriers to Healthy Eating --  6/27/2023 10:54 AM by Nereida Hawkins RN     Care Management Activities:      - making healthy food choices encouraged  - strategies to removing barriers to physical activity or exercise promoted      Notes: 06/27/23                    Diabetes Type 2 (Adult)           Problem Priority Last Modified     Glycemic Management (Diabetes, Type 2) --  6/27/2023 10:50 AM by Nereida Hawkins RN              Goal Recent Progress Last Modified     Glycemic Management  Optimized --  6/27/2023 10:50 AM by Nereida Hawkins RN     Evidence-based guidance:   Anticipate A1C testing (point-of-care) every 3 to 6 months based on goal attainment.   Review mutually-set A1C goal or target range.   Anticipate use of antihyperglycemic with or without insulin and periodic adjustments; consider active involvement of pharmacist.   Provide medical nutrition therapy and development of individualized eating.   Compare self-reported symptoms of hypo or hyperglycemia to blood glucose levels, diet and fluid intake, current medications, psychosocial and physiologic stressors, change in activity and barriers to care adherence.   Promote self-monitoring of blood glucose levels.   Assess and address barriers to management plan, such as food insecurity, age, developmental ability, depression, anxiety, fear of hypoglycemia or weight gain, as well as medication cost, side effects and complicated regimen.   Consider referral to community-based diabetes education program, visiting nurse, community health worker or health .   Encourage regular dental care for treatment of periodontal disease; refer to dental provider when needed.    Notes:            Task Due Date Last Modified     Alleviate Barriers to Glycemic Management --  7/27/2023  2:23 PM by Nereida Hawkins RN     Care Management Activities:      - barriers to adherence to treatment plan identified  - blood glucose monitoring encouraged  - blood glucose readings reviewed  - resources required to improve adherence to care identified  - self-awareness of signs/symptoms of hypo or hyperglycemia encouraged  - use of blood glucose monitoring log promoted      Notes: 07/27/23               Problem Priority Last Modified     Disease Progression (Diabetes, Type 2) --  6/27/2023 10:50 AM by Nereida Hawkins, ANASTASIA              Goal Recent Progress Last Modified     Disease Progression Prevented or Minimized --  6/27/2023 10:50 AM by Nereida Hawkins, RN      Evidence-based guidance:   Prepare patient for laboratory and diagnostic exams based on risk and presentation.   Encourage lifestyle changes, such as increased intake of plant-based foods, stress reduction, consistent physical activity and smoking cessation to prevent long-term complications and chronic disease.    Individualize activity and exercise recommendations while considering potential limitations, such as neuropathy, retinopathy or the ability to prevent hyperglycemia or hypoglycemia.    Prepare patient for use of pharmacologic therapy that may include antihypertensive, analgesic, prostaglandin E1 with periodic adjustments, based on presenting chronic condition and laboratory results.   Assess signs/symptoms and risk factors for hypertension, sleep-disordered breathing, neuropathy (including changes in gait and balance), retinopathy, nephropathy and sexual dysfunction.   Address pregnancy planning and contraceptive choice, especially when prescribing antihypertensive or statin.   Ensure completion of annual comprehensive foot exam and dilated eye exam.    Implement additional individualized goals and interventions based on identified risk factors.   Prepare patient for consultation or referral for specialist care, such as ophthalmology, neurology, cardiology, podiatry, nephrology or perinatology.    Notes:            Task Due Date Last Modified     Monitor and Manage Follow-up for Comorbidities --  7/27/2023  2:24 PM by Nereida Hawkins RN     Care Management Activities:      - activity based on tolerance and functional limitations encouraged  - healthy lifestyle promoted  - signs/symptoms of comorbidities identified      Notes: 07/27/23                    Obesity (Adult)           Problem Priority Last Modified     Weight Management (Obesity) --  6/27/2023 10:50 AM by Nereida Hawkins RN              Goal Recent Progress Last Modified     Weight Loss Achieved --  6/27/2023 10:50 AM by Nereida Hawkins RN      Evidence-based guidance:   Review medication that may contribute to weight gain, such as corticosteroid, beta-blocker, tricyclic antidepressant, oral antihyperglycemic; advocate for changes when appropriate.   Perform or refer to registered dietitian to perform comprehensive nutrition assessment that includes disordered-eating behaviors, such as binge-eating, emotional or compulsive eating, grazing.    patient regarding health risks of obesity and that weight loss goal of 5 to 10 percent of initial weight will improve risk.   Recommend initial weight loss goal of 3 to 5 percent of bodyweight; increase weight-loss goals based on patient success as achieving greater weight loss continues to reduce risk.   Propose a calorie-reduced diet based on the patient's preferences and health status.   Provide ongoing emotional support or cognitive behavioral therapy and dietitian services (individual, group, virtual) over at least 6 months with a minimum of 14 encounters to best facilitate weight loss.   Provide monthly follow-up for 12 months when weight loss goal is met to assist with maintenance of weight loss.   Encourage increased physical activity or exercise based on individual age, risk, and ability up to 200 to 300 minutes per week that includes aerobic and resistance training.   Encourage reduction in sedentary behaviors by replacing them with nonexercise yet active leisure pursuits.   Identify physical barriers, such as change in posture, balance, gait patterns, joint pain, and environmental barriers to activity.   Consider referral to rehabilitation therapy, especially when mobility or function is impaired due to osteoarthritis and obesity.   Consider referral to weight-loss program that has published evidence of safety and efficacy if on-site intensive intervention is unavailable or patient preference.   Prepare patient for use of pharmacologic therapy as an adjunct to lifestyle changes based on body mass  index, patient agreement and presence of risk factors or comorbidities.   Evaluate efficacy of pharmacologic therapy (weight loss) and tolerance to medication periodically.   Engage in shared decision-making regarding referral to bariatric surgeon for consultation and evaluation when weight-loss goal has not been accomplished by behavioral therapy with or without pharmacologic therapy.    Notes:            Task Due Date Last Modified     Promote Lifestyle and Nutrition Changes to Achieve Weight Loss Goal --  7/27/2023  2:29 PM by Nereida Hawkins RN     Care Management Activities:      - activity and exercise based on tolerance encouraged  - barriers to physical activity or exercise addressed  - encouragement and support provided      Notes: 07/27/23               Problem Priority Last Modified     Comorbidities (Obesity) --  6/27/2023 10:50 AM by Nereida Hawkins RN              Goal Recent Progress Last Modified     Prevent or Manage Comorbidities --  6/27/2023 10:50 AM by Nereida Hawkins RN     Evidence-based guidance:   Assess risk for or presence of comorbid condition.   Prepare patient for laboratory tests and diagnostic studies, such as fasting blood glucose, hemoglobin A1C, lipid panel, metabolic panel, polysomnography, joint x-ray, magnetic resonance imaging.   Implement additional goals and interventions based on identified comorbidities or risk factors; management is required regardless of patient's ability to achieve or sustain weight loss.   Identify and support self-management plan for chronic comorbidities; identify barriers to disease management and brainstorm strategies for success.   Support and encourage lifestyle changes and weight loss efforts as fundamental to reducing risk or severity of comorbidities.   Assess for presence of skin breakdown or infection, especially in skin folds, under breasts and abdominal aprons.   Promote meticulous skin hygiene to prevent skin breakdown, such as gentle  cleansing, moisturizing; consider barrier protectant (zinc oxide, petroleum jelly), topical antifungal or drying agent.   Assess sleep quality; prepare patient for polysomnography and ERIK (obstructive sleep apnea) treatment based on presentation and sleep study results.   Monitor for signs of anxiety or depression; provide or refer for mental health services when needed.   Encourage routine dental care to prevent or manage periodontal disease.   Prepare patient for use of pharmacologic therapy as treatment for comorbidity; monitor efficacy and manage side effects.    Notes:            Task Due Date Last Modified     Monitor and Manage Follow-up for Comorbidities --  7/27/2023  2:30 PM by Nereida Hawkins, RN     Care Management Activities:      - complementary therapy use encouraged  - medication side effects managed  - signs and symptoms of comorbidities monitored      Notes: Reviewed what a NSAID is and how it is processed               Problem Priority Last Modified     Readiness for Weight Management --  6/27/2023 10:50 AM by Nereida Hawkins, ANASTASIA              Goal Recent Progress Last Modified     Plan for Weight Management Developed --  6/27/2023 10:50 AM by Nereida Hawkins, RN     Evidence-based guidance:   Assess patient perspective on healthy weight, weight loss, motivation, and readiness for weight loss; review current dietary intake and exercise levels.   Review without judgment previous weight-loss attempts; acknowledge the challenge patient is facing.   Discuss psychologic factors related to previous attempts to lose weight (both successes and failures); identify setbacks as opportunities for growth.   Acknowledge understanding of weight-related stigma, embarrassment; assess and address effect on self-esteem and quality of life.   Discuss barriers, including negative body image, self-efficacy, embarrassment, physical impairments, pain, lack of motivation, competing demands, financial constraints.    Acknowledge and validate that changes in lifestyle and diet may influence social interactions including exclusion from groups and contribute to relapse or discontinuing treatment.   Brainstorm ways to minimize isolation.   Provide anticipatory guidance about benefits of weight loss, including improvement in vitality, self-esteem, sexual health, pain, and mobility, even with small amount of weight loss.   Use motivational interviewing techniques to increase confidence and commitment to change.   Assist patient to set mutual, meaningful goals regarding weight, activity, exercise, and healthy lifestyle; identify change strategies that align with level of readiness for change.    Notes:            Task Due Date Last Modified     Facilitate Readiness for Weight Loss --  7/27/2023  2:30 PM by Nereida Hawkins RN     Care Management Activities:      - activation or motivation to change monitored  - difficulty of making life-long changes acknowledged  - encouragement and support provided      Notes: 07/27/23                         Current Specialty Plan of Care Status signed by both patient and provider    Instructions   Patient was provided an electronic copy of care plan  CCM services were explained and offered and patient has accepted these services.  Patient has given their written consent to receive CCM services and understands that this includes the authorization of electronic communication of medical information with the other treating providers.  Patient understands that they may stop CCM services at any time and these changes will be effective at the end of the calendar month and will effectively revocate the agreement of CCM services.  Patient understands that only one practitioner can furnish and be paid for CCM services during one calendar month.  Patient also understands that there may be co-payment or deductible fees in association with CCM services.  Patient will continue with at least monthly follow-up  calls with the Ambulatory .    Nereida HENRY  Ambulatory Case Management    7/28/2023, 09:38 EDT

## 2023-08-03 ENCOUNTER — LAB (OUTPATIENT)
Dept: LAB | Facility: HOSPITAL | Age: 53
End: 2023-08-03
Payer: MEDICARE

## 2023-08-03 ENCOUNTER — TRANSCRIBE ORDERS (OUTPATIENT)
Dept: LAB | Facility: HOSPITAL | Age: 53
End: 2023-08-03
Payer: MEDICARE

## 2023-08-03 ENCOUNTER — HOSPITAL ENCOUNTER (OUTPATIENT)
Dept: PHYSICAL THERAPY | Facility: HOSPITAL | Age: 53
Setting detail: THERAPIES SERIES
Discharge: HOME | End: 2023-08-03
Payer: MEDICARE

## 2023-08-03 DIAGNOSIS — L03.031 CELLULITIS OF FIFTH TOE, RIGHT: Primary | ICD-10-CM

## 2023-08-03 DIAGNOSIS — S91.101D OPEN WOUND OF RIGHT GREAT TOE, SUBSEQUENT ENCOUNTER: Primary | ICD-10-CM

## 2023-08-03 LAB
ALBUMIN SERPL-MCNC: 3.5 G/DL (ref 3.5–5.2)
ALBUMIN/GLOB SERPL: 1.2 G/DL
ALP SERPL-CCNC: 83 U/L (ref 39–117)
ALT SERPL W P-5'-P-CCNC: 17 U/L (ref 1–41)
ANION GAP SERPL CALCULATED.3IONS-SCNC: 8 MMOL/L (ref 5–15)
AST SERPL-CCNC: 17 U/L (ref 1–40)
BASOPHILS # BLD AUTO: 0.05 10*3/MM3 (ref 0–0.2)
BASOPHILS NFR BLD AUTO: 0.9 % (ref 0–1.5)
BILIRUB SERPL-MCNC: 0.5 MG/DL (ref 0–1.2)
BUN SERPL-MCNC: 23 MG/DL (ref 6–20)
BUN/CREAT SERPL: 18.9 (ref 7–25)
CALCIUM SPEC-SCNC: 9.1 MG/DL (ref 8.6–10.5)
CHLORIDE SERPL-SCNC: 104 MMOL/L (ref 98–107)
CO2 SERPL-SCNC: 27 MMOL/L (ref 22–29)
CREAT SERPL-MCNC: 1.22 MG/DL (ref 0.76–1.27)
CRP SERPL-MCNC: <0.3 MG/DL (ref 0–0.5)
DEPRECATED RDW RBC AUTO: 39.7 FL (ref 37–54)
EGFRCR SERPLBLD CKD-EPI 2021: 70.9 ML/MIN/1.73
EOSINOPHIL # BLD AUTO: 0.15 10*3/MM3 (ref 0–0.4)
EOSINOPHIL NFR BLD AUTO: 2.8 % (ref 0.3–6.2)
ERYTHROCYTE [DISTWIDTH] IN BLOOD BY AUTOMATED COUNT: 11.9 % (ref 12.3–15.4)
ERYTHROCYTE [SEDIMENTATION RATE] IN BLOOD: 10 MM/HR (ref 0–20)
GLOBULIN UR ELPH-MCNC: 2.9 GM/DL
GLUCOSE SERPL-MCNC: 297 MG/DL (ref 65–99)
HCT VFR BLD AUTO: 44.2 % (ref 37.5–51)
HGB BLD-MCNC: 15.3 G/DL (ref 13–17.7)
IMM GRANULOCYTES # BLD AUTO: 0.02 10*3/MM3 (ref 0–0.05)
IMM GRANULOCYTES NFR BLD AUTO: 0.4 % (ref 0–0.5)
LYMPHOCYTES # BLD AUTO: 1.89 10*3/MM3 (ref 0.7–3.1)
LYMPHOCYTES NFR BLD AUTO: 35.7 % (ref 19.6–45.3)
MCH RBC QN AUTO: 31.7 PG (ref 26.6–33)
MCHC RBC AUTO-ENTMCNC: 34.6 G/DL (ref 31.5–35.7)
MCV RBC AUTO: 91.5 FL (ref 79–97)
MONOCYTES # BLD AUTO: 0.51 10*3/MM3 (ref 0.1–0.9)
MONOCYTES NFR BLD AUTO: 9.6 % (ref 5–12)
NEUTROPHILS NFR BLD AUTO: 2.68 10*3/MM3 (ref 1.7–7)
NEUTROPHILS NFR BLD AUTO: 50.6 % (ref 42.7–76)
NRBC BLD AUTO-RTO: 0 /100 WBC (ref 0–0.2)
PLATELET # BLD AUTO: 207 10*3/MM3 (ref 140–450)
PMV BLD AUTO: 9.6 FL (ref 6–12)
POTASSIUM SERPL-SCNC: 4.9 MMOL/L (ref 3.5–5.2)
PROT SERPL-MCNC: 6.4 G/DL (ref 6–8.5)
RBC # BLD AUTO: 4.83 10*6/MM3 (ref 4.14–5.8)
SODIUM SERPL-SCNC: 139 MMOL/L (ref 136–145)
WBC NRBC COR # BLD: 5.3 10*3/MM3 (ref 3.4–10.8)

## 2023-08-03 PROCEDURE — 80053 COMPREHEN METABOLIC PANEL: CPT | Performed by: INTERNAL MEDICINE

## 2023-08-03 PROCEDURE — 85025 COMPLETE CBC W/AUTO DIFF WBC: CPT | Performed by: INTERNAL MEDICINE

## 2023-08-03 PROCEDURE — 85652 RBC SED RATE AUTOMATED: CPT | Performed by: INTERNAL MEDICINE

## 2023-08-03 PROCEDURE — 86140 C-REACTIVE PROTEIN: CPT | Performed by: INTERNAL MEDICINE

## 2023-08-03 PROCEDURE — 36415 COLL VENOUS BLD VENIPUNCTURE: CPT | Performed by: INTERNAL MEDICINE

## 2023-08-03 PROCEDURE — 97597 DBRDMT OPN WND 1ST 20 CM/<: CPT

## 2023-08-04 ENCOUNTER — TELEPHONE (OUTPATIENT)
Dept: INTERNAL MEDICINE | Facility: CLINIC | Age: 53
End: 2023-08-04
Payer: MEDICARE

## 2023-08-04 RX ORDER — CALCIUM CITRATE/VITAMIN D3 200MG-6.25
TABLET ORAL
Qty: 180 EACH | Refills: 3 | Status: SHIPPED | OUTPATIENT
Start: 2023-08-04

## 2023-08-10 ENCOUNTER — APPOINTMENT (OUTPATIENT)
Dept: PHYSICAL THERAPY | Facility: HOSPITAL | Age: 53
End: 2023-08-10
Payer: MEDICARE

## 2023-08-15 ENCOUNTER — TELEPHONE (OUTPATIENT)
Dept: INTERNAL MEDICINE | Facility: CLINIC | Age: 53
End: 2023-08-15
Payer: MEDICARE

## 2023-08-15 RX ORDER — BLOOD-GLUCOSE METER
1 EACH MISCELLANEOUS 2 TIMES DAILY
Qty: 1 KIT | Refills: 0 | Status: SHIPPED | OUTPATIENT
Start: 2023-08-15

## 2023-08-15 NOTE — TELEPHONE ENCOUNTER
Pharmacy Name:      [facility]    Pharmacy representative name:     LINA    Pharmacy representative phone number:    [phone]     What medication are you calling in regards to:     METER    What question does the pharmacy have:     PHARMACY STATED PATIENT'S INSURANCE WILL NOT COVER TRUE METRIX METER    PHARMACY REQUESTED A NEW PRESCRIPTION FROM DR MARTINEZ FOR:    ONE TOUCH METER OR ACCU CHEK METER    Who is the provider that prescribed the medication:     DR MARTINEZ

## 2023-08-17 ENCOUNTER — HOSPITAL ENCOUNTER (OUTPATIENT)
Dept: PHYSICAL THERAPY | Facility: HOSPITAL | Age: 53
Setting detail: THERAPIES SERIES
Discharge: HOME | End: 2023-08-17
Payer: MEDICARE

## 2023-08-17 DIAGNOSIS — S91.101D OPEN WOUND OF RIGHT GREAT TOE, SUBSEQUENT ENCOUNTER: Primary | ICD-10-CM

## 2023-08-17 PROCEDURE — 97597 DBRDMT OPN WND 1ST 20 CM/<: CPT

## 2023-08-17 NOTE — THERAPY WOUND CARE TREATMENT
Outpatient Rehabilitation - Wound/Debridement Treatment Note  [facility]     Patient Name: Juarez Hunt  : 1970  MRN: [id number]  Today's Date: 2023                 Admit Date: 2023    Visit Dx:    ICD-10-CM ICD-9-CM   1. Open wound of right great toe, subsequent encounter  S91.101D V58.89     893.0           Patient Active Problem List   Diagnosis    ERIK on CPAP    Type 2 diabetes mellitus without complication, with long-term current use of insulin    Acute pulmonary embolism without acute cor pulmonale    History of pulmonary embolism    Chronic anticoagulation    Obesity (BMI 30-39.9)        Past Medical History:   Diagnosis Date    Arthritis     Blood clot in vein     Diabetes mellitus     Hypertension         Past Surgical History:   Procedure Laterality Date    CARDIAC CATHETERIZATION      IVC FILTER RETRIEVAL      KNEE SURGERY Left 1986    SKIN BIOPSY  2020    toe         EVALUATION   PT Ortho       Row Name 23 1300       Subjective Comments    Subjective Comments Pt reports low-grade fever and sinus congestion today, but reports negative COVID tests.  States R great toe wound has had a little more drainage.  Pt has follow-up at [facility] next week.  -JM       Subjective Pain    Able to rate subjective pain? yes  -JM    Pre-Treatment Pain Level 0  -JM    Post-Treatment Pain Level 0  -JM       Transfers    Sit-Stand [place] (Transfers) independent  -JM    Stand-Sit [place] (Transfers) independent  -    Comment, (Transfers) long sitting for tx  -       Gait/Stairs (Locomotion)    [place] Level (Gait) independent  -    Assistive Device (Gait) other (see comments)  offloading shoe  -              User Key  (r) = Recorded By, (t) = Taken By, (c) = Cosigned By      Initials Name Provider Type    Karolyn Montero, PT Physical Therapist                     LDA Wound       Row Name 23 1300             Wound 23 0800 Right posterior great  toe Diabetic Ulcer    Wound - Properties Group Placement Date: 06/13/23  - Placement Time: 0800  - Present on Hospital Admission: Y  - Side: Right  - Orientation: posterior  - Location: great toe  -MC Primary Wound Type: Diabetic ul  -    Wound Image Images linked: 2  -JM      Dressing Appearance intact;moist drainage  -      Base moist;pink;yellow;slough;subcutaneous;red  friable pale pink/yellow, less granulation, small crease in tissues with depth but has soft end-feel  -      Periwound intact;moist;pink;redness;warm;pale white;macerated  min erythema of toe, min maceration, loose callous rim  -      Periwound Temperature warm  -      Periwound Skin Turgor firm  -      Edges irregular;open;callused  loose edges  -      Wound Length (cm) 1.1 cm  -      Wound Width (cm) 0.8 cm  -      Wound Depth (cm) 0.2 cm  -      Wound Surface Area (cm^2) 0.88 cm^2  -JM      Wound Volume (cm^3) 0.176 cm^3  -      Drainage Characteristics/Odor serosanguineous;malodorous  min odor  -      Drainage Amount moderate  -      Care, Wound cleansed with;wound cleanser;debrided;honey applied  -      Dressing Care dressing applied;collagen;antimicrobial agent applied;foam;silver impregnated  rey, HFBt, optifoam ag, primafix  -      Periwound Care cleansed with pH balanced cleanser;dry periwound area maintained  -      Retired Wound - Properties Group Placement Date: 06/13/23  - Placement Time: 0800  - Present on Hospital Admission: Y  - Side: Right  - Orientation: posterior  - Location: great toe  -MC Primary Wound Type: Diabetic ul  -    Retired Wound - Properties Group Date first assessed: 06/13/23  - Time first assessed: 0800  - Present on Hospital Admission: Y  - Side: Right  - Location: great toe  [facility]              User Key  (r) = Recorded By, (t) = Taken By, (c) = Cosigned By      Initials Name Provider Type    Shanita Lance  TANIA, PT Physical Therapist    Karolyn Montero PT Physical Therapist                      WOUND DEBRIDEMENT  Total area of Debridement: 4cmsq  Debridement Site 1  Location- Site 1: R great toe wound and periwound  Selective Debridement- Site 1: Wound Surface <20cmsq  Instruments- Site 1: #15, scapel, tweezers  Excised Tissue Description- Site 1: minimum, slough, moderate, other (comment) (loose callous rim, hypertrophic nonviable tissues)  Bleeding- Site 1: scant              Therapy Education       Row Name 08/17/23 1300             Therapy Education    Education Details Continue home dressing changes.  Monitor redness and notify [facility] office if noticing increase S&S of infection.  Reinforced NWB R foot for wound to heal.  -GERMAIN      Given Symptoms/condition management;Bandaging/dressing change  -      Program Reinforced  -GERMAIN      How Provided Verbal;Demonstration  -GERMAIN      Provided to Patient;Other (comment)  family  -GERMAIN      Level of Understanding Teach back education performed;Verbalized  -                User Key  (r) = Recorded By, (t) = Taken By, (c) = Cosigned By      Initials Name Provider Type    Karolyn Montero, PT Physical Therapist                    Recommendation and Plan   PT Assessment/Plan       Row Name 08/17/23 1300          PT Assessment    Functional Limitations Other (comment);Performance in self-care ADL  wound management  -     Impairments Integumentary integrity;Impaired sensory integrity  -     Assessment Comments Wound measurements stable since last tx 2 weeks ago, but wound tissues less granulating and pt with buildup of callous rim and loose edges.  Mild increase in redness of great toe.  [facility]  Pt to notify MD if S&S of infection increase.  -        PT Plan    PT Frequency 1x/week  -GERMAIN     Physical Therapy Interventions (Optional Details) patient/family education;wound care  -     PT Plan Comments debridement, dressings, MIST if pt  agreeable to 2x/week  -GERMAIN               User Key  (r) = Recorded By, (t) = Taken By, (c) = Cosigned By      Initials Name Provider Type    Karolyn Montero, PT Physical Therapist                    Goals   PT OP Goals       Row Name 08/17/23 1300          Time Calculation    PT Goal Re-Cert Due Date 09/27/23  -GERMAIN               User Key  (r) = Recorded By, (t) = Taken By, (c) = Cosigned By      Initials Name Provider Type    Karolyn Montero, PT Physical Therapist                    PT Goal Re-Cert Due Date: 09/27/23            Time Calculation: Start Time: 1300  Untimed Charges  59868-[id number] debridement: 25  Total Minutes  Untimed Charges Total Minutes: 25   Total Minutes: 25  Therapy Charges for Today       Code Description Service Date Service Provider Modifiers Qty    [id number] HC EAMON DEBRIDE OPEN WOUND UP TO 20CM 8/17/2023 Karolyn Lomeli, PT GP 1                    Karolyn Lomeli, PT  8/17/2023

## 2023-08-19 ENCOUNTER — HOSPITAL ENCOUNTER (EMERGENCY)
Facility: HOSPITAL | Age: 53
Discharge: HOME | End: 2023-08-19
Attending: EMERGENCY MEDICINE
Payer: MEDICARE

## 2023-08-19 VITALS
RESPIRATION RATE: 16 BRPM | DIASTOLIC BLOOD PRESSURE: 90 MMHG | SYSTOLIC BLOOD PRESSURE: 175 MMHG | HEIGHT: 74 IN | WEIGHT: 305 LBS | OXYGEN SATURATION: 96 % | TEMPERATURE: 98.2 F | HEART RATE: 95 BPM | BODY MASS INDEX: 39.14 KG/M2

## 2023-08-19 DIAGNOSIS — B34.9 ACUTE VIRAL SYNDROME: Primary | ICD-10-CM

## 2023-08-19 PROCEDURE — 99282 EMERGENCY DEPT VISIT SF MDM: CPT

## 2023-08-19 NOTE — ED PROVIDER NOTES
Subjective   History of Present Illness  Patient presents for evaluation of a fever.  Using a infrared forehead thermometer he spiked a fever to 100.4 øF today.  Over the past 5 days or so he has been experiencing symptoms which he describes as a cold, including some sinus pressure, mild cough.  He has not been having any chest pain or difficulty breathing.  No abdominal pain, nausea, vomiting, no dysuria, urgency, frequency.    Patient states that the primary reason that he came to the ER is because he recently finished a course of IV antibiotics about 1 to 1-1/2 weeks ago for an infection in the great toe of his right foot, osteomyelitis.  He has not noticed any changes to that foot but the fever worried him and he would like this checked out.  He does have a scheduled follow-up appointment with his infectious disease physician on Thursday.    History provided by:  Patient    Review of Systems    Past Medical History:   Diagnosis Date    Arthritis     Blood clot in vein     Diabetes mellitus     Hypertension        No Known Allergies    Past Surgical History:   Procedure Laterality Date    CARDIAC CATHETERIZATION  2004    IVC FILTER RETRIEVAL      KNEE SURGERY Left 1986    SKIN BIOPSY  06/13/2020    toe       Family History   Problem Relation Age of Onset    Diabetes Mother     Heart disease Mother     Heart attack Mother     Hypertension Mother     Arthritis Father     Heart attack Maternal Grandfather     Heart attack Maternal Uncle     Liver cancer Maternal Aunt        Social History     Socioeconomic History    Marital status:    Tobacco Use    Smoking status: Former    Smokeless tobacco: Current     Types: Chew   Vaping Use    Vaping Use: Never used   Substance and Sexual Activity    Alcohol use: Yes     Comment: 1/4 glass per week    Drug use: No    Sexual activity: Defer           Objective   Physical Exam  Constitutional:       General: He is not in acute distress.     Appearance: He is not  ill-appearing.   HENT:      Head: Normocephalic and atraumatic.   Eyes:      Conjunctiva/sclera: Conjunctivae normal.      Pupils: Pupils are equal, round, and reactive to light.   Cardiovascular:      Rate and Rhythm: Normal rate and regular rhythm.      Pulses: Normal pulses.      Heart sounds: No murmur heard.    No gallop.   Pulmonary:      Effort: Pulmonary effort is normal. No respiratory distress.      Breath sounds: No wheezing or rales.   Abdominal:      General: Abdomen is flat. There is no distension.      Tenderness: There is no abdominal tenderness. There is no guarding.   Musculoskeletal:         General: No swelling or deformity. Normal range of motion.      Right lower leg: No edema.      Left lower leg: No edema.      Comments: There is a shallow ulcer at the base of the great toe on the right foot.  This appears well-healing.  There is no surrounding erythema.  There is no purulence.  There is no foul odor.  There is no tenderness   Skin:     General: Skin is warm and dry.      Capillary Refill: Capillary refill takes less than 2 seconds.   Neurological:      General: No focal deficit present.      Mental Status: He is alert and oriented to person, place, and time.   Psychiatric:         Mood and Affect: Mood normal.         Behavior: Behavior normal.       Procedures           ED Course                                           Medical Decision Making  At this time patient signs and symptoms are most consistent with a viral upper respiratory infection.  He is outside of any treatment window is for oral antiviral medications.  Clinically he does not have signs of serious bacterial infection including pneumonia, urinary tract infection, intra-abdominal infection.  There is no evidence of active infection on examination of his right great toe and foot.  Believe patient is appropriate for outpatient management at this time and will follow-up with his infectious disease physician.  He was counseled  "strictly on return precautions to the ER.  He was discharged in good condition    Amount and/or Complexity of Data Reviewed  External Data Reviewed: notes.    Risk  OTC drugs.        Final diagnoses:   Acute viral syndrome       ED Disposition  ED Disposition       ED Disposition   Discharge    Condition   Stable    Comment   --           No results found for this or any previous visit (from the past 24 hour(s)).  Note: In addition to lab results from this visit, the labs listed above may include labs taken at another facility or during a different encounter within the last 24 hours. Please correlate lab times with ED admission and discharge times for further clarification of the services performed during this visit.    No orders to display     Vitals:    08/19/23 1931   BP: 175/90   BP Location: Left arm   Patient Position: Sitting   Pulse: 95   Resp: 16   Temp: 98.2 øF (36.8 øC)   TempSrc: Oral   SpO2: 96%   Weight: (!) 138 kg (305 lb)   Height: 188 cm (74\")     Medications - No data to display  ECG/EMG Results (last 24 hours)       ** No results found for the last 24 hours. **          No orders to display           No follow-up provider specified.       Medication List      No changes were made to your prescriptions during this visit.            Magdaleno Gerard MD  08/19/23 1955    "

## 2023-08-19 NOTE — DISCHARGE INSTRUCTIONS
He should drink plenty of fluids to stay well-hydrated.  You may use Tylenol or ibuprofen to help with symptoms and low-grade fevers.  You should keep your scheduled follow-up appointment with your infectious disease specialist on Thursday.  You should return to the emergency room if you develop new or worsening symptoms especially difficulty breathing, chest pain, abdominal pain, nausea, vomiting, burning with urination, or signs of infection in your foot such as pus, redness, foul odor.

## 2023-08-23 ENCOUNTER — PATIENT OUTREACH (OUTPATIENT)
Dept: CASE MANAGEMENT | Facility: OTHER | Age: 53
End: 2023-08-23
Payer: MEDICARE

## 2023-08-23 ENCOUNTER — OFFICE VISIT (OUTPATIENT)
Dept: INTERNAL MEDICINE | Facility: CLINIC | Age: 53
End: 2023-08-23
Payer: MEDICARE

## 2023-08-23 VITALS
HEIGHT: 74 IN | WEIGHT: 304 LBS | SYSTOLIC BLOOD PRESSURE: 132 MMHG | RESPIRATION RATE: 16 BRPM | DIASTOLIC BLOOD PRESSURE: 86 MMHG | HEART RATE: 82 BPM | BODY MASS INDEX: 39.01 KG/M2 | TEMPERATURE: 97 F | OXYGEN SATURATION: 96 %

## 2023-08-23 DIAGNOSIS — I10 HYPERTENSION, UNSPECIFIED TYPE: ICD-10-CM

## 2023-08-23 DIAGNOSIS — E11.621 DIABETIC ULCER OF TOE OF LEFT FOOT ASSOCIATED WITH TYPE 2 DIABETES MELLITUS, WITH NECROSIS OF MUSCLE: Primary | ICD-10-CM

## 2023-08-23 DIAGNOSIS — Z79.4 TYPE 2 DIABETES MELLITUS WITHOUT COMPLICATION, WITH LONG-TERM CURRENT USE OF INSULIN: Primary | ICD-10-CM

## 2023-08-23 DIAGNOSIS — E66.9 OBESITY (BMI 30-39.9): ICD-10-CM

## 2023-08-23 DIAGNOSIS — L97.523 DIABETIC ULCER OF TOE OF LEFT FOOT ASSOCIATED WITH TYPE 2 DIABETES MELLITUS, WITH NECROSIS OF MUSCLE: Primary | ICD-10-CM

## 2023-08-23 DIAGNOSIS — E11.9 TYPE 2 DIABETES MELLITUS WITHOUT COMPLICATION, WITH LONG-TERM CURRENT USE OF INSULIN: Primary | ICD-10-CM

## 2023-08-23 PROBLEM — L97.511 NON-PRESSURE CHRONIC ULCER OF OTHER PART OF RIGHT FOOT LIMITED TO BREAKDOWN OF SKIN: Status: ACTIVE | Noted: 2023-07-07

## 2023-08-23 PROBLEM — E66.09 OBESITY DUE TO EXCESS CALORIES: Status: ACTIVE | Noted: 2023-06-27

## 2023-08-23 PROBLEM — M86.171 OTHER ACUTE OSTEOMYELITIS, RIGHT ANKLE AND FOOT: Status: ACTIVE | Noted: 2023-06-27

## 2023-08-23 PROBLEM — L97.512 NON-PRESSURE CHRONIC ULCER OF OTHER PART OF RIGHT FOOT WITH FAT LAYER EXPOSED: Status: ACTIVE | Noted: 2023-06-27

## 2023-08-23 PROBLEM — A49.01 METHICILLIN SUSCEPTIBLE STAPHYLOCOCCUS AUREUS INFECTION: Status: ACTIVE | Noted: 2023-06-27

## 2023-08-23 PROBLEM — L03.031 CELLULITIS OF TOE OF RIGHT FOOT: Status: ACTIVE | Noted: 2023-06-27

## 2023-08-23 PROBLEM — E11.42 POLYNEUROPATHY IN DIABETES: Status: ACTIVE | Noted: 2023-06-27

## 2023-08-23 PROCEDURE — 3051F HG A1C>EQUAL 7.0%<8.0%: CPT | Performed by: INTERNAL MEDICINE

## 2023-08-23 PROCEDURE — 3079F DIAST BP 80-89 MM HG: CPT | Performed by: INTERNAL MEDICINE

## 2023-08-23 PROCEDURE — 99214 OFFICE O/P EST MOD 30 MIN: CPT | Performed by: INTERNAL MEDICINE

## 2023-08-23 PROCEDURE — 3075F SYST BP GE 130 - 139MM HG: CPT | Performed by: INTERNAL MEDICINE

## 2023-08-23 RX ORDER — AMOXICILLIN AND CLAVULANATE POTASSIUM 875; 125 MG/1; MG/1
1 TABLET, FILM COATED ORAL EVERY 12 HOURS SCHEDULED
Qty: 60 TABLET | Refills: 1 | Status: SHIPPED | OUTPATIENT
Start: 2023-08-23

## 2023-08-23 RX ORDER — ACYCLOVIR 400 MG/1
1 TABLET ORAL
Qty: 9 EACH | Refills: 3 | Status: SHIPPED | OUTPATIENT
Start: 2023-08-23

## 2023-08-23 NOTE — PROGRESS NOTES
Subjective     Patient ID: Juarez Hunt is a 52 y.o. male. Patient is here for management of multiple medical problems.     Chief Complaint   Patient presents with    Diabetes    Foot Ulcer     Right foot     History of Present Illness   Dm  Runnign better.    180 average.      Toe ulcer.      The following portions of the patient's history were reviewed and updated as appropriate: allergies, current medications, past family history, past medical history, past social history, past surgical history and problem list.    Review of Systems    Current Outpatient Medications:     allopurinol (Zyloprim) 100 MG tablet, Take 1 tablet by mouth Daily., Disp: 90 tablet, Rfl: 3    Blood Glucose Monitoring Suppl ([employer] Blood Glucose Meter) w/Device kit, 1 each 2 (Two) Times a Day. E11.9, Disp: 1 kit, Rfl: 0    Diclofenac Sodium (VOLTAREN) 1 % gel gel, Apply 4 g topically to the appropriate area as directed 4 (Four) Times a Day As Needed (pain)., Disp: 100 g, Rfl: 1    Eliquis 5 MG tablet tablet, Take 1 tablet by mouth Every 12 (Twelve) Hours., Disp: 60 tablet, Rfl: 11    glucose blood (True Metrix Blood Glucose Test) test strip, Use as instructed to test blood glucose two times per day, Disp: 180 each, Rfl: 3    Lantus SoloStar 100 UNIT/ML injection pen, Inject 30 units under the skin two times per day as directed, Disp: 15 mL, Rfl: 11    lisinopril (PRINIVIL,ZESTRIL) 20 MG tablet, Take 1 tablet by mouth Daily., Disp: 90 tablet, Rfl: 3    metFORMIN (Glucophage) 500 MG tablet, Take 1 tablet by mouth 2 (Two) Times a Day With Meals., Disp: 60 tablet, Rfl: 11    metoprolol tartrate (LOPRESSOR) 50 MG tablet, Take 1 tablet by mouth 2 (Two) Times a Day., Disp: , Rfl:     sildenafil (REVATIO) 20 MG tablet, Take 1 tablet by mouth 3 (Three) Times a Day., Disp: 90 tablet, Rfl: 3    amoxicillin-clavulanate (AUGMENTIN) 875-125 MG per tablet, Take 1 tablet by mouth Every 12 (Twelve) Hours., Disp: 60 tablet, Rfl: 1    Continuous Blood  "Gluc Sensor (Dexcom G7 Sensor) misc, 1 each Every 10 (Ten) Days., Disp: 9 each, Rfl: 3    silver sulfadiazine (Silvadene) 1 % cream, Apply 1 application  topically to the appropriate area as directed 2 (Two) Times a Day., Disp: 100 g, Rfl: 0    Objective      Blood pressure 132/86, pulse 82, temperature 97 øF (36.1 øC), resp. rate 16, height 188 cm (74\"), weight (!) 138 kg (304 lb), SpO2 96 %.    Physical Exam     General Appearance:    Alert, cooperative, no distress, appears stated age   Head:    Normocephalic, without obvious abnormality, atraumatic   Eyes:    PERRL, conjunctiva/corneas clear, EOM's intact   Ears:    Normal TM's and external ear canals, both ears   Nose:   Nares normal, septum midline, mucosa normal, no drainage   or sinus tenderness   Throat:   Lips, mucosa, and tongue normal; teeth and gums normal   Neck:   Supple, symmetrical, trachea midline, no adenopathy;        thyroid:  No enlargement/tenderness/nodules; no carotid    bruit or JVD   Back:     Symmetric, no curvature, ROM normal, no CVA tenderness   Lungs:     Clear to auscultation bilaterally, respirations unlabored   Chest wall:    No tenderness or deformity   Heart:    Regular rate and rhythm, S1 and S2 normal, no murmur,        rub or gallop   Abdomen:     Soft, non-tender, bowel sounds active all four quadrants,     no masses, no organomegaly   Extremities:   Extremities normal, atraumatic, no cyanosis or edema   Pulses:   2+ and symmetric all extremities   Skin:   Stage 3 ulcer of left great toe. No erythema     Lymph nodes:   Cervical, supraclavicular, and axillary nodes normal   Neurologic:   CNII-XII intact. Normal strength, sensation and reflexes       throughout      Results for orders placed or performed in visit on 08/03/23   Sedimentation Rate    Specimen: Blood   Result Value Ref Range    Sed Rate 10 0 - 20 mm/hr   C-reactive Protein    Specimen: Blood   Result Value Ref Range    C-Reactive Protein <0.30 0.00 - 0.50 mg/dL "   Comprehensive Metabolic Panel    Specimen: Blood   Result Value Ref Range    Glucose 297 (H) 65 - 99 mg/dL    BUN 23 (H) 6 - 20 mg/dL    Creatinine 1.22 0.76 - 1.27 mg/dL    Sodium 139 136 - 145 mmol/L    Potassium 4.9 3.5 - 5.2 mmol/L    Chloride 104 98 - 107 mmol/L    CO2 27.0 22.0 - 29.0 mmol/L    Calcium 9.1 8.6 - 10.5 mg/dL    Total Protein 6.4 6.0 - 8.5 g/dL    Albumin 3.5 3.5 - 5.2 g/dL    ALT (SGPT) 17 1 - 41 U/L    AST (SGOT) 17 1 - 40 U/L    Alkaline Phosphatase 83 39 - 117 U/L    Total Bilirubin 0.5 0.0 - 1.2 mg/dL    Globulin 2.9 gm/dL    A/G Ratio 1.2 g/dL    BUN/Creatinine Ratio 18.9 7.0 - 25.0    Anion Gap 8.0 5.0 - 15.0 mmol/L    eGFR 70.9 >60.0 mL/min/1.73   CBC Auto Differential    Specimen: Blood   Result Value Ref Range    WBC 5.30 3.40 - 10.80 10*3/mm3    RBC 4.83 4.14 - 5.80 10*6/mm3    Hemoglobin 15.3 13.0 - 17.7 g/dL    Hematocrit 44.2 37.5 - 51.0 %    MCV 91.5 79.0 - 97.0 fL    MCH 31.7 26.6 - 33.0 pg    MCHC 34.6 31.5 - 35.7 g/dL    RDW 11.9 (L) 12.3 - 15.4 %    RDW-SD 39.7 37.0 - 54.0 fl    MPV 9.6 6.0 - 12.0 fL    Platelets 207 140 - 450 10*3/mm3    Neutrophil % 50.6 42.7 - 76.0 %    Lymphocyte % 35.7 19.6 - 45.3 %    Monocyte % 9.6 5.0 - 12.0 %    Eosinophil % 2.8 0.3 - 6.2 %    Basophil % 0.9 0.0 - 1.5 %    Immature Grans % 0.4 0.0 - 0.5 %    Neutrophils, Absolute 2.68 1.70 - 7.00 10*3/mm3    Lymphocytes, Absolute 1.89 0.70 - 3.10 10*3/mm3    Monocytes, Absolute 0.51 0.10 - 0.90 10*3/mm3    Eosinophils, Absolute 0.15 0.00 - 0.40 10*3/mm3    Basophils, Absolute 0.05 0.00 - 0.20 10*3/mm3    Immature Grans, Absolute 0.02 0.00 - 0.05 10*3/mm3    nRBC 0.0 0.0 - 0.2 /100 WBC         Assessment & Plan            Component  Ref Range & Units 1 mo ago  (7/3/23) 2 mo ago  (6/21/23) 5 mo ago  (2/28/23) 1 yr ago  (6/14/22) 2 yr ago  (6/11/21) 2 yr ago  (2/23/21)   Hemoglobin A1C  4.80 - 5.60 % 7.40 High  8.10 High  8.10 High  CM 8.40 High  CM 9.0 High  R, CM 9.40 High  CM          Diagnoses and  all orders for this visit:    1. Diabetic ulcer of toe of left foot associated with type 2 diabetes mellitus, with necrosis of muscle (Primary)  -     Ambulatory Referral to Podiatry  -     Comprehensive Metabolic Panel  -     Hemoglobin A1c  -     Vitamin B12  -     CBC & Differential    Other orders  -     Continuous Blood Gluc Sensor (Dexcom G7 Sensor) misc; 1 each Every 10 (Ten) Days.  Dispense: 9 each; Refill: 3  -     silver sulfadiazine (Silvadene) 1 % cream; Apply 1 application  topically to the appropriate area as directed 2 (Two) Times a Day.  Dispense: 100 g; Refill: 0  -     amoxicillin-clavulanate (AUGMENTIN) 875-125 MG per tablet; Take 1 tablet by mouth Every 12 (Twelve) Hours.  Dispense: 60 tablet; Refill: 1      Return in about 3 months (around 11/23/2023).          There are no Patient Instructions on file for this visit.     Jw Guido MD    Assessment & Plan

## 2023-08-23 NOTE — OUTREACH NOTE
AMBULATORY CASE MANAGEMENT NOTE    Name and Relationship of Patient/Support Person: Juarez Hunt - Self    Patient Outreach    AMB  outreach with Patient. Patient had an ED visit with [facility] on 08/19/23. Patient was treated and discharged to home with primary care follow up as instructed. Patient was encouraged to stay hydrated use Tylenol or Ibuprofen to help with symptoms or low grade fevers.    Reviewed preventative measures to decrease risk of infection; wash hands with soap and water frequently, leave shoes by the front door and/or have guest spray bottom of shoes with Lysol before entering the home and avoid known family members or friends who are ill.     Reviewed BG trends and importance of performing daily foot exams. Patient V/U and commitment to do so. States he has purchased a mirror so that he can examine the bottom of his feet.     Reinforced contacting [facility] for any questions or concerns. Next outreach scheduled.    Education Documentation  Unresolved/Worsening Symptoms, taught by Nereida Hawkins, RN at 8/24/2023  8:56 AM.  Learner: Patient  Readiness: Acceptance  Method: Explanation, Teach Back  Response: Verbalizes Understanding    Site Care, taught by Nereida Hawkins, RN at 8/24/2023  8:56 AM.  Learner: Patient  Readiness: Acceptance  Method: Explanation, Teach Back  Response: Verbalizes Understanding    Provider Follow-Up, taught by Nereida Hawkins, RN at 8/24/2023  8:56 AM.  Learner: Patient  Readiness: Acceptance  Method: Explanation, Teach Back  Response: Verbalizes Understanding    Medication Management, taught by Nereida Hawkins, RN at 8/24/2023  8:56 AM.  Learner: Patient  Readiness: Acceptance  Method: Explanation, Teach Back  Response: Verbalizes Understanding    Infection Prevention, taught by Nereida Hawkins, RN at 8/24/2023  8:56 AM.  Learner: Patient  Readiness: Acceptance  Method: Explanation, Teach Back  Response: Verbalizes Understanding    Activity,  taught by Nereida Hawkins, RN at 8/24/2023  8:56 AM.  Learner: Patient  Readiness: Acceptance  Method: Explanation, Teach Back  Response: Verbalizes Understanding          Nereida HENRY  Ambulatory Case Management    8/23/2023, 16:17 EDT

## 2023-08-24 ENCOUNTER — HOSPITAL ENCOUNTER (OUTPATIENT)
Dept: PHYSICAL THERAPY | Facility: HOSPITAL | Age: 53
Setting detail: THERAPIES SERIES
Discharge: HOME | End: 2023-08-24
Payer: MEDICARE

## 2023-08-24 DIAGNOSIS — S91.101D OPEN WOUND OF RIGHT GREAT TOE, SUBSEQUENT ENCOUNTER: Primary | ICD-10-CM

## 2023-08-24 PROCEDURE — 97597 DBRDMT OPN WND 1ST 20 CM/<: CPT

## 2023-08-24 NOTE — THERAPY PROGRESS REPORT/RE-CERT
Outpatient Rehabilitation - Wound/Debridement Progress Note   [place]     Patient Name: Juarez Hunt  : 1970  MRN: [id number]  Today's Date: 2023                 Admit Date: 2023    Visit Dx:    ICD-10-CM ICD-9-CM   1. Open wound of right great toe, subsequent encounter  S91.101D V58.89     893.0       Patient Active Problem List   Diagnosis    ERIK on CPAP    Type 2 diabetes mellitus without complication, with long-term current use of insulin    Acute pulmonary embolism without acute cor pulmonale    History of pulmonary embolism    Chronic anticoagulation    Obesity (BMI 30-39.9)    Benign essential hypertension    Cellulitis of toe of right foot    Encounter for long-term (current) use of insulin    Methicillin susceptible Staphylococcus aureus infection    Non-pressure chronic ulcer of other part of right foot with fat layer exposed    Non-pressure chronic ulcer of other part of right foot limited to breakdown of skin    Other acute osteomyelitis, right ankle and foot    Polyneuropathy in diabetes    Obesity due to excess calories        Past Medical History:   Diagnosis Date    Arthritis     Blood clot in vein     Diabetes mellitus     Hypertension         Past Surgical History:   Procedure Laterality Date    CARDIAC CATHETERIZATION      IVC FILTER RETRIEVAL      KNEE SURGERY Left 1986    SKIN BIOPSY  2020    toe         EVALUATION   PT Ortho       Row Name 23 1300       Subjective Comments    Subjective Comments Pt reports ED visit since last tx due to fever, and was placed on oral abx by ED provider.  -JM       Subjective Pain    Able to rate subjective pain? yes  -JM    Pre-Treatment Pain Level 0  -JM    Post-Treatment Pain Level 0  -JM       Transfers    Sit-Stand [place] (Transfers) independent  -JM    Stand-Sit [place] (Transfers) independent  -JM    Comment, (Transfers) long sitting for tx  -JM       Gait/Stairs (Locomotion)    [place] Level  (Gait) independent  -    Assistive Device (Gait) other (see comments)  offloading shoe  -              User Key  (r) = Recorded By, (t) = Taken By, (c) = Cosigned By      Initials Name Provider Type    Karolyn Montero, PT Physical Therapist                     LDA Wound       Row Name 08/24/23 1300             Wound 06/13/23 0800 Right posterior great toe Diabetic Ulcer    Wound - Properties Group Placement Date: 06/13/23  [facility] [place], [place] Placement Time: 0800  - Present on Hospital Admission: Y  - Side: Right  - Orientation: posterior  - Location: great toe  -MC Primary Wound Type: Diabetic ulc  -    Dressing Appearance intact;moist drainage  -      Base granulating;moist;red;yellow;slough;subcutaneous  improved beefy red granulation  -      Periwound intact;moist;pink;redness;warm;pale white;macerated  min erythema of toe, min maceration, loose callous rim with friable dark areas  -      Periwound Temperature warm  -      Periwound Skin Turgor firm  -      Edges irregular;open;callused  loose edges  -      Wound Length (cm) 1.1 cm  -      Wound Width (cm) 0.9 cm  -      Wound Depth (cm) 0.2 cm  -      Wound Surface Area (cm^2) 0.99 cm^2  -      Wound Volume (cm^3) 0.198 cm^3  -      Drainage Characteristics/Odor serosanguineous  no odor noted  -      Drainage Amount small  -      Care, Wound cleansed with;wound cleanser;debrided;honey applied  -      Dressing Care dressing applied;collagen;antimicrobial agent applied;foam;silver impregnated  rey, HFBt, optifoam ag, primafix  -      Periwound Care cleansed with pH balanced cleanser;dry periwound area maintained  -      Retired Wound - Properties Group Placement Date: 06/13/23  - Placement Time: 0800  - Present on Hospital Admission: Y  - Side: Right  - Orientation: posterior  - Location: great toe  -MC Primary Wound Type: Diabetic ulc  -    Retired Wound - Properties Group Date first assessed: 06/13/23  - Time  first assessed: 0800  - Present on Hospital Admission: Y  - Side: Right  - Location: great toe  - Primary Wound Type: Diabetic ulc  -              User Key  (r) = Recorded By, (t) = Taken By, (c) = Cosigned By      Initials Name Provider Type    Shanita Lance, PT Physical Therapist    Karolyn Montero, PT Physical Therapist                      WOUND DEBRIDEMENT  Total area of Debridement: 4cmsq  Debridement Site 1  Location- Site 1: R great toe wound and periwound  Selective Debridement- Site 1: Wound Surface <20cmsq  Instruments- Site 1: #15, scapel, tweezers  Excised Tissue Description- Site 1: minimum, slough, other (comment) (loose callous rim, periwound callous)  Bleeding- Site 1: none              Therapy Education       Row Name 08/24/23 1300             Therapy Education    Education Details Continue dressing changes and offloading  -      Given Symptoms/condition management;Bandaging/dressing change  -      Program Reinforced  -      How Provided Verbal;Demonstration  -      Provided to Patient;Other (comment)  family  -      Level of Understanding Teach back education performed;Verbalized  -                User Key  (r) = Recorded By, (t) = Taken By, (c) = Cosigned By      Initials Name Provider Type    Karolyn Montero, PT Physical Therapist                    Recommendation and Plan   PT Assessment/Plan       Row Name 08/24/23 1300          PT Assessment    Functional Limitations Other (comment);Performance in self-care ADL  wound management  -     Impairments Integumentary integrity;Impaired sensory integrity  -     Assessment Comments Pt's R great toe wound with improved beefy red granulation and less maceration, no odor, slight improvement in erythema of toe.  Continue with debridement and dressings.  PT will discuss potential addition of MIST with pt next tx if pt able to increase tx frequency.  Pt has met STGs, progressing toward LTGs, but still slow to  heal and pt high risk for loss of limb.  -     Rehab Potential Fair  -     Patient/caregiver participated in establishment of treatment plan and goals Yes  -     Patient would benefit from skilled therapy intervention Yes  -        PT Plan    PT Frequency 1x/week;2x/week  -     Predicted Duration of Therapy Intervention (PT) 24 visits  -     Planned CPT's? PT EAMON DEBRIDE OPEN WOUND UP TO 20 CM: 33149;PT NLFU MIST: 97571;PT NONSELECT DEBRIDE 15 MIN: 76419;PT SELF CARE/MGMT/TRAIN 15 MIN: 25586  -     Physical Therapy Interventions (Optional Details) patient/family education;wound care  -     PT Plan Comments debridement, ?MIST if pt can attend 2x/week tx  -               User Key  (r) = Recorded By, (t) = Taken By, (c) = Cosigned By      Initials Name Provider Type    Karolyn Montero, PT Physical Therapist                    Goals   PT OP Goals       Row Name 08/24/23 1300          PT Short Term Goals    STG Date to Achieve 08/13/23  -     STG 1 Pt will verbalize s/sx of infection.  -     STG 1 Progress Met  -     STG 2 Pt will demonstrate 25% reduction in wound area to indicate healing progress.  -     STG 2 Progress Met  -        Long Term Goals    LTG Date to Achieve 09/27/23  -     LTG 1 Pt will demonstrate independence with clean home dressing changes.  -     LTG 1 Progress Met  -     LTG 2 Pt will demonstrate 75% reduction in wound area to indicate healing progress.  -     LTG 2 Progress Ongoing;Progressing  -     LTG 3 Pt will demonstrate/verbalize independence with daily diabetic foot care.  -     LTG 3 Progress Ongoing  -        Time Calculation    PT Goal Re-Cert Due Date 09/27/23  -               User Key  (r) = Recorded By, (t) = Taken By, (c) = Cosigned By      Initials Name Provider Type    Karolyn Montero, PT Physical Therapist                    PT Goal Re-Cert Due Date: 09/27/23  PT Short Term Goals  STG Date to Achieve: 08/13/23  STG 1: Pt will  verbalize s/sx of infection.  STG 1 Progress: Met  STG 2: Pt will demonstrate 25% reduction in wound area to indicate healing progress.  STG 2 Progress: Met  Long Term Goals  LTG Date to Achieve: 09/27/23  LTG 1: Pt will demonstrate independence with clean home dressing changes.  LTG 1 Progress: Met  LTG 2: Pt will demonstrate 75% reduction in wound area to indicate healing progress.  LTG 2 Progress: Ongoing, Progressing  LTG 3: Pt will demonstrate/verbalize independence with daily diabetic foot care.  LTG 3 Progress: Ongoing      Time Calculation: Start Time: 1300  Untimed Charges  58104-[place] debridement: 25  Total Minutes  Untimed Charges Total Minutes: 25   Total Minutes: 25  Therapy Charges for Today       Code Description Service Date Service Provider Modifiers Qty    [id number] HC EAMON DEBRIDE OPEN WOUND UP TO 20CM 8/24/2023 Karolyn Lomeli, PT GP 1                    Karolyn Lomeli, PT  8/24/2023

## 2023-08-29 ENCOUNTER — LAB (OUTPATIENT)
Dept: LAB | Facility: HOSPITAL | Age: 53
End: 2023-08-29
Payer: MEDICARE

## 2023-08-29 DIAGNOSIS — E11.621 DIABETIC FOOT ULCER WITH OSTEOMYELITIS: ICD-10-CM

## 2023-08-29 DIAGNOSIS — M86.9 DIABETIC FOOT ULCER WITH OSTEOMYELITIS: ICD-10-CM

## 2023-08-29 DIAGNOSIS — L97.509 DIABETIC FOOT ULCER WITH OSTEOMYELITIS: ICD-10-CM

## 2023-08-29 DIAGNOSIS — M86.171 ACUTE OSTEOMYELITIS OF RIGHT ANKLE OR FOOT: ICD-10-CM

## 2023-08-29 DIAGNOSIS — E11.69 DIABETIC FOOT ULCER WITH OSTEOMYELITIS: ICD-10-CM

## 2023-08-29 DIAGNOSIS — L03.031 ABSCESS OF FIFTH TOENAIL OF RIGHT FOOT: ICD-10-CM

## 2023-08-29 LAB
ALBUMIN SERPL-MCNC: 3.9 G/DL (ref 3.5–5.2)
ALBUMIN/GLOB SERPL: 1.6 G/DL
ALP SERPL-CCNC: 82 U/L (ref 39–117)
ALT SERPL W P-5'-P-CCNC: 17 U/L (ref 1–41)
ANION GAP SERPL CALCULATED.3IONS-SCNC: 7 MMOL/L (ref 5–15)
AST SERPL-CCNC: 17 U/L (ref 1–40)
BASOPHILS # BLD AUTO: 0.08 10*3/MM3 (ref 0–0.2)
BASOPHILS NFR BLD AUTO: 1.1 % (ref 0–1.5)
BILIRUB SERPL-MCNC: 0.3 MG/DL (ref 0–1.2)
BUN SERPL-MCNC: 21 MG/DL (ref 6–20)
BUN/CREAT SERPL: 12.8 (ref 7–25)
CALCIUM SPEC-SCNC: 8.9 MG/DL (ref 8.6–10.5)
CHLORIDE SERPL-SCNC: 107 MMOL/L (ref 98–107)
CO2 SERPL-SCNC: 26 MMOL/L (ref 22–29)
CREAT SERPL-MCNC: 1.64 MG/DL (ref 0.76–1.27)
CRP SERPL-MCNC: <0.3 MG/DL (ref 0–0.5)
DEPRECATED RDW RBC AUTO: 39.5 FL (ref 37–54)
EGFRCR SERPLBLD CKD-EPI 2021: 49.7 ML/MIN/1.73
EOSINOPHIL # BLD AUTO: 0.21 10*3/MM3 (ref 0–0.4)
EOSINOPHIL NFR BLD AUTO: 2.9 % (ref 0.3–6.2)
ERYTHROCYTE [DISTWIDTH] IN BLOOD BY AUTOMATED COUNT: 11.6 % (ref 12.3–15.4)
GLOBULIN UR ELPH-MCNC: 2.5 GM/DL
GLUCOSE SERPL-MCNC: 256 MG/DL (ref 65–99)
HCT VFR BLD AUTO: 41.2 % (ref 37.5–51)
HGB BLD-MCNC: 14.3 G/DL (ref 13–17.7)
IMM GRANULOCYTES # BLD AUTO: 0.02 10*3/MM3 (ref 0–0.05)
IMM GRANULOCYTES NFR BLD AUTO: 0.3 % (ref 0–0.5)
LYMPHOCYTES # BLD AUTO: 2.41 10*3/MM3 (ref 0.7–3.1)
LYMPHOCYTES NFR BLD AUTO: 33.6 % (ref 19.6–45.3)
MCH RBC QN AUTO: 32 PG (ref 26.6–33)
MCHC RBC AUTO-ENTMCNC: 34.7 G/DL (ref 31.5–35.7)
MCV RBC AUTO: 92.2 FL (ref 79–97)
MONOCYTES # BLD AUTO: 0.61 10*3/MM3 (ref 0.1–0.9)
MONOCYTES NFR BLD AUTO: 8.5 % (ref 5–12)
NEUTROPHILS NFR BLD AUTO: 3.84 10*3/MM3 (ref 1.7–7)
NEUTROPHILS NFR BLD AUTO: 53.6 % (ref 42.7–76)
NRBC BLD AUTO-RTO: 0 /100 WBC (ref 0–0.2)
PLATELET # BLD AUTO: 226 10*3/MM3 (ref 140–450)
PMV BLD AUTO: 10.5 FL (ref 6–12)
POTASSIUM SERPL-SCNC: 5.5 MMOL/L (ref 3.5–5.2)
PROT SERPL-MCNC: 6.4 G/DL (ref 6–8.5)
RBC # BLD AUTO: 4.47 10*6/MM3 (ref 4.14–5.8)
SODIUM SERPL-SCNC: 140 MMOL/L (ref 136–145)
WBC NRBC COR # BLD: 7.17 10*3/MM3 (ref 3.4–10.8)

## 2023-08-29 PROCEDURE — 86140 C-REACTIVE PROTEIN: CPT

## 2023-08-29 PROCEDURE — 85025 COMPLETE CBC W/AUTO DIFF WBC: CPT | Performed by: INTERNAL MEDICINE

## 2023-08-29 PROCEDURE — 80053 COMPREHEN METABOLIC PANEL: CPT | Performed by: INTERNAL MEDICINE

## 2023-08-29 PROCEDURE — 82607 VITAMIN B-12: CPT | Performed by: INTERNAL MEDICINE

## 2023-08-29 PROCEDURE — 83036 HEMOGLOBIN GLYCOSYLATED A1C: CPT | Performed by: INTERNAL MEDICINE

## 2023-08-30 DIAGNOSIS — Z79.4 TYPE 2 DIABETES MELLITUS WITHOUT COMPLICATION, WITH LONG-TERM CURRENT USE OF INSULIN: Primary | ICD-10-CM

## 2023-08-30 DIAGNOSIS — E11.9 TYPE 2 DIABETES MELLITUS WITHOUT COMPLICATION, WITH LONG-TERM CURRENT USE OF INSULIN: Primary | ICD-10-CM

## 2023-08-30 LAB
HBA1C MFR BLD: 7.6 % (ref 4.8–5.6)
VIT B12 BLD-MCNC: 432 PG/ML (ref 211–946)

## 2023-08-30 NOTE — PROGRESS NOTES
Renal function is out of normal range. Repeat bmp in 1-2 weeks with good hydration. Order in. Please set up rtc In 6 months

## 2023-08-31 ENCOUNTER — TELEPHONE (OUTPATIENT)
Dept: INTERNAL MEDICINE | Facility: CLINIC | Age: 53
End: 2023-08-31
Payer: MEDICARE

## 2023-08-31 ENCOUNTER — TELEPHONE (OUTPATIENT)
Dept: PHYSICAL THERAPY | Facility: HOSPITAL | Age: 53
End: 2023-08-31
Payer: MEDICARE

## 2023-08-31 ENCOUNTER — APPOINTMENT (OUTPATIENT)
Dept: PHYSICAL THERAPY | Facility: HOSPITAL | Age: 53
End: 2023-08-31
Payer: MEDICARE

## 2023-08-31 ENCOUNTER — PATIENT OUTREACH (OUTPATIENT)
Dept: CASE MANAGEMENT | Facility: OTHER | Age: 53
End: 2023-08-31
Payer: MEDICARE

## 2023-08-31 DIAGNOSIS — E66.9 OBESITY (BMI 30-39.9): ICD-10-CM

## 2023-08-31 DIAGNOSIS — I10 HYPERTENSION, UNSPECIFIED TYPE: ICD-10-CM

## 2023-08-31 DIAGNOSIS — Z79.4 TYPE 2 DIABETES MELLITUS WITHOUT COMPLICATION, WITH LONG-TERM CURRENT USE OF INSULIN: Primary | ICD-10-CM

## 2023-08-31 DIAGNOSIS — E11.9 TYPE 2 DIABETES MELLITUS WITHOUT COMPLICATION, WITH LONG-TERM CURRENT USE OF INSULIN: Primary | ICD-10-CM

## 2023-08-31 NOTE — OUTREACH NOTE
CCM End of Month Documentation    This Chronic Medical Management Care Plan for Juarez Hunt, 52 y.o. male, has been monitored and managed and a new plan of care implemented for the month of August.  A cumulative time of 33  minutes was spent on this patient record this month, including face to face visit with provider and patient; chart review.    Regarding the patient's problems: has ERIK on CPAP; Type 2 diabetes mellitus without complication, with long-term current use of insulin; Acute pulmonary embolism without acute cor pulmonale; History of pulmonary embolism; Chronic anticoagulation; Obesity (BMI 30-39.9); Benign essential hypertension; Cellulitis of toe of right foot; Encounter for long-term (current) use of insulin; Methicillin susceptible Staphylococcus aureus infection; Non-pressure chronic ulcer of other part of right foot with fat layer exposed; Non-pressure chronic ulcer of other part of right foot limited to breakdown of skin; Other acute osteomyelitis, right ankle and foot; Polyneuropathy in diabetes; and Obesity due to excess calories on their problem list., the following items were addressed: medical records; medications; changes to medical care; transitions to medical care and any changes can be found within the plan section of the note.  A detailed listing of time spent for chronic care management is tracked within each outreach encounter.  Current medications include:  has a current medication list which includes the following prescription(s): allopurinol, amoxicillin-clavulanate, [employer] blood glucose meter, dexcom g7 sensor, diclofenac sodium, eliquis, true metrix blood glucose test, lantus solostar, lisinopril, metformin, metoprolol tartrate, sildenafil, and silver sulfadiazine. and the patient is reported to be patient is compliant with medication protocol,  Medications are reported to be non-effective in controlling symptoms and changes have been made to the medication protocol, A1c is  8.10, ED visit on 08/19/23.  Regarding these diagnoses, referrals were made to the following provider(s):  none.  All notes on chart for PCP to review.    The patient was monitored remotely for blood glucose; medications; weight.    The patient's physical needs include:  help taking medications as prescribed; medication education.     The patient's mental support needs include:  needs met    The patient's cognitive support needs include:  needs met    The patient's psychosocial support needs include:  medication management or adherence    The patient's functional needs include: health care coverage; medication education    The patient's environmental needs include:  not applicable    Care Plan overall comments:  IV ABX completed; engaged in PT and wound care for osteo of great toe    Refer to previous outreach notes for more information on the areas listed above.    Monthly Billing Diagnoses  (E11.9,  Z79.4) Type 2 diabetes mellitus without complication, with long-term current use of insulin    (I10) Hypertension, unspecified type    (E66.9) Obesity (BMI 30-39.9)    Medications   Medications have been reconciled    Care Plan progress this month:      Recently Modified Care Plans Updates made since 7/31/2023 12:00 AM       Wellness (Adult)           Problem Priority Last Modified     Healthy Nutrition (Wellness) --  6/27/2023 10:48 AM by Nereida Hawkins, ANASTASIA              Goal Recent Progress Last Modified     Healthy Nutrition Achieved --  6/27/2023 10:48 AM by Nereida Hawkins, RN     Evidence-based guidance:   Assess patient perspective on healthy weight, weight loss or weight gain, motivation and readiness for change.   Recommend or set healthy weight goal based on body mass index.   Review current dietary intake and exercise levels.   Encourage small steps toward making change to eating and exercising.   Provide individualized medical nutrition therapy.    Notes:            Task Due Date Last Modified     Alleviate  Barriers to Healthy Eating --  8/24/2023  8:34 AM by Nereida Hawkins RN     Care Management Activities:      - not discussed during this outreach      Notes: 06/27/23                    Diabetes Type 2 (Adult)           Problem Priority Last Modified     Glycemic Management (Diabetes, Type 2) --  6/27/2023 10:50 AM by Nereida Hawkins, RN              Goal Recent Progress Last Modified     Glycemic Management Optimized --  6/27/2023 10:50 AM by Nereida Hawkins RN     Evidence-based guidance:   Anticipate A1C testing (point-of-care) every 3 to 6 months based on goal attainment.   Review mutually-set A1C goal or target range.   Anticipate use of antihyperglycemic with or without insulin and periodic adjustments; consider active involvement of pharmacist.   Provide medical nutrition therapy and development of individualized eating.   Compare self-reported symptoms of hypo or hyperglycemia to blood glucose levels, diet and fluid intake, current medications, psychosocial and physiologic stressors, change in activity and barriers to care adherence.   Promote self-monitoring of blood glucose levels.   Assess and address barriers to management plan, such as food insecurity, age, developmental ability, depression, anxiety, fear of hypoglycemia or weight gain, as well as medication cost, side effects and complicated regimen.   Consider referral to community-based diabetes education program, visiting nurse, community health worker or health .   Encourage regular dental care for treatment of periodontal disease; refer to dental provider when needed.    Notes:            Task Due Date Last Modified     Alleviate Barriers to Glycemic Management --  8/24/2023  8:35 AM by Nereida Hawkins, ANASTASIA     Care Management Activities:      - blood glucose monitoring encouraged  - blood glucose readings reviewed  - self-awareness of signs/symptoms of hypo or hyperglycemia encouraged  - use of blood glucose monitoring log promoted       Notes: 08/23/23               Problem Priority Last Modified     Disease Progression (Diabetes, Type 2) --  6/27/2023 10:50 AM by Nereida Hawkins RN              Goal Recent Progress Last Modified     Disease Progression Prevented or Minimized --  6/27/2023 10:50 AM by Nereida Hawkins, RN     Evidence-based guidance:   Prepare patient for laboratory and diagnostic exams based on risk and presentation.   Encourage lifestyle changes, such as increased intake of plant-based foods, stress reduction, consistent physical activity and smoking cessation to prevent long-term complications and chronic disease.    Individualize activity and exercise recommendations while considering potential limitations, such as neuropathy, retinopathy or the ability to prevent hyperglycemia or hypoglycemia.    Prepare patient for use of pharmacologic therapy that may include antihypertensive, analgesic, prostaglandin E1 with periodic adjustments, based on presenting chronic condition and laboratory results.   Assess signs/symptoms and risk factors for hypertension, sleep-disordered breathing, neuropathy (including changes in gait and balance), retinopathy, nephropathy and sexual dysfunction.   Address pregnancy planning and contraceptive choice, especially when prescribing antihypertensive or statin.   Ensure completion of annual comprehensive foot exam and dilated eye exam.    Implement additional individualized goals and interventions based on identified risk factors.   Prepare patient for consultation or referral for specialist care, such as ophthalmology, neurology, cardiology, podiatry, nephrology or perinatology.    Notes:            Task Due Date Last Modified     Monitor and Manage Follow-up for Comorbidities --  8/24/2023  8:35 AM by Nereida Hawkins, RN     Care Management Activities:      - activity based on tolerance and functional limitations encouraged  - healthy lifestyle promoted  - reduction of sedentary activity  encouraged  - signs/symptoms of comorbidities identified      Notes: 08/23/23                    Obesity (Adult)           Problem Priority Last Modified     Weight Management (Obesity) --  6/27/2023 10:50 AM by Nereida Hawkins RN              Goal Recent Progress Last Modified     Weight Loss Achieved --  6/27/2023 10:50 AM by Nereida Hawkins RN     Evidence-based guidance:   Review medication that may contribute to weight gain, such as corticosteroid, beta-blocker, tricyclic antidepressant, oral antihyperglycemic; advocate for changes when appropriate.   Perform or refer to registered dietitian to perform comprehensive nutrition assessment that includes disordered-eating behaviors, such as binge-eating, emotional or compulsive eating, grazing.    patient regarding health risks of obesity and that weight loss goal of 5 to 10 percent of initial weight will improve risk.   Recommend initial weight loss goal of 3 to 5 percent of bodyweight; increase weight-loss goals based on patient success as achieving greater weight loss continues to reduce risk.   Propose a calorie-reduced diet based on the patient's preferences and health status.   Provide ongoing emotional support or cognitive behavioral therapy and dietitian services (individual, group, virtual) over at least 6 months with a minimum of 14 encounters to best facilitate weight loss.   Provide monthly follow-up for 12 months when weight loss goal is met to assist with maintenance of weight loss.   Encourage increased physical activity or exercise based on individual age, risk, and ability up to 200 to 300 minutes per week that includes aerobic and resistance training.   Encourage reduction in sedentary behaviors by replacing them with nonexercise yet active leisure pursuits.   Identify physical barriers, such as change in posture, balance, gait patterns, joint pain, and environmental barriers to activity.   Consider referral to rehabilitation therapy,  especially when mobility or function is impaired due to osteoarthritis and obesity.   Consider referral to weight-loss program that has published evidence of safety and efficacy if on-site intensive intervention is unavailable or patient preference.   Prepare patient for use of pharmacologic therapy as an adjunct to lifestyle changes based on body mass index, patient agreement and presence of risk factors or comorbidities.   Evaluate efficacy of pharmacologic therapy (weight loss) and tolerance to medication periodically.   Engage in shared decision-making regarding referral to bariatric surgeon for consultation and evaluation when weight-loss goal has not been accomplished by behavioral therapy with or without pharmacologic therapy.    Notes:            Task Due Date Last Modified     Promote Lifestyle and Nutrition Changes to Achieve Weight Loss Goal --  8/24/2023  8:36 AM by Nereida Hawkins RN     Care Management Activities:      - activation or motivation to change monitored  - activity and exercise based on tolerance encouraged  - barriers to physical activity or exercise addressed  - difficulty of making life-long changes acknowledged  - encouragement and support provided      Notes: 08/23/23               Problem Priority Last Modified     Comorbidities (Obesity) --  6/27/2023 10:50 AM by Nereida Hawkins RN              Goal Recent Progress Last Modified     Prevent or Manage Comorbidities --  6/27/2023 10:50 AM by Nereida Hawkins RN     Evidence-based guidance:   Assess risk for or presence of comorbid condition.   Prepare patient for laboratory tests and diagnostic studies, such as fasting blood glucose, hemoglobin A1C, lipid panel, metabolic panel, polysomnography, joint x-ray, magnetic resonance imaging.   Implement additional goals and interventions based on identified comorbidities or risk factors; management is required regardless of patient's ability to achieve or sustain weight loss.   Identify  and support self-management plan for chronic comorbidities; identify barriers to disease management and brainstorm strategies for success.   Support and encourage lifestyle changes and weight loss efforts as fundamental to reducing risk or severity of comorbidities.   Assess for presence of skin breakdown or infection, especially in skin folds, under breasts and abdominal aprons.   Promote meticulous skin hygiene to prevent skin breakdown, such as gentle cleansing, moisturizing; consider barrier protectant (zinc oxide, petroleum jelly), topical antifungal or drying agent.   Assess sleep quality; prepare patient for polysomnography and ERIK (obstructive sleep apnea) treatment based on presentation and sleep study results.   Monitor for signs of anxiety or depression; provide or refer for mental health services when needed.   Encourage routine dental care to prevent or manage periodontal disease.   Prepare patient for use of pharmacologic therapy as treatment for comorbidity; monitor efficacy and manage side effects.    Notes:            Task Due Date Last Modified     Monitor and Manage Follow-up for Comorbidities --  8/24/2023  8:37 AM by Nereida Hawkins RN     Care Management Activities:      - complementary therapy use encouraged  - medication side effects managed  - meticulous skin care promoted  - response to pharmacologic therapy monitored  - signs and symptoms of comorbidities monitored      Notes: Reviewed what a NSAID is and how it is processed               Problem Priority Last Modified     Readiness for Weight Management --  6/27/2023 10:50 AM by Nereida Hawkins RN              Goal Recent Progress Last Modified     Plan for Weight Management Developed --  6/27/2023 10:50 AM by Nereida Hawkins, RN     Evidence-based guidance:   Assess patient perspective on healthy weight, weight loss, motivation, and readiness for weight loss; review current dietary intake and exercise levels.   Review without judgment  previous weight-loss attempts; acknowledge the challenge patient is facing.   Discuss psychologic factors related to previous attempts to lose weight (both successes and failures); identify setbacks as opportunities for growth.   Acknowledge understanding of weight-related stigma, embarrassment; assess and address effect on self-esteem and quality of life.   Discuss barriers, including negative body image, self-efficacy, embarrassment, physical impairments, pain, lack of motivation, competing demands, financial constraints.   Acknowledge and validate that changes in lifestyle and diet may influence social interactions including exclusion from groups and contribute to relapse or discontinuing treatment.   Brainstorm ways to minimize isolation.   Provide anticipatory guidance about benefits of weight loss, including improvement in vitality, self-esteem, sexual health, pain, and mobility, even with small amount of weight loss.   Use motivational interviewing techniques to increase confidence and commitment to change.   Assist patient to set mutual, meaningful goals regarding weight, activity, exercise, and healthy lifestyle; identify change strategies that align with level of readiness for change.    Notes:            Task Due Date Last Modified     Facilitate Readiness for Weight Loss --  8/24/2023  8:37 AM by Nereida Hawkins RN     Care Management Activities:      - activation or motivation to change monitored  - difficulty of making life-long changes acknowledged  - encouragement and support provided  - patient perspective assessed  - set-back potential acknowledged      Notes: 08/23/23                         Current Specialty Plan of Care Status signed by both patient and provider    Instructions   Patient was provided an electronic copy of care plan  CCM services were explained and offered and patient has accepted these services.  Patient has given their written consent to receive CCM services and understands  that this includes the authorization of electronic communication of medical information with the other treating providers.  Patient understands that they may stop CCM services at any time and these changes will be effective at the end of the calendar month and will effectively revocate the agreement of CCM services.  Patient understands that only one practitioner can furnish and be paid for CCM services during one calendar month.  Patient also understands that there may be co-payment or deductible fees in association with CCM services.  Patient will continue with at least monthly follow-up calls with the Ambulatory .    Nereida HENRY  Ambulatory Case Management    8/31/2023, 10:11 EDT

## 2023-08-31 NOTE — TELEPHONE ENCOUNTER
Caller: Nu Hunt    Relationship: Emergency Contact    Best call back number: [phone]     What was the call regarding:    PATIENT'S (WIFE) NU STATED THAT PATIENT WAS INFORMED BY THE PHARMACY THAT HIS MEDICATION NEEDS A PRIOR AUTHORICATION FOR INSURANCE TO COVER     Blood Glucose Monitoring Suppl ([employer] Blood Glucose Meter) w/Device kit       Continuous Blood Gluc Sensor (Dexcom G7 Sensor) misc

## 2023-09-01 DIAGNOSIS — I82.469 DEEP VEIN THROMBOSIS (DVT) OF CALF MUSCLE VEIN, UNSPECIFIED CHRONICITY, UNSPECIFIED LATERALITY: ICD-10-CM

## 2023-09-01 RX ORDER — APIXABAN 5 MG/1
TABLET, FILM COATED ORAL
Qty: 60 TABLET | Refills: 0 | Status: SHIPPED | OUTPATIENT
Start: 2023-09-01

## 2023-09-01 NOTE — TELEPHONE ENCOUNTER
Spoke to patient's wife to let her know that the patient has Medicare, and that means we have to go through DME to get the Dexcom. It has been faxed, but it will take time. It will come through the mail.

## 2023-09-01 NOTE — TELEPHONE ENCOUNTER
WIFE WOULD LIKE TO KNOW IF WE HAVE HEARD ANYTHING ABOUT THE PRIOR AUTHORIZATION FOR THE DEXCOM.     PLEASE CALL [phone]

## 2023-09-07 ENCOUNTER — HOSPITAL ENCOUNTER (OUTPATIENT)
Dept: PHYSICAL THERAPY | Facility: HOSPITAL | Age: 53
Setting detail: THERAPIES SERIES
Discharge: HOME | End: 2023-09-07
Payer: MEDICARE

## 2023-09-07 DIAGNOSIS — S91.101D OPEN WOUND OF RIGHT GREAT TOE, SUBSEQUENT ENCOUNTER: Primary | ICD-10-CM

## 2023-09-07 PROCEDURE — 97597 DBRDMT OPN WND 1ST 20 CM/<: CPT

## 2023-09-07 NOTE — THERAPY WOUND CARE TREATMENT
Outpatient Rehabilitation - Wound/Debridement Treatment Note  [facility]     Patient Name: Juarez Hunt  : 1970  MRN: [id number]  Today's Date: 2023                 Admit Date: 2023    Visit Dx:    ICD-10-CM ICD-9-CM   1. Open wound of right great toe, subsequent encounter  S91.101D V58.89     893.0       Patient Active Problem List   Diagnosis    ERIK on CPAP    Type 2 diabetes mellitus without complication, with long-term current use of insulin    Acute pulmonary embolism without acute cor pulmonale    History of pulmonary embolism    Chronic anticoagulation    Obesity (BMI 30-39.9)    Benign essential hypertension    Cellulitis of toe of right foot    Encounter for long-term (current) use of insulin    Methicillin susceptible Staphylococcus aureus infection    Non-pressure chronic ulcer of other part of right foot with fat layer exposed    Non-pressure chronic ulcer of other part of right foot limited to breakdown of skin    Other acute osteomyelitis, right ankle and foot    Polyneuropathy in diabetes    Obesity due to excess calories        Past Medical History:   Diagnosis Date    Arthritis     Blood clot in vein     Diabetes mellitus     Hypertension         Past Surgical History:   Procedure Laterality Date    CARDIAC CATHETERIZATION      IVC FILTER RETRIEVAL      KNEE SURGERY Left 1986    SKIN BIOPSY  2020    toe         EVALUATION   PT Ortho       Row Name 23 1400       Subjective Comments    Subjective Comments Reports he saw the podiatrist again yesterday, but he would rather continue with wound care here and only see the DPM for his nails, hammer toes, and other related issues.  -MC       Subjective Pain    Able to rate subjective pain? yes  -MC    Pre-Treatment Pain Level 0  -MC    Post-Treatment Pain Level 0  -MC       Transfers    Sit-Stand [place] (Transfers) independent  -MC    Stand-Sit [place] (Transfers) independent  -MC    Comment,  (Transfers) seated EOB for tx  -       Gait/Stairs (Locomotion)    [place] Level (Gait) independent  -    Assistive Device (Gait) other (see comments)  offloading shoe  -              User Key  (r) = Recorded By, (t) = Taken By, (c) = Cosigned By      Initials Name Provider Type     Shanita Massey PT Physical Therapist                     LDA Wound       Row Name 09/07/23 1400             Wound 06/13/23 0800 Right posterior great toe Diabetic Ulcer    Wound - Properties Group Placement Date: 06/13/23  - Placement Time: 0800  - Present on Hospital Admission: Y  - Side: Right  - Orientation: posterior  -MC Location: great toe  -MC Primary Wound Type: Diabetic ul  -    Wound Image Images linked: 1  -      Dressing Appearance intact;moist drainage  -      Base granulating;moist;red  completely granulated  -      Periwound intact;pink;redness;warm  -      Periwound Temperature warm  -      Periwound Skin Turgor firm  -      Edges irregular;open;callused  -      Wound Length (cm) 0.8 cm  -      Wound Width (cm) 0.8 cm  -      Wound Depth (cm) 0.1 cm  -      Wound Surface Area (cm^2) 0.64 cm^2  -      Wound Volume (cm^3) 0.064 cm^3  -      Drainage Characteristics/Odor serosanguineous  no odor noted  -      Drainage Amount small  -      Care, Wound cleansed with;wound cleanser;debrided  -      Dressing Care dressing applied;silver impregnated;collagen;antimicrobial agent applied;low-adherent;foam  rey, HFBt, optifoam Ag, PF tape  -      Periwound Care cleansed with pH balanced cleanser;dry periwound area maintained  -      Retired Wound - Properties Group Placement Date: 06/13/23  - Placement Time: 0800  - Present on Hospital Admission: Y  - Side: Right  - Orientation: posterior  -MC Location: great toe  -MC Primary Wound Type: Diabetic ulc  -    Retired Wound - Properties Group Date first assessed: 06/13/23  - Time first assessed: 0800  -  Present on Hospital Admission: Y  - Side: Right  - Location: great toe  - Primary Wound Type: Diabetic ulc  -              User Key  (r) = Recorded By, (t) = Taken By, (c) = Cosigned By      Initials Name Provider Type    Shanita Lance, PT Physical Therapist                      WOUND DEBRIDEMENT  Total area of Debridement: 3 cm2  Debridement Site 1  Location- Site 1: R great toe wound and periwound  Selective Debridement- Site 1: Wound Surface <20cmsq  Instruments- Site 1: #15, scapel, tweezers  Excised Tissue Description- Site 1: minimum, slough, moderate, other (comment) (callus)  Bleeding- Site 1: none              Therapy Education       Row Name 09/07/23 1400             Therapy Education    Education Details Continue with dressing changes as instructed (holding therahoney unless the wound looks particularly dry). Try to avoid changing daily and change every other day at most. Extra education on difference in wound care and DPM services. Pt may be seen by both providers if truly different services are being performed.  -      Given Symptoms/condition management;Bandaging/dressing change  -      Program Reinforced;Modified  -      How Provided Verbal;Demonstration  -      Provided to Patient;Other (comment)  family  -      Level of Understanding Teach back education performed;Verbalized  -                User Key  (r) = Recorded By, (t) = Taken By, (c) = Cosigned By      Initials Name Provider Type    Shanita Lance, PT Physical Therapist                    Recommendation and Plan   PT Assessment/Plan       Row Name 09/07/23 1400          PT Assessment    Functional Limitations Other (comment);Performance in self-care ADL  wound management  -     Impairments Integumentary integrity;Impaired sensory integrity  -     Assessment Comments Pt with reduced wound dimensions, a fully granulated wound bed, and attached wound edges since his last assessment. He appears to be  progressing very well. PT suspects that his hammer toe procedure by his DPM has helped in keeping the area more appropriately off-loaded.  -     Rehab Potential Fair  -     Patient/caregiver participated in establishment of treatment plan and goals Yes  -     Patient would benefit from skilled therapy intervention Yes  -        PT Plan    PT Frequency 1x/week  -     Physical Therapy Interventions (Optional Details) wound care;patient/family education  -     PT Plan Comments debridement, dressings  -               User Key  (r) = Recorded By, (t) = Taken By, (c) = Cosigned By      Initials Name Provider Type    Shanita Lance, PT Physical Therapist                    Goals   PT OP Goals       Row Name 09/07/23 1445          Time Calculation    PT Goal Re-Cert Due Date 09/27/23  -               User Key  (r) = Recorded By, (t) = Taken By, (c) = Cosigned By      Initials Name Provider Type    Shanita Lance, PT Physical Therapist                    PT Goal Re-Cert Due Date: 09/27/23            Time Calculation: Start Time: 1300  Untimed Charges  22283-Bdulsjxly debridement: 25  Total Minutes  Untimed Charges Total Minutes: 25   Total Minutes: 25              Shanita Massey, PT  9/7/2023

## 2023-09-14 ENCOUNTER — HOSPITAL ENCOUNTER (OUTPATIENT)
Dept: PHYSICAL THERAPY | Facility: HOSPITAL | Age: 53
Setting detail: THERAPIES SERIES
Discharge: HOME | End: 2023-09-14
Payer: MEDICARE

## 2023-09-14 DIAGNOSIS — S91.101D OPEN WOUND OF RIGHT GREAT TOE, SUBSEQUENT ENCOUNTER: Primary | ICD-10-CM

## 2023-09-14 PROCEDURE — 97597 DBRDMT OPN WND 1ST 20 CM/<: CPT

## 2023-09-14 NOTE — THERAPY WOUND CARE TREATMENT
Outpatient Rehabilitation - Wound/Debridement Treatment Note   [facility]     Patient Name: Juarez Hunt  : 1970  MRN: [id number]  Today's Date: 2023                 Admit Date: 2023    Visit Dx:    ICD-10-CM ICD-9-CM   1. Open wound of right great toe, subsequent encounter  S91.101D V58.89     893.0       Patient Active Problem List   Diagnosis    ERIK on CPAP    Type 2 diabetes mellitus without complication, with long-term current use of insulin    Acute pulmonary embolism without acute cor pulmonale    History of pulmonary embolism    Chronic anticoagulation    Obesity (BMI 30-39.9)    Benign essential hypertension    Cellulitis of toe of right foot    Encounter for long-term (current) use of insulin    Methicillin susceptible Staphylococcus aureus infection    Non-pressure chronic ulcer of other part of right foot with fat layer exposed    Non-pressure chronic ulcer of other part of right foot limited to breakdown of skin    Other acute osteomyelitis, right ankle and foot    Polyneuropathy in diabetes    Obesity due to excess calories        Past Medical History:   Diagnosis Date    Arthritis     Blood clot in vein     Diabetes mellitus     Hypertension         Past Surgical History:   Procedure Laterality Date    CARDIAC CATHETERIZATION      IVC FILTER RETRIEVAL      KNEE SURGERY Left 1986    SKIN BIOPSY  2020    toe         EVALUATION   PT Ortho       Row Name 23 1300       Subjective    Subjective Comments No new issues.  -MF       Subjective Pain    Able to rate subjective pain? yes  -MF    Pre-Treatment Pain Level 0  -MF    Post-Treatment Pain Level 0  -MF       Transfers    Sit-Stand [place] (Transfers) independent  -MF    Stand-Sit [place] (Transfers) independent  -MF    Comment, (Transfers) seated EOB for tx  -MF       Gait/Stairs (Locomotion)    [place] Level (Gait) independent  -MF    Assistive Device (Gait) other (see comments)  offloading  shoe  -              User Key  (r) = Recorded By, (t) = Taken By, (c) = Cosigned By      Initials Name Provider Type    Jw Parker, PT Physical Therapist                     LDA Wound       Row Name 09/14/23 1300             Wound 06/13/23 0800 Right posterior great toe Diabetic Ulcer    Wound - Properties Group Placement Date: 06/13/23  [facility] [place]: 0800  - Present on Hospital Admission: Y  - Side: Right  - Orientation: posterior  - Location: great toe  -MC Primary Wound Type: Diabetic ulc  -    Dressing Appearance open to air  -      Base closed/resurfaced;epithelialization  -      Periwound intact;dry  -      Periwound Temperature warm  -      Periwound Skin Turgor soft  -      Edges callused  -      Drainage Amount none  -MF      Care, Wound cleansed with;soap and water;debrided  -      Dressing Care foam;low-adherent;silver impregnated  -      Periwound Care cleansed with pH balanced cleanser  -      Retired Wound - Properties Group Placement Date: 06/13/23  [facility] [place]: 0800  - Present on Hospital Admission: Y  - Side: Right  - Orientation: posterior  - Location: great toe  -MC Primary Wound Type: Diabetic ulc  -    Retired Wound - Properties Group Date first assessed: 06/13/23  - Time first assessed: 0800  - Present on Hospital Admission: Y  - Side: Right  - Location: great toe  -MC Primary Wound Type: Diabetic ulc  -              User Key  (r) = Recorded By, (t) = Taken By, (c) = Cosigned By      Initials Name Provider Type    Jw Parker, PT Physical Therapist    Shanita Lance, PT Physical Therapist                      WOUND DEBRIDEMENT  Total area of Debridement: ~1cm2  Debridement Site 1  Location- Site 1: R great toe wound and periwound  Selective Debridement- Site 1: Wound Surface <20cmsq  Instruments- Site 1: #15, scapel, tweezers  Excised Tissue Description- Site 1: minimum, other (comment) (crusted nonviable  skin)  Bleeding- Site 1: none                 Recommendation and Plan   PT Assessment/Plan       Row Name 09/14/23 1300          PT Assessment    Functional Limitations Other (comment);Performance in self-care ADL  wound care  -     Impairments Integumentary integrity;Impaired sensory integrity  -     Assessment Comments Pt presents with no open area to toe today, but with moderate hypertrophic crust build up. PT was able to debride area to ensure no skin breakdown or open areas underneath hypertrophic skin. PT educated pt on maintaining dressing x1-2 weeks to ensure maturation of wound base prior to Dc of dressing and offloading shoe.  PT will hold further appointments at this time and pt to call if any issues arise in the next 2-3 weeks.  -     Rehab Potential Fair  -     Patient/caregiver participated in establishment of treatment plan and goals Yes  -     Patient would benefit from skilled therapy intervention Yes  -MF        PT Plan    PT Frequency Other (comment)  pt to call with further issues.  -     Physical Therapy Interventions (Optional Details) wound care  -     PT Plan Comments debridement and dressing change, but no further f/u needed at this time.  -               User Key  (r) = Recorded By, (t) = Taken By, (c) = Cosigned By      Initials Name Provider Type    Jw Parekr, PT Physical Therapist                    Goals   PT OP Goals       Row Name 09/14/23 1300          Time Calculation    PT Goal Re-Cert Due Date 09/27/23  -               User Key  (r) = Recorded By, (t) = Taken By, (c) = Cosigned By      Initials Name Provider Type    Jw Parker, PT Physical Therapist                    PT Goal Re-Cert Due Date: 09/27/23            Time Calculation: Start Time: 1300  Untimed Charges  39647-Wmcetxbuu debridement: 25  Total Minutes  Untimed Charges Total Minutes: 25   Total Minutes: 25              Jw Freitas, PT  9/14/2023

## 2023-09-21 ENCOUNTER — DOCUMENTATION (OUTPATIENT)
Dept: PHYSICAL THERAPY | Facility: HOSPITAL | Age: 53
End: 2023-09-21
Payer: MEDICARE

## 2023-09-21 ENCOUNTER — APPOINTMENT (OUTPATIENT)
Dept: PHYSICAL THERAPY | Facility: HOSPITAL | Age: 53
End: 2023-09-21
Payer: MEDICARE

## 2023-09-21 NOTE — THERAPY DISCHARGE NOTE
Outpatient Rehabilitation - Wound/Debridement  Discharge  &  Discharge Summary       Patient Name: Juarez Hunt  : 1970  MRN: [id number]  Today's Date: 2023                  Admit Date: (Not on file)    Visit Dx:  No diagnosis found.    Patient Active Problem List   Diagnosis    ERIK on CPAP    Type 2 diabetes mellitus without complication, with long-term current use of insulin    Acute pulmonary embolism without acute cor pulmonale    History of pulmonary embolism    Chronic anticoagulation    Obesity (BMI 30-39.9)    Benign essential hypertension    Cellulitis of toe of right foot    Encounter for long-term (current) use of insulin    Methicillin susceptible Staphylococcus aureus infection    Non-pressure chronic ulcer of other part of right foot with fat layer exposed    Non-pressure chronic ulcer of other part of right foot limited to breakdown of skin    Other acute osteomyelitis, right ankle and foot    Polyneuropathy in diabetes    Obesity due to excess calories        Past Medical History:   Diagnosis Date    Arthritis     Blood clot in vein     Diabetes mellitus     Hypertension         Past Surgical History:   Procedure Laterality Date    CARDIAC CATHETERIZATION  2004    IVC FILTER RETRIEVAL      KNEE SURGERY Left 1986    SKIN BIOPSY  2020    toe         EVALUATION                WOUND DEBRIDEMENT                            Recommendation and Plan      Goals   PT OP Goals       Row Name 23 1100          PT Short Term Goals    STG 1 Pt will verbalize s/sx of infection.  -     STG 1 Progress Met  -     STG 2 Pt will demonstrate 25% reduction in wound area to indicate healing progress.  -     STG 2 Progress Met  [facility]        Long Term Goals    LTG 1 Pt will demonstrate independence with clean home dressing changes.  -     LTG 1 Progress Met  [facility]     LTG 2 Pt will demonstrate 75% reduction in wound area to indicate healing progress.  -     LTG 2 Progress Met  [facility]      LTG 3 Pt will demonstrate/verbalize independence with daily diabetic foot care.  -     LTG 3 Progress Met  -        Time Calculation    PT Goal Re-Cert Due Date 09/27/23  -               User Key  (r) = Recorded By, (t) = Taken By, (c) = Cosigned By      Initials Name Provider Type     Miky Malhotra, PT Physical Therapist                    Time Calculation:               OP Discharge Summary       Row Name 09/21/23 1155             OP PT Discharge Summary    Date of Discharge 09/21/23  -      Reason for Discharge All goals achieved  -      Outcomes Achieved Able to achieve all goals within established timeline  -      Discharge Instructions/Additional Comments Pt has met all of his wound care goals with no remaining open wounds. Will need new MD referral for any further wound care treatment.  -                User Key  (r) = Recorded By, (t) = Taken By, (c) = Cosigned By      Initials Name Provider Type     Miky Malhotra, PT Physical Therapist                    Miky Malhotra, PT  9/21/2023

## 2023-09-25 ENCOUNTER — LAB (OUTPATIENT)
Dept: LAB | Facility: HOSPITAL | Age: 53
End: 2023-09-25
Payer: MEDICARE

## 2023-09-25 DIAGNOSIS — L03.031 ABSCESS OF FIFTH TOENAIL OF RIGHT FOOT: ICD-10-CM

## 2023-09-25 DIAGNOSIS — M86.9 DIABETIC FOOT ULCER WITH OSTEOMYELITIS: ICD-10-CM

## 2023-09-25 DIAGNOSIS — E11.69 DIABETIC FOOT ULCER WITH OSTEOMYELITIS: ICD-10-CM

## 2023-09-25 DIAGNOSIS — M86.171 ACUTE OSTEOMYELITIS OF RIGHT ANKLE OR FOOT: ICD-10-CM

## 2023-09-25 DIAGNOSIS — L97.509 DIABETIC FOOT ULCER WITH OSTEOMYELITIS: ICD-10-CM

## 2023-09-25 DIAGNOSIS — E11.621 DIABETIC FOOT ULCER WITH OSTEOMYELITIS: ICD-10-CM

## 2023-09-25 LAB
ALBUMIN SERPL-MCNC: 3.8 G/DL (ref 3.5–5.2)
ALBUMIN/GLOB SERPL: 1.5 G/DL
ALP SERPL-CCNC: 81 U/L (ref 39–117)
ALT SERPL W P-5'-P-CCNC: 20 U/L (ref 1–41)
ANION GAP SERPL CALCULATED.3IONS-SCNC: 10.4 MMOL/L (ref 5–15)
AST SERPL-CCNC: 22 U/L (ref 1–40)
BASOPHILS # BLD AUTO: 0.08 10*3/MM3 (ref 0–0.2)
BASOPHILS NFR BLD AUTO: 1.2 % (ref 0–1.5)
BILIRUB SERPL-MCNC: 0.2 MG/DL (ref 0–1.2)
BUN SERPL-MCNC: 22 MG/DL (ref 6–20)
BUN/CREAT SERPL: 15.2 (ref 7–25)
CALCIUM SPEC-SCNC: 9 MG/DL (ref 8.6–10.5)
CHLORIDE SERPL-SCNC: 108 MMOL/L (ref 98–107)
CO2 SERPL-SCNC: 23.6 MMOL/L (ref 22–29)
CREAT SERPL-MCNC: 1.45 MG/DL (ref 0.76–1.27)
CRP SERPL-MCNC: <0.3 MG/DL (ref 0–0.5)
DEPRECATED RDW RBC AUTO: 39.6 FL (ref 37–54)
EGFRCR SERPLBLD CKD-EPI 2021: 57.6 ML/MIN/1.73
EOSINOPHIL # BLD AUTO: 0.2 10*3/MM3 (ref 0–0.4)
EOSINOPHIL NFR BLD AUTO: 3 % (ref 0.3–6.2)
ERYTHROCYTE [DISTWIDTH] IN BLOOD BY AUTOMATED COUNT: 12 % (ref 12.3–15.4)
ERYTHROCYTE [SEDIMENTATION RATE] IN BLOOD: 7 MM/HR (ref 0–20)
GLOBULIN UR ELPH-MCNC: 2.6 GM/DL
GLUCOSE SERPL-MCNC: 191 MG/DL (ref 65–99)
HBA1C MFR BLD: 7.8 % (ref 4.8–5.6)
HCT VFR BLD AUTO: 37.5 % (ref 37.5–51)
HGB BLD-MCNC: 13.1 G/DL (ref 13–17.7)
IMM GRANULOCYTES # BLD AUTO: 0.03 10*3/MM3 (ref 0–0.05)
IMM GRANULOCYTES NFR BLD AUTO: 0.5 % (ref 0–0.5)
LYMPHOCYTES # BLD AUTO: 1.89 10*3/MM3 (ref 0.7–3.1)
LYMPHOCYTES NFR BLD AUTO: 28.7 % (ref 19.6–45.3)
MCH RBC QN AUTO: 31.8 PG (ref 26.6–33)
MCHC RBC AUTO-ENTMCNC: 34.9 G/DL (ref 31.5–35.7)
MCV RBC AUTO: 91 FL (ref 79–97)
MONOCYTES # BLD AUTO: 0.61 10*3/MM3 (ref 0.1–0.9)
MONOCYTES NFR BLD AUTO: 9.3 % (ref 5–12)
NEUTROPHILS NFR BLD AUTO: 3.78 10*3/MM3 (ref 1.7–7)
NEUTROPHILS NFR BLD AUTO: 57.3 % (ref 42.7–76)
NRBC BLD AUTO-RTO: 0 /100 WBC (ref 0–0.2)
PLATELET # BLD AUTO: 216 10*3/MM3 (ref 140–450)
PMV BLD AUTO: 10.3 FL (ref 6–12)
POTASSIUM SERPL-SCNC: 4.7 MMOL/L (ref 3.5–5.2)
PROT SERPL-MCNC: 6.4 G/DL (ref 6–8.5)
RBC # BLD AUTO: 4.12 10*6/MM3 (ref 4.14–5.8)
SODIUM SERPL-SCNC: 142 MMOL/L (ref 136–145)
TSH SERPL DL<=0.05 MIU/L-ACNC: 1.78 UIU/ML (ref 0.27–4.2)
VIT B12 BLD-MCNC: 426 PG/ML (ref 211–946)
WBC NRBC COR # BLD: 6.59 10*3/MM3 (ref 3.4–10.8)

## 2023-09-25 PROCEDURE — 83036 HEMOGLOBIN GLYCOSYLATED A1C: CPT | Performed by: INTERNAL MEDICINE

## 2023-09-25 PROCEDURE — 80053 COMPREHEN METABOLIC PANEL: CPT | Performed by: INTERNAL MEDICINE

## 2023-09-25 PROCEDURE — 84443 ASSAY THYROID STIM HORMONE: CPT | Performed by: INTERNAL MEDICINE

## 2023-09-25 PROCEDURE — 82607 VITAMIN B-12: CPT | Performed by: INTERNAL MEDICINE

## 2023-09-25 PROCEDURE — 86140 C-REACTIVE PROTEIN: CPT

## 2023-09-25 PROCEDURE — 85025 COMPLETE CBC W/AUTO DIFF WBC: CPT | Performed by: INTERNAL MEDICINE

## 2023-09-25 PROCEDURE — 85652 RBC SED RATE AUTOMATED: CPT

## 2023-09-26 ENCOUNTER — TELEPHONE (OUTPATIENT)
Dept: CASE MANAGEMENT | Facility: OTHER | Age: 53
End: 2023-09-26
Payer: MEDICARE

## 2023-09-26 NOTE — TELEPHONE ENCOUNTER
TOO MAE outreach with Patient; unable to leave a voicemail requesting a return call to ANASTASIA Padron with [facility] at [phone].

## 2023-09-27 ENCOUNTER — PATIENT OUTREACH (OUTPATIENT)
Dept: CASE MANAGEMENT | Facility: OTHER | Age: 53
End: 2023-09-27
Payer: MEDICARE

## 2023-09-27 DIAGNOSIS — Z79.4 TYPE 2 DIABETES MELLITUS WITHOUT COMPLICATION, WITH LONG-TERM CURRENT USE OF INSULIN: Primary | ICD-10-CM

## 2023-09-27 DIAGNOSIS — E11.9 TYPE 2 DIABETES MELLITUS WITHOUT COMPLICATION, WITH LONG-TERM CURRENT USE OF INSULIN: Primary | ICD-10-CM

## 2023-09-27 DIAGNOSIS — I10 HYPERTENSION, UNSPECIFIED TYPE: ICD-10-CM

## 2023-09-27 RX ORDER — METOPROLOL TARTRATE 50 MG/1
TABLET, FILM COATED ORAL
Qty: 180 TABLET | Refills: 3 | Status: SHIPPED | OUTPATIENT
Start: 2023-09-27

## 2023-09-27 RX ORDER — ACYCLOVIR 400 MG/1
1 TABLET ORAL
Qty: 9 EACH | Refills: 3 | OUTPATIENT
Start: 2023-09-27

## 2023-09-27 NOTE — TELEPHONE ENCOUNTER
Caller: Nu Hunt    Relationship: Emergency Contact      Best call back number: [phone]      Requested Prescriptions:   Requested Prescriptions     Pending Prescriptions Disp Refills    metoprolol tartrate (LOPRESSOR) 50 MG tablet [Pharmacy Med Name: Metoprolol Tartrate Oral Tablet 50 MG] 180 tablet 0     Sig: TAKE 1 TABLET BY MOUTH 2 TIMES A DAY        Pharmacy where request should be sent: [facility] [address] [place] [address] - [address] [facility] - [phone] [facility] [phone]      Last office visit with prescribing clinician: 8/23/2023   Last telemedicine visit with prescribing clinician: Visit date not found   Next office visit with prescribing clinician: 2/26/2024     Additional details provided by patient: COMPLETELY OUT OF MEDICATION; PATIENT WOULD NEED PEN NEEDLES AS WELL ; PATIENT LIVES OUT FAR PLEASE SEND TODAY THANKS    Does the patient have less than a 3 day supply:  [x] Yes  [] No    Would you like a call back once the refill request has been completed: [x] Yes [] No    If the office needs to give you a call back, can they leave a voicemail: [x] Yes [] No    Geraldine Yi   09/27/23 13:00 EDT

## 2023-09-27 NOTE — OUTREACH NOTE
"AMBULATORY CASE MANAGEMENT NOTE    Name and Relationship of Patient/Support Person: Juarez Hunt - Self    Patient Outreach    AMB CM outreach with Patient and his wife. Patient reports that BG continues to run \"a little on the high side\" but has experienced some hypoglycemia episodes with BG 70. Reviewed AVS from office visit on 08/23/23 and recent labs including A1c 7.8. Patient had several questions regarding what the A1c actually means. Explained that with an A1c of 7.8 it is likely that his glucose levels are only within the recommended range of 70 to 180 about 50% of the time. Patient V/U and asked how to increase the 50%; Patient was encouraged to increase his water intake, increase physical activity and swap out frozen pizza for a cauliflower crust option.     AMB CM spoke with Pt and wife regarding status of CGM and that it may be a few more days before we have an answer on whether or not the CGM is covered under pharmacy benefits.    Reinforced contacting AMB CM for any questions or concerns.    Care Coordination    Care coordination with [place], [facility] and [facility]. AMB CM reviewed recommendations for CGM with [place]. CGM order was sent to [facility] and declined; needs approved thru pharmacy benefits not medical per insurance policy. AMB CM completed referral to [employer] (Fax # [phone]) for CGM orders to be reviewed under pharmacy benefits.     Education Documentation  Monitoring Carbohydrate Intake, taught by Nereida Hawkins, RN at 9/27/2023  3:18 PM.  Learner: Significant Other, Patient  Readiness: Eager  Method: Explanation  Response: Verbalizes Understanding, Needs Reinforcement    Hypoglycemia Identification and Treatment, taught by Nereida Hawkins, RN at 9/27/2023  3:18 PM.  Learner: Significant Other, Patient  Readiness: Eager  Method: Explanation  Response: Verbalizes Understanding, Needs Reinforcement    A1C Goal, taught by Nereida Hawkins, RN at 9/27/2023  3:18 PM.  Learner: " Significant Other, Patient  Readiness: Eager  Method: Explanation  Response: Verbalizes Understanding, Needs Reinforcement    Definition, taught by Nereida Hawkins, RN at 9/27/2023  3:18 PM.  Learner: Significant Other, Patient  Readiness: Eager  Method: Explanation  Response: Verbalizes Understanding, Needs Reinforcement          Nereida HENRY  Ambulatory Case Management    9/27/2023, 15:18 EDT

## 2023-09-27 NOTE — TELEPHONE ENCOUNTER
Rx Refill Note  Requested Prescriptions     Pending Prescriptions Disp Refills    metoprolol tartrate (LOPRESSOR) 50 MG tablet [Pharmacy Med Name: Metoprolol Tartrate Oral Tablet 50 MG] 180 tablet 0     Sig: TAKE 1 TABLET BY MOUTH 2 TIMES A DAY      Last office visit with prescribing clinician: 8/23/2023   Last telemedicine visit with prescribing clinician: Visit date not found   Next office visit with prescribing clinician: 2/26/2024                         Would you like a call back once the refill request has been completed: [] Yes [] No    If the office needs to give you a call back, can they leave a voicemail: [] Yes [] No    Armani Landrum MA  09/27/23, 17:32 EDT

## 2023-09-28 ENCOUNTER — APPOINTMENT (OUTPATIENT)
Dept: PHYSICAL THERAPY | Facility: HOSPITAL | Age: 53
End: 2023-09-28
Payer: MEDICARE

## 2023-09-29 ENCOUNTER — PATIENT OUTREACH (OUTPATIENT)
Dept: CASE MANAGEMENT | Facility: OTHER | Age: 53
End: 2023-09-29
Payer: MEDICARE

## 2023-09-29 DIAGNOSIS — E11.9 TYPE 2 DIABETES MELLITUS WITHOUT COMPLICATION, WITH LONG-TERM CURRENT USE OF INSULIN: Primary | ICD-10-CM

## 2023-09-29 DIAGNOSIS — I10 HYPERTENSION, UNSPECIFIED TYPE: ICD-10-CM

## 2023-09-29 DIAGNOSIS — E66.9 OBESITY (BMI 30-39.9): ICD-10-CM

## 2023-09-29 DIAGNOSIS — Z79.4 TYPE 2 DIABETES MELLITUS WITHOUT COMPLICATION, WITH LONG-TERM CURRENT USE OF INSULIN: Primary | ICD-10-CM

## 2023-09-29 NOTE — OUTREACH NOTE
[facility] End of Month Documentation    This Chronic Medical Management Care Plan for Juarez Hunt, 52 y.o. male, has been monitored and managed and a new plan of care implemented for the month of September.  A cumulative time of 56  minutes was spent on this patient record this month, including face to face with provider; phone call with patient; phone call with relative; electronic communication with primary care provider; electronic communication with pharmacist; phone call with pharmacist; chart review, Labs, BG trends, A1c status and CGM.    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  A detailed listing of time spent for chronic care management is tracked within each outreach encounter.  Current medications include:  has a current medication list which includes the following prescription(s): metoprolol tartrate, allopurinol, amoxicillin-clavulanate, cvs blood glucose meter, dexcom g7 sensor, diclofenac sodium, eliquis, true metrix blood glucose test, lantus solostar, lisinopril, metformin, sildenafil, and silver sulfadiazine. and the patient is reported to be patient is compliant with medication  protocol,  Medications are reported to be non-effective in controlling symptoms and changes have been made to the medication protocol, A1c 7.8 09/21/23.  Regarding these diagnoses, referrals were made to the following provider(s):  none.  All notes on chart for PCP to review.    The patient was monitored remotely for blood glucose; medications; weight, Reports some episodes of hypoglycemia with BG 70.    The patient's physical needs include:  medication education; DME supplies; help taking medications as prescribed, CGM recommended; Dexcom 7.     The patient's mental support needs include:  needs met    The patient's cognitive support needs include:  needs met    The patient's psychosocial support needs include:  medication management or adherence    The patient's functional needs include: health care coverage; medication education    The patient's environmental needs include:  not applicable    Care Plan overall comments:  Diabetic Ulcer of great toe has healed, CGM recommended but pending insurance approval with pharmacy benefits.    Refer to previous outreach notes for more information on the areas listed above.    Monthly Billing Diagnoses  (E11.9,  Z79.4) Type 2 diabetes mellitus without complication, with long-term current use of insulin    (I10) Hypertension, unspecified type    (E66.9) Obesity (BMI 30-39.9)    Medications   Medications have been reconciled    Care Plan progress this month:      Recently Modified Care Plans Updates made since 8/29/2023 12:00 AM       Wellness (Adult)           Problem Priority Last Modified     Healthy Nutrition (Wellness) --  6/27/2023 10:48 AM by Nereida Hawkins, RN              Goal Recent Progress Last Modified     Healthy Nutrition Achieved --  6/27/2023 10:48 AM by Nereida Hawkins, RN     Evidence-based guidance:   Assess patient perspective on healthy weight, weight loss or weight gain, motivation and readiness for change.   Recommend or set healthy weight goal based  on body mass index.   Review current dietary intake and exercise levels.   Encourage small steps toward making change to eating and exercising.   Provide individualized medical nutrition therapy.    Notes:            Task Due Date Last Modified     Alleviate Barriers to Healthy Eating --  9/27/2023  2:28 PM by Nereida Hawkins, ANASTASIA     Care Management Activities:      - making healthy food choices encouraged      Notes: 09/27/23                    Diabetes Type 2 (Adult)           Problem Priority Last Modified     Glycemic Management (Diabetes, Type 2) --  6/27/2023 10:50 AM by Nereida Hawkins, RN              Goal Recent Progress Last Modified     Glycemic Management Optimized --  6/27/2023 10:50 AM by Nereida Hawkins, RN     Evidence-based guidance:   Anticipate A1C testing (point-of-care) every 3 to 6 months based on goal attainment.   Review mutually-set A1C goal or target range.   Anticipate use of antihyperglycemic with or without insulin and periodic adjustments; consider active involvement of pharmacist.   Provide medical nutrition therapy and development of individualized eating.   Compare self-reported symptoms of hypo or hyperglycemia to blood glucose levels, diet and fluid intake, current medications, psychosocial and physiologic stressors, change in activity and barriers to care adherence.   Promote self-monitoring of blood glucose levels.   Assess and address barriers to management plan, such as food insecurity, age, developmental ability, depression, anxiety, fear of hypoglycemia or weight gain, as well as medication cost, side effects and complicated regimen.   Consider referral to community-based diabetes education program, visiting nurse, community health worker or health .   Encourage regular dental care for treatment of periodontal disease; refer to dental provider when needed.    Notes:            Task Due Date Last Modified     Alleviate Barriers to Glycemic Management --  9/27/2023  2:29  PM by Nereida Hawkins RN     Care Management Activities:      - barriers to adherence to treatment plan identified  - blood glucose monitoring encouraged  - blood glucose readings reviewed  - mutual A1C goal set or reviewed  - resources required to improve adherence to care identified  - self-awareness of signs/symptoms of hypo or hyperglycemia encouraged  - use of blood glucose monitoring log promoted      Notes: 09/27/23               Problem Priority Last Modified     Disease Progression (Diabetes, Type 2) --  6/27/2023 10:50 AM by Nereida Hawkins RN              Goal Recent Progress Last Modified     Disease Progression Prevented or Minimized --  6/27/2023 10:50 AM by Nereida Hawkins, RN     Evidence-based guidance:   Prepare patient for laboratory and diagnostic exams based on risk and presentation.   Encourage lifestyle changes, such as increased intake of plant-based foods, stress reduction, consistent physical activity and smoking cessation to prevent long-term complications and chronic disease.    Individualize activity and exercise recommendations while considering potential limitations, such as neuropathy, retinopathy or the ability to prevent hyperglycemia or hypoglycemia.    Prepare patient for use of pharmacologic therapy that may include antihypertensive, analgesic, prostaglandin E1 with periodic adjustments, based on presenting chronic condition and laboratory results.   Assess signs/symptoms and risk factors for hypertension, sleep-disordered breathing, neuropathy (including changes in gait and balance), retinopathy, nephropathy and sexual dysfunction.   Address pregnancy planning and contraceptive choice, especially when prescribing antihypertensive or statin.   Ensure completion of annual comprehensive foot exam and dilated eye exam.    Implement additional individualized goals and interventions based on identified risk factors.   Prepare patient for consultation or referral for specialist care,  such as ophthalmology, neurology, cardiology, podiatry, nephrology or perinatology.    Notes:            Task Due Date Last Modified     Monitor and Manage Follow-up for Comorbidities --  9/27/2023  2:29 PM by Nereida Hawkins RN     Care Management Activities:      - activity based on tolerance and functional limitations encouraged  - healthy lifestyle promoted  - reduction of sedentary activity encouraged      Notes: 09/27/23                    Obesity (Adult)           Problem Priority Last Modified     Weight Management (Obesity) --  6/27/2023 10:50 AM by Nereida Hawkins RN              Goal Recent Progress Last Modified     Weight Loss Achieved --  6/27/2023 10:50 AM by Nereida Hawkins, RN     Evidence-based guidance:   Review medication that may contribute to weight gain, such as corticosteroid, beta-blocker, tricyclic antidepressant, oral antihyperglycemic; advocate for changes when appropriate.   Perform or refer to registered dietitian to perform comprehensive nutrition assessment that includes disordered-eating behaviors, such as binge-eating, emotional or compulsive eating, grazing.    patient regarding health risks of obesity and that weight loss goal of 5 to 10 percent of initial weight will improve risk.   Recommend initial weight loss goal of 3 to 5 percent of bodyweight; increase weight-loss goals based on patient success as achieving greater weight loss continues to reduce risk.   Propose a calorie-reduced diet based on the patient's preferences and health status.   Provide ongoing emotional support or cognitive behavioral therapy and dietitian services (individual, group, virtual) over at least 6 months with a minimum of 14 encounters to best facilitate weight loss.   Provide monthly follow-up for 12 months when weight loss goal is met to assist with maintenance of weight loss.   Encourage increased physical activity or exercise based on individual age, risk, and ability up to 200 to 300  minutes per week that includes aerobic and resistance training.   Encourage reduction in sedentary behaviors by replacing them with nonexercise yet active leisure pursuits.   Identify physical barriers, such as change in posture, balance, gait patterns, joint pain, and environmental barriers to activity.   Consider referral to rehabilitation therapy, especially when mobility or function is impaired due to osteoarthritis and obesity.   Consider referral to weight-loss program that has published evidence of safety and efficacy if on-site intensive intervention is unavailable or patient preference.   Prepare patient for use of pharmacologic therapy as an adjunct to lifestyle changes based on body mass index, patient agreement and presence of risk factors or comorbidities.   Evaluate efficacy of pharmacologic therapy (weight loss) and tolerance to medication periodically.   Engage in shared decision-making regarding referral to bariatric surgeon for consultation and evaluation when weight-loss goal has not been accomplished by behavioral therapy with or without pharmacologic therapy.    Notes:            Task Due Date Last Modified     Promote Lifestyle and Nutrition Changes to Achieve Weight Loss Goal --  9/27/2023  2:30 PM by Nereida Hawkins RN     Care Management Activities:      - barriers to physical activity or exercise addressed  - encouragement and support provided      Notes: 09/27/23               Problem Priority Last Modified     Comorbidities (Obesity) --  6/27/2023 10:50 AM by Nereida Hawkins RN              Goal Recent Progress Last Modified     Prevent or Manage Comorbidities --  6/27/2023 10:50 AM by Nereida Hawkins RN     Evidence-based guidance:   Assess risk for or presence of comorbid condition.   Prepare patient for laboratory tests and diagnostic studies, such as fasting blood glucose, hemoglobin A1C, lipid panel, metabolic panel, polysomnography, joint x-ray, magnetic resonance imaging.    Implement additional goals and interventions based on identified comorbidities or risk factors; management is required regardless of patient's ability to achieve or sustain weight loss.   Identify and support self-management plan for chronic comorbidities; identify barriers to disease management and brainstorm strategies for success.   Support and encourage lifestyle changes and weight loss efforts as fundamental to reducing risk or severity of comorbidities.   Assess for presence of skin breakdown or infection, especially in skin folds, under breasts and abdominal aprons.   Promote meticulous skin hygiene to prevent skin breakdown, such as gentle cleansing, moisturizing; consider barrier protectant (zinc oxide, petroleum jelly), topical antifungal or drying agent.   Assess sleep quality; prepare patient for polysomnography and ERIK (obstructive sleep apnea) treatment based on presentation and sleep study results.   Monitor for signs of anxiety or depression; provide or refer for mental health services when needed.   Encourage routine dental care to prevent or manage periodontal disease.   Prepare patient for use of pharmacologic therapy as treatment for comorbidity; monitor efficacy and manage side effects.    Notes:            Task Due Date Last Modified     Monitor and Manage Follow-up for Comorbidities --  9/27/2023  2:30 PM by Nereida Hawkins RN     Care Management Activities:      - not discussed during this outreach      Notes:   Reviewed what a NSAID is and how it is processed               Problem Priority Last Modified     Readiness for Weight Management --  6/27/2023 10:50 AM by Nereida Hawkins RN              Goal Recent Progress Last Modified     Plan for Weight Management Developed --  6/27/2023 10:50 AM by Nereida Hawkins RN     Evidence-based guidance:   Assess patient perspective on healthy weight, weight loss, motivation, and readiness for weight loss; review current dietary intake and exercise  levels.   Review without judgment previous weight-loss attempts; acknowledge the challenge patient is facing.   Discuss psychologic factors related to previous attempts to lose weight (both successes and failures); identify setbacks as opportunities for growth.   Acknowledge understanding of weight-related stigma, embarrassment; assess and address effect on self-esteem and quality of life.   Discuss barriers, including negative body image, self-efficacy, embarrassment, physical impairments, pain, lack of motivation, competing demands, financial constraints.   Acknowledge and validate that changes in lifestyle and diet may influence social interactions including exclusion from groups and contribute to relapse or discontinuing treatment.   Brainstorm ways to minimize isolation.   Provide anticipatory guidance about benefits of weight loss, including improvement in vitality, self-esteem, sexual health, pain, and mobility, even with small amount of weight loss.   Use motivational interviewing techniques to increase confidence and commitment to change.   Assist patient to set mutual, meaningful goals regarding weight, activity, exercise, and healthy lifestyle; identify change strategies that align with level of readiness for change.    Notes:            Task Due Date Last Modified     Facilitate Readiness for Weight Loss --  9/27/2023  2:31 PM by Nereida Hawkins RN     Care Management Activities:      - not discussed during this outreach      Notes:                          Current Specialty Plan of Care Status signed by both patient and provider    Instructions   Patient was provided an electronic copy of care plan  CCM services were explained and offered and patient has accepted these services.  Patient has given their written consent to receive CCM services and understands that this includes the authorization of electronic communication of medical information with the other treating providers.  Patient understands  that they may stop CCM services at any time and these changes will be effective at the end of the calendar month and will effectively revocate the agreement of CCM services.  Patient understands that only one practitioner can furnish and be paid for CCM services during one calendar month.  Patient also understands that there may be co-payment or deductible fees in association with CCM services.  Patient will continue with at least monthly follow-up calls with the Ambulatory .    Nereida HENRY  Ambulatory Case Management    9/29/2023, 11:37 EDT

## 2023-10-02 DIAGNOSIS — I82.469 DEEP VEIN THROMBOSIS (DVT) OF CALF MUSCLE VEIN, UNSPECIFIED CHRONICITY, UNSPECIFIED LATERALITY: ICD-10-CM

## 2023-10-02 RX ORDER — APIXABAN 5 MG/1
TABLET, FILM COATED ORAL
Qty: 60 TABLET | Refills: 0 | Status: SHIPPED | OUTPATIENT
Start: 2023-10-02

## 2023-10-02 NOTE — TELEPHONE ENCOUNTER
Caller:  [id number]  [place], [place] - [address] - [phone] [facility] [phone] FX    Relationship: [employer]    Best call back number: [phone]    Requested Prescriptions:   Requested Prescriptions     Pending Prescriptions Disp Refills    Eliquis 5 MG tablet tablet [Pharmacy Med Name: Eliquis Oral Tablet 5 MG] 60 tablet 0     Sig: TAKE 1 TABLET BY MOUTH EVERY 12 HOURS        Pharmacy where request should be sent: [id number]  [place], [place] - [address] - [phone] [facility] [phone] FX     Last office visit with prescribing clinician: 8/23/2023   Last telemedicine visit with prescribing clinician: Visit date not found   Next office visit with prescribing clinician: 2/26/2024     Additional details provided by patient: HAS 2 LEFT    Does the patient have less than a 3 day supply:  [x] Yes  [] No    Would you like a call back once the refill request has been completed: [x] Yes [] No    If the office needs to give you a call back, can they leave a voicemail: [x] Yes [] No    Jerri Avalos MA   10/02/23 13:28 EDT

## 2023-10-09 ENCOUNTER — PATIENT OUTREACH (OUTPATIENT)
Dept: CASE MANAGEMENT | Facility: OTHER | Age: 53
End: 2023-10-09
Payer: MEDICARE

## 2023-10-09 DIAGNOSIS — E66.9 OBESITY (BMI 30-39.9): ICD-10-CM

## 2023-10-09 DIAGNOSIS — I10 HYPERTENSION, UNSPECIFIED TYPE: ICD-10-CM

## 2023-10-09 DIAGNOSIS — Z79.4 TYPE 2 DIABETES MELLITUS WITHOUT COMPLICATION, WITH LONG-TERM CURRENT USE OF INSULIN: Primary | ICD-10-CM

## 2023-10-09 DIAGNOSIS — E11.9 TYPE 2 DIABETES MELLITUS WITHOUT COMPLICATION, WITH LONG-TERM CURRENT USE OF INSULIN: Primary | ICD-10-CM

## 2023-10-09 PROCEDURE — 99490 CHRNC CARE MGMT STAFF 1ST 20: CPT | Performed by: INTERNAL MEDICINE

## 2023-10-09 NOTE — OUTREACH NOTE
AMBULATORY CASE MANAGEMENT NOTE    Name and Relationship of Patient/Support Person: Juarez Hunt - Self    Patient Outreach    AMB CM outreach with Patient. Provided Patient with name and contact information for his CGM coordinator with [employer]. Encouraged Patient to contact [place] today and if the co pay or out of pocket amount would create a financial hardship for Patient to contact AMB  and review/apply for Dexcom prescription assistance program; Patient V/U and willingness to do so.    Reinforced contacting AMB CM for any questions or concerns.    Care Coordination    Care coordination with [employer]. Representative with [employer] stated that outreach to Patient had been unsuccessful but that they would only continue attempts for a certain period of time. They requested that Patient contacted his CGM coordinator at [phone]. Patient repeated back the contact information correctly to [facility] and stated that he would call them today.    Education Documentation  Risks, taught by Nereida Hawkins, RN at 10/9/2023 12:57 PM.  Learner: Patient  Readiness: Acceptance  Method: Explanation  Response: Verbalizes Understanding    Benefits, taught by Nereida Hawkins, RN at 10/9/2023 12:57 PM.  Learner: Patient  Readiness: Acceptance  Method: Explanation  Response: Verbalizes Understanding    Adjustment to Diet/Medications, taught by Nereida Hawkins, RN at 10/9/2023 12:57 PM.  Learner: Patient  Readiness: Acceptance  Method: Explanation  Response: Verbalizes Understanding          Nereida HENRY  Ambulatory Case Management    10/9/2023, 12:57 EDT

## 2023-10-30 DIAGNOSIS — I82.469 DEEP VEIN THROMBOSIS (DVT) OF CALF MUSCLE VEIN, UNSPECIFIED CHRONICITY, UNSPECIFIED LATERALITY: ICD-10-CM

## 2023-10-31 ENCOUNTER — PATIENT OUTREACH (OUTPATIENT)
Dept: CASE MANAGEMENT | Facility: OTHER | Age: 53
End: 2023-10-31
Payer: MEDICARE

## 2023-10-31 DIAGNOSIS — E11.9 TYPE 2 DIABETES MELLITUS WITHOUT COMPLICATION, WITH LONG-TERM CURRENT USE OF INSULIN: Primary | ICD-10-CM

## 2023-10-31 DIAGNOSIS — I10 HYPERTENSION, UNSPECIFIED TYPE: ICD-10-CM

## 2023-10-31 DIAGNOSIS — E66.9 OBESITY (BMI 30-39.9): ICD-10-CM

## 2023-10-31 DIAGNOSIS — Z79.4 TYPE 2 DIABETES MELLITUS WITHOUT COMPLICATION, WITH LONG-TERM CURRENT USE OF INSULIN: Primary | ICD-10-CM

## 2023-10-31 NOTE — OUTREACH NOTE
[facility] End of Month Documentation    This Chronic Medical Management Care Plan for Juarez Hunt, 53 y.o. male, has been monitored and managed and a new plan of care implemented for the month of October.  A cumulative time of 25  minutes was spent on this patient record this month, including phone call with patient; phone call with pharmacist; chart review, [facility] and CGM, BG trends.    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  A detailed listing of time spent for chronic care management is tracked within each outreach encounter.  Current medications include:  has a current medication list which includes the following prescription(s): metoprolol tartrate, allopurinol, amoxicillin-clavulanate, cvs blood glucose meter, dexcom g7 sensor, diclofenac sodium, eliquis, true metrix blood glucose test, lantus solostar, lisinopril, metformin, sildenafil, and silver sulfadiazine. and the patient is reported to be patient is compliant with medication protocol,  Medications are reported to be non-effective in controlling symptoms and changes have been made to the medication protocol, A1c 7.8  09/21/23.  Regarding these diagnoses, referrals were made to the following provider(s):  none.  All notes on chart for PCP to review.    The patient was monitored remotely for blood glucose; medications; weight, Reports compliance with BG; stated he would call [employer] today.    The patient's physical needs include:  medication education; DME supplies; help taking medications as prescribed, [employer] ([facility]) [phone], Pt reports that he will call on this date.     The patient's mental support needs include:  needs met    The patient's cognitive support needs include:  needs met    The patient's psychosocial support needs include:  medication management or adherence    The patient's functional needs include: health care coverage; medication education    The patient's environmental needs include:  not applicable    Care Plan overall comments:  CGM pending insurarance approval with pharmacy benefits and Pt contacting [employer]    Refer to previous outreach notes for more information on the areas listed above.    Monthly Billing Diagnoses  (E11.9,  Z79.4) Type 2 diabetes mellitus without complication, with long-term current use of insulin    (I10) Hypertension, unspecified type    (E66.9) Obesity (BMI 30-39.9)    Medications   Medications have been reconciled    Care Plan progress this month:      Recently Modified Care Plans Updates made since 9/30/2023 12:00 AM       Diabetes Type 2 (Adult)           Problem Priority Last Modified     Glycemic Management (Diabetes, Type 2) --  6/27/2023 10:50 AM by Nereida Hawkins, RN              Goal Recent Progress Last Modified     Glycemic Management Optimized --  6/27/2023 10:50 AM by Nereida Hawkins, RN     Evidence-based guidance:   Anticipate A1C testing (point-of-care) every 3 to 6 months based on goal attainment.   Review mutually-set A1C goal or target range.   Anticipate use of antihyperglycemic with or without insulin and periodic adjustments;  consider active involvement of pharmacist.   Provide medical nutrition therapy and development of individualized eating.   Compare self-reported symptoms of hypo or hyperglycemia to blood glucose levels, diet and fluid intake, current medications, psychosocial and physiologic stressors, change in activity and barriers to care adherence.   Promote self-monitoring of blood glucose levels.   Assess and address barriers to management plan, such as food insecurity, age, developmental ability, depression, anxiety, fear of hypoglycemia or weight gain, as well as medication cost, side effects and complicated regimen.   Consider referral to community-based diabetes education program, visiting nurse, community health worker or health .   Encourage regular dental care for treatment of periodontal disease; refer to dental provider when needed.    Notes:            Task Due Date Last Modified     Alleviate Barriers to Glycemic Management --  10/9/2023 12:48 PM by Nereida Hawkins RN     Care Management Activities:      - blood glucose monitoring encouraged  - blood glucose readings reviewed  - resources required to improve adherence to care identified  - self-awareness of signs/symptoms of hypo or hyperglycemia encouraged  - use of blood glucose monitoring log promoted      Notes: 10/09/23               Problem Priority Last Modified     Disease Progression (Diabetes, Type 2) --  6/27/2023 10:50 AM by Nereida Hawkins, ANASTASIA              Goal Recent Progress Last Modified     Disease Progression Prevented or Minimized --  6/27/2023 10:50 AM by Nereida Hawkins, RN     Evidence-based guidance:   Prepare patient for laboratory and diagnostic exams based on risk and presentation.   Encourage lifestyle changes, such as increased intake of plant-based foods, stress reduction, consistent physical activity and smoking cessation to prevent long-term complications and chronic disease.    Individualize activity and exercise  recommendations while considering potential limitations, such as neuropathy, retinopathy or the ability to prevent hyperglycemia or hypoglycemia.    Prepare patient for use of pharmacologic therapy that may include antihypertensive, analgesic, prostaglandin E1 with periodic adjustments, based on presenting chronic condition and laboratory results.   Assess signs/symptoms and risk factors for hypertension, sleep-disordered breathing, neuropathy (including changes in gait and balance), retinopathy, nephropathy and sexual dysfunction.   Address pregnancy planning and contraceptive choice, especially when prescribing antihypertensive or statin.   Ensure completion of annual comprehensive foot exam and dilated eye exam.    Implement additional individualized goals and interventions based on identified risk factors.   Prepare patient for consultation or referral for specialist care, such as ophthalmology, neurology, cardiology, podiatry, nephrology or perinatology.    Notes:            Task Due Date Last Modified     Monitor and Manage Follow-up for Comorbidities --  10/9/2023 12:48 PM by Nereida Hawkins RN     Care Management Activities:      - activity based on tolerance and functional limitations encouraged  - healthy lifestyle promoted  - reduction of sedentary activity encouraged      Notes: 10/09/23                    Obesity (Adult)           Problem Priority Last Modified     Weight Management (Obesity) --  6/27/2023 10:50 AM by Nereida Hwakins RN              Goal Recent Progress Last Modified     Weight Loss Achieved --  6/27/2023 10:50 AM by Nereida Hawkins RN     Evidence-based guidance:   Review medication that may contribute to weight gain, such as corticosteroid, beta-blocker, tricyclic antidepressant, oral antihyperglycemic; advocate for changes when appropriate.   Perform or refer to registered dietitian to perform comprehensive nutrition assessment that includes disordered-eating behaviors, such as  binge-eating, emotional or compulsive eating, grazing.    patient regarding health risks of obesity and that weight loss goal of 5 to 10 percent of initial weight will improve risk.   Recommend initial weight loss goal of 3 to 5 percent of bodyweight; increase weight-loss goals based on patient success as achieving greater weight loss continues to reduce risk.   Propose a calorie-reduced diet based on the patient's preferences and health status.   Provide ongoing emotional support or cognitive behavioral therapy and dietitian services (individual, group, virtual) over at least 6 months with a minimum of 14 encounters to best facilitate weight loss.   Provide monthly follow-up for 12 months when weight loss goal is met to assist with maintenance of weight loss.   Encourage increased physical activity or exercise based on individual age, risk, and ability up to 200 to 300 minutes per week that includes aerobic and resistance training.   Encourage reduction in sedentary behaviors by replacing them with nonexercise yet active leisure pursuits.   Identify physical barriers, such as change in posture, balance, gait patterns, joint pain, and environmental barriers to activity.   Consider referral to rehabilitation therapy, especially when mobility or function is impaired due to osteoarthritis and obesity.   Consider referral to weight-loss program that has published evidence of safety and efficacy if on-site intensive intervention is unavailable or patient preference.   Prepare patient for use of pharmacologic therapy as an adjunct to lifestyle changes based on body mass index, patient agreement and presence of risk factors or comorbidities.   Evaluate efficacy of pharmacologic therapy (weight loss) and tolerance to medication periodically.   Engage in shared decision-making regarding referral to bariatric surgeon for consultation and evaluation when weight-loss goal has not been accomplished by behavioral therapy  with or without pharmacologic therapy.    Notes:            Task Due Date Last Modified     Promote Lifestyle and Nutrition Changes to Achieve Weight Loss Goal --  10/9/2023 12:49 PM by Nereida Hawkins RN     Care Management Activities:      - activation or motivation to change monitored  - activity and exercise based on tolerance encouraged  - barriers to physical activity or exercise addressed  - encouragement and support provided      Notes: 10/09/23               Problem Priority Last Modified     Comorbidities (Obesity) --  6/27/2023 10:50 AM by Nereida Hawkins RN              Goal Recent Progress Last Modified     Prevent or Manage Comorbidities --  6/27/2023 10:50 AM by Nereida Hawkins, RN     Evidence-based guidance:   Assess risk for or presence of comorbid condition.   Prepare patient for laboratory tests and diagnostic studies, such as fasting blood glucose, hemoglobin A1C, lipid panel, metabolic panel, polysomnography, joint x-ray, magnetic resonance imaging.   Implement additional goals and interventions based on identified comorbidities or risk factors; management is required regardless of patient's ability to achieve or sustain weight loss.   Identify and support self-management plan for chronic comorbidities; identify barriers to disease management and brainstorm strategies for success.   Support and encourage lifestyle changes and weight loss efforts as fundamental to reducing risk or severity of comorbidities.   Assess for presence of skin breakdown or infection, especially in skin folds, under breasts and abdominal aprons.   Promote meticulous skin hygiene to prevent skin breakdown, such as gentle cleansing, moisturizing; consider barrier protectant (zinc oxide, petroleum jelly), topical antifungal or drying agent.   Assess sleep quality; prepare patient for polysomnography and ERIK (obstructive sleep apnea) treatment based on presentation and sleep study results.   Monitor for signs of anxiety  or depression; provide or refer for mental health services when needed.   Encourage routine dental care to prevent or manage periodontal disease.   Prepare patient for use of pharmacologic therapy as treatment for comorbidity; monitor efficacy and manage side effects.    Notes:            Task Due Date Last Modified     Monitor and Manage Follow-up for Comorbidities --  10/9/2023 12:49 PM by Nereida Hawkins RN     Care Management Activities:      - not discussed during this outreach      Notes:   Reviewed what a NSAID is and how it is processed               Problem Priority Last Modified     Readiness for Weight Management --  6/27/2023 10:50 AM by Nereida Hawkins RN              Goal Recent Progress Last Modified     Plan for Weight Management Developed --  6/27/2023 10:50 AM by Nereida Hawkins RN     Evidence-based guidance:   Assess patient perspective on healthy weight, weight loss, motivation, and readiness for weight loss; review current dietary intake and exercise levels.   Review without judgment previous weight-loss attempts; acknowledge the challenge patient is facing.   Discuss psychologic factors related to previous attempts to lose weight (both successes and failures); identify setbacks as opportunities for growth.   Acknowledge understanding of weight-related stigma, embarrassment; assess and address effect on self-esteem and quality of life.   Discuss barriers, including negative body image, self-efficacy, embarrassment, physical impairments, pain, lack of motivation, competing demands, financial constraints.   Acknowledge and validate that changes in lifestyle and diet may influence social interactions including exclusion from groups and contribute to relapse or discontinuing treatment.   Brainstorm ways to minimize isolation.   Provide anticipatory guidance about benefits of weight loss, including improvement in vitality, self-esteem, sexual health, pain, and mobility, even with small amount of  weight loss.   Use motivational interviewing techniques to increase confidence and commitment to change.   Assist patient to set mutual, meaningful goals regarding weight, activity, exercise, and healthy lifestyle; identify change strategies that align with level of readiness for change.    Notes:            Task Due Date Last Modified     Facilitate Readiness for Weight Loss --  10/9/2023 12:49 PM by Nereida Hawkins RN     Care Management Activities:      - activation or motivation to change monitored      Notes:   10/09/23                         Current Specialty Plan of Care Status signed by both patient and provider    Instructions   Patient was provided an electronic copy of care plan  CCM services were explained and offered and patient has accepted these services.  Patient has given their written consent to receive CCM services and understands that this includes the authorization of electronic communication of medical information with the other treating providers.  Patient understands that they may stop CCM services at any time and these changes will be effective at the end of the calendar month and will effectively revocate the agreement of CCM services.  Patient understands that only one practitioner can furnish and be paid for CCM services during one calendar month.  Patient also understands that there may be co-payment or deductible fees in association with CCM services.  Patient will continue with at least monthly follow-up calls with the Ambulatory .    Nereida HENRY  Ambulatory Case Management    10/31/2023, 09:21 EDT

## 2023-11-01 RX ORDER — APIXABAN 5 MG/1
TABLET, FILM COATED ORAL
Qty: 60 TABLET | Refills: 0 | Status: SHIPPED | OUTPATIENT
Start: 2023-11-01

## 2023-11-06 ENCOUNTER — TELEPHONE (OUTPATIENT)
Dept: CASE MANAGEMENT | Facility: OTHER | Age: 53
End: 2023-11-06
Payer: MEDICARE

## 2023-11-06 DIAGNOSIS — I10 HYPERTENSION, UNSPECIFIED TYPE: ICD-10-CM

## 2023-11-06 DIAGNOSIS — E11.9 TYPE 2 DIABETES MELLITUS WITHOUT COMPLICATION, WITH LONG-TERM CURRENT USE OF INSULIN: Primary | ICD-10-CM

## 2023-11-06 DIAGNOSIS — Z79.4 TYPE 2 DIABETES MELLITUS WITHOUT COMPLICATION, WITH LONG-TERM CURRENT USE OF INSULIN: Primary | ICD-10-CM

## 2023-11-06 DIAGNOSIS — E66.9 OBESITY (BMI 30-39.9): ICD-10-CM

## 2023-11-06 NOTE — TELEPHONE ENCOUNTER
TOO MAE outreach with Patient; unable to leave a voicemail requesting a return call to ANASTASIA Padron with Chronic Care Management at [phone].

## 2023-11-10 DIAGNOSIS — I82.469 DEEP VEIN THROMBOSIS (DVT) OF CALF MUSCLE VEIN, UNSPECIFIED CHRONICITY, UNSPECIFIED LATERALITY: ICD-10-CM

## 2023-11-10 NOTE — TELEPHONE ENCOUNTER
Caller: Nu Hunt    Relationship: Emergency Contact    Best call back number: [phone]     Requested Prescriptions:   Requested Prescriptions     Pending Prescriptions Disp Refills    apixaban (Eliquis) 5 MG tablet tablet 60 tablet 0     Sig: Take 1 tablet by mouth Every 12 (Twelve) Hours.        Pharmacy where request should be sent: [id number], [place] - [address] - [phone] [facility] [phone] FX     Last office visit with prescribing clinician: 8/23/2023   Last telemedicine visit with prescribing clinician: Visit date not found   Next office visit with prescribing clinician: 2/26/2024     Additional details provided by patient: PATIENT HAS 2 DAY SUPPLY OF THIS MEDICATION.    Does the patient have less than a 3 day supply:  [x] Yes  [] No    Would you like a call back once the refill request has been completed: [x] Yes [] No    If the office needs to give you a call back, can they leave a voicemail: [x] Yes [] No    Geraldine Marquez Rep   11/10/23 16:36 EST

## 2023-11-15 ENCOUNTER — TELEPHONE (OUTPATIENT)
Dept: CASE MANAGEMENT | Facility: OTHER | Age: 53
End: 2023-11-15
Payer: MEDICARE

## 2023-11-15 DIAGNOSIS — E66.9 OBESITY (BMI 30-39.9): ICD-10-CM

## 2023-11-15 DIAGNOSIS — I10 HYPERTENSION, UNSPECIFIED TYPE: ICD-10-CM

## 2023-11-15 DIAGNOSIS — Z79.4 TYPE 2 DIABETES MELLITUS WITHOUT COMPLICATION, WITH LONG-TERM CURRENT USE OF INSULIN: Primary | ICD-10-CM

## 2023-11-15 DIAGNOSIS — E11.9 TYPE 2 DIABETES MELLITUS WITHOUT COMPLICATION, WITH LONG-TERM CURRENT USE OF INSULIN: Primary | ICD-10-CM

## 2023-11-15 NOTE — TELEPHONE ENCOUNTER
TOO CM outreach with Patient; left a message with his wife Nu, requesting a return call to ANASTASIA Padron with Chronic Care Management at [phone].

## 2023-12-05 ENCOUNTER — TELEPHONE (OUTPATIENT)
Dept: CASE MANAGEMENT | Facility: OTHER | Age: 53
End: 2023-12-05
Payer: MEDICARE

## 2023-12-05 DIAGNOSIS — I10 HYPERTENSION, UNSPECIFIED TYPE: ICD-10-CM

## 2023-12-05 DIAGNOSIS — E66.9 OBESITY (BMI 30-39.9): ICD-10-CM

## 2023-12-05 DIAGNOSIS — E11.9 TYPE 2 DIABETES MELLITUS WITHOUT COMPLICATION, WITH LONG-TERM CURRENT USE OF INSULIN: Primary | ICD-10-CM

## 2023-12-05 DIAGNOSIS — Z79.4 TYPE 2 DIABETES MELLITUS WITHOUT COMPLICATION, WITH LONG-TERM CURRENT USE OF INSULIN: Primary | ICD-10-CM

## 2023-12-05 NOTE — TELEPHONE ENCOUNTER
TOO MAE outreach with Patient; mail box was full and unable to leave a voicemail requesting a return call to ANASTASIA Padron with Chronic Care Management at [phone].

## 2023-12-19 ENCOUNTER — PATIENT OUTREACH (OUTPATIENT)
Dept: CASE MANAGEMENT | Facility: OTHER | Age: 53
End: 2023-12-19
Payer: MEDICARE

## 2023-12-19 DIAGNOSIS — Z79.4 TYPE 2 DIABETES MELLITUS WITHOUT COMPLICATION, WITH LONG-TERM CURRENT USE OF INSULIN: Primary | ICD-10-CM

## 2023-12-19 DIAGNOSIS — E66.9 OBESITY (BMI 30-39.9): ICD-10-CM

## 2023-12-19 DIAGNOSIS — E11.9 TYPE 2 DIABETES MELLITUS WITHOUT COMPLICATION, WITH LONG-TERM CURRENT USE OF INSULIN: Primary | ICD-10-CM

## 2023-12-19 NOTE — OUTREACH NOTE
AMBULATORY CASE MANAGEMENT NOTE    Name and Relationship of Patient/Support Person: Juarez Hunt - Self    Patient Outreach    AMB  outreach with Patient and his wife. Patient denies any unreported fall, changes in medications or trips to the urgent care or emergency department. Reports compliance with BG monitoring and water intake. States that the average BG is approximately 160's after eating. Educated on moderation of holiday favorites.     Reports that he was not approved for the CGM by his insurance and the out of pocket cost is not affordable. Reviewed upcoming appointments. Encouraged that he schedule the recommended diabetic eye, dental and foot exam after the first of the year. Patient reports compliance with daily foot exams and states that he is wearing approved diabetic footwear to protect this feet.    Reinforced contacting AMB CM for any questions or concerns. Contact information is known to Patient. Next outreach scheduled.    Education Documentation  Prevention of Complications, taught by Nereida Hawkins, RN at 12/19/2023 11:47 AM.  Learner: Significant Other, Patient  Readiness: Acceptance  Method: Explanation  Response: Verbalizes Understanding    Benefits, taught by Nereida Hawkins RN at 12/19/2023 11:47 AM.  Learner: Significant Other, Patient  Readiness: Acceptance  Method: Explanation  Response: Verbalizes Understanding    Portion Management, taught by Nereida Hawkins RN at 12/19/2023 11:47 AM.  Learner: Significant Other, Patient  Readiness: Acceptance  Method: Explanation  Response: Verbalizes Understanding    Emotions and Feelings, taught by Nereida Hawkins RN at 12/19/2023 11:47 AM.  Learner: Significant Other, Patient  Readiness: Acceptance  Method: Explanation  Response: Verbalizes Understanding    Consistent Eating Pattern, taught by Nereida Hawkins RN at 12/19/2023 11:47 AM.  Learner: Significant Other, Patient  Readiness: Acceptance  Method: Explanation  Response:  Verbalizes Understanding    Monitoring Carbohydrate Intake, taught by Nereida Hawkins, RN at 12/19/2023 11:47 AM.  Learner: Significant Other, Patient  Readiness: Acceptance  Method: Explanation  Response: Verbalizes Understanding    Carbohydrate-Containing Foods, taught by Nereida Hawkins, RN at 12/19/2023 11:47 AM.  Learner: Significant Other, Patient  Readiness: Acceptance  Method: Explanation  Response: Verbalizes Understanding    Hypoglycemia Identification and Treatment, taught by Nereida Hawkins, RN at 12/19/2023 11:47 AM.  Learner: Significant Other, Patient  Readiness: Acceptance  Method: Explanation  Response: Verbalizes Understanding    Hyperglycemia Identification and Treatment, taught by Nereida Hawkins RN at 12/19/2023 11:47 AM.  Learner: Significant Other, Patient  Readiness: Acceptance  Method: Explanation  Response: Verbalizes Understanding    Blood Glucose Goal, taught by Nereida Hawkins, RN at 12/19/2023 11:47 AM.  Learner: Significant Other, Patient  Readiness: Acceptance  Method: Explanation  Response: Verbalizes Understanding          Nereida HENRY  Ambulatory Case Management    12/19/2023, 11:48 EST

## 2023-12-26 DIAGNOSIS — I82.469 DEEP VEIN THROMBOSIS (DVT) OF CALF MUSCLE VEIN, UNSPECIFIED CHRONICITY, UNSPECIFIED LATERALITY: ICD-10-CM

## 2023-12-26 RX ORDER — LISINOPRIL 20 MG/1
20 TABLET ORAL DAILY
Qty: 90 TABLET | Refills: 3 | Status: SHIPPED | OUTPATIENT
Start: 2023-12-26

## 2023-12-26 NOTE — TELEPHONE ENCOUNTER
Caller: Nu Hunt    Relationship: Emergency Contact    Best call back number: [phone]     Requested Prescriptions:   Requested Prescriptions     Pending Prescriptions Disp Refills    lisinopril (PRINIVIL,ZESTRIL) 20 MG tablet 90 tablet 3     Sig: Take 1 tablet by mouth Daily.        Pharmacy where request should be sent: [id number], [place] - [address] - [phone]  - [phone] FX     Last office visit with prescribing clinician: 8/23/2023   Last telemedicine visit with prescribing clinician: Visit date not found   Next office visit with prescribing clinician: 2/26/2024         Does the patient have less than a 3 day supply:  [x] Yes  [] No      Geraldine Wagner Rep   12/26/23 13:46 EST

## 2023-12-29 ENCOUNTER — PATIENT OUTREACH (OUTPATIENT)
Dept: CASE MANAGEMENT | Facility: OTHER | Age: 53
End: 2023-12-29
Payer: MEDICARE

## 2023-12-29 DIAGNOSIS — I10 HYPERTENSION, UNSPECIFIED TYPE: ICD-10-CM

## 2023-12-29 DIAGNOSIS — E11.9 TYPE 2 DIABETES MELLITUS WITHOUT COMPLICATION, WITH LONG-TERM CURRENT USE OF INSULIN: Primary | ICD-10-CM

## 2023-12-29 DIAGNOSIS — Z79.4 TYPE 2 DIABETES MELLITUS WITHOUT COMPLICATION, WITH LONG-TERM CURRENT USE OF INSULIN: Primary | ICD-10-CM

## 2023-12-29 DIAGNOSIS — E66.9 OBESITY (BMI 30-39.9): ICD-10-CM

## 2023-12-29 RX ORDER — APIXABAN 5 MG/1
5 TABLET, FILM COATED ORAL EVERY 12 HOURS
Qty: 60 TABLET | Refills: 3 | Status: SHIPPED | OUTPATIENT
Start: 2023-12-29

## 2023-12-29 NOTE — OUTREACH NOTE
[facility] End of Month Documentation    This Chronic Medical Management Care Plan for Juarez Hunt, 53 y.o. male, has been monitored and managed; reviewed and a new plan of care implemented for the month of December.  A cumulative time of 37  minutes was spent on this patient record this month, including phone call with patient; phone call with relative; chart review, Review of BG trends, upcoming appts, care gaps and A1c goal.    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  A detailed listing of time spent for chronic care management is tracked within each outreach encounter.  Current medications include:  has a current medication list which includes the following prescription(s): metoprolol tartrate, allopurinol, amoxicillin-clavulanate, apixaban, cvs blood glucose meter, dexcom g7 sensor, diclofenac sodium, true metrix blood glucose test, lantus solostar, lisinopril, metformin, sildenafil, and silver sulfadiazine. and the patient is reported to be patient is compliant with medication protocol,  Medications are reported to be non-effective in controlling symptoms and changes have been made to the  medication protocol, A1c 7.8 09/21/23.  Regarding these diagnoses, referrals were made to the following provider(s):  none.  All notes on chart for PCP to review.    The patient was monitored remotely for blood glucose; medications, Reports compliance with BG; reports that the CGM was not approved/affordable.    The patient's physical needs include:  medication education, Recommended to call after the first of the year and schedule annual eye exam, dental, foot etc.     The patient's mental support needs include:  needs met; not applicable    The patient's cognitive support needs include:  needs met; not applicable    The patient's psychosocial support needs include:  needs met; not applicable    The patient's functional needs include: medication education    The patient's environmental needs include:  not applicable    Care Plan overall comments:  Review of care gaps, recommend BG ranges, upcoming appointments and moderation over the holidays.    Refer to previous outreach notes for more information on the areas listed above.    Monthly Billing Diagnoses  (E11.9,  Z79.4) Type 2 diabetes mellitus without complication, with long-term current use of insulin    (E66.9) Obesity (BMI 30-39.9)    (I10) Hypertension, unspecified type    Medications   Medications have been reconciled    Care Plan progress this month:      Recently Modified Care Plans Updates made since 11/28/2023 12:00 AM       Diabetes Type 2 (Adult)           Problem Priority Last Modified     Glycemic Management (Diabetes, Type 2) --  6/27/2023 10:50 AM by Nereida Hawkins, RN              Goal Recent Progress Last Modified     Glycemic Management Optimized --  6/27/2023 10:50 AM by Nereida Hawkins, RN     Evidence-based guidance:   Anticipate A1C testing (point-of-care) every 3 to 6 months based on goal attainment.   Review mutually-set A1C goal or target range.   Anticipate use of antihyperglycemic with or without insulin and periodic adjustments;  consider active involvement of pharmacist.   Provide medical nutrition therapy and development of individualized eating.   Compare self-reported symptoms of hypo or hyperglycemia to blood glucose levels, diet and fluid intake, current medications, psychosocial and physiologic stressors, change in activity and barriers to care adherence.   Promote self-monitoring of blood glucose levels.   Assess and address barriers to management plan, such as food insecurity, age, developmental ability, depression, anxiety, fear of hypoglycemia or weight gain, as well as medication cost, side effects and complicated regimen.   Consider referral to community-based diabetes education program, visiting nurse, community health worker or health .   Encourage regular dental care for treatment of periodontal disease; refer to dental provider when needed.    Notes:            Task Due Date Last Modified     Alleviate Barriers to Glycemic Management --  12/19/2023 11:35 AM by Nereida Hawkins RN     Care Management Activities:      - blood glucose monitoring encouraged  - blood glucose readings reviewed  - self-awareness of signs/symptoms of hypo or hyperglycemia encouraged  - use of blood glucose monitoring log promoted      Notes:   12/19/23               Problem Priority Last Modified     Disease Progression (Diabetes, Type 2) --  6/27/2023 10:50 AM by Nereida Hawkins RN              Goal Recent Progress Last Modified     Disease Progression Prevented or Minimized --  6/27/2023 10:50 AM by Nereida Hawkins RN     Evidence-based guidance:   Prepare patient for laboratory and diagnostic exams based on risk and presentation.   Encourage lifestyle changes, such as increased intake of plant-based foods, stress reduction, consistent physical activity and smoking cessation to prevent long-term complications and chronic disease.    Individualize activity and exercise recommendations while considering potential limitations, such as  neuropathy, retinopathy or the ability to prevent hyperglycemia or hypoglycemia.    Prepare patient for use of pharmacologic therapy that may include antihypertensive, analgesic, prostaglandin E1 with periodic adjustments, based on presenting chronic condition and laboratory results.   Assess signs/symptoms and risk factors for hypertension, sleep-disordered breathing, neuropathy (including changes in gait and balance), retinopathy, nephropathy and sexual dysfunction.   Address pregnancy planning and contraceptive choice, especially when prescribing antihypertensive or statin.   Ensure completion of annual comprehensive foot exam and dilated eye exam.    Implement additional individualized goals and interventions based on identified risk factors.   Prepare patient for consultation or referral for specialist care, such as ophthalmology, neurology, cardiology, podiatry, nephrology or perinatology.    Notes:            Task Due Date Last Modified     Monitor and Manage Follow-up for Comorbidities --  12/19/2023 11:36 AM by Nereida Hawkins RN     Care Management Activities:      - healthy lifestyle promoted  - reduction of sedentary activity encouraged  - response to pharmacologic therapy monitored      Notes: 12/19/23                    Obesity (Adult)           Problem Priority Last Modified     Weight Management (Obesity) --  6/27/2023 10:50 AM by Nereida Hawkins RN              Goal Recent Progress Last Modified     Weight Loss Achieved --  6/27/2023 10:50 AM by Nereida Hawkins RN     Evidence-based guidance:   Review medication that may contribute to weight gain, such as corticosteroid, beta-blocker, tricyclic antidepressant, oral antihyperglycemic; advocate for changes when appropriate.   Perform or refer to registered dietitian to perform comprehensive nutrition assessment that includes disordered-eating behaviors, such as binge-eating, emotional or compulsive eating, grazing.    patient regarding  health risks of obesity and that weight loss goal of 5 to 10 percent of initial weight will improve risk.   Recommend initial weight loss goal of 3 to 5 percent of bodyweight; increase weight-loss goals based on patient success as achieving greater weight loss continues to reduce risk.   Propose a calorie-reduced diet based on the patient's preferences and health status.   Provide ongoing emotional support or cognitive behavioral therapy and dietitian services (individual, group, virtual) over at least 6 months with a minimum of 14 encounters to best facilitate weight loss.   Provide monthly follow-up for 12 months when weight loss goal is met to assist with maintenance of weight loss.   Encourage increased physical activity or exercise based on individual age, risk, and ability up to 200 to 300 minutes per week that includes aerobic and resistance training.   Encourage reduction in sedentary behaviors by replacing them with nonexercise yet active leisure pursuits.   Identify physical barriers, such as change in posture, balance, gait patterns, joint pain, and environmental barriers to activity.   Consider referral to rehabilitation therapy, especially when mobility or function is impaired due to osteoarthritis and obesity.   Consider referral to weight-loss program that has published evidence of safety and efficacy if on-site intensive intervention is unavailable or patient preference.   Prepare patient for use of pharmacologic therapy as an adjunct to lifestyle changes based on body mass index, patient agreement and presence of risk factors or comorbidities.   Evaluate efficacy of pharmacologic therapy (weight loss) and tolerance to medication periodically.   Engage in shared decision-making regarding referral to bariatric surgeon for consultation and evaluation when weight-loss goal has not been accomplished by behavioral therapy with or without pharmacologic therapy.    Notes:            Task Due Date Last  Modified     Promote Lifestyle and Nutrition Changes to Achieve Weight Loss Goal --  12/19/2023 11:37 AM by Nereida Hawkins, ANASTASIA     Care Management Activities:      - activation or motivation to change monitored  - activity and exercise based on tolerance encouraged  - encouragement and support provided      Notes:   12/19/23               Problem Priority Last Modified     Comorbidities (Obesity) --  6/27/2023 10:50 AM by Nereida Hawkins, RN              Goal Recent Progress Last Modified     Prevent or Manage Comorbidities --  6/27/2023 10:50 AM by Nereida Hawkins, RN     Evidence-based guidance:   Assess risk for or presence of comorbid condition.   Prepare patient for laboratory tests and diagnostic studies, such as fasting blood glucose, hemoglobin A1C, lipid panel, metabolic panel, polysomnography, joint x-ray, magnetic resonance imaging.   Implement additional goals and interventions based on identified comorbidities or risk factors; management is required regardless of patient's ability to achieve or sustain weight loss.   Identify and support self-management plan for chronic comorbidities; identify barriers to disease management and brainstorm strategies for success.   Support and encourage lifestyle changes and weight loss efforts as fundamental to reducing risk or severity of comorbidities.   Assess for presence of skin breakdown or infection, especially in skin folds, under breasts and abdominal aprons.   Promote meticulous skin hygiene to prevent skin breakdown, such as gentle cleansing, moisturizing; consider barrier protectant (zinc oxide, petroleum jelly), topical antifungal or drying agent.   Assess sleep quality; prepare patient for polysomnography and ERIK (obstructive sleep apnea) treatment based on presentation and sleep study results.   Monitor for signs of anxiety or depression; provide or refer for mental health services when needed.   Encourage routine dental care to prevent or manage  periodontal disease.   Prepare patient for use of pharmacologic therapy as treatment for comorbidity; monitor efficacy and manage side effects.    Notes:            Task Due Date Last Modified     Monitor and Manage Follow-up for Comorbidities --  12/19/2023 11:38 AM by Nereida Hawkins RN     Care Management Activities:      - meticulous skin care promoted      Notes:   12/19/23               Problem Priority Last Modified     Readiness for Weight Management --  6/27/2023 10:50 AM by Nereida Hawkins RN              Goal Recent Progress Last Modified     Plan for Weight Management Developed --  6/27/2023 10:50 AM by Nereida Hawkins RN     Evidence-based guidance:   Assess patient perspective on healthy weight, weight loss, motivation, and readiness for weight loss; review current dietary intake and exercise levels.   Review without judgment previous weight-loss attempts; acknowledge the challenge patient is facing.   Discuss psychologic factors related to previous attempts to lose weight (both successes and failures); identify setbacks as opportunities for growth.   Acknowledge understanding of weight-related stigma, embarrassment; assess and address effect on self-esteem and quality of life.   Discuss barriers, including negative body image, self-efficacy, embarrassment, physical impairments, pain, lack of motivation, competing demands, financial constraints.   Acknowledge and validate that changes in lifestyle and diet may influence social interactions including exclusion from groups and contribute to relapse or discontinuing treatment.   Brainstorm ways to minimize isolation.   Provide anticipatory guidance about benefits of weight loss, including improvement in vitality, self-esteem, sexual health, pain, and mobility, even with small amount of weight loss.   Use motivational interviewing techniques to increase confidence and commitment to change.   Assist patient to set mutual, meaningful goals regarding  weight, activity, exercise, and healthy lifestyle; identify change strategies that align with level of readiness for change.    Notes:            Task Due Date Last Modified     Facilitate Readiness for Weight Loss --  12/19/2023 11:40 AM by Nereida Hawkins RN     Care Management Activities:      - activation or motivation to change monitored  - encouragement and support provided      Notes:   12/19/23                         Current Specialty Plan of Care Status signed by both patient and provider    Instructions   Patient was provided an electronic copy of care plan  CCM services were explained and offered and patient has accepted these services.  Patient has given their written consent to receive CCM services and understands that this includes the authorization of electronic communication of medical information with the other treating providers.  Patient understands that they may stop CCM services at any time and these changes will be effective at the end of the calendar month and will effectively revocate the agreement of CCM services.  Patient understands that only one practitioner can furnish and be paid for CCM services during one calendar month.  Patient also understands that there may be co-payment or deductible fees in association with CCM services.  Patient will continue with at least monthly follow-up calls with the Ambulatory .    Nereida HENRY  Ambulatory Case Management    12/29/2023, 10:41 EST

## 2023-12-29 NOTE — TELEPHONE ENCOUNTER
Rx Refill Note  Requested Prescriptions     Pending Prescriptions Disp Refills    Eliquis 5 MG tablet tablet [Pharmacy Med Name: Eliquis Oral Tablet 5 MG] 60 tablet 0     Sig: TAKE 1 TABLET BY MOUTH EVERY 12 HOURS      Last office visit with prescribing clinician: 8/23/2023   Last telemedicine visit with prescribing clinician: Visit date not found   Next office visit with prescribing clinician: 2/26/2024                         Would you like a call back once the refill request has been completed: [] Yes [] No    If the office needs to give you a call back, can they leave a voicemail: [] Yes [] No    Dulce Duenas MA  12/29/23, 14:51 EST

## 2024-01-18 ENCOUNTER — PATIENT OUTREACH (OUTPATIENT)
Dept: CASE MANAGEMENT | Facility: OTHER | Age: 54
End: 2024-01-18
Payer: MEDICARE

## 2024-01-18 DIAGNOSIS — Z79.4 TYPE 2 DIABETES MELLITUS WITHOUT COMPLICATION, WITH LONG-TERM CURRENT USE OF INSULIN: Primary | ICD-10-CM

## 2024-01-18 DIAGNOSIS — E66.9 OBESITY (BMI 30-39.9): ICD-10-CM

## 2024-01-18 DIAGNOSIS — E11.9 TYPE 2 DIABETES MELLITUS WITHOUT COMPLICATION, WITH LONG-TERM CURRENT USE OF INSULIN: Primary | ICD-10-CM

## 2024-01-18 NOTE — OUTREACH NOTE
"AMBULATORY CASE MANAGEMENT NOTE    Name and Relationship of Patient/Support Person: Juarez Hunt - Self    Patient Outreach    AMB CM outreach with Patient. Patient reports compliance with medications, BG monitoring and having \"mostly\" diabetic diet. Denies any changes to medications, falls or trips to the urgent care or emergency department.    Reviewed most recent labs, upcoming appointments and A1c goals. Provided education on CCM monitoring and if upcoming labs are improved and no changes to medications then Patient is agreeable to monitoring. Denies any current needs of concerns at this time.    Reinforced contacting AMB CM. Next outreach scheduled.    Education Documentation  Prevention of Complications, taught by Nereida Hawkins, RN at 1/18/2024 12:44 PM.  Learner: Patient  Readiness: Acceptance  Method: Explanation  Response: Verbalizes Understanding    Benefits, taught by Nereida Hawkins, RN at 1/18/2024 12:44 PM.  Learner: Patient  Readiness: Acceptance  Method: Explanation  Response: Verbalizes Understanding    Healthy Food Choices, taught by Nereida Hawkins, RN at 1/18/2024 12:44 PM.  Learner: Patient  Readiness: Acceptance  Method: Explanation  Response: Verbalizes Understanding    A1C Goal, taught by Nereida Hawkins, RN at 1/18/2024 12:44 PM.  Learner: Patient  Readiness: Acceptance  Method: Explanation  Response: Verbalizes Understanding    Frequency of Testing, taught by Nereida Hawkins, RN at 1/18/2024 12:44 PM.  Learner: Patient  Readiness: Acceptance  Method: Explanation  Response: Verbalizes Understanding          Nereida HENRY  Ambulatory Case Management    1/18/2024, 12:44 EST  "

## 2024-01-31 ENCOUNTER — PATIENT OUTREACH (OUTPATIENT)
Dept: CASE MANAGEMENT | Facility: OTHER | Age: 54
End: 2024-01-31
Payer: MEDICARE

## 2024-01-31 DIAGNOSIS — E11.9 TYPE 2 DIABETES MELLITUS WITHOUT COMPLICATION, WITH LONG-TERM CURRENT USE OF INSULIN: Primary | ICD-10-CM

## 2024-01-31 DIAGNOSIS — E66.9 OBESITY (BMI 30-39.9): ICD-10-CM

## 2024-01-31 DIAGNOSIS — I10 HYPERTENSION, UNSPECIFIED TYPE: ICD-10-CM

## 2024-01-31 DIAGNOSIS — Z79.4 TYPE 2 DIABETES MELLITUS WITHOUT COMPLICATION, WITH LONG-TERM CURRENT USE OF INSULIN: Primary | ICD-10-CM

## 2024-01-31 NOTE — OUTREACH NOTE
[facility] End of Month Documentation    This Chronic Medical Management Care Plan for Juarez Hunt, 53 y.o. male, has been monitored and managed; reviewed and a new plan of care implemented for the month of January.  A cumulative time of 23  minutes was spent on this patient record this month, including phone call with patient; chart review, RVW of BG trends, upcoming appt, recent labs and A1c goal.    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  A detailed listing of time spent for chronic care management is tracked within each outreach encounter.  Current medications include:  has a current medication list which includes the following prescription(s): metoprolol tartrate, allopurinol, amoxicillin-clavulanate, eliquis, cvs blood glucose meter, dexcom g7 sensor, diclofenac sodium, true metrix blood glucose test, lantus solostar, lisinopril, metformin, sildenafil, and silver sulfadiazine. and the patient is reported to be patient is compliant with medication protocol,  Medications are reported to be non-effective in controlling symptoms and changes have been made to the medication protocol, A1c 7.8  09/21/23.  Regarding these diagnoses, referrals were made to the following provider(s):  None.  All notes on chart for PCP to review.    The patient was monitored remotely for blood glucose; medications, Reports compliance with BG; reports that the CGM was not approved/affordable.    The patient's physical needs include:  medication education, Recommended to call and schedule annual eye exam, dental, foot etc.     The patient's mental support needs include:  needs met; not applicable    The patient's cognitive support needs include:  needs met; not applicable    The patient's psychosocial support needs include:  needs met; not applicable    The patient's functional needs include: medication education    The patient's environmental needs include:  not applicable    Care Plan overall comments:  RVW of upcoming appointments, recent labs, A1c goals and when to transition to monitoring    Refer to previous outreach notes for more information on the areas listed above.    Monthly Billing Diagnoses  (E11.9,  Z79.4) Type 2 diabetes mellitus without complication, with long-term current use of insulin    (E66.9) Obesity (BMI 30-39.9)    (I10) Hypertension, unspecified type    Medications   Medications have been reconciled    Care Plan progress this month:      Recently Modified Care Plans Updates made since 12/31/2023 12:00 AM       Diabetes Type 2 (Adult)           Problem Priority Last Modified     Glycemic Management (Diabetes, Type 2) --  6/27/2023 10:50 AM by Nereida Hawkins, ANASTASIA              Goal Recent Progress Last Modified     Glycemic Management Optimized --  6/27/2023 10:50 AM by Nereida Hawkins, RN     Evidence-based guidance:   Anticipate A1C testing (point-of-care) every 3 to 6 months based on goal attainment.   Review mutually-set A1C goal or target range.   Anticipate use of antihyperglycemic with or without insulin and periodic adjustments; consider active involvement of pharmacist.   Provide medical nutrition  therapy and development of individualized eating.   Compare self-reported symptoms of hypo or hyperglycemia to blood glucose levels, diet and fluid intake, current medications, psychosocial and physiologic stressors, change in activity and barriers to care adherence.   Promote self-monitoring of blood glucose levels.   Assess and address barriers to management plan, such as food insecurity, age, developmental ability, depression, anxiety, fear of hypoglycemia or weight gain, as well as medication cost, side effects and complicated regimen.   Consider referral to community-based diabetes education program, visiting nurse, community health worker or health .   Encourage regular dental care for treatment of periodontal disease; refer to dental provider when needed.    Notes:            Task Due Date Last Modified     Alleviate Barriers to Glycemic Management --  1/18/2024 12:34 PM by Nereida Hawkins RN     Care Management Activities:      - blood glucose monitoring encouraged  - blood glucose readings reviewed  - self-awareness of signs/symptoms of hypo or hyperglycemia encouraged  - use of blood glucose monitoring log promoted      Notes:   01/18/24               Problem Priority Last Modified     Disease Progression (Diabetes, Type 2) --  6/27/2023 10:50 AM by Nereida Hawkins RN              Goal Recent Progress Last Modified     Disease Progression Prevented or Minimized --  6/27/2023 10:50 AM by Nereida Hawkins RN     Evidence-based guidance:   Prepare patient for laboratory and diagnostic exams based on risk and presentation.   Encourage lifestyle changes, such as increased intake of plant-based foods, stress reduction, consistent physical activity and smoking cessation to prevent long-term complications and chronic disease.    Individualize activity and exercise recommendations while considering potential limitations, such as neuropathy, retinopathy or the ability to prevent hyperglycemia or hypoglycemia.     Prepare patient for use of pharmacologic therapy that may include antihypertensive, analgesic, prostaglandin E1 with periodic adjustments, based on presenting chronic condition and laboratory results.   Assess signs/symptoms and risk factors for hypertension, sleep-disordered breathing, neuropathy (including changes in gait and balance), retinopathy, nephropathy and sexual dysfunction.   Address pregnancy planning and contraceptive choice, especially when prescribing antihypertensive or statin.   Ensure completion of annual comprehensive foot exam and dilated eye exam.    Implement additional individualized goals and interventions based on identified risk factors.   Prepare patient for consultation or referral for specialist care, such as ophthalmology, neurology, cardiology, podiatry, nephrology or perinatology.    Notes:            Task Due Date Last Modified     Monitor and Manage Follow-up for Comorbidities --  1/18/2024 12:35 PM by Nereida Hawkins RN     Care Management Activities:      - healthy lifestyle promoted  - reduction of sedentary activity encouraged  - response to pharmacologic therapy monitored      Notes: 01/18/24                    Obesity (Adult)           Problem Priority Last Modified     Weight Management (Obesity) --  6/27/2023 10:50 AM by Nereida Hawkins RN              Goal Recent Progress Last Modified     Weight Loss Achieved --  6/27/2023 10:50 AM by Nereida Hawkins RN     Evidence-based guidance:   Review medication that may contribute to weight gain, such as corticosteroid, beta-blocker, tricyclic antidepressant, oral antihyperglycemic; advocate for changes when appropriate.   Perform or refer to registered dietitian to perform comprehensive nutrition assessment that includes disordered-eating behaviors, such as binge-eating, emotional or compulsive eating, grazing.    patient regarding health risks of obesity and that weight loss goal of 5 to 10 percent of initial weight  will improve risk.   Recommend initial weight loss goal of 3 to 5 percent of bodyweight; increase weight-loss goals based on patient success as achieving greater weight loss continues to reduce risk.   Propose a calorie-reduced diet based on the patient's preferences and health status.   Provide ongoing emotional support or cognitive behavioral therapy and dietitian services (individual, group, virtual) over at least 6 months with a minimum of 14 encounters to best facilitate weight loss.   Provide monthly follow-up for 12 months when weight loss goal is met to assist with maintenance of weight loss.   Encourage increased physical activity or exercise based on individual age, risk, and ability up to 200 to 300 minutes per week that includes aerobic and resistance training.   Encourage reduction in sedentary behaviors by replacing them with nonexercise yet active leisure pursuits.   Identify physical barriers, such as change in posture, balance, gait patterns, joint pain, and environmental barriers to activity.   Consider referral to rehabilitation therapy, especially when mobility or function is impaired due to osteoarthritis and obesity.   Consider referral to weight-loss program that has published evidence of safety and efficacy if on-site intensive intervention is unavailable or patient preference.   Prepare patient for use of pharmacologic therapy as an adjunct to lifestyle changes based on body mass index, patient agreement and presence of risk factors or comorbidities.   Evaluate efficacy of pharmacologic therapy (weight loss) and tolerance to medication periodically.   Engage in shared decision-making regarding referral to bariatric surgeon for consultation and evaluation when weight-loss goal has not been accomplished by behavioral therapy with or without pharmacologic therapy.    Notes:            Task Due Date Last Modified     Promote Lifestyle and Nutrition Changes to Achieve Weight Loss Goal --   1/18/2024 12:37 PM by Nereida Hawkins RN     Care Management Activities:      - encouragement and support provided      Notes:   01/18/24               Problem Priority Last Modified     Comorbidities (Obesity) --  6/27/2023 10:50 AM by Nereida Hawkins RN              Goal Recent Progress Last Modified     Prevent or Manage Comorbidities --  6/27/2023 10:50 AM by Nereida Hawkins RN     Evidence-based guidance:   Assess risk for or presence of comorbid condition.   Prepare patient for laboratory tests and diagnostic studies, such as fasting blood glucose, hemoglobin A1C, lipid panel, metabolic panel, polysomnography, joint x-ray, magnetic resonance imaging.   Implement additional goals and interventions based on identified comorbidities or risk factors; management is required regardless of patient's ability to achieve or sustain weight loss.   Identify and support self-management plan for chronic comorbidities; identify barriers to disease management and brainstorm strategies for success.   Support and encourage lifestyle changes and weight loss efforts as fundamental to reducing risk or severity of comorbidities.   Assess for presence of skin breakdown or infection, especially in skin folds, under breasts and abdominal aprons.   Promote meticulous skin hygiene to prevent skin breakdown, such as gentle cleansing, moisturizing; consider barrier protectant (zinc oxide, petroleum jelly), topical antifungal or drying agent.   Assess sleep quality; prepare patient for polysomnography and ERIK (obstructive sleep apnea) treatment based on presentation and sleep study results.   Monitor for signs of anxiety or depression; provide or refer for mental health services when needed.   Encourage routine dental care to prevent or manage periodontal disease.   Prepare patient for use of pharmacologic therapy as treatment for comorbidity; monitor efficacy and manage side effects.    Notes:            Task Due Date Last Modified      Monitor and Manage Follow-up for Comorbidities --  1/18/2024 12:38 PM by Nereida Hawkins, ANASTASIA     Care Management Activities:      - not discussed during this outreach      Notes:   01/18/24               Problem Priority Last Modified     Readiness for Weight Management --  6/27/2023 10:50 AM by Nereida Hawkins, RN              Goal Recent Progress Last Modified     Plan for Weight Management Developed --  6/27/2023 10:50 AM by Nereida Hawkins, RN     Evidence-based guidance:   Assess patient perspective on healthy weight, weight loss, motivation, and readiness for weight loss; review current dietary intake and exercise levels.   Review without judgment previous weight-loss attempts; acknowledge the challenge patient is facing.   Discuss psychologic factors related to previous attempts to lose weight (both successes and failures); identify setbacks as opportunities for growth.   Acknowledge understanding of weight-related stigma, embarrassment; assess and address effect on self-esteem and quality of life.   Discuss barriers, including negative body image, self-efficacy, embarrassment, physical impairments, pain, lack of motivation, competing demands, financial constraints.   Acknowledge and validate that changes in lifestyle and diet may influence social interactions including exclusion from groups and contribute to relapse or discontinuing treatment.   Brainstorm ways to minimize isolation.   Provide anticipatory guidance about benefits of weight loss, including improvement in vitality, self-esteem, sexual health, pain, and mobility, even with small amount of weight loss.   Use motivational interviewing techniques to increase confidence and commitment to change.   Assist patient to set mutual, meaningful goals regarding weight, activity, exercise, and healthy lifestyle; identify change strategies that align with level of readiness for change.    Notes:            Task Due Date Last Modified     Facilitate Readiness  for Weight Loss --  1/18/2024 12:38 PM by Nereida Hawkins RN     Care Management Activities:      - activation or motivation to change monitored  - encouragement and support provided      Notes:   01/18/24                         Current Specialty Plan of Care Status signed by both patient and provider    Instructions   Patient was provided an electronic copy of care plan  CCM services were explained and offered and patient has accepted these services.  Patient has given their written consent to receive CCM services and understands that this includes the authorization of electronic communication of medical information with the other treating providers.  Patient understands that they may stop CCM services at any time and these changes will be effective at the end of the calendar month and will effectively revocate the agreement of CCM services.  Patient understands that only one practitioner can furnish and be paid for CCM services during one calendar month.  Patient also understands that there may be co-payment or deductible fees in association with CCM services.  Patient will continue with at least monthly follow-up calls with the Ambulatory .    Nereida HENRY  Ambulatory Case Management    1/31/2024, 11:04 EST

## 2024-02-26 ENCOUNTER — OFFICE VISIT (OUTPATIENT)
Dept: INTERNAL MEDICINE | Facility: CLINIC | Age: 54
End: 2024-02-26
Payer: MEDICARE

## 2024-02-26 ENCOUNTER — TELEPHONE (OUTPATIENT)
Dept: INTERNAL MEDICINE | Facility: CLINIC | Age: 54
End: 2024-02-26

## 2024-02-26 VITALS
TEMPERATURE: 97.1 F | SYSTOLIC BLOOD PRESSURE: 136 MMHG | WEIGHT: 309 LBS | OXYGEN SATURATION: 95 % | BODY MASS INDEX: 39.66 KG/M2 | HEIGHT: 74 IN | HEART RATE: 88 BPM | RESPIRATION RATE: 16 BRPM | DIASTOLIC BLOOD PRESSURE: 80 MMHG

## 2024-02-26 DIAGNOSIS — Z79.01 CHRONIC ANTICOAGULATION: ICD-10-CM

## 2024-02-26 DIAGNOSIS — Z79.4 TYPE 2 DIABETES MELLITUS WITHOUT COMPLICATION, WITH LONG-TERM CURRENT USE OF INSULIN: ICD-10-CM

## 2024-02-26 DIAGNOSIS — Z00.00 ROUTINE GENERAL MEDICAL EXAMINATION AT A HEALTH CARE FACILITY: Primary | ICD-10-CM

## 2024-02-26 DIAGNOSIS — E55.9 VITAMIN D DEFICIENCY, UNSPECIFIED: ICD-10-CM

## 2024-02-26 DIAGNOSIS — E11.9 TYPE 2 DIABETES MELLITUS WITHOUT COMPLICATION, WITH LONG-TERM CURRENT USE OF INSULIN: ICD-10-CM

## 2024-02-26 DIAGNOSIS — Z86.711 HISTORY OF PULMONARY EMBOLISM: ICD-10-CM

## 2024-02-26 NOTE — PROGRESS NOTES
The ABCs of the Annual Wellness Visit  Subsequent Medicare Wellness Visit    Subjective      Juarez Hunt is a 53 y.o. male who presents for a Subsequent Medicare Wellness Visit2    The following portions of the patient's history were reviewed and   updated as appropriate: allergies, current medications, past family history, past medical history, past social history, past surgical history, and problem list.    Compared to one year ago, the patient feels his physical   health is the same.    Compared to one year ago, the patient feels his mental   health is the same.    Recent Hospitalizations:  This patient has had a [facility] admission record on file within the last 365 days.    Current Medical Providers:  Patient Care Team:  Jw Guido MD as PCP - General (Internal Medicine)  Nereida Hawkins RN as Ambulatory  ([facility])    Outpatient Medications Prior to Visit   Medication Sig Dispense Refill    allopurinol (Zyloprim) 100 MG tablet Take 1 tablet by mouth Daily. 90 tablet 3    apixaban (Eliquis) 5 MG tablet tablet TAKE 1 TABLET BY MOUTH EVERY 12 HOURS 60 tablet 3    Blood Glucose Monitoring Suppl ([employer] Blood Glucose Meter) w/Device kit 1 each 2 (Two) Times a Day. E11.9 1 kit 0    Continuous Blood Gluc Sensor (Dexcom G7 Sensor) misc Use 1 each Every 10 (Ten) Days. 9 each 3    Diclofenac Sodium (VOLTAREN) 1 % gel gel Apply 4 g topically to the appropriate area as directed 4 (Four) Times a Day As Needed (pain). 100 g 1    glucose blood (True Metrix Blood Glucose Test) test strip Use as instructed to test blood glucose two times per day 180 each 3    Lantus SoloStar 100 UNIT/ML injection pen Inject 30 units under the skin two times per day as directed 15 mL 11    lisinopril (PRINIVIL,ZESTRIL) 20 MG tablet Take 1 tablet by mouth Daily. 90 tablet 3    metFORMIN (Glucophage) 500 MG tablet Take 1 tablet by mouth 2 (Two) Times a Day With Meals. 60 tablet 11    metoprolol tartrate  "(LOPRESSOR) 50 MG tablet TAKE 1 TABLET BY MOUTH 2 TIMES A  tablet 3    sildenafil (REVATIO) 20 MG tablet Take 1 tablet by mouth 3 (Three) Times a Day. 90 tablet 3    silver sulfadiazine (Silvadene) 1 % cream Apply 1 application  topically to the appropriate area as directed 2 (Two) Times a Day. 100 g 0    amoxicillin-clavulanate (AUGMENTIN) 875-125 MG per tablet Take 1 tablet by mouth Every 12 (Twelve) Hours. 60 tablet 1     No facility-administered medications prior to visit.       No opioid medication identified on active medication list. I have reviewed chart for other potential  high risk medication/s and harmful drug interactions in the elderly.        Aspirin is not on active medication list.  Aspirin use is contraindicated for this patient due to: current use of Eliquis.  .    Patient Active Problem List   Diagnosis    ERIK on CPAP    Type 2 diabetes mellitus without complication, with long-term current use of insulin    Acute pulmonary embolism without acute cor pulmonale    History of pulmonary embolism    Chronic anticoagulation    Obesity (BMI 30-39.9)    Benign essential hypertension    Cellulitis of toe of right foot    Encounter for long-term (current) use of insulin    Methicillin susceptible Staphylococcus aureus infection    Non-pressure chronic ulcer of other part of right foot with fat layer exposed    Non-pressure chronic ulcer of other part of right foot limited to breakdown of skin    Other acute osteomyelitis, right ankle and foot    Polyneuropathy in diabetes    Obesity due to excess calories     Advance Care Planning   Advance Care Planning     Advance Directive is not on file.  ACP discussion was held with the patient during this visit. Patient does not have an advance directive, declines further assistance.     Objective    Vitals:    02/26/24 1446   BP: 136/80   Pulse: 88   Resp: 16   Temp: 97.1 °F (36.2 °C)   SpO2: 95%   Weight: (!) 140 kg (309 lb)   Height: 188 cm (74\")   PainSc:  " " 3     Estimated body mass index is 39.67 kg/m² as calculated from the following:    Height as of this encounter: 188 cm (74\").    Weight as of this encounter: 140 kg (309 lb).           Does the patient have evidence of cognitive impairment?   No            HEALTH RISK ASSESSMENT    Smoking Status:  Social History     Tobacco Use   Smoking Status Former   Smokeless Tobacco Current    Types: Chew     Alcohol Consumption:  Social History     Substance and Sexual Activity   Alcohol Use Yes    Comment: 1/4 glass per week     Fall Risk Screen:    COREY Fall Risk Assessment was completed, and patient is at MODERATE risk for falls. Assessment completed on:2024    Depression Screenin/26/2024     3:05 PM   PHQ-2/PHQ-9 Depression Screening   Little Interest or Pleasure in Doing Things 0-->not at all   Feeling Down, Depressed or Hopeless 0-->not at all   PHQ-9: Brief Depression Severity Measure Score 0       Health Habits and Functional and Cognitive Screenin/26/2024     3:04 PM   Functional & Cognitive Status   Do you have difficulty preparing food and eating? No   Do you have difficulty bathing yourself, getting dressed or grooming yourself? No   Do you have difficulty using the toilet? No   Do you have difficulty moving around from place to place? No   Do you have trouble with steps or getting out of a bed or a chair? Yes   Current Diet Well Balanced Diet   Dental Exam Up to date   Eye Exam Up to date   Exercise (times per week) 2 times per week   Current Exercises Include Walking   Do you need help using the phone?  No   Are you deaf or do you have serious difficulty hearing?  No   Do you need help to go to places out of walking distance? Yes   Do you need help shopping? No   Do you need help preparing meals?  No   Do you need help with housework?  No   Do you need help with laundry? No   Do you need help taking your medications? No   Do you need help managing money? No   Do you ever drive or ride " in a car without wearing a seat belt? No   Have you felt unusual stress, anger or loneliness in the last month? No   Who do you live with? Spouse   If you need help, do you have trouble finding someone available to you? No   Have you been bothered in the last four weeks by sexual problems? No   Do you have difficulty concentrating, remembering or making decisions? No       Age-appropriate Screening Schedule:  Refer to the list below for future screening recommendations based on patient's age, sex and/or medical conditions. Orders for these recommended tests are listed in the plan section. The patient has been provided with a written plan.    Health Maintenance   Topic Date Due    Hepatitis B (1 of 3 - 3-dose series) Never done    Pneumococcal Vaccine 0-64 (1 of 2 - PCV) Never done    TDAP/TD VACCINES (1 - Tdap) Never done    HEPATITIS C SCREENING  Never done    COLORECTAL CANCER SCREENING  10/01/2023    ZOSTER VACCINE (1 of 2) 06/26/2024 (Originally 10/21/2019)    DIABETIC EYE EXAM  03/01/2024    HEMOGLOBIN A1C  03/25/2024    BMI FOLLOWUP  06/22/2024    URINE MICROALBUMIN  07/03/2024    DIABETIC FOOT EXAM  09/12/2024    ANNUAL WELLNESS VISIT  02/26/2025    COVID-19 Vaccine  Completed    INFLUENZA VACCINE  Completed                  CMS Preventative Services Quick Reference  Risk Factors Identified During Encounter:    Fall Risk-High or Moderate: Discussed Fall Prevention in the homeSubjective     Patient ID: Juarez Hunt is a 53 y.o. male. Patient is here for management of multiple medical problems.     Chief Complaint   Patient presents with    Medicare Wellness-subsequent     History of Present Illness     The following portions of the patient's history were reviewed and updated as appropriate: allergies, current medications, past family history, past medical history, past social history, past surgical history and problem list.    Review of Systems    Current Outpatient Medications:     allopurinol  "(Zyloprim) 100 MG tablet, Take 1 tablet by mouth Daily., Disp: 90 tablet, Rfl: 3    apixaban (Eliquis) 5 MG tablet tablet, TAKE 1 TABLET BY MOUTH EVERY 12 HOURS, Disp: 60 tablet, Rfl: 3    Blood Glucose Monitoring Suppl ([employer] Blood Glucose Meter) w/Device kit, 1 each 2 (Two) Times a Day. E11.9, Disp: 1 kit, Rfl: 0    Continuous Blood Gluc Sensor (Dexcom G7 Sensor) misc, Use 1 each Every 10 (Ten) Days., Disp: 9 each, Rfl: 3    Diclofenac Sodium (VOLTAREN) 1 % gel gel, Apply 4 g topically to the appropriate area as directed 4 (Four) Times a Day As Needed (pain)., Disp: 100 g, Rfl: 1    glucose blood (True Metrix Blood Glucose Test) test strip, Use as instructed to test blood glucose two times per day, Disp: 180 each, Rfl: 3    Lantus SoloStar 100 UNIT/ML injection pen, Inject 30 units under the skin two times per day as directed, Disp: 15 mL, Rfl: 11    lisinopril (PRINIVIL,ZESTRIL) 20 MG tablet, Take 1 tablet by mouth Daily., Disp: 90 tablet, Rfl: 3    metFORMIN (Glucophage) 500 MG tablet, Take 1 tablet by mouth 2 (Two) Times a Day With Meals., Disp: 60 tablet, Rfl: 11    metoprolol tartrate (LOPRESSOR) 50 MG tablet, TAKE 1 TABLET BY MOUTH 2 TIMES A DAY, Disp: 180 tablet, Rfl: 3    sildenafil (REVATIO) 20 MG tablet, Take 1 tablet by mouth 3 (Three) Times a Day., Disp: 90 tablet, Rfl: 3    silver sulfadiazine (Silvadene) 1 % cream, Apply 1 application  topically to the appropriate area as directed 2 (Two) Times a Day., Disp: 100 g, Rfl: 0    Insulin Pen Needle (BD Pen Needle Tina 2nd Gen) 32G X 4 MM misc, Inject 1 Units under the skin into the appropriate area as directed 2 (Two) Times a Day., Disp: 200 each, Rfl: 3    Objective      Blood pressure 136/80, pulse 88, temperature 97.1 °F (36.2 °C), resp. rate 16, height 188 cm (74\"), weight (!) 140 kg (309 lb), SpO2 95%.            Physical Exam     General Appearance:    Alert, cooperative, no distress, appears stated age   Head:    Normocephalic, without obvious " abnormality, atraumatic   Eyes:    PERRL, conjunctiva/corneas clear, EOM's intact   Ears:    Normal TM's and external ear canals, both ears   Nose:   Nares normal, septum midline, mucosa normal, no drainage   or sinus tenderness   Throat:   Lips, mucosa, and tongue normal; teeth and gums normal   Neck:   Supple, symmetrical, trachea midline, no adenopathy;        thyroid:  No enlargement/tenderness/nodules; no carotid    bruit or JVD   Back:     Symmetric, no curvature, ROM normal, no CVA tenderness   Lungs:     Clear to auscultation bilaterally, respirations unlabored   Chest wall:    No tenderness or deformity   Heart:    Regular rate and rhythm, S1 and S2 normal, no murmur,        rub or gallop   Abdomen:     Soft, non-tender, bowel sounds active all four quadrants,     no masses, no organomegaly   Extremities:   Extremities normal, atraumatic, no cyanosis or edema   Pulses:   2+ and symmetric all extremities   Skin:   Skin color, texture, turgor normal, no rashes or lesions   Lymph nodes:   Cervical, supraclavicular, and axillary nodes normal   Neurologic:   CNII-XII intact. Normal strength, sensation and reflexes       throughout      Results for orders placed or performed in visit on 09/25/23   Sedimentation Rate    Specimen: Blood   Result Value Ref Range    Sed Rate 7 0 - 20 mm/hr   C-reactive Protein    Specimen: Blood   Result Value Ref Range    C-Reactive Protein <0.30 0.00 - 0.50 mg/dL         Assessment & Plan             Diagnoses and all orders for this visit:    1. Routine general medical examination at a health care facility (Primary)  -     CBC & Differential  -     Lipid Panel  -     Vitamin B12  -     Comprehensive Metabolic Panel  -     TSH  -     T4, Free  -     Hemoglobin A1c  -     MicroAlbumin, Urine, Random - Urine, Clean Catch  -     Vitamin D,25-Hydroxy    2. Type 2 diabetes mellitus without complication, with long-term current use of insulin  -     CBC & Differential  -     Lipid  Panel  -     Vitamin B12  -     Comprehensive Metabolic Panel  -     TSH  -     T4, Free  -     Hemoglobin A1c  -     MicroAlbumin, Urine, Random - Urine, Clean Catch  -     Vitamin D,25-Hydroxy    3. Vitamin D deficiency, unspecified  -     Vitamin D,25-Hydroxy    4. Chronic anticoagulation    5. History of pulmonary embolism      Return in about 6 months (around 8/26/2024).          There are no Patient Instructions on file for this visit.     Jw Guido MD    Assessment & Plan         The above risks/problems have been discussed with the patient.  Pertinent information has been shared with the patient in the After Visit Summary.    Diagnoses and all orders for this visit:    1. Routine [facility] medical examination at a [facility] (Primary)  -     CBC & Differential  -     Lipid Panel  -     Vitamin B12  -     Comprehensive Metabolic Panel  -     TSH  -     T4, Free  -     Hemoglobin A1c  -     MicroAlbumin, Urine, Random - Urine, Clean Catch  -     Vitamin D,25-Hydroxy    2. Type 2 diabetes mellitus without complication, with long-term current use of insulin  -     CBC & Differential  -     Lipid Panel  -     Vitamin B12  -     Comprehensive Metabolic Panel  -     TSH  -     T4, Free  -     Hemoglobin A1c  -     MicroAlbumin, Urine, Random - Urine, Clean Catch  -     Vitamin D,25-Hydroxy    3. Vitamin D deficiency, unspecified  -     Vitamin D,25-Hydroxy    4. Chronic anticoagulation    5. History of pulmonary embolism        Follow Up:   Next Medicare Wellness visit to be scheduled in 1 year.      An After Visit Summary and PPPS were made available to the patient.

## 2024-02-26 NOTE — TELEPHONE ENCOUNTER
Caller: Nu Hunt    Relationship: Emergency Contact    Best call back number: [phone]     What medication are you requesting: PEN NEEDLES FOR LANTUS SOLASTAR    What are your current symptoms:     How long have you been experiencing symptoms:     Have you had these symptoms before:    [] Yes  [] No    Have you been treated for these symptoms before:   [] Yes  [] No    If a prescription is needed, what is your preferred pharmacy and phone number: [id number] - [place], [place] [address] [facility] [phone] [facility] [phone] FX     Additional notes:  [facility] SYSTEM DOWN.  NEEDS TO BE CALLED IN OR FAXED.  PATIENT NEEDLES .

## 2024-02-27 RX ORDER — PEN NEEDLE, DIABETIC 32GX 5/32"
1 NEEDLE, DISPOSABLE MISCELLANEOUS 2 TIMES DAILY
Qty: 200 EACH | Refills: 3 | Status: SHIPPED | OUTPATIENT
Start: 2024-02-27

## 2024-02-28 ENCOUNTER — PATIENT OUTREACH (OUTPATIENT)
Dept: CASE MANAGEMENT | Facility: OTHER | Age: 54
End: 2024-02-28
Payer: MEDICARE

## 2024-02-28 DIAGNOSIS — E66.9 OBESITY (BMI 30-39.9): ICD-10-CM

## 2024-02-28 DIAGNOSIS — E11.9 TYPE 2 DIABETES MELLITUS WITHOUT COMPLICATION, WITH LONG-TERM CURRENT USE OF INSULIN: Primary | ICD-10-CM

## 2024-02-28 DIAGNOSIS — Z79.4 TYPE 2 DIABETES MELLITUS WITHOUT COMPLICATION, WITH LONG-TERM CURRENT USE OF INSULIN: Primary | ICD-10-CM

## 2024-02-28 NOTE — OUTREACH NOTE
AMBULATORY CASE MANAGEMENT NOTE    Care Coordination    AMB CM completed 30 day chart review and no urgent care or emergency department visits noted. No changes to medications. Patient has completed AMV and has pending labs with recommended follow up in 6 months. Patient has not contacted [facility] in the last 30 days. No changes or new needs identified. Chronic Care Management; Monitoring.    Nereida HENRY  Ambulatory Case Management    2/28/2024, 09:41 EST

## 2024-03-22 ENCOUNTER — APPOINTMENT (OUTPATIENT)
Dept: CT IMAGING | Facility: HOSPITAL | Age: 54
End: 2024-03-22
Payer: MEDICARE

## 2024-03-22 ENCOUNTER — HOSPITAL ENCOUNTER (EMERGENCY)
Facility: HOSPITAL | Age: 54
Discharge: HOME | End: 2024-03-23
Attending: EMERGENCY MEDICINE
Payer: MEDICARE

## 2024-03-22 ENCOUNTER — APPOINTMENT (OUTPATIENT)
Dept: GENERAL RADIOLOGY | Facility: HOSPITAL | Age: 54
End: 2024-03-22
Payer: MEDICARE

## 2024-03-22 DIAGNOSIS — N18.9 CHRONIC KIDNEY DISEASE, UNSPECIFIED CKD STAGE: ICD-10-CM

## 2024-03-22 DIAGNOSIS — R42 DIZZINESS: Primary | ICD-10-CM

## 2024-03-22 DIAGNOSIS — R07.89 ATYPICAL CHEST PAIN: ICD-10-CM

## 2024-03-22 DIAGNOSIS — R73.9 HYPERGLYCEMIA: ICD-10-CM

## 2024-03-22 LAB
ALBUMIN SERPL-MCNC: 4.1 G/DL (ref 3.5–5.2)
ALBUMIN/GLOB SERPL: 1.5 G/DL
ALP SERPL-CCNC: 77 U/L (ref 39–117)
ALT SERPL W P-5'-P-CCNC: 21 U/L (ref 1–41)
ANION GAP SERPL CALCULATED.3IONS-SCNC: 7 MMOL/L (ref 5–15)
AST SERPL-CCNC: 24 U/L (ref 1–40)
BASOPHILS # BLD AUTO: 0.07 10*3/MM3 (ref 0–0.2)
BASOPHILS NFR BLD AUTO: 1.2 % (ref 0–1.5)
BILIRUB SERPL-MCNC: 0.4 MG/DL (ref 0–1.2)
BUN SERPL-MCNC: 23 MG/DL (ref 6–20)
BUN/CREAT SERPL: 14.5 (ref 7–25)
CALCIUM SPEC-SCNC: 8.6 MG/DL (ref 8.6–10.5)
CHLORIDE SERPL-SCNC: 103 MMOL/L (ref 98–107)
CO2 SERPL-SCNC: 31 MMOL/L (ref 22–29)
CREAT SERPL-MCNC: 1.59 MG/DL (ref 0.76–1.27)
D DIMER PPP FEU-MCNC: <0.27 MCGFEU/ML (ref 0–0.54)
D-LACTATE SERPL-SCNC: 1 MMOL/L (ref 0.5–2)
DEPRECATED RDW RBC AUTO: 41.2 FL (ref 37–54)
EGFRCR SERPLBLD CKD-EPI 2021: 51.3 ML/MIN/1.73
EOSINOPHIL # BLD AUTO: 0.21 10*3/MM3 (ref 0–0.4)
EOSINOPHIL NFR BLD AUTO: 3.7 % (ref 0.3–6.2)
ERYTHROCYTE [DISTWIDTH] IN BLOOD BY AUTOMATED COUNT: 11.9 % (ref 12.3–15.4)
GEN 5 2HR TROPONIN T REFLEX: 29 NG/L
GLOBULIN UR ELPH-MCNC: 2.7 GM/DL
GLUCOSE SERPL-MCNC: 209 MG/DL (ref 65–99)
HCT VFR BLD AUTO: 38.7 % (ref 37.5–51)
HGB BLD-MCNC: 13 G/DL (ref 13–17.7)
HOLD SPECIMEN: NORMAL
IMM GRANULOCYTES # BLD AUTO: 0.01 10*3/MM3 (ref 0–0.05)
IMM GRANULOCYTES NFR BLD AUTO: 0.2 % (ref 0–0.5)
LIPASE SERPL-CCNC: 28 U/L (ref 13–60)
LYMPHOCYTES # BLD AUTO: 1.82 10*3/MM3 (ref 0.7–3.1)
LYMPHOCYTES NFR BLD AUTO: 31.9 % (ref 19.6–45.3)
MAGNESIUM SERPL-MCNC: 1.7 MG/DL (ref 1.6–2.6)
MCH RBC QN AUTO: 31.3 PG (ref 26.6–33)
MCHC RBC AUTO-ENTMCNC: 33.6 G/DL (ref 31.5–35.7)
MCV RBC AUTO: 93.3 FL (ref 79–97)
MONOCYTES # BLD AUTO: 0.54 10*3/MM3 (ref 0.1–0.9)
MONOCYTES NFR BLD AUTO: 9.5 % (ref 5–12)
NEUTROPHILS NFR BLD AUTO: 3.06 10*3/MM3 (ref 1.7–7)
NEUTROPHILS NFR BLD AUTO: 53.5 % (ref 42.7–76)
NRBC BLD AUTO-RTO: 0 /100 WBC (ref 0–0.2)
NT-PROBNP SERPL-MCNC: 121.7 PG/ML (ref 0–900)
PLATELET # BLD AUTO: 212 10*3/MM3 (ref 140–450)
PMV BLD AUTO: 10.3 FL (ref 6–12)
POTASSIUM SERPL-SCNC: 5.3 MMOL/L (ref 3.5–5.2)
PROT SERPL-MCNC: 6.8 G/DL (ref 6–8.5)
RBC # BLD AUTO: 4.15 10*6/MM3 (ref 4.14–5.8)
SODIUM SERPL-SCNC: 141 MMOL/L (ref 136–145)
TROPONIN T DELTA: -2 NG/L
TROPONIN T SERPL HS-MCNC: 31 NG/L
WBC NRBC COR # BLD AUTO: 5.71 10*3/MM3 (ref 3.4–10.8)
WHOLE BLOOD HOLD SPECIMEN: NORMAL

## 2024-03-22 PROCEDURE — 83880 ASSAY OF NATRIURETIC PEPTIDE: CPT

## 2024-03-22 PROCEDURE — 84484 ASSAY OF TROPONIN QUANT: CPT | Performed by: EMERGENCY MEDICINE

## 2024-03-22 PROCEDURE — 36415 COLL VENOUS BLD VENIPUNCTURE: CPT

## 2024-03-22 PROCEDURE — 80053 COMPREHEN METABOLIC PANEL: CPT

## 2024-03-22 PROCEDURE — 99284 EMERGENCY DEPT VISIT MOD MDM: CPT

## 2024-03-22 PROCEDURE — 85379 FIBRIN DEGRADATION QUANT: CPT | Performed by: EMERGENCY MEDICINE

## 2024-03-22 PROCEDURE — 83605 ASSAY OF LACTIC ACID: CPT | Performed by: EMERGENCY MEDICINE

## 2024-03-22 PROCEDURE — 83735 ASSAY OF MAGNESIUM: CPT | Performed by: EMERGENCY MEDICINE

## 2024-03-22 PROCEDURE — 83690 ASSAY OF LIPASE: CPT

## 2024-03-22 PROCEDURE — 25810000003 SODIUM CHLORIDE 0.9 % SOLUTION: Performed by: EMERGENCY MEDICINE

## 2024-03-22 PROCEDURE — 85025 COMPLETE CBC W/AUTO DIFF WBC: CPT

## 2024-03-22 PROCEDURE — 71045 X-RAY EXAM CHEST 1 VIEW: CPT

## 2024-03-22 PROCEDURE — 93005 ELECTROCARDIOGRAM TRACING: CPT

## 2024-03-22 PROCEDURE — 70450 CT HEAD/BRAIN W/O DYE: CPT

## 2024-03-22 RX ORDER — ASPIRIN 81 MG/1
324 TABLET, CHEWABLE ORAL ONCE
Status: DISCONTINUED | OUTPATIENT
Start: 2024-03-22 | End: 2024-03-22

## 2024-03-22 RX ORDER — SODIUM CHLORIDE 0.9 % (FLUSH) 0.9 %
10 SYRINGE (ML) INJECTION AS NEEDED
Status: DISCONTINUED | OUTPATIENT
Start: 2024-03-22 | End: 2024-03-23 | Stop reason: HOSPADM

## 2024-03-22 RX ADMIN — SODIUM CHLORIDE 1000 ML: 9 INJECTION, SOLUTION INTRAVENOUS at 21:51

## 2024-03-22 NOTE — ED PROVIDER NOTES
Subjective   History of Present Illness  53-year-old male presents to the emergency department      Review of Systems    Past Medical History:   Diagnosis Date    Arthritis     Blood clot in vein     Diabetes mellitus     Hypertension        No Known Allergies    Past Surgical History:   Procedure Laterality Date    CARDIAC CATHETERIZATION  2004    IVC FILTER RETRIEVAL      KNEE SURGERY Left 1986    SKIN BIOPSY  06/13/2020    toe       Family History   Problem Relation Age of Onset    Diabetes Mother     Heart disease Mother     Heart attack Mother     Hypertension Mother     Arthritis Father     Heart attack Maternal Grandfather     Heart attack Maternal Uncle     Liver cancer Maternal Aunt        Social History     Socioeconomic History    Marital status:    Tobacco Use    Smoking status: Former    Smokeless tobacco: Current     Types: Chew   Vaping Use    Vaping status: Never Used   Substance and Sexual Activity    Alcohol use: Yes     Comment: 1/4 glass per week    Drug use: No    Sexual activity: Defer           Objective   Physical Exam    Procedures           ED Course  ED Course as of 03/23/24 0457   Sat Mar 23, 2024   0139 Results and plan discussed with the patient and his wife at the bedside.  Patient feels better after IV fluids.  He ambulated to the bathroom without assistance and states he was asymptomatic when doing so.  Orthostatic vital signs reviewed and negative.  Gray zone troponins in the setting of chronic kidney disease which is at baseline, trend flat, without acute EKG changes of concern.  Patient currently stable at this time for close follow-up with chest pain clinic, and at this time he is agreeable to this plan.  I will prescribe meclizine as needed recurrent vertigo symptoms, which may be secondary to BPPV. [LD]      ED Course User Index  [LD] Karla Hardy MD                                             Medical Decision Making  Differential diagnosis includes    Problems  Addressed:  Atypical chest pain: complicated acute illness or injury  Chronic kidney disease, unspecified CKD stage: complicated acute illness or injury  Dizziness: complicated acute illness or injury  Hyperglycemia: complicated acute illness or injury    Amount and/or Complexity of Data Reviewed  Labs: ordered. Decision-making details documented in ED Course.     Details: Hemolysis noted on lab result of potassium.  Radiology: ordered. Decision-making details documented in ED Course.  ECG/medicine tests: ordered and independent interpretation performed. Decision-making details documented in ED Course.     Details: EKG at 1843 shows normal sinus rhythm at a rate of 77 beats per minute LAFB , nonspecific ST/T wave changes. Repeat EKG at 2109 shows sinus rhythm with short AK interval at a rate of 80 beats per minute, nonspecific ST/T wave changes, no significant dynamic EKG changes.      Recent Results (from the past 24 hour(s))   ECG 12 Lead ED Triage Standing Order; Chest Pain    Collection Time: 03/22/24  6:43 PM   Result Value Ref Range    QT Interval 388 ms    QTC Interval 439 ms   Lavender Top    Collection Time: 03/22/24  7:02 PM   Result Value Ref Range    Extra Tube hold for add-on    Gold Top - SST    Collection Time: 03/22/24  7:02 PM   Result Value Ref Range    Extra Tube Hold for add-ons.    CBC Auto Differential    Collection Time: 03/22/24  7:02 PM    Specimen: Blood   Result Value Ref Range    WBC 5.71 3.40 - 10.80 10*3/mm3    RBC 4.15 4.14 - 5.80 10*6/mm3    Hemoglobin 13.0 13.0 - 17.7 g/dL    Hematocrit 38.7 37.5 - 51.0 %    MCV 93.3 79.0 - 97.0 fL    MCH 31.3 26.6 - 33.0 pg    MCHC 33.6 31.5 - 35.7 g/dL    RDW 11.9 (L) 12.3 - 15.4 %    RDW-SD 41.2 37.0 - 54.0 fl    MPV 10.3 6.0 - 12.0 fL    Platelets 212 140 - 450 10*3/mm3    Neutrophil % 53.5 42.7 - 76.0 %    Lymphocyte % 31.9 19.6 - 45.3 %    Monocyte % 9.5 5.0 - 12.0 %    Eosinophil % 3.7 0.3 - 6.2 %    Basophil % 1.2 0.0 - 1.5 %    Immature  Grans % 0.2 0.0 - 0.5 %    Neutrophils, Absolute 3.06 1.70 - 7.00 10*3/mm3    Lymphocytes, Absolute 1.82 0.70 - 3.10 10*3/mm3    Monocytes, Absolute 0.54 0.10 - 0.90 10*3/mm3    Eosinophils, Absolute 0.21 0.00 - 0.40 10*3/mm3    Basophils, Absolute 0.07 0.00 - 0.20 10*3/mm3    Immature Grans, Absolute 0.01 0.00 - 0.05 10*3/mm3    nRBC 0.0 0.0 - 0.2 /100 WBC   Comprehensive Metabolic Panel    Collection Time: 03/22/24  7:04 PM    Specimen: Blood   Result Value Ref Range    Glucose 209 (H) 65 - 99 mg/dL    BUN 23 (H) 6 - 20 mg/dL    Creatinine 1.59 (H) 0.76 - 1.27 mg/dL    Sodium 141 136 - 145 mmol/L    Potassium 5.3 (H) 3.5 - 5.2 mmol/L    Chloride 103 98 - 107 mmol/L    CO2 31.0 (H) 22.0 - 29.0 mmol/L    Calcium 8.6 8.6 - 10.5 mg/dL    Total Protein 6.8 6.0 - 8.5 g/dL    Albumin 4.1 3.5 - 5.2 g/dL    ALT (SGPT) 21 1 - 41 U/L    AST (SGOT) 24 1 - 40 U/L    Alkaline Phosphatase 77 39 - 117 U/L    Total Bilirubin 0.4 0.0 - 1.2 mg/dL    Globulin 2.7 gm/dL    A/G Ratio 1.5 g/dL    BUN/Creatinine Ratio 14.5 7.0 - 25.0    Anion Gap 7.0 5.0 - 15.0 mmol/L    eGFR 51.3 (L) >60.0 mL/min/1.73   Lipase    Collection Time: 03/22/24  7:04 PM    Specimen: Blood   Result Value Ref Range    Lipase 28 13 - 60 U/L   BNP    Collection Time: 03/22/24  7:04 PM    Specimen: Blood   Result Value Ref Range    proBNP 121.7 0.0 - 900.0 pg/mL   Green Top (Gel)    Collection Time: 03/22/24  7:04 PM   Result Value Ref Range    Extra Tube Hold for add-ons.    Gray Top    Collection Time: 03/22/24  7:04 PM   Result Value Ref Range    Extra Tube Hold for add-ons.    Lactic Acid, Plasma    Collection Time: 03/22/24  7:04 PM    Specimen: Blood   Result Value Ref Range    Lactate 1.0 0.5 - 2.0 mmol/L   Magnesium    Collection Time: 03/22/24  7:04 PM    Specimen: Blood   Result Value Ref Range    Magnesium 1.7 1.6 - 2.6 mg/dL   High Sensitivity Troponin T    Collection Time: 03/22/24  7:04 PM    Specimen: Blood   Result Value Ref Range    HS Troponin T  31 (H) <22 ng/L   D-dimer, Quantitative    Collection Time: 03/22/24  7:04 PM    Specimen: Blood   Result Value Ref Range    D-Dimer, Quantitative <0.27 0.00 - 0.54 MCGFEU/mL   High Sensitivity Troponin T 2Hr    Collection Time: 03/22/24  9:07 PM    Specimen: Blood   Result Value Ref Range    HS Troponin T 29 (H) <22 ng/L    Troponin T Delta -2 >=-4 - <+4 ng/L   ECG 12 Lead ED Triage Standing Order; Chest Pain    Collection Time: 03/22/24  9:09 PM   Result Value Ref Range    QT Interval 386 ms    QTC Interval 445 ms     Note: In addition to lab results from this visit, the labs listed above may include labs taken at another facility or during a different encounter within the last 24 hours. Please correlate lab times with ED admission and discharge times for further clarification of the services performed during this visit.    CT Head Without Contrast   Final Result   Impression:   No acute intracranial abnormality.            Electronically Signed: Gregg Duarte MD     3/22/2024 7:56 PM EDT     Workstation ID: [id number]      XR Chest 1 View   Final Result   Impression:   No acute process.                  Electronically Signed: Gregg Duarte MD     3/22/2024 7:37 PM EDT     Workstation ID: [id number]        Vitals:    03/22/24 2137 03/22/24 2139 03/22/24 2140 03/23/24 0056   BP: 137/80 119/77 116/74 144/84   BP Location:       Patient Position: Lying Sitting Standing    Pulse: 80 86 92 76   Resp:       Temp:       TempSrc:       SpO2:    95%   Weight:       Height:         Medications   sodium chloride 0.9 % bolus 1,000 mL (0 mL Intravenous Stopped 3/22/24 7852)     ECG/EMG Results (last 24 hours)       Procedure Component Value Units Date/Time    ECG 12 Lead ED Triage Standing Order; Chest Pain [[id number]] Collected: 03/22/24 1843     Updated: 03/22/24 1844     QT Interval 388 ms      QTC Interval 439 ms     Narrative:      Test Reason : ED Triage Standing Order~  Blood Pressure :   */*   mmHG  Vent. Rate :  77 BPM      Atrial Rate :  77 BPM     P-R Int : 158 ms          QRS Dur : 106 ms      QT Int : 388 ms       P-R-T Axes :  37 -47  -7 degrees     QTc Int : 439 ms    Normal sinus rhythm  Left anterior fascicular block  Abnormal ECG  When compared with ECG of 12-JUN-2018 08:14,  Nonspecific T wave abnormality now evident in Inferior leads    Referred By:            Confirmed By:           ECG 12 Lead ED Triage Standing Order; Chest Pain   Final Result   Test Reason : ED Triage Standing Order~   Blood Pressure :   */*   mmHG   Vent. Rate :  80 BPM     Atrial Rate :  80 BPM      P-R Int : 106 ms          QRS Dur : 108 ms       QT Int : 386 ms       P-R-T Axes :  36 -44  -3 degrees      QTc Int : 445 ms      Sinus rhythm with short WA   Left axis deviation   Abnormal ECG   When compared with ECG of 22-MAR-2024 18:43, (Unconfirmed)   No significant change was found   Confirmed by CAROL DURAN ([id number]) on 3/23/2024 3:59:34 AM      Referred By:            Confirmed By: CAROL DURAN      ECG 12 Lead ED Triage Standing Order; Chest Pain   Final Result   Test Reason : ED Triage Standing Order~   Blood Pressure :   */*   mmHG   Vent. Rate :  77 BPM     Atrial Rate :  77 BPM      P-R Int : 158 ms          QRS Dur : 106 ms       QT Int : 388 ms       P-R-T Axes :  37 -47  -7 degrees      QTc Int : 439 ms      Normal sinus rhythm   Left anterior fascicular block   Abnormal ECG   When compared with ECG of 12-JUN-2018 08:14,   Nonspecific T wave abnormality now evident in Inferior leads   Confirmed by CAROL DURAN ([id number]) on 3/23/2024 3:54:05 AM      Referred By:            Confirmed By: CAROL DURAN              Final diagnoses:   Dizziness   Atypical chest pain   Chronic kidney disease, unspecified CKD stage   Hyperglycemia       ED Disposition  ED Disposition       ED Disposition   Discharge    Condition   Stable    Comment   --               [facility] CARDIOLOGY  [address]  [address]  [facility]  [address]  [phone]  Call in 2 days  This is a cardiologist for close follow up, possible echocardiogram and stress test.    Jw Guido MD  [address] [place] [address] [address]  [facility] [address] [phone]    Schedule an appointment as soon as possible for a visit in 3 days  your primary care provider         Medication List        New Prescriptions      meclizine 25 MG tablet  Commonly known as: ANTIVERT  Take 1 tablet by mouth 3 (Three) Times a Day As Needed for Dizziness.               Where to Get Your Medications        These medications were sent to [id number] - [place], [place] - [address] - [phone]  - [phone] FX  [address], [facility] [address]      Phone: [phone]   meclizine 25 MG tablet            Test Reason : ED Triage Standing Order~   Blood Pressure :   */*   mmHG   Vent. Rate :  80 BPM     Atrial Rate :  80 BPM      P-R Int : 106 ms          QRS Dur : 108 ms       QT Int : 386 ms       P-R-T Axes :  36 -44  -3 degrees      QTc Int : 445 ms      Sinus rhythm with short ND   Left axis deviation   Abnormal ECG   When compared with ECG of 22-MAR-2024 18:43, (Unconfirmed)   No significant change was found   Confirmed by CAROL DURAN ([id number]) on 3/23/2024 3:59:34 AM      Referred By:            Confirmed By: CAROL DURAN      ECG 12 Lead ED Triage Standing Order; Chest Pain   Final Result   Test Reason : ED Triage Standing Order~   Blood Pressure :   */*   mmHG   Vent. Rate :  77 BPM     Atrial Rate :  77 BPM      P-R Int : 158 ms          QRS Dur : 106 ms       QT Int : 388 ms       P-R-T Axes :  37 -47  -7 degrees      QTc Int : 439 ms      Normal sinus rhythm   Left anterior fascicular block   Abnormal ECG   When compared with ECG of 12-JUN-2018 08:14,   Nonspecific T wave abnormality now evident in Inferior leads   Confirmed by CAROL DURAN ([id number]) on 3/23/2024 3:54:05 AM      Referred By:            Confirmed By: CAROL DURAN              Final diagnoses:   Dizziness   Atypical chest pain   Chronic kidney disease, unspecified CKD stage   Hyperglycemia       ED Disposition  ED Disposition       ED Disposition   Discharge    Condition   Stable    Comment   --               [facility] CARDIOLOGY  [address] [facility] [address]  [facility] [address] [phone]  Call in 2 days  This is a cardiologist for close follow up, possible echocardiogram and stress test.    Jw Guido MD  [address] [facility] [address]  [facility] [address] [phone]    Schedule an appointment as soon as possible for a visit in 3 days  your primary care provider         Medication List        New Prescriptions      meclizine 25 MG tablet  Commonly known as: ANTIVERT  Take 1 tablet by mouth 3 (Three) Times  a Day As Needed for Dizziness.               Where to Get Your Medications        These medications were sent to [id number] - [place], [place] - [address] - [phone]  - [phone] FX  [address] [address] [place] [address]      Phone: [phone]   meclizine 25 MG tablet            Karla Hardy MD  03/26/24 2655

## 2024-03-23 VITALS
OXYGEN SATURATION: 95 % | DIASTOLIC BLOOD PRESSURE: 84 MMHG | RESPIRATION RATE: 18 BRPM | HEIGHT: 74 IN | TEMPERATURE: 98.6 F | BODY MASS INDEX: 38.5 KG/M2 | WEIGHT: 300 LBS | HEART RATE: 76 BPM | SYSTOLIC BLOOD PRESSURE: 144 MMHG

## 2024-03-23 LAB
QT INTERVAL: 386 MS
QT INTERVAL: 388 MS
QTC INTERVAL: 439 MS
QTC INTERVAL: 445 MS

## 2024-03-23 RX ORDER — MECLIZINE HYDROCHLORIDE 25 MG/1
25 TABLET ORAL 3 TIMES DAILY PRN
Qty: 30 TABLET | Refills: 0 | Status: SHIPPED | OUTPATIENT
Start: 2024-03-23

## 2024-03-28 ENCOUNTER — PATIENT OUTREACH (OUTPATIENT)
Dept: CASE MANAGEMENT | Facility: OTHER | Age: 54
End: 2024-03-28
Payer: MEDICARE

## 2024-03-28 DIAGNOSIS — E66.9 OBESITY (BMI 30-39.9): ICD-10-CM

## 2024-03-28 DIAGNOSIS — Z79.4 TYPE 2 DIABETES MELLITUS WITHOUT COMPLICATION, WITH LONG-TERM CURRENT USE OF INSULIN: Primary | ICD-10-CM

## 2024-03-28 DIAGNOSIS — E11.9 TYPE 2 DIABETES MELLITUS WITHOUT COMPLICATION, WITH LONG-TERM CURRENT USE OF INSULIN: Primary | ICD-10-CM

## 2024-03-28 NOTE — OUTREACH NOTE
AMBULATORY CASE MANAGEMENT NOTE    Care Coordination    TOO MAE completed 60 day chart review and emergency department visits noted on 03/22/24 for dizziness. Patient has appointment with cardiology and refills on file. Current conditions are stable. Patient is IADL's, driving and has established support network. TOO MAE will follow up with Patient after scheduled cardiology visit. Will continue monitoring. CCM; Monitoring to graduate.      Nereida HENRY  Ambulatory Case Management    3/28/2024, 10:20 EDT

## 2024-04-11 NOTE — PROGRESS NOTES
"[facility]  Heart and Valve Center    Chief Complaint  Chest Pain and Establish Care    Subjective    History of Present Illness {CC  Problem List  Visit  Diagnosis   Encounters  Notes  Medications  Labs  Result Review Imaging  Media :23}     Juarez Hunt is a 53 y.o. male with diabetes, ERIK, PE on Eliquis, hypertension, CKD who presents today for evaluation of chest pain at the request of Dr. Hardy.     Patient presented to the ED 3/22/24 with intermittent left-sided chest pressure that started one day prior and orthostatic dizziness. He reports that he stood up to walk to the couch and had to sit down because the room was spinning. Described chest pressure as \"rhythmic\" and not necessarily associated with activity. High sensitive troponins 31 and 29, EKG negative for acute ischemia, chest x-ray showed no acute process, CT head negative, d-dimer normal.     He reports that the chest pressure was mild and primarily went to the ED due to dizziness and \"spinning\". He does still have some intermittent chest discomfort that he says is mild and lasts less than a minute. He walks with the dogs daily for 20-30 min and doesn't notice chest discomfort but does have CONSTANTINO. Denies palpitations     Cardiac risks: Diabetes, hypertension, obesity, gender, age, mother had CABG in her 50s    Objective     Vital Signs:   Vitals:    04/16/24 1356 04/16/24 1359   BP: 105/71 138/78   BP Location: Left arm Left arm   Patient Position: Standing Sitting   Cuff Size: Adult Adult   Pulse: 88 83   Resp:  20   Temp:  97.8 °F (36.6 °C)   TempSrc:  Temporal   SpO2: 95% 92%   Weight:  (!) 140 kg (309 lb 8 oz)   Height:  188 cm (74\")     Body mass index is 39.74 kg/m².  Physical Exam  Vitals reviewed.   Constitutional:       Appearance: Normal appearance.   HENT:      Head: Normocephalic.   Neck:      Vascular: No carotid bruit.   Cardiovascular:      Rate and Rhythm: Normal rate and regular rhythm.      Pulses: " Normal pulses.      Heart sounds: Normal heart sounds, S1 normal and S2 normal. No murmur heard.  Pulmonary:      Effort: Pulmonary effort is normal. No respiratory distress.      Breath sounds: Normal breath sounds.   Chest:      Chest wall: No tenderness.   Abdominal:      General: Abdomen is flat.      Palpations: Abdomen is soft.   Musculoskeletal:      Cervical back: Neck supple.      Right lower leg: No edema.      Left lower leg: No edema.   Skin:     General: Skin is warm and dry.   Neurological:      General: No focal deficit present.      Mental Status: He is alert and oriented to person, place, and time. Mental status is at baseline.   Psychiatric:         Mood and Affect: Mood normal.         Behavior: Behavior normal.         Thought Content: Thought content normal.              Result Review  Data Reviewed:{ Labs  Result Review  Imaging  Med Tab  Media :23}   High Sensitivity Troponin T (03/22/2024 19:04)  High Sensitivity Troponin T 2Hr (03/22/2024 21:07)  D-dimer, Quantitative (03/22/2024 19:04)  Magnesium (03/22/2024 19:04)  BNP (03/22/2024 19:04)  Lipase (03/22/2024 19:04)  Comprehensive Metabolic Panel (03/22/2024 19:04)  Lactic Acid, Plasma (03/22/2024 19:04)  CBC & Differential (03/22/2024 19:02)  ECG 12 Lead ED Triage Standing Order; Chest Pain (03/22/2024 21:09)  ECG 12 Lead ED Triage Standing Order; Chest Pain (03/22/2024 18:43)         Assessment and Plan {CC Problem List  Visit Diagnosis  ROS  Review (Popup)  Health Maintenance  Quality  BestPractice  Medications  SmartSets  SnapShot Encounters  Media :23}   1. Chest pain, unspecified type  - Multiple ASCVD risks, mildly elevated troponins, recommend ischemic evaluation. Patient unable to walk on treadmill due to exertional dyspnea, needs PET imaging due to body habitus  - Stress Test With Pet Myocardial Perfusion; Future  - Adult Transthoracic Echo Complete W/ Cont if Necessary Per Protocol; Future    2. CONSTANTINO (dyspnea on  exertion)  - Possible anginal equivalent  - Stress Test With Pet Myocardial Perfusion; Future  - Adult Transthoracic Echo Complete W/ Cont if Necessary Per Protocol; Future    3. Benign essential hypertension  - Appears mostly controlled. Goal BP around 130/80. Continue metoprolol and lisinopril  - Stress Test With Pet Myocardial Perfusion; Future  - Adult Transthoracic Echo Complete W/ Cont if Necessary Per Protocol; Future    4. Type 2 diabetes mellitus with hyperglycemia, without long-term current use of insulin  - Discussed healthy diet and importance of good BP control  - Stress Test With Pet Myocardial Perfusion; Future  - Adult Transthoracic Echo Complete W/ Cont if Necessary Per Protocol; Future    5. Abnormal EKG  - LAFB, chronic  - Stress Test With Pet Myocardial Perfusion; Future  - Adult Transthoracic Echo Complete W/ Cont if Necessary Per Protocol; Future    6. Orthostatic hypotension  - Advised to increase hydration. Avoid prolonged standing and excessive heat      Follow Up {Instructions Charge Capture  Follow-up Communications :23}   Return in about 4 weeks (around 5/14/2024) for testing results, Video Visit.    Patient was given instructions and counseling regarding his condition or for health maintenance advice. Please see specific information pulled into the AVS if appropriate.  Advised to call the Heart and Valve Center with any questions, concerns, or worsening symptoms.

## 2024-04-12 ENCOUNTER — PATIENT OUTREACH (OUTPATIENT)
Dept: CASE MANAGEMENT | Facility: OTHER | Age: 54
End: 2024-04-12
Payer: MEDICARE

## 2024-04-12 DIAGNOSIS — I10 HYPERTENSION, UNSPECIFIED TYPE: ICD-10-CM

## 2024-04-12 DIAGNOSIS — Z79.4 TYPE 2 DIABETES MELLITUS WITHOUT COMPLICATION, WITH LONG-TERM CURRENT USE OF INSULIN: Primary | ICD-10-CM

## 2024-04-12 DIAGNOSIS — E11.9 TYPE 2 DIABETES MELLITUS WITHOUT COMPLICATION, WITH LONG-TERM CURRENT USE OF INSULIN: Primary | ICD-10-CM

## 2024-04-12 NOTE — OUTREACH NOTE
AMBULATORY CASE MANAGEMENT NOTE    Name and Relationship of Patient/Support Person: Nu Hunt - Emergency Contact    Patient Outreach    AMB CM outreach with Patient. Wife reports that he is not home. Reviewed upcoming appointments and reinforced keeping cardiology appointment. Nu states that they have had multiple things happening but do intend to be at the next appointment. Reinforced contacting AMB CM as needed. Next outreach scheduled.    Nereida HENRY  Ambulatory Case Management    4/12/2024, 11:18 EDT

## 2024-04-16 ENCOUNTER — OFFICE VISIT (OUTPATIENT)
Dept: CARDIOLOGY | Facility: HOSPITAL | Age: 54
End: 2024-04-16
Payer: MEDICARE

## 2024-04-16 VITALS
TEMPERATURE: 97.8 F | HEART RATE: 83 BPM | RESPIRATION RATE: 20 BRPM | DIASTOLIC BLOOD PRESSURE: 78 MMHG | WEIGHT: 309.5 LBS | HEIGHT: 74 IN | OXYGEN SATURATION: 92 % | SYSTOLIC BLOOD PRESSURE: 138 MMHG | BODY MASS INDEX: 39.72 KG/M2

## 2024-04-16 DIAGNOSIS — I95.1 ORTHOSTATIC HYPOTENSION: ICD-10-CM

## 2024-04-16 DIAGNOSIS — I10 BENIGN ESSENTIAL HYPERTENSION: ICD-10-CM

## 2024-04-16 DIAGNOSIS — R07.9 CHEST PAIN, UNSPECIFIED TYPE: Primary | ICD-10-CM

## 2024-04-16 DIAGNOSIS — R94.31 ABNORMAL EKG: ICD-10-CM

## 2024-04-16 DIAGNOSIS — R06.09 DOE (DYSPNEA ON EXERTION): ICD-10-CM

## 2024-04-16 DIAGNOSIS — E11.65 TYPE 2 DIABETES MELLITUS WITH HYPERGLYCEMIA, WITHOUT LONG-TERM CURRENT USE OF INSULIN: ICD-10-CM

## 2024-04-16 PROCEDURE — 99214 OFFICE O/P EST MOD 30 MIN: CPT | Performed by: NURSE PRACTITIONER

## 2024-04-16 PROCEDURE — 3074F SYST BP LT 130 MM HG: CPT | Performed by: NURSE PRACTITIONER

## 2024-04-16 PROCEDURE — 1159F MED LIST DOCD IN RCRD: CPT | Performed by: NURSE PRACTITIONER

## 2024-04-16 PROCEDURE — 3078F DIAST BP <80 MM HG: CPT | Performed by: NURSE PRACTITIONER

## 2024-04-16 PROCEDURE — 1160F RVW MEDS BY RX/DR IN RCRD: CPT | Performed by: NURSE PRACTITIONER

## 2024-04-17 ENCOUNTER — HOSPITAL ENCOUNTER (OUTPATIENT)
Dept: CARDIOLOGY | Facility: HOSPITAL | Age: 54
Discharge: HOME | End: 2024-04-17
Admitting: NURSE PRACTITIONER
Payer: MEDICARE

## 2024-04-17 VITALS — HEIGHT: 74 IN | WEIGHT: 308.64 LBS | BODY MASS INDEX: 39.61 KG/M2

## 2024-04-17 DIAGNOSIS — R06.09 DOE (DYSPNEA ON EXERTION): ICD-10-CM

## 2024-04-17 DIAGNOSIS — R07.9 CHEST PAIN, UNSPECIFIED TYPE: ICD-10-CM

## 2024-04-17 DIAGNOSIS — E11.65 TYPE 2 DIABETES MELLITUS WITH HYPERGLYCEMIA, WITHOUT LONG-TERM CURRENT USE OF INSULIN: ICD-10-CM

## 2024-04-17 DIAGNOSIS — R94.31 ABNORMAL EKG: ICD-10-CM

## 2024-04-17 DIAGNOSIS — I10 BENIGN ESSENTIAL HYPERTENSION: ICD-10-CM

## 2024-04-17 LAB
ASCENDING AORTA: 3.7 CM
BH CV ECHO MEAS - AO MAX PG: 4.5 MMHG
BH CV ECHO MEAS - AO MEAN PG: 3 MMHG
BH CV ECHO MEAS - AO ROOT AREA (BSA CORRECTED): 1.5 CM2
BH CV ECHO MEAS - AO ROOT DIAM: 4 CM
BH CV ECHO MEAS - AO V2 MAX: 106 CM/SEC
BH CV ECHO MEAS - AO V2 VTI: 22.9 CM
BH CV ECHO MEAS - AVA(I,D): 3.4 CM2
BH CV ECHO MEAS - EDV(CUBED): 176.6 ML
BH CV ECHO MEAS - EDV(MOD-SP2): 202 ML
BH CV ECHO MEAS - EDV(MOD-SP4): 164 ML
BH CV ECHO MEAS - EF(MOD-BP): 51 %
BH CV ECHO MEAS - EF(MOD-SP2): 47.5 %
BH CV ECHO MEAS - EF(MOD-SP4): 53 %
BH CV ECHO MEAS - ESV(CUBED): 54.4 ML
BH CV ECHO MEAS - ESV(MOD-SP2): 106 ML
BH CV ECHO MEAS - ESV(MOD-SP4): 77 ML
BH CV ECHO MEAS - FS: 32.4 %
BH CV ECHO MEAS - IVS/LVPW: 0.99 CM
BH CV ECHO MEAS - IVSD: 1.01 CM
BH CV ECHO MEAS - LA DIMENSION: 3.7 CM
BH CV ECHO MEAS - LAT PEAK E' VEL: 6.8 CM/SEC
BH CV ECHO MEAS - LV DIASTOLIC VOL/BSA (35-75): 62.7 CM2
BH CV ECHO MEAS - LV MASS(C)D: 224.7 GRAMS
BH CV ECHO MEAS - LV MAX PG: 2.7 MMHG
BH CV ECHO MEAS - LV MEAN PG: 2 MMHG
BH CV ECHO MEAS - LV SYSTOLIC VOL/BSA (12-30): 29.5 CM2
BH CV ECHO MEAS - LV V1 MAX: 82.8 CM/SEC
BH CV ECHO MEAS - LV V1 VTI: 18.5 CM
BH CV ECHO MEAS - LVIDD: 5.6 CM
BH CV ECHO MEAS - LVIDS: 3.8 CM
BH CV ECHO MEAS - LVOT AREA: 4.2 CM2
BH CV ECHO MEAS - LVOT DIAM: 2.3 CM
BH CV ECHO MEAS - LVPWD: 1.02 CM
BH CV ECHO MEAS - MED PEAK E' VEL: 7.66 CM/SEC
BH CV ECHO MEAS - MV A MAX VEL: 61.1 CM/SEC
BH CV ECHO MEAS - MV DEC SLOPE: 292 CM/SEC2
BH CV ECHO MEAS - MV DEC TIME: 0.26 SEC
BH CV ECHO MEAS - MV E MAX VEL: 54.3 CM/SEC
BH CV ECHO MEAS - MV E/A: 0.89
BH CV ECHO MEAS - MV MAX PG: 2.17 MMHG
BH CV ECHO MEAS - MV MEAN PG: 1 MMHG
BH CV ECHO MEAS - MV P1/2T: 65.4 MSEC
BH CV ECHO MEAS - MV V2 VTI: 20.6 CM
BH CV ECHO MEAS - MVA(P1/2T): 3.4 CM2
BH CV ECHO MEAS - MVA(VTI): 3.7 CM2
BH CV ECHO MEAS - PA ACC SLOPE: 537 CM/SEC2
BH CV ECHO MEAS - PA ACC TIME: 0.11 SEC
BH CV ECHO MEAS - PA V2 MAX: 87.5 CM/SEC
BH CV ECHO MEAS - PI END-D VEL: 89 CM/SEC
BH CV ECHO MEAS - RAP SYSTOLE: 3 MMHG
BH CV ECHO MEAS - RVSP: 24 MMHG
BH CV ECHO MEAS - SI(MOD-SP2): 36.7 ML/M2
BH CV ECHO MEAS - SI(MOD-SP4): 33.3 ML/M2
BH CV ECHO MEAS - SV(LVOT): 76.9 ML
BH CV ECHO MEAS - SV(MOD-SP2): 96 ML
BH CV ECHO MEAS - SV(MOD-SP4): 87 ML
BH CV ECHO MEAS - TAPSE (>1.6): 2 CM
BH CV ECHO MEAS - TR MAX PG: 21 MMHG
BH CV ECHO MEAS - TR MAX VEL: 229 CM/SEC
BH CV ECHO MEASUREMENTS AVERAGE E/E' RATIO: 7.51
BH CV XLRA - RV BASE: 3.9 CM
BH CV XLRA - RV LENGTH: 7.1 CM
BH CV XLRA - RV MID: 2.1 CM
BH CV XLRA - TDI S': 11.1 CM/SEC
LEFT ATRIUM VOLUME INDEX: 27.6 ML/M2

## 2024-04-17 PROCEDURE — 93306 TTE W/DOPPLER COMPLETE: CPT

## 2024-04-18 ENCOUNTER — HOSPITAL ENCOUNTER (OUTPATIENT)
Dept: CARDIOLOGY | Facility: HOSPITAL | Age: 54
Discharge: HOME | End: 2024-04-18
Payer: MEDICARE

## 2024-04-18 DIAGNOSIS — E11.65 TYPE 2 DIABETES MELLITUS WITH HYPERGLYCEMIA, WITHOUT LONG-TERM CURRENT USE OF INSULIN: ICD-10-CM

## 2024-04-18 DIAGNOSIS — R94.31 ABNORMAL EKG: ICD-10-CM

## 2024-04-18 DIAGNOSIS — R06.09 DOE (DYSPNEA ON EXERTION): ICD-10-CM

## 2024-04-18 DIAGNOSIS — R07.9 CHEST PAIN, UNSPECIFIED TYPE: ICD-10-CM

## 2024-04-18 DIAGNOSIS — I10 BENIGN ESSENTIAL HYPERTENSION: ICD-10-CM

## 2024-04-18 LAB
BH CV REST NUCLEAR ISOTOPE DOSE: 33 MCI
BH CV STRESS BP STAGE 1: NORMAL
BH CV STRESS BP STAGE 2: NORMAL
BH CV STRESS BP STAGE 4: NORMAL
BH CV STRESS COMMENTS STAGE 1: NORMAL
BH CV STRESS DOSE REGADENOSON STAGE 1: 0.4
BH CV STRESS DURATION MIN STAGE 1: 1
BH CV STRESS DURATION MIN STAGE 2: 1
BH CV STRESS DURATION MIN STAGE 3: 1
BH CV STRESS DURATION MIN STAGE 4: 1
BH CV STRESS DURATION SEC STAGE 1: 0
BH CV STRESS DURATION SEC STAGE 2: 0
BH CV STRESS DURATION SEC STAGE 3: 0
BH CV STRESS DURATION SEC STAGE 4: 0
BH CV STRESS HR STAGE 1: 90
BH CV STRESS HR STAGE 2: 94
BH CV STRESS HR STAGE 3: 93
BH CV STRESS HR STAGE 4: 93
BH CV STRESS NUCLEAR ISOTOPE DOSE: 33 MCI
BH CV STRESS O2 STAGE 1: 100
BH CV STRESS O2 STAGE 2: 100
BH CV STRESS O2 STAGE 3: 100
BH CV STRESS O2 STAGE 4: 100
BH CV STRESS PROTOCOL 1: NORMAL
BH CV STRESS RECOVERY BP: NORMAL MMHG
BH CV STRESS RECOVERY HR: 85 BPM
BH CV STRESS RECOVERY O2: 99 %
BH CV STRESS STAGE 1: 1
BH CV STRESS STAGE 2: 2
BH CV STRESS STAGE 3: 3
BH CV STRESS STAGE 4: 4
MAXIMAL PREDICTED HEART RATE: 166 BPM
PERCENT MAX PREDICTED HR: 60.84 %
STRESS BASELINE BP: NORMAL MMHG
STRESS BASELINE HR: 86 BPM
STRESS O2 SAT REST: 97 %
STRESS PERCENT HR: 72 %
STRESS POST ESTIMATED WORKLOAD: 1 METS
STRESS POST EXERCISE DUR MIN: 4 MIN
STRESS POST EXERCISE DUR SEC: 0 SEC
STRESS POST O2 SAT PEAK: 100 %
STRESS POST PEAK BP: NORMAL MMHG
STRESS POST PEAK HR: 101 BPM
STRESS TARGET HR: 141 BPM

## 2024-04-18 PROCEDURE — 93017 CV STRESS TEST TRACING ONLY: CPT

## 2024-04-18 PROCEDURE — 78431 MYOCRD IMG PET RST&STRS CT: CPT

## 2024-04-18 PROCEDURE — 25010000002 REGADENOSON 0.4 MG/5ML SOLUTION: Performed by: NURSE PRACTITIONER

## 2024-04-18 PROCEDURE — A9555 RB82 RUBIDIUM: HCPCS | Performed by: NURSE PRACTITIONER

## 2024-04-18 PROCEDURE — 0 RUBIDIUM CHLORIDE: Performed by: NURSE PRACTITIONER

## 2024-04-18 RX ORDER — REGADENOSON 0.08 MG/ML
0.4 INJECTION, SOLUTION INTRAVENOUS ONCE
Status: COMPLETED | OUTPATIENT
Start: 2024-04-18 | End: 2024-04-18

## 2024-04-18 RX ADMIN — RUBIDIUM CHLORIDE RB-82 1 DOSE: 150 INJECTION, SOLUTION INTRAVENOUS at 12:22

## 2024-04-18 RX ADMIN — REGADENOSON 0.4 MG: 0.08 INJECTION, SOLUTION INTRAVENOUS at 12:34

## 2024-04-18 RX ADMIN — RUBIDIUM CHLORIDE RB-82 1 DOSE: 150 INJECTION, SOLUTION INTRAVENOUS at 12:37

## 2024-04-18 NOTE — PROGRESS NOTES
Your echocardiogram is within normal limits.   Your heart squeezes normally.  There are no significant valvular abnormalities noted.

## 2024-04-25 ENCOUNTER — PATIENT OUTREACH (OUTPATIENT)
Dept: CASE MANAGEMENT | Facility: OTHER | Age: 54
End: 2024-04-25
Payer: MEDICARE

## 2024-04-25 DIAGNOSIS — I10 HYPERTENSION, UNSPECIFIED TYPE: ICD-10-CM

## 2024-04-25 DIAGNOSIS — E11.9 TYPE 2 DIABETES MELLITUS WITHOUT COMPLICATION, WITH LONG-TERM CURRENT USE OF INSULIN: Primary | ICD-10-CM

## 2024-04-25 DIAGNOSIS — Z79.4 TYPE 2 DIABETES MELLITUS WITHOUT COMPLICATION, WITH LONG-TERM CURRENT USE OF INSULIN: Primary | ICD-10-CM

## 2024-04-25 NOTE — OUTREACH NOTE
AMBULATORY CASE MANAGEMENT NOTE    Care Coordination    No urgent care or emergency department visits noted. Patient has scheduled follow up with primary care provider and cardiology. He has not contacted [facility] for any concerns or assistance within the past 30 days. Will close CCM; Graduated.    Nereida HENRY  Ambulatory Case Management    4/25/2024, 14:10 EDT

## 2024-04-29 DIAGNOSIS — I82.469 DEEP VEIN THROMBOSIS (DVT) OF CALF MUSCLE VEIN, UNSPECIFIED CHRONICITY, UNSPECIFIED LATERALITY: ICD-10-CM

## 2024-04-29 RX ORDER — APIXABAN 5 MG/1
5 TABLET, FILM COATED ORAL EVERY 12 HOURS
Qty: 60 TABLET | Refills: 0 | Status: SHIPPED | OUTPATIENT
Start: 2024-04-29

## 2024-05-09 NOTE — PROGRESS NOTES
"[facility]  [facility]    Chief Complaint  Follow-up and Chest Pain    Subjective    History of Present Illness {CC  Problem List  Visit  Diagnosis   Encounters  Notes  Medications  Labs  Result Review Imaging  Media :23}     Juarez Hunt is a 53 y.o. male with diabetes, ERIK, PE on Eliquis, hypertension, CKD who presents today to follow up on chest pain, dizziness    Patient presented to the ED 3/22/24 with intermittent left-sided chest pressure that started one day prior and orthostatic dizziness. He reports that he stood up to walk to the couch and had to sit down because the room was spinning. Described chest pressure as \"rhythmic\" and not necessarily associated with activity. High sensitive troponins 31 and 29, EKG negative for acute ischemia, chest x-ray showed no acute process, CT head negative, d-dimer normal.     He had a normal stress PET, echo showed an EF of 51% and no significant valve abnormalities.    He denies any further chest pain.  He notes some dyspnea on exertion when he walks long distances.  Denies any palpitations     Cardiac risks: Diabetes, hypertension, obesity, gender, age, mother had CABG in her 50s    Objective     Vital Signs:   Vitals:    05/14/24 1459   BP: 132/74   BP Location: Left arm   Patient Position: Sitting   Cuff Size: Adult   Pulse: 77   Resp: 18   Temp: 96.4 °F (35.8 °C)   TempSrc: Temporal   SpO2: 95%   Weight: (!) 140 kg (309 lb 4 oz)   Height: 188 cm (74.02\")       Body mass index is 39.68 kg/m².  Physical Exam  Vitals reviewed.   Constitutional:       Appearance: Normal appearance.   HENT:      Head: Normocephalic.   Neck:      Vascular: No carotid bruit.   Cardiovascular:      Rate and Rhythm: Normal rate and regular rhythm.      Pulses: Normal pulses.      Heart sounds: Normal heart sounds, S1 normal and S2 normal. No murmur heard.  Pulmonary:      Effort: Pulmonary effort is normal. No respiratory distress.      Breath " sounds: Normal breath sounds.   Chest:      Chest wall: No tenderness.   Abdominal:      General: Abdomen is flat.      Palpations: Abdomen is soft.   Musculoskeletal:      Cervical back: Neck supple.      Right lower leg: No edema.      Left lower leg: No edema.   Skin:     General: Skin is warm and dry.   Neurological:      General: No focal deficit present.      Mental Status: He is alert and oriented to person, place, and time. Mental status is at baseline.   Psychiatric:         Mood and Affect: Mood normal.         Behavior: Behavior normal.         Thought Content: Thought content normal.              Result Review  Data Reviewed:{ Labs  Result Review  Imaging  Med Tab  Media :23}   High Sensitivity Troponin T (03/22/2024 19:04)  High Sensitivity Troponin T 2Hr (03/22/2024 21:07)  D-dimer, Quantitative (03/22/2024 19:04)  Magnesium (03/22/2024 19:04)  BNP (03/22/2024 19:04)  Lipase (03/22/2024 19:04)  Comprehensive Metabolic Panel (03/22/2024 19:04)  Lactic Acid, Plasma (03/22/2024 19:04)  CBC & Differential (03/22/2024 19:02)  ECG 12 Lead ED Triage Standing Order; Chest Pain (03/22/2024 21:09)  ECG 12 Lead ED Triage Standing Order; Chest Pain (03/22/2024 18:43)         Assessment and Plan {CC Problem List  Visit Diagnosis  ROS  Review (Popup)  Health Maintenance  Quality  BestPractice  Medications  SmartSets  SnapShot Encounters  Media :23}   1. Chest pain, unspecified type  - Symptoms have resolved. Normal stress PET  -Long discussion regarding cardiac prevention.  Recommend good glycemic control, blood pressure and lipid control.  Encouraged Mediterranean/healthy diet and regular exercise    2. Benign essential hypertension  - Appears mostly controlled. Goal BP around 130/80. Continue metoprolol and lisinopril    3. Type 2 diabetes mellitus with hyperglycemia, without long-term current use of insulin  - Discussed healthy diet and importance of good glycemic control. Goal A1c <7. Would  consider SGLT2 inhibitor    4. Hyperlipidemia  - The 10-year ASCVD risk score (Crystal RASHID, et al., 2019) is: 12%    Values used to calculate the score:      Age: 53 years      Sex: Male      Is Non- : No      Diabetic: Yes      Tobacco smoker: No      Systolic Blood Pressure: 132 mmHg      Is BP treated: Yes      HDL Cholesterol: 38 mg/dL      Total Cholesterol: 165 mg/dL  - Would recommend statin therapy, he is going to have upcoming labs with his PCP. Will add lipoprotein a      Follow Up {Instructions Charge Capture  Follow-up Communications :23}   Return if symptoms worsen or fail to improve.    Patient was given instructions and counseling regarding his condition or for health maintenance advice. Please see specific information pulled into the AVS if appropriate.  Advised to call the [facility] and Val[facility] Center with any questions, concerns, or worsening symptoms.

## 2024-05-14 ENCOUNTER — OFFICE VISIT (OUTPATIENT)
Dept: CARDIOLOGY | Facility: HOSPITAL | Age: 54
End: 2024-05-14
Payer: MEDICARE

## 2024-05-14 VITALS
OXYGEN SATURATION: 95 % | DIASTOLIC BLOOD PRESSURE: 74 MMHG | HEIGHT: 74 IN | SYSTOLIC BLOOD PRESSURE: 132 MMHG | RESPIRATION RATE: 18 BRPM | TEMPERATURE: 96.4 F | BODY MASS INDEX: 39.69 KG/M2 | WEIGHT: 309.25 LBS | HEART RATE: 77 BPM

## 2024-05-14 DIAGNOSIS — E78.5 HYPERLIPIDEMIA LDL GOAL <100: Primary | ICD-10-CM

## 2024-05-15 DIAGNOSIS — I82.469 DEEP VEIN THROMBOSIS (DVT) OF CALF MUSCLE VEIN, UNSPECIFIED CHRONICITY, UNSPECIFIED LATERALITY: ICD-10-CM

## 2024-05-15 RX ORDER — INSULIN GLARGINE 100 [IU]/ML
INJECTION, SOLUTION SUBCUTANEOUS
Qty: 15 ML | Refills: 0 | Status: SHIPPED | OUTPATIENT
Start: 2024-05-15

## 2024-05-15 RX ORDER — APIXABAN 5 MG/1
5 TABLET, FILM COATED ORAL EVERY 12 HOURS
Qty: 60 TABLET | Refills: 0 | Status: SHIPPED | OUTPATIENT
Start: 2024-05-15

## 2024-05-15 NOTE — TELEPHONE ENCOUNTER
Rx Refill Note  Requested Prescriptions     Pending Prescriptions Disp Refills    Lantus SoloStar 100 UNIT/ML injection pen [Pharmacy Med Name: Lantus SoloStar Subcutaneous Solution Pen-injector 100 UNIT/ML] 15 mL 0     Sig: Inject 30 units under the skin two times per day as directed    Eliquis 5 MG tablet tablet [Pharmacy Med Name: Eliquis Oral Tablet 5 MG] 60 tablet 0     Sig: TAKE 1 TABLET BY MOUTH EVERY 12 HOURS      Last office visit with prescribing clinician: 2/26/2024   Last telemedicine visit with prescribing clinician: Visit date not found   Next office visit with prescribing clinician: Visit date not found     Dulce Duenas MA  05/15/24, 09:35 EDT

## 2024-05-15 NOTE — TELEPHONE ENCOUNTER
Rx Refill Note  Requested Prescriptions     Pending Prescriptions Disp Refills    Lantus SoloStar 100 UNIT/ML injection pen [Pharmacy Med Name: Lantus SoloStar Subcutaneous Solution Pen-injector 100 UNIT/ML] 15 mL 0     Sig: Inject 30 units under the skin two times per day as directed    Eliquis 5 MG tablet tablet [Pharmacy Med Name: Eliquis Oral Tablet 5 MG] 60 tablet 0     Sig: TAKE 1 TABLET BY MOUTH EVERY 12 HOURS      Last office visit with prescribing clinician: 2/26/2024   Last telemedicine visit with prescribing clinician: Visit date not found   Next office visit with prescribing clinician: Visit date not found       Dulce Duenas MA  05/15/24, 09:30 EDT

## 2024-05-15 NOTE — TELEPHONE ENCOUNTER
Caller: Nu Hunt    Relationship: Emergency Contact    Best call back number: [phone]    Requested Prescriptions:   Requested Prescriptions     Pending Prescriptions Disp Refills    Lantus SoloStar 100 UNIT/ML injection pen [Pharmacy Med Name: Lantus SoloStar Subcutaneous Solution Pen-injector 100 UNIT/ML] 15 mL 0     Sig: Inject 30 units under the skin two times per day as directed    Eliquis 5 MG tablet tablet [Pharmacy Med Name: Eliquis Oral Tablet 5 MG] 60 tablet 0     Sig: TAKE 1 TABLET BY MOUTH EVERY 12 HOURS        Pharmacy where request should be sent: [id number] [place], [place] [facility] [address] [facility]  - [phone] [facility] [phone] FX     Last office visit with prescribing clinician: 2/26/2024   Last telemedicine visit with prescribing clinician: Visit date not found   Next office visit with prescribing clinician: Visit date not found     Additional details provided by patient: PATIENT HAS 3 DAYS LEFT    Does the patient have less than a 3 day supply:  [x] Yes  [] No    Would you like a call back once the refill request has been completed: [x] Yes [] No    If the office needs to give you a call back, can they leave a voicemail: [x] Yes [] No    Geraldine Scott Rep   05/15/24 09:28 EDT

## 2024-05-24 LAB
25(OH)D3+25(OH)D2 SERPL-MCNC: 16.4 NG/ML (ref 30–100)
ALBUMIN SERPL-MCNC: 4 G/DL (ref 3.5–5.2)
ALBUMIN/GLOB SERPL: 1.9 G/DL
ALP SERPL-CCNC: 80 U/L (ref 39–117)
ALT SERPL-CCNC: 20 U/L (ref 1–41)
AST SERPL-CCNC: 21 U/L (ref 1–40)
BASOPHILS # BLD AUTO: 0.09 10*3/MM3 (ref 0–0.2)
BASOPHILS NFR BLD AUTO: 1.5 % (ref 0–1.5)
BILIRUB SERPL-MCNC: 0.3 MG/DL (ref 0–1.2)
BUN SERPL-MCNC: 24 MG/DL (ref 6–20)
BUN/CREAT SERPL: 17.1 (ref 7–25)
CALCIUM SERPL-MCNC: 8.9 MG/DL (ref 8.6–10.5)
CHLORIDE SERPL-SCNC: 107 MMOL/L (ref 98–107)
CHOLEST SERPL-MCNC: 171 MG/DL (ref 0–200)
CO2 SERPL-SCNC: 22.8 MMOL/L (ref 22–29)
CREAT SERPL-MCNC: 1.4 MG/DL (ref 0.76–1.27)
EGFRCR SERPLBLD CKD-EPI 2021: 59.7 ML/MIN/1.73
EOSINOPHIL # BLD AUTO: 0.21 10*3/MM3 (ref 0–0.4)
EOSINOPHIL NFR BLD AUTO: 3.4 % (ref 0.3–6.2)
ERYTHROCYTE [DISTWIDTH] IN BLOOD BY AUTOMATED COUNT: 12 % (ref 12.3–15.4)
GLOBULIN SER CALC-MCNC: 2.1 GM/DL
GLUCOSE SERPL-MCNC: 161 MG/DL (ref 65–99)
HBA1C MFR BLD: 6.8 % (ref 4.8–5.6)
HCT VFR BLD AUTO: 38.8 % (ref 37.5–51)
HDLC SERPL-MCNC: 35 MG/DL (ref 40–60)
HGB BLD-MCNC: 13.4 G/DL (ref 13–17.7)
IMM GRANULOCYTES # BLD AUTO: 0.05 10*3/MM3 (ref 0–0.05)
IMM GRANULOCYTES NFR BLD AUTO: 0.8 % (ref 0–0.5)
LDLC SERPL CALC-MCNC: 102 MG/DL (ref 0–100)
LYMPHOCYTES # BLD AUTO: 2.24 10*3/MM3 (ref 0.7–3.1)
LYMPHOCYTES NFR BLD AUTO: 36.2 % (ref 19.6–45.3)
MCH RBC QN AUTO: 32.1 PG (ref 26.6–33)
MCHC RBC AUTO-ENTMCNC: 34.5 G/DL (ref 31.5–35.7)
MCV RBC AUTO: 92.8 FL (ref 79–97)
MICROALBUMIN UR-MCNC: 72.7 UG/ML
MONOCYTES # BLD AUTO: 0.55 10*3/MM3 (ref 0.1–0.9)
MONOCYTES NFR BLD AUTO: 8.9 % (ref 5–12)
NEUTROPHILS # BLD AUTO: 3.05 10*3/MM3 (ref 1.7–7)
NEUTROPHILS NFR BLD AUTO: 49.2 % (ref 42.7–76)
NRBC BLD AUTO-RTO: 0 /100 WBC (ref 0–0.2)
PLATELET # BLD AUTO: 221 10*3/MM3 (ref 140–450)
POTASSIUM SERPL-SCNC: 4.9 MMOL/L (ref 3.5–5.2)
PROT SERPL-MCNC: 6.1 G/DL (ref 6–8.5)
RBC # BLD AUTO: 4.18 10*6/MM3 (ref 4.14–5.8)
SODIUM SERPL-SCNC: 140 MMOL/L (ref 136–145)
T4 FREE SERPL-MCNC: 1.01 NG/DL (ref 0.93–1.7)
TRIGL SERPL-MCNC: 194 MG/DL (ref 0–150)
TSH SERPL DL<=0.005 MIU/L-ACNC: 1.79 UIU/ML (ref 0.27–4.2)
VIT B12 SERPL-MCNC: 573 PG/ML (ref 211–946)
VLDLC SERPL CALC-MCNC: 34 MG/DL (ref 5–40)
WBC # BLD AUTO: 6.19 10*3/MM3 (ref 3.4–10.8)

## 2024-06-04 RX ORDER — SILDENAFIL CITRATE 20 MG/1
20 TABLET ORAL 3 TIMES DAILY
Qty: 90 TABLET | Refills: 0 | Status: SHIPPED | OUTPATIENT
Start: 2024-06-04

## 2024-06-17 RX ORDER — INSULIN GLARGINE 100 [IU]/ML
INJECTION, SOLUTION SUBCUTANEOUS
Qty: 15 ML | Refills: 0 | OUTPATIENT
Start: 2024-06-17

## 2024-06-17 RX ORDER — INSULIN GLARGINE 100 [IU]/ML
INJECTION, SOLUTION SUBCUTANEOUS
Qty: 15 ML | Refills: 0 | Status: SHIPPED | OUTPATIENT
Start: 2024-06-17

## 2024-06-17 NOTE — TELEPHONE ENCOUNTER
Caller: GilbertNu    Relationship: Emergency Contact    Best call back number: [phone]     Requested Prescriptions:   Requested Prescriptions     Pending Prescriptions Disp Refills    Insulin Glargine (Lantus SoloStar) 100 UNIT/ML injection pen 15 mL 0     Sig: INJECT 30 UNITS UNDER THE SKIN INTO THE APPROPRIATE AREA AS DIRECTED 2 TIMES PER DAY.        Pharmacy where request should be sent: [id number] [place], [place] - [address] [facility] - [phone] [facility] [phone]      Last office visit with prescribing clinician: 2/26/2024   Last telemedicine visit with prescribing clinician: Visit date not found   Next office visit with prescribing clinician: 8/21/2024     Additional details provided by patient:     Does the patient have less than a 3 day supply:  [x] Yes  [] No    Would you like a call back once the refill request has been completed: [x] Yes [] No    If the office needs to give you a call back, can they leave a voicemail: [x] Yes [] No    Geraldine Stephen Rep   06/17/24 09:38 EDT

## 2024-06-18 DIAGNOSIS — I82.469 DEEP VEIN THROMBOSIS (DVT) OF CALF MUSCLE VEIN, UNSPECIFIED CHRONICITY, UNSPECIFIED LATERALITY: ICD-10-CM

## 2024-06-18 RX ORDER — APIXABAN 5 MG/1
5 TABLET, FILM COATED ORAL EVERY 12 HOURS
Qty: 60 TABLET | Refills: 2 | Status: SHIPPED | OUTPATIENT
Start: 2024-06-18

## 2024-06-19 ENCOUNTER — TELEPHONE (OUTPATIENT)
Dept: INTERNAL MEDICINE | Facility: CLINIC | Age: 54
End: 2024-06-19
Payer: MEDICARE

## 2024-06-19 NOTE — TELEPHONE ENCOUNTER
Caller: Juarez Hunt    Relationship: Self    Best call back number: [phone]    What test was performed: LABS    When was the test performed: 5/23/24    Where was the test performed:     DOWNSTAIRS    Additional notes:     [facility] SENT HIM A LETTER ASKING FOR MEDICAL RECORDS    PLEASE CALL PATIENT TO DISCUSS  DOES HE HAVE UNDERLYING KIDNEY ISSUES

## 2024-06-26 ENCOUNTER — OFFICE VISIT (OUTPATIENT)
Dept: INTERNAL MEDICINE | Facility: CLINIC | Age: 54
End: 2024-06-26
Payer: MEDICARE

## 2024-06-26 VITALS
OXYGEN SATURATION: 97 % | WEIGHT: 306 LBS | BODY MASS INDEX: 39.27 KG/M2 | SYSTOLIC BLOOD PRESSURE: 138 MMHG | HEART RATE: 78 BPM | DIASTOLIC BLOOD PRESSURE: 82 MMHG | HEIGHT: 74 IN | TEMPERATURE: 97.1 F | RESPIRATION RATE: 16 BRPM

## 2024-06-26 DIAGNOSIS — Z98.890 HX OF NEPHROLITHOTOMY WITH REMOVAL OF CALCULI: ICD-10-CM

## 2024-06-26 DIAGNOSIS — Z12.5 PROSTATE CANCER SCREENING: ICD-10-CM

## 2024-06-26 DIAGNOSIS — I10 PRIMARY HYPERTENSION: ICD-10-CM

## 2024-06-26 DIAGNOSIS — Z87.442 HX OF NEPHROLITHOTOMY WITH REMOVAL OF CALCULI: ICD-10-CM

## 2024-06-26 DIAGNOSIS — Z79.4 TYPE 2 DIABETES MELLITUS WITHOUT COMPLICATION, WITH LONG-TERM CURRENT USE OF INSULIN: ICD-10-CM

## 2024-06-26 DIAGNOSIS — H66.92 OTITIS OF LEFT EAR: Primary | ICD-10-CM

## 2024-06-26 DIAGNOSIS — E11.9 TYPE 2 DIABETES MELLITUS WITHOUT COMPLICATION, WITH LONG-TERM CURRENT USE OF INSULIN: ICD-10-CM

## 2024-06-26 DIAGNOSIS — N18.2 STAGE 2 CHRONIC KIDNEY DISEASE: ICD-10-CM

## 2024-06-26 PROCEDURE — 3075F SYST BP GE 130 - 139MM HG: CPT | Performed by: INTERNAL MEDICINE

## 2024-06-26 PROCEDURE — G2211 COMPLEX E/M VISIT ADD ON: HCPCS | Performed by: INTERNAL MEDICINE

## 2024-06-26 PROCEDURE — 3079F DIAST BP 80-89 MM HG: CPT | Performed by: INTERNAL MEDICINE

## 2024-06-26 PROCEDURE — 99214 OFFICE O/P EST MOD 30 MIN: CPT | Performed by: INTERNAL MEDICINE

## 2024-06-26 PROCEDURE — 3044F HG A1C LEVEL LT 7.0%: CPT | Performed by: INTERNAL MEDICINE

## 2024-06-26 PROCEDURE — 1126F AMNT PAIN NOTED NONE PRSNT: CPT | Performed by: INTERNAL MEDICINE

## 2024-06-26 RX ORDER — ACYCLOVIR 400 MG/1
1 TABLET ORAL
Qty: 1 EACH | Refills: 0 | COMMUNITY
Start: 2024-06-26

## 2024-06-26 NOTE — PROGRESS NOTES
Subjective     Patient ID: Juarez Hunt is a 53 y.o. male. Patient is here for management of multiple medical problems.     Chief Complaint   Patient presents with    Diabetes     History of Present Illness   DM    [place] has given information to [employer]. That company has been soliciting pt.          The following portions of the patient's history were reviewed and updated as appropriate: allergies, current medications, past family history, past medical history, past social history, past surgical history and problem list.    Review of Systems    Current Outpatient Medications:     allopurinol (Zyloprim) 100 MG tablet, Take 1 tablet by mouth Daily., Disp: 90 tablet, Rfl: 3    apixaban (Eliquis) 5 MG tablet tablet, TAKE 1 TABLET BY MOUTH EVERY 12 HOURS, Disp: 60 tablet, Rfl: 2    Blood Glucose Monitoring Suppl ([employer] Blood Glucose Meter) w/Device kit, 1 each 2 (Two) Times a Day. E11.9, Disp: 1 kit, Rfl: 0    Continuous Glucose  (Dexcom G7 ) device, USE as directed TO monitor blood glucose, Disp: , Rfl:     Continuous Glucose Sensor (Dexcom G7 Sensor) misc, Use 1 each Every 10 (Ten) Days., Disp: 1 each, Rfl: 0    Diclofenac Sodium (VOLTAREN) 1 % gel gel, Apply 4 g topically to the appropriate area as directed 4 (Four) Times a Day As Needed (pain)., Disp: 100 g, Rfl: 1    fluticasone (FLONASE) 50 MCG/ACT nasal spray, 2 sprays into the nostril(s) as directed by provider Daily., Disp: 16 g, Rfl: 0    glucose blood (True Metrix Blood Glucose Test) test strip, Use as instructed to test blood glucose two times per day, Disp: 180 each, Rfl: 3    Insulin Glargine (Lantus SoloStar) 100 UNIT/ML injection pen, INJECT 30 UNITS UNDER THE SKIN INTO THE APPROPRIATE AREA AS DIRECTED 2 TIMES PER DAY., Disp: 15 mL, Rfl: 0    Insulin Pen Needle (BD Pen Needle Tina 2nd Gen) 32G X 4 MM misc, Inject 1 Units under the skin into the appropriate area as directed 2 (Two) Times a Day., Disp: 200 each, Rfl: 3    lisinopril  "(PRINIVIL,ZESTRIL) 20 MG tablet, Take 1 tablet by mouth Daily., Disp: 90 tablet, Rfl: 3    meclizine (ANTIVERT) 25 MG tablet, Take 1 tablet by mouth 3 (Three) Times a Day As Needed for Dizziness., Disp: 30 tablet, Rfl: 0    metFORMIN (Glucophage) 500 MG tablet, Take 1 tablet by mouth 2 (Two) Times a Day With Meals., Disp: 60 tablet, Rfl: 11    metoprolol tartrate (LOPRESSOR) 50 MG tablet, TAKE 1 TABLET BY MOUTH 2 TIMES A DAY, Disp: 180 tablet, Rfl: 3    sildenafil (REVATIO) 20 MG tablet, TAKE 1 TABLET BY MOUTH 3 TIMES A DAY, Disp: 90 tablet, Rfl: 0    Objective      Blood pressure 138/82, pulse 78, temperature 97.1 °F (36.2 °C), resp. rate 16, height 188 cm (74\"), weight (!) 139 kg (306 lb), SpO2 97%.    Class 2 Severe Obesity (BMI >=35 and <=39.9). Obesity-related health conditions include the following: obstructive sleep apnea and hypertension. Obesity is unchanged. BMI is is above average; BMI management plan is completed. We discussed portion control and increasing exercise.       Physical Exam     General Appearance:    Alert, cooperative, no distress, appears stated age   Head:    Normocephalic, without obvious abnormality, atraumatic   Eyes:    PERRL, conjunctiva/corneas clear, EOM's intact   Ears:    Normal TM's and external ear canals, both ears   Nose:   Nares normal, septum midline, mucosa normal, no drainage   or sinus tenderness   Throat:   Lips, mucosa, and tongue normal; teeth and gums normal   Neck:   Supple, symmetrical, trachea midline, no adenopathy;        thyroid:  No enlargement/tenderness/nodules; no carotid    bruit or JVD   Back:     Symmetric, no curvature, ROM normal, no CVA tenderness   Lungs:     Clear to auscultation bilaterally, respirations unlabored   Chest wall:    No tenderness or deformity   Heart:    Regular rate and rhythm, S1 and S2 normal, no murmur,        rub or gallop   Abdomen:     Soft, non-tender, bowel sounds active all four quadrants,     no masses, no organomegaly "   Extremities:   Extremities normal, atraumatic, no cyanosis or edema   Pulses:   2+ and symmetric all extremities   Skin:   Skin color, texture, turgor normal, no rashes or lesions   Lymph nodes:   Cervical, supraclavicular, and axillary nodes normal   Neurologic:   CNII-XII intact. Normal strength, sensation and reflexes       throughout      Results for orders placed or performed during the hospital encounter of 04/18/24   Stress Test With Pet Myocardial Perfusion   Result Value Ref Range    BH CV STRESS PROTOCOL 1 Pharmacologic     Stage 1 1.0     Duration Min Stage 1 1     Duration Sec Stage 1 0     Stress Dose Regadenoson Stage 1 0.40     Stress Comments Stage 1 10 sec bolus injection     Stage 2 2.0     Duration Min Stage 2 1     Duration Sec Stage 2 0     Stage 3 3.0     Duration Min Stage 3 1     Duration Sec Stage 3 0     Stage 4 4.0     Duration Min Stage 4 1     Duration Sec Stage 4 0     Target HR (85%) 141 bpm    Max. Pred. HR (100%) 166 bpm    Exercise duration (min) 4 min    Exercise duration (sec) 0 sec    Estimated workload 1.0 METS    Baseline HR 86 bpm    Baseline /61 mmHg    O2 sat rest 97 %    BP Stage 1 115/58     O2 Stage 1 100     HR Stage 2 94     BP Stage 2 101/58     O2 Stage 2 100     HR Stage 3 93     O2 Stage 3 100     HR Stage 4 93     BP Stage 4 103/59     O2 Stage 4 100     Peak  bpm    Peak /58 mmHg    O2 sat peak 100 %    Recovery HR 85 bpm    Recovery /59 mmHg    Recovery O2 99 %    Percent Max Pred HR 60.84 %    Percent Target HR 72 %    HR Stage 1 90     BH CV REST NUCLEAR ISOTOPE DOSE 33.0 mCi    BH CV STRESS NUCLEAR ISOTOPE DOSE 33.0 mCi         Assessment & Plan   Pt advised to not give personal information to this 3rd of 4 th party that claims association with his insurance company.     Ear pressure. 90  + hours now. 50% loss of hearing. Seen in [facility].  Given abx and flonase.    Ckd.  Multifactorial. Get dm and htn better.  Ha1c coming down.  Need wt  loss.     Has katia diet changes    Wt loss, avoid nsaids.          Diagnoses and all orders for this visit:    1. Otitis of left ear (Primary)  -     Comprehensive Metabolic Panel  -     Vitamin B12  -     CBC & Differential  -     Hemoglobin A1c    2. Type 2 diabetes mellitus without complication, with long-term current use of insulin  -     Continuous Glucose Sensor (Dexcom G7 Sensor) misc; Use 1 each Every 10 (Ten) Days.  Dispense: 1 each; Refill: 0  -     Comprehensive Metabolic Panel  -     Vitamin B12  -     CBC & Differential  -     Hemoglobin A1c    3. Primary hypertension  -     Comprehensive Metabolic Panel  -     Vitamin B12  -     CBC & Differential  -     Hemoglobin A1c    4. Stage 2 chronic kidney disease  -     US Renal Bilateral; Future  -     Comprehensive Metabolic Panel  -     Vitamin B12  -     CBC & Differential  -     Hemoglobin A1c    5. Hx of nephrolithotomy with removal of calculi  -     US Renal Bilateral; Future  -     Comprehensive Metabolic Panel  -     Vitamin B12  -     CBC & Differential  -     Hemoglobin A1c    6. Prostate cancer screening  -     PSA Screen      Return in about 3 months (around 9/26/2024).          There are no Patient Instructions on file for this visit.     Jw Guido MD    Assessment & Plan       Answers submitted by the patient for this visit:  Primary Reason for Visit (Submitted on 6/26/2024)  What is the primary reason for your visit?: Other  Other (Submitted on 6/26/2024)  Please describe your symptoms.: Lab results about kidney function,& discuss letter I received. Also have had loss of hearing and pressure in one ear  Have you had these symptoms before?: No  How long have you been having these symptoms?: 1-4 days  Please list any medications you are currently taking for this condition.: Antibiotics and flonase  Please describe any probable cause for these symptoms. : No

## 2024-07-08 RX ORDER — SILDENAFIL CITRATE 20 MG/1
20 TABLET ORAL 3 TIMES DAILY
Qty: 90 TABLET | Refills: 0 | Status: SHIPPED | OUTPATIENT
Start: 2024-07-08

## 2024-07-08 RX ORDER — INSULIN GLARGINE 100 [IU]/ML
INJECTION, SOLUTION SUBCUTANEOUS
Qty: 15 ML | Refills: 0 | Status: SHIPPED | OUTPATIENT
Start: 2024-07-08

## 2024-07-29 ENCOUNTER — HOSPITAL ENCOUNTER (OUTPATIENT)
Dept: ULTRASOUND IMAGING | Facility: HOSPITAL | Age: 54
Discharge: HOME | End: 2024-07-29
Admitting: INTERNAL MEDICINE
Payer: MEDICARE

## 2024-07-29 DIAGNOSIS — Z98.890 HX OF NEPHROLITHOTOMY WITH REMOVAL OF CALCULI: ICD-10-CM

## 2024-07-29 DIAGNOSIS — Z87.442 HX OF NEPHROLITHOTOMY WITH REMOVAL OF CALCULI: ICD-10-CM

## 2024-07-29 DIAGNOSIS — N18.2 STAGE 2 CHRONIC KIDNEY DISEASE: ICD-10-CM

## 2024-07-29 PROCEDURE — 76775 US EXAM ABDO BACK WALL LIM: CPT

## 2024-08-02 ENCOUNTER — TELEPHONE (OUTPATIENT)
Dept: INTERNAL MEDICINE | Facility: CLINIC | Age: 54
End: 2024-08-02
Payer: MEDICARE

## 2024-08-02 RX ORDER — INSULIN GLARGINE 100 [IU]/ML
INJECTION, SOLUTION SUBCUTANEOUS
Qty: 15 ML | Refills: 0 | Status: SHIPPED | OUTPATIENT
Start: 2024-08-02

## 2024-08-02 RX ORDER — METOPROLOL TARTRATE 50 MG/1
TABLET, FILM COATED ORAL
Qty: 180 TABLET | Refills: 0 | Status: SHIPPED | OUTPATIENT
Start: 2024-08-02

## 2024-08-02 RX ORDER — SILDENAFIL CITRATE 20 MG/1
20 TABLET ORAL 3 TIMES DAILY
Qty: 90 TABLET | Refills: 0 | Status: SHIPPED | OUTPATIENT
Start: 2024-08-02

## 2024-08-02 NOTE — TELEPHONE ENCOUNTER
Caller: Nu Hunt    Relationship: Emergency Contact    Best call back number: [phone]    What test was performed:     ULTRASOUND OF KIDNEY    When was the test performed: 7/29/24    Additional notes:     CALL PATIENT'S WIFE TO DISCUSS KIDNEY ULTRASOUND RESULTS

## 2024-08-02 NOTE — TELEPHONE ENCOUNTER
He is ready for the shot. She stated it has been discussed before and also has had samples of one about a year ago.

## 2024-08-02 NOTE — TELEPHONE ENCOUNTER
Caller: Nu Hunt    Relationship: Emergency Contact    Best call back number: [phone]     What medication are you requesting: WEIGHT LOSS SHOT     If a prescription is needed, what is your preferred pharmacy and phone number: [id number] - [place], [place] - [address] - [phone] [facility] [phone] FX     Additional notes: PATIENTS WIFE STATES IT WAS PREVIOUSLY DISCUSSED FOR PATIENT TO TRY A WEIGHT LOSS SHOT TO HELP WITH DIABETES AND WEIGHT LOSS. PLEASE CALL TO FURTHER DISCUSS.

## 2024-08-07 NOTE — TELEPHONE ENCOUNTER
"Relay     \"Tried calling patient back, no answer and no voicemail to leave message. If patient or patient's wife calls back please inform that Dr. Guido states he will discuss at his appointment in 2 weeks. If patient wants to be seen sooner please let us know and we can work on trying to work him in sooner. \"                 "

## 2024-08-21 ENCOUNTER — OFFICE VISIT (OUTPATIENT)
Dept: INTERNAL MEDICINE | Facility: CLINIC | Age: 54
End: 2024-08-21
Payer: MEDICARE

## 2024-08-21 VITALS
OXYGEN SATURATION: 97 % | DIASTOLIC BLOOD PRESSURE: 64 MMHG | SYSTOLIC BLOOD PRESSURE: 112 MMHG | WEIGHT: 306 LBS | HEART RATE: 80 BPM | HEIGHT: 74 IN | RESPIRATION RATE: 16 BRPM | TEMPERATURE: 97.2 F | BODY MASS INDEX: 39.27 KG/M2

## 2024-08-21 DIAGNOSIS — N20.0 RENAL STONE: ICD-10-CM

## 2024-08-21 DIAGNOSIS — E11.9 TYPE 2 DIABETES MELLITUS WITHOUT COMPLICATION, WITHOUT LONG-TERM CURRENT USE OF INSULIN: ICD-10-CM

## 2024-08-21 DIAGNOSIS — N28.89 RENAL MASS: Primary | ICD-10-CM

## 2024-08-21 RX ORDER — ORAL SEMAGLUTIDE 3 MG/1
3 TABLET ORAL DAILY
Qty: 30 TABLET | Refills: 11 | Status: SHIPPED | OUTPATIENT
Start: 2024-08-21

## 2024-08-21 NOTE — PROGRESS NOTES
Subjective     Patient ID: Juarez Hunt is a 53 y.o. male. Patient is here for management of multiple medical problems.     Chief Complaint   Patient presents with    Diabetes     History of Present Illness   Wt  is down some. Labs not done.      The following portions of the patient's history were reviewed and updated as appropriate: allergies, current medications, past family history, past medical history, past social history, past surgical history and problem list.    Review of Systems    Current Outpatient Medications:     allopurinol (Zyloprim) 100 MG tablet, Take 1 tablet by mouth Daily., Disp: 90 tablet, Rfl: 3    apixaban (Eliquis) 5 MG tablet tablet, TAKE 1 TABLET BY MOUTH EVERY 12 HOURS, Disp: 60 tablet, Rfl: 2    Blood Glucose Monitoring Suppl (CVS Blood Glucose Meter) w/Device kit, 1 each 2 (Two) Times a Day. E11.9, Disp: 1 kit, Rfl: 0    Continuous Glucose  (Dexcom G7 ) device, USE as directed TO monitor blood glucose, Disp: , Rfl:     Continuous Glucose Sensor (Dexcom G7 Sensor) misc, Use 1 each Every 10 (Ten) Days., Disp: 1 each, Rfl: 0    Diclofenac Sodium (VOLTAREN) 1 % gel gel, Apply 4 g topically to the appropriate area as directed 4 (Four) Times a Day As Needed (pain)., Disp: 100 g, Rfl: 1    fluticasone (FLONASE) 50 MCG/ACT nasal spray, 2 sprays into the nostril(s) as directed by provider Daily., Disp: 16 g, Rfl: 0    glucose blood (True Metrix Blood Glucose Test) test strip, Use as instructed to test blood glucose two times per day, Disp: 180 each, Rfl: 3    Insulin Pen Needle (BD Pen Needle Tina 2nd Gen) 32G X 4 MM misc, Inject 1 Units under the skin into the appropriate area as directed 2 (Two) Times a Day., Disp: 200 each, Rfl: 3    Lantus SoloStar 100 UNIT/ML injection pen, INJECT 30 UNITS UNDER THE SKIN INTO THE APPROPRIATE AREA AS DIRECTED 2 TIMES PER DAY., Disp: 15 mL, Rfl: 0    lisinopril (PRINIVIL,ZESTRIL) 20 MG tablet, Take 1 tablet by mouth Daily., Disp: 90  "tablet, Rfl: 3    meclizine (ANTIVERT) 25 MG tablet, Take 1 tablet by mouth 3 (Three) Times a Day As Needed for Dizziness., Disp: 30 tablet, Rfl: 0    metFORMIN (Glucophage) 500 MG tablet, Take 1 tablet by mouth 2 (Two) Times a Day With Meals., Disp: 60 tablet, Rfl: 11    metoprolol tartrate (LOPRESSOR) 50 MG tablet, TAKE 1 TABLET BY MOUTH 2 TIMES A DAY, Disp: 180 tablet, Rfl: 0    sildenafil (REVATIO) 20 MG tablet, TAKE 1 TABLET BY MOUTH 3 TIMES A DAY, Disp: 90 tablet, Rfl: 0    Semaglutide (Rybelsus) 3 MG tablet, Take 1 tablet by mouth Daily., Disp: 30 tablet, Rfl: 11    Objective      Blood pressure 112/64, pulse 80, temperature 97.2 °F (36.2 °C), resp. rate 16, height 188 cm (74\"), weight (!) 139 kg (306 lb), SpO2 97%.            Physical Exam     General Appearance:    Alert, cooperative, no distress, appears stated age   Head:    Normocephalic, without obvious abnormality, atraumatic   Eyes:    PERRL, conjunctiva/corneas clear, EOM's intact   Ears:    Normal TM's and external ear canals, both ears   Nose:   Nares normal, septum midline, mucosa normal, no drainage   or sinus tenderness   Throat:   Lips, mucosa, and tongue normal; teeth and gums normal   Neck:   Supple, symmetrical, trachea midline, no adenopathy;        thyroid:  No enlargement/tenderness/nodules; no carotid    bruit or JVD   Back:     Symmetric, no curvature, ROM normal, no CVA tenderness   Lungs:     Clear to auscultation bilaterally, respirations unlabored   Chest wall:    No tenderness or deformity   Heart:    Regular rate and rhythm, S1 and S2 normal, no murmur,        rub or gallop   Abdomen:     Soft, non-tender, bowel sounds active all four quadrants,     no masses, no organomegaly   Extremities:   Extremities normal, atraumatic, no cyanosis or edema   Pulses:   2+ and symmetric all extremities   Skin:   Skin color, texture, turgor normal, no rashes or lesions   Lymph nodes:   Cervical, supraclavicular, and axillary nodes normal "   Neurologic:   CNII-XII intact. Normal strength, sensation and reflexes       throughout      Results for orders placed or performed during the hospital encounter of 04/18/24   Stress Test With Pet Myocardial Perfusion   Result Value Ref Range    BH CV STRESS PROTOCOL 1 Pharmacologic     Stage 1 1.0     Duration Min Stage 1 1     Duration Sec Stage 1 0     Stress Dose Regadenoson Stage 1 0.40     Stress Comments Stage 1 10 sec bolus injection     Stage 2 2.0     Duration Min Stage 2 1     Duration Sec Stage 2 0     Stage 3 3.0     Duration Min Stage 3 1     Duration Sec Stage 3 0     Stage 4 4.0     Duration Min Stage 4 1     Duration Sec Stage 4 0     Target HR (85%) 141 bpm    Max. Pred. HR (100%) 166 bpm    Exercise duration (min) 4 min    Exercise duration (sec) 0 sec    Estimated workload 1.0 METS    Baseline HR 86 bpm    Baseline /61 mmHg    O2 sat rest 97 %    BP Stage 1 115/58     O2 Stage 1 100     HR Stage 2 94     BP Stage 2 101/58     O2 Stage 2 100     HR Stage 3 93     O2 Stage 3 100     HR Stage 4 93     BP Stage 4 103/59     O2 Stage 4 100     Peak  bpm    Peak /58 mmHg    O2 sat peak 100 %    Recovery HR 85 bpm    Recovery /59 mmHg    Recovery O2 99 %    Percent Max Pred HR 60.84 %    Percent Target HR 72 %    HR Stage 1 90     BH CV REST NUCLEAR ISOTOPE DOSE 33.0 mCi    BH CV STRESS NUCLEAR ISOTOPE DOSE 33.0 mCi         Assessment & Plan     Narrative & Impression   PROCEDURE: US RENAL BILATERAL-     HISTORY: cri; N18.2-Chronic kidney disease, stage 2 (mild);  Z98.890-Other specified postprocedural states; Z87.442-Personal history  of urinary calculi     PROCEDURE: Ultrasound images of the kidneys were obtained.     FINDINGS: The kidneys are normal in size and echogenicity with the right  kidney measuring 12.38 cm and the left kidney measuring 12 cm . There is  an echogenic stone in the inferior pole of the right kidney. There is no  hydronephrosis. There is a suspected  dromedary hump in the mid left  kidney. Renal mass protocol CT could further evaluate.     IMPRESSION:  1. Right nephrolithiasis without hydronephrosis.  2. Suspected dromedary hump in the left kidney. A renal mass protocol CT  is recommended to exclude mass.     Pt with CKD and will need ct renal with contrast to sort out. Unclear if this is needed as the risk of IV contrast with CKD. I would like urology to sort out to see if CT is needed.    Renal stones. Mild symptoms.    Pt considering GLP-1  Increase water to 1/2 gallon a day.   Increase protein 60 grams a day to avoid sarcopenia.        Low bs at night 40.  Will decrease lantus 30 to 20.   Start rybelsus.     Diagnoses and all orders for this visit:    1. Renal mass (Primary)  -     Ambulatory Referral to Urology    2. Renal stone  -     Ambulatory Referral to Urology    3. Type 2 diabetes mellitus without complication, without long-term current use of insulin  -     Semaglutide (Rybelsus) 3 MG tablet; Take 1 tablet by mouth Daily.  Dispense: 30 tablet; Refill: 11      Return in about 4 weeks (around 9/18/2024).          There are no Patient Instructions on file for this visit.     Jw Guido MD    Assessment & Plan

## 2024-08-23 DIAGNOSIS — I82.469 DEEP VEIN THROMBOSIS (DVT) OF CALF MUSCLE VEIN, UNSPECIFIED CHRONICITY, UNSPECIFIED LATERALITY: ICD-10-CM

## 2024-08-23 RX ORDER — INSULIN GLARGINE 100 [IU]/ML
INJECTION, SOLUTION SUBCUTANEOUS
Qty: 15 ML | Refills: 0 | Status: SHIPPED | OUTPATIENT
Start: 2024-08-23

## 2024-08-23 RX ORDER — APIXABAN 5 MG/1
5 TABLET, FILM COATED ORAL EVERY 12 HOURS
Qty: 60 TABLET | Refills: 0 | Status: SHIPPED | OUTPATIENT
Start: 2024-08-23

## 2024-09-11 RX ORDER — INSULIN GLARGINE 100 [IU]/ML
INJECTION, SOLUTION SUBCUTANEOUS
Qty: 15 ML | Refills: 0 | Status: SHIPPED | OUTPATIENT
Start: 2024-09-11

## 2024-09-13 ENCOUNTER — OFFICE VISIT (OUTPATIENT)
Dept: INTERNAL MEDICINE | Facility: CLINIC | Age: 54
End: 2024-09-13
Payer: MEDICARE

## 2024-09-13 VITALS
HEIGHT: 74 IN | DIASTOLIC BLOOD PRESSURE: 78 MMHG | HEART RATE: 73 BPM | RESPIRATION RATE: 20 BRPM | TEMPERATURE: 97.9 F | WEIGHT: 307 LBS | SYSTOLIC BLOOD PRESSURE: 136 MMHG | BODY MASS INDEX: 39.4 KG/M2 | OXYGEN SATURATION: 99 %

## 2024-09-13 DIAGNOSIS — K59.01 SLOW TRANSIT CONSTIPATION: Primary | ICD-10-CM

## 2024-09-13 PROCEDURE — 3075F SYST BP GE 130 - 139MM HG: CPT | Performed by: INTERNAL MEDICINE

## 2024-09-13 PROCEDURE — 3078F DIAST BP <80 MM HG: CPT | Performed by: INTERNAL MEDICINE

## 2024-09-13 PROCEDURE — 1126F AMNT PAIN NOTED NONE PRSNT: CPT | Performed by: INTERNAL MEDICINE

## 2024-09-13 PROCEDURE — 3044F HG A1C LEVEL LT 7.0%: CPT | Performed by: INTERNAL MEDICINE

## 2024-09-13 PROCEDURE — 99213 OFFICE O/P EST LOW 20 MIN: CPT | Performed by: INTERNAL MEDICINE

## 2024-09-13 RX ORDER — MAG HYDROX/ALUMINUM HYD/SIMETH 400-400-40
1 SUSPENSION, ORAL (FINAL DOSE FORM) ORAL AS NEEDED
Qty: 12 EACH | Refills: 0 | Status: SHIPPED | OUTPATIENT
Start: 2024-09-13

## 2024-09-13 NOTE — PROGRESS NOTES
Chief Complaint   Patient presents with    Constipation     For the last 9 days--very little movement       Subjective     History of Present Illness  The patient is here for a follow-up visit.    He has been experiencing constipation for approximately 9 days, with only a few bowel movements induced by laxatives. Despite consuming prunes for the past 2 days, he continues to feel constipated. He has been using Ex-Lax chocolate laxative, initially taking two doses which resulted in diarrhea-like symptoms. Subsequent doses on Monday and last night led to similar symptoms. This morning, he had a minor but more solid bowel movement. He also reports feeling slightly bloated and has been passing gas.     He has a long-standing dairy allergy, which he attempted to use to stimulate bowel movements, but this was unsuccessful. He reports no pain or gas-related discomfort but does mention an unusual burning sensation in his abdomen. He also took a dose of MiraLAX last Friday, which did not result in a bowel movement for several hours, leading him to take the chocolate laxative.    ALLERGIES  He is allergic to DAIRY.    The following portions of the patient's history were reviewed and updated as appropriate: allergies, current medications, past family history, past medical history, past social history, past surgical history and problem list.    Review of Systems   Constitutional:  Negative for chills, fatigue and fever.   HENT:  Negative for congestion, ear pain, rhinorrhea, sinus pressure and sore throat.    Eyes:  Negative for visual disturbance.   Respiratory:  Negative for cough, chest tightness, shortness of breath and wheezing.    Cardiovascular:  Negative for chest pain, palpitations and leg swelling.   Gastrointestinal:  Negative for abdominal pain, blood in stool, constipation, diarrhea, nausea and vomiting.   Endocrine: Negative for polydipsia and polyuria.   Genitourinary:  Negative for dysuria and hematuria.    Musculoskeletal:  Negative for arthralgias and back pain.   Skin:  Negative for rash.   Neurological:  Negative for dizziness, light-headedness, numbness and headaches.   Psychiatric/Behavioral:  Negative for dysphoric mood and sleep disturbance. The patient is not nervous/anxious.        No Known Allergies    Past Medical History:   Diagnosis Date    Arthritis     Blood clot in vein     Diabetes mellitus     Hypertension        Social History     Socioeconomic History    Marital status:    Tobacco Use    Smoking status: Former     Passive exposure: Past    Smokeless tobacco: Current     Types: Chew   Vaping Use    Vaping status: Never Used   Substance and Sexual Activity    Alcohol use: Yes     Comment: 1/4 glass per week    Drug use: No    Sexual activity: Defer        Past Surgical History:   Procedure Laterality Date    CARDIAC CATHETERIZATION  2004    IVC FILTER RETRIEVAL      KNEE SURGERY Left 1986    SKIN BIOPSY  06/13/2020    toe       Family History   Problem Relation Age of Onset    Diabetes Mother     Heart disease Mother     Heart attack Mother     Hypertension Mother     Arthritis Father     Heart attack Maternal Grandfather     Heart attack Maternal Uncle     Liver cancer Maternal Aunt          Current Outpatient Medications:     allopurinol (Zyloprim) 100 MG tablet, Take 1 tablet by mouth Daily., Disp: 90 tablet, Rfl: 3    Blood Glucose Monitoring Suppl (CVS Blood Glucose Meter) w/Device kit, 1 each 2 (Two) Times a Day. E11.9, Disp: 1 kit, Rfl: 0    Continuous Glucose  (Dexcom G7 ) device, USE as directed TO monitor blood glucose, Disp: , Rfl:     Continuous Glucose Sensor (Dexcom G7 Sensor) misc, Use 1 each Every 10 (Ten) Days., Disp: 1 each, Rfl: 0    Diclofenac Sodium (VOLTAREN) 1 % gel gel, Apply 4 g topically to the appropriate area as directed 4 (Four) Times a Day As Needed (pain)., Disp: 100 g, Rfl: 1    Eliquis 5 MG tablet tablet, TAKE 1 TABLET BY MOUTH EVERY 12 HOURS,  "Disp: 60 tablet, Rfl: 0    fluticasone (FLONASE) 50 MCG/ACT nasal spray, 2 sprays into the nostril(s) as directed by provider Daily., Disp: 16 g, Rfl: 0    glucose blood (True Metrix Blood Glucose Test) test strip, Use as instructed to test blood glucose two times per day, Disp: 180 each, Rfl: 3    Insulin Pen Needle (BD Pen Needle Tina 2nd Gen) 32G X 4 MM misc, Inject 1 Units under the skin into the appropriate area as directed 2 (Two) Times a Day., Disp: 200 each, Rfl: 3    Lantus SoloStar 100 UNIT/ML injection pen, INJECT 30 UNITS UNDER THE SKIN INTO THE APPROPRIATE AREA AS DIRECTED 2 TIMES PER DAY., Disp: 15 mL, Rfl: 0    lisinopril (PRINIVIL,ZESTRIL) 20 MG tablet, Take 1 tablet by mouth Daily., Disp: 90 tablet, Rfl: 3    meclizine (ANTIVERT) 25 MG tablet, Take 1 tablet by mouth 3 (Three) Times a Day As Needed for Dizziness., Disp: 30 tablet, Rfl: 0    metFORMIN (Glucophage) 500 MG tablet, Take 1 tablet by mouth 2 (Two) Times a Day With Meals., Disp: 60 tablet, Rfl: 11    metoprolol tartrate (LOPRESSOR) 50 MG tablet, TAKE 1 TABLET BY MOUTH 2 TIMES A DAY, Disp: 180 tablet, Rfl: 0    sildenafil (REVATIO) 20 MG tablet, TAKE 1 TABLET BY MOUTH 3 TIMES A DAY, Disp: 90 tablet, Rfl: 0    glycerin adult 2 g suppository, Insert 1 suppository into the rectum As Needed for Constipation., Disp: 12 each, Rfl: 0    Objective   /78   Pulse 73   Temp 97.9 °F (36.6 °C)   Resp 20   Ht 188 cm (74\")   Wt (!) 139 kg (307 lb)   SpO2 99%   BMI 39.42 kg/m²     Physical Exam  Vitals and nursing note reviewed.   Constitutional:       Appearance: Normal appearance. He is well-developed. He is obese.   HENT:      Head: Normocephalic and atraumatic.   Eyes:      Extraocular Movements: Extraocular movements intact.      Conjunctiva/sclera: Conjunctivae normal.   Pulmonary:      Effort: Pulmonary effort is normal.   Musculoskeletal:      Cervical back: Normal range of motion and neck supple.   Skin:     General: Skin is warm and " dry.      Findings: No rash.   Neurological:      General: No focal deficit present.      Mental Status: He is alert and oriented to person, place, and time.   Psychiatric:         Mood and Affect: Mood normal.         Behavior: Behavior normal.         Results:  Results for orders placed or performed during the hospital encounter of 04/18/24   Stress Test With Pet Myocardial Perfusion   Result Value Ref Range     CV STRESS PROTOCOL 1 Pharmacologic     Stage 1 1.0     Duration Min Stage 1 1     Duration Sec Stage 1 0     Stress Dose Regadenoson Stage 1 0.40     Stress Comments Stage 1 10 sec bolus injection     Stage 2 2.0     Duration Min Stage 2 1     Duration Sec Stage 2 0     Stage 3 3.0     Duration Min Stage 3 1     Duration Sec Stage 3 0     Stage 4 4.0     Duration Min Stage 4 1     Duration Sec Stage 4 0     Target HR (85%) 141 bpm    Max. Pred. HR (100%) 166 bpm    Exercise duration (min) 4 min    Exercise duration (sec) 0 sec    Estimated workload 1.0 METS    Baseline HR 86 bpm    Baseline /61 mmHg    O2 sat rest 97 %    BP Stage 1 115/58     O2 Stage 1 100     HR Stage 2 94     BP Stage 2 101/58     O2 Stage 2 100     HR Stage 3 93     O2 Stage 3 100     HR Stage 4 93     BP Stage 4 103/59     O2 Stage 4 100     Peak  bpm    Peak /58 mmHg    O2 sat peak 100 %    Recovery HR 85 bpm    Recovery /59 mmHg    Recovery O2 99 %    Percent Max Pred HR 60.84 %    Percent Target HR 72 %    HR Stage 1 90     BH CV REST NUCLEAR ISOTOPE DOSE 33.0 mCi     CV STRESS NUCLEAR ISOTOPE DOSE 33.0 mCi         Assessment & Plan   Diagnoses and all orders for this visit:    1. Slow transit constipation (Primary)  -     XR Abdomen KUB; Future  -     glycerin adult 2 g suppository; Insert 1 suppository into the rectum As Needed for Constipation.  Dispense: 12 each; Refill: 0        Assessment & Plan  Constipation.  Symptoms suggest possible fecal impaction. He is advised to take two doses of MiraLAX  today. If bowel movements do not occur by tonight, he should use a glycerin suppository, which can be used up to twice daily if necessary. An x-ray has been ordered to further investigate if there is no improvement in bowels by tomorrow.   - He was counseled on the importance of adequate hydration and dietary fiber intake. Prune juice was recommended as a natural remedy        Follow up:  No follow-ups on file.     Patient or patient representative verbalized consent for the use of Ambient Listening during the visit with  Jacques Melendez DO for chart documentation. 9/13/2024  17:20 EDT

## 2024-09-16 DIAGNOSIS — I82.469 DEEP VEIN THROMBOSIS (DVT) OF CALF MUSCLE VEIN, UNSPECIFIED CHRONICITY, UNSPECIFIED LATERALITY: ICD-10-CM

## 2024-09-16 RX ORDER — METOPROLOL TARTRATE 50 MG
50 TABLET ORAL 2 TIMES DAILY
Qty: 180 TABLET | Refills: 0 | Status: SHIPPED | OUTPATIENT
Start: 2024-09-16

## 2024-10-02 RX ORDER — INSULIN GLARGINE 100 [IU]/ML
INJECTION, SOLUTION SUBCUTANEOUS
Qty: 15 ML | Refills: 0 | Status: SHIPPED | OUTPATIENT
Start: 2024-10-02

## 2024-10-31 RX ORDER — INSULIN GLARGINE 100 [IU]/ML
INJECTION, SOLUTION SUBCUTANEOUS
Qty: 15 ML | Refills: 0 | Status: SHIPPED | OUTPATIENT
Start: 2024-10-31

## 2024-11-08 ENCOUNTER — OFFICE VISIT (OUTPATIENT)
Age: 54
End: 2024-11-08
Payer: MEDICARE

## 2024-11-08 DIAGNOSIS — N28.9 RENAL LESION: Primary | ICD-10-CM

## 2024-11-08 RX ORDER — ORAL SEMAGLUTIDE 3 MG/1
1 TABLET ORAL DAILY
COMMUNITY
Start: 2024-09-15

## 2024-11-08 NOTE — PROGRESS NOTES
Renal Mass Office Visit      Patient Name: Juarez Hunt  : 1970   MRN: [id number]     Chief Complaint:  Renal Mass.   Chief Complaint   Patient presents with    Renal mass & Renal cyst       Referring Provider: Jw Guiod MD    History of Present Illness: Juarez Hunt is a 54 y.o. male who presents today with recently diagnosed renal mass. ***      Subjective      Review of System: Review of Systems   Genitourinary:  Negative for decreased urine volume, difficulty urinating, dysuria, enuresis, flank pain, frequency, hematuria and urgency.      I have reviewed the ROS documented by my clinical staff, I have updated appropriately and I agree. Radha Ross MA    Past Medical History:   Past Medical History:   Diagnosis Date    Arthritis     Blood clot in vein     Diabetes mellitus     Hypertension        Past Surgical History:   Past Surgical History:   Procedure Laterality Date    CARDIAC CATHETERIZATION      IVC FILTER RETRIEVAL      KNEE SURGERY Left 1986    SKIN BIOPSY  2020    toe       Family History:   Family History   Problem Relation Age of Onset    Diabetes Mother     Heart disease Mother     Heart attack Mother     Hypertension Mother     Arthritis Father     Heart attack Maternal Grandfather     Heart attack Maternal Uncle     Liver cancer Maternal Aunt        Social History:   Social History     Socioeconomic History    Marital status:    Tobacco Use    Smoking status: Former     Passive exposure: Past    Smokeless tobacco: Current     Types: Chew   Vaping Use    Vaping status: Never Used   Substance and Sexual Activity    Alcohol use: Yes     Comment: 1/4 glass per week    Drug use: No    Sexual activity: Defer       Medications:     Current Outpatient Medications:     allopurinol (Zyloprim) 100 MG tablet, Take 1 tablet by mouth Daily., Disp: 90 tablet, Rfl: 3    apixaban (Eliquis) 5 MG tablet tablet, Take 1 tablet by mouth Every 12 (Twelve)  Hours., Disp: 60 tablet, Rfl: 0    Blood Glucose Monitoring Suppl (CVS Blood Glucose Meter) w/Device kit, 1 each 2 (Two) Times a Day. E11.9, Disp: 1 kit, Rfl: 0    Continuous Glucose  (Dexcom G7 ) device, USE as directed TO monitor blood glucose, Disp: , Rfl:     Continuous Glucose Sensor (Dexcom G7 Sensor) misc, Use 1 each Every 10 (Ten) Days., Disp: 1 each, Rfl: 0    Diclofenac Sodium (VOLTAREN) 1 % gel gel, Apply 4 g topically to the appropriate area as directed 4 (Four) Times a Day As Needed (pain)., Disp: 100 g, Rfl: 1    fluticasone (FLONASE) 50 MCG/ACT nasal spray, 2 sprays into the nostril(s) as directed by provider Daily., Disp: 16 g, Rfl: 0    glucose blood (True Metrix Blood Glucose Test) test strip, Use as instructed to test blood glucose two times per day, Disp: 180 each, Rfl: 3    glycerin adult 2 g suppository, Insert 1 suppository into the rectum As Needed for Constipation., Disp: 12 each, Rfl: 0    Insulin Pen Needle (BD Pen Needle Tina 2nd Gen) 32G X 4 MM misc, Inject 1 Units under the skin into the appropriate area as directed 2 (Two) Times a Day., Disp: 200 each, Rfl: 3    Lantus SoloStar 100 UNIT/ML injection pen, INJECT 30 UNITS UNDER THE SKIN INTO THE APPROPRIATE AREA 2 TIMES PER DAY., Disp: 15 mL, Rfl: 0    lisinopril (PRINIVIL,ZESTRIL) 20 MG tablet, Take 1 tablet by mouth Daily., Disp: 90 tablet, Rfl: 3    meclizine (ANTIVERT) 25 MG tablet, Take 1 tablet by mouth 3 (Three) Times a Day As Needed for Dizziness., Disp: 30 tablet, Rfl: 0    metFORMIN (Glucophage) 500 MG tablet, Take 1 tablet by mouth 2 (Two) Times a Day With Meals., Disp: 60 tablet, Rfl: 11    metoprolol tartrate (LOPRESSOR) 50 MG tablet, Take 1 tablet by mouth 2 (Two) Times a Day., Disp: 180 tablet, Rfl: 0    Rybelsus 3 MG tablet, Take 1 tablet by mouth Daily., Disp: , Rfl:     sildenafil (REVATIO) 20 MG tablet, TAKE 1 TABLET BY MOUTH 3 TIMES A DAY, Disp: 90 tablet, Rfl: 0    Allergies:   No Known  "Allergies    IPSS Questionnaire (AUA-7):  Over the past month…    1)  Incomplete Emptying  How often have you had a sensation of not emptying your bladder?  {Ratin}   2)  Frequency  How often have you had to urinate less than every two hours? {Ratin}   3)  Intermittency  How often have you found you stopped and started again several times when you urinated?  {Ratin}   4) Urgency  How often have you found it difficult to postpone urination?  {Ratin}   5) Weak Stream  How often have you had a weak urinary stream?  {Ratin}   6) Straining  How often have you had to push or strain to begin urination?  {Ratin}   7) Nocturia  How many times did you typically get up at night to urinate?  {Ratin}   Total Score:  {0-35:01156}       Quality of life due to urinary symptoms:  If you were to spend the rest of your life with your urinary condition the way it is now, how would you feel about that? {Ratin}   Urine Leakage (Incontinence) {Ratin}       Objective     Physical Exam:   Vital Signs: There were no vitals filed for this visit.  There is no height or weight on file to calculate BMI.     Physical Exam    Genitourinary  Penis: {Desc; circumcised/uncircumcised:5705::\"circumcised\"} penis, glans normal, no penile discharge.  No rashes/lesions.    Testes: descended bilaterally, no masses, {Desc; tender/non:09755} to palpation. Remainder of scrotal contents normal. No hernia appreciated.  Rectal:  Normal tone, no masses.  Prostate:  {NUMBERS; 5-50 BY 5:79842} grams.  Symmetric, non-tender, anodular and no induration.      Labs:   Lab Results   Component Value Date    PSA 0.410 2021       Lab Results   Component Value Date    WBC 6.19 2024    HGB 13.4 2024    HCT 38.8 2024    MCV 92.8 2024     2024       Lab Results   Component Value Date    GLUCOSE 161 (H) 2024    BUN 24 (H) 2024    CREATININE 1.40 (H) 2024    " "EGFRIFNONA 91 06/11/2021    EGFRIFAFRI 105 06/11/2021    BCR 17.1 05/23/2024    K 4.9 05/23/2024    CO2 22.8 05/23/2024    CALCIUM 8.9 05/23/2024    PROTENTOTREF 6.1 05/23/2024    ALBUMIN 4.0 05/23/2024    LABIL2 1.9 05/23/2024    AST 21 05/23/2024    ALT 20 05/23/2024       Images:   CT Lower Extremity Right Without Contrast    Result Date: 11/7/2024  Degenerative joint disease. Images reviewed, interpreted, and dictated by Dr. GIANFRANCO Valladares. Transcribed by Lo Kaur PA-C.    US Renal Bilateral    Result Date: 7/29/2024  1. Right nephrolithiasis without hydronephrosis. 2. Suspected dromedary hump in the left kidney. A renal mass protocol CT is recommended to exclude mass.      Images were reviewed, interpreted, and dictated by Dr. Sary Johns MD Transcribed by Britt Huang PA-C.  This report was signed and finalized on 7/29/2024 12:42 PM by Sary Johns MD.        Measures:   Tobacco:   Juarez Hunt  reports that he has quit smoking. He has been exposed to tobacco smoke. His smokeless tobacco use includes chew. I have educated him on the risk of diseases from using tobacco products such as {Tobacco Cessation Diseases:08632::\"cancer\",\"COPD\",\"heart disease\"}.     I advised him to quit and he is {Willing/Not Willing to Quit Tobacco Products:89746}.    I spent {Time Spent Tobacco :27953} minutes counseling the patient.           Urine Incontinence: ( NOUI)  ***     Assessment / Plan      Assessment:   Juarez Hunt is a 54 y.o. male who was recently diagnosed with a renal mass. ***     There are no diagnoses linked to this encounter.     Patient Education:   ***    Follow Up:   No follow-ups on file.    I spent approximately *** minutes providing clinical care for this patient; including review of patient's chart and provider documentation, face to face time spent with patient in examination room (obtaining history, performing physical exam, discussing diagnosis and management " options), placing orders, and completing patient documentation.     William Dudley MD  [facility] [place]

## 2024-11-08 NOTE — PROGRESS NOTES
Office Visit New Urology      Patient Name: Juarez Hunt  : 1970   MRN: [id number]     Chief Complaint:    Chief Complaint   Patient presents with    Nephrolithiasis       Referring Provider: Jw Guido MD    History of Present Illness: Juarez Hunt is a 54 y.o. male who presents to Urology today for evaluation of incidental imaging findings.  He underwent ultrasound imaging in 2024 with report of possible dromedary hump of the left kidney, lower pole right renal stone.  Patient presents today as a referral for workup for potential renal lesion.  He denies flank pain.  Denies gross hematuria.  Denies history of recurrent urinary tract infection.  Denies bothersome baseline lower urinary tract symptoms.  Past smoking history.  No family history of  malignancy.    Additionally, he denies prior history and knowledge of stone disease.  Has past urologic evaluation including instrumentation or procedure.      Subjective      Review of System: Review of Systems   Genitourinary:  Negative for decreased urine volume, difficulty urinating, dysuria, enuresis, flank pain, frequency, hematuria and urgency.      I have reviewed the ROS documented by my clinical staff, updated appropriately and I agree. William Dudley MD    Past Medical History:   Past Medical History:   Diagnosis Date    Arthritis     Blood clot in vein     Diabetes mellitus     Hypertension        Past Surgical History:   Past Surgical History:   Procedure Laterality Date    CARDIAC CATHETERIZATION  2004    IVC FILTER RETRIEVAL      KNEE SURGERY Left 1986    SKIN BIOPSY  2020    toe       Family History:   Family History   Problem Relation Age of Onset    Diabetes Mother     Heart disease Mother     Heart attack Mother     Hypertension Mother     Arthritis Father     Heart attack Maternal Grandfather     Heart attack Maternal Uncle     Liver cancer Maternal Aunt        Social History:   Social History      Socioeconomic History    Marital status:    Tobacco Use    Smoking status: Former     Passive exposure: Past    Smokeless tobacco: Current     Types: Chew   Vaping Use    Vaping status: Never Used   Substance and Sexual Activity    Alcohol use: Yes     Comment: 1/4 glass per week    Drug use: No    Sexual activity: Defer       Medications:     Current Outpatient Medications:     allopurinol (Zyloprim) 100 MG tablet, Take 1 tablet by mouth Daily., Disp: 90 tablet, Rfl: 3    apixaban (Eliquis) 5 MG tablet tablet, Take 1 tablet by mouth Every 12 (Twelve) Hours., Disp: 60 tablet, Rfl: 0    Blood Glucose Monitoring Suppl (CVS Blood Glucose Meter) w/Device kit, 1 each 2 (Two) Times a Day. E11.9, Disp: 1 kit, Rfl: 0    Continuous Glucose  (Dexcom G7 ) device, USE as directed TO monitor blood glucose, Disp: , Rfl:     Continuous Glucose Sensor (Dexcom G7 Sensor) misc, Use 1 each Every 10 (Ten) Days., Disp: 1 each, Rfl: 0    Diclofenac Sodium (VOLTAREN) 1 % gel gel, Apply 4 g topically to the appropriate area as directed 4 (Four) Times a Day As Needed (pain)., Disp: 100 g, Rfl: 1    fluticasone (FLONASE) 50 MCG/ACT nasal spray, 2 sprays into the nostril(s) as directed by provider Daily., Disp: 16 g, Rfl: 0    glucose blood (True Metrix Blood Glucose Test) test strip, Use as instructed to test blood glucose two times per day, Disp: 180 each, Rfl: 3    glycerin adult 2 g suppository, Insert 1 suppository into the rectum As Needed for Constipation., Disp: 12 each, Rfl: 0    Insulin Pen Needle (BD Pen Needle Tina 2nd Gen) 32G X 4 MM misc, Inject 1 Units under the skin into the appropriate area as directed 2 (Two) Times a Day., Disp: 200 each, Rfl: 3    Lantus SoloStar 100 UNIT/ML injection pen, INJECT 30 UNITS UNDER THE SKIN INTO THE APPROPRIATE AREA 2 TIMES PER DAY., Disp: 15 mL, Rfl: 0    lisinopril (PRINIVIL,ZESTRIL) 20 MG tablet, Take 1 tablet by mouth Daily., Disp: 90 tablet, Rfl: 3    meclizine  (ANTIVERT) 25 MG tablet, Take 1 tablet by mouth 3 (Three) Times a Day As Needed for Dizziness., Disp: 30 tablet, Rfl: 0    metFORMIN (Glucophage) 500 MG tablet, Take 1 tablet by mouth 2 (Two) Times a Day With Meals., Disp: 60 tablet, Rfl: 11    metoprolol tartrate (LOPRESSOR) 50 MG tablet, Take 1 tablet by mouth 2 (Two) Times a Day., Disp: 180 tablet, Rfl: 0    Rybelsus 3 MG tablet, Take 1 tablet by mouth Daily., Disp: , Rfl:     sildenafil (REVATIO) 20 MG tablet, TAKE 1 TABLET BY MOUTH 3 TIMES A DAY, Disp: 90 tablet, Rfl: 0    Allergies:   No Known Allergies    IPSS Questionnaire (AUA-7):  Over the past month…    1)  Incomplete Emptying  How often have you had a sensation of not emptying your bladder?  1 - Less than 1 time in 5   2)  Frequency  How often have you had to urinate less than every two hours? 1 - Less than 1 time in 5   3)  Intermittency  How often have you found you stopped and started again several times when you urinated?  0 - Not at all   4) Urgency  How often have you found it difficult to postpone urination?  0 - Not at all   5) Weak Stream  How often have you had a weak urinary stream?  0 - Not at all   6) Straining  How often have you had to push or strain to begin urination?  0 - Not at all   7) Nocturia  How many times did you typically get up at night to urinate?  5 - 5+ times   Total Score:  7       Quality of life due to urinary symptoms:  If you were to spend the rest of your life with your urinary condition the way it is now, how would you feel about that? 3-Mixed   Urine Leakage (Incontinence) 0-No Leakage       Objective     Physical Exam:   Vital Signs: There were no vitals filed for this visit.  There is no height or weight on file to calculate BMI.     Physical Exam  Vitals and nursing note reviewed.   Constitutional:       Appearance: Normal appearance.   HENT:      Head: Normocephalic and atraumatic.   Cardiovascular:      Comments: Well perfused  Pulmonary:      Effort: Pulmonary  effort is normal.   Abdominal:      General: Abdomen is flat.      Palpations: Abdomen is soft.   Musculoskeletal:         General: Normal range of motion.   Skin:     General: Skin is warm and dry.   Neurological:      General: No focal deficit present.      Mental Status: He is alert and oriented to person, place, and time. Mental status is at baseline.   Psychiatric:         Mood and Affect: Mood normal.         Behavior: Behavior normal.         Thought Content: Thought content normal.         Judgment: Judgment normal.             Labs:   Brief Urine Lab Results       None                 Lab Results   Component Value Date    GLUCOSE 161 (H) 05/23/2024    CALCIUM 8.9 05/23/2024     05/23/2024    K 4.9 05/23/2024    CO2 22.8 05/23/2024     05/23/2024    BUN 24 (H) 05/23/2024    CREATININE 1.40 (H) 05/23/2024    EGFRIFAFRI 105 06/11/2021    EGFRIFNONA 91 06/11/2021    BCR 17.1 05/23/2024    ANIONGAP 7.0 03/22/2024       Lab Results   Component Value Date    WBC 6.19 05/23/2024    HGB 13.4 05/23/2024    HCT 38.8 05/23/2024    MCV 92.8 05/23/2024     05/23/2024       Images:   CT Lower Extremity Right Without Contrast    Result Date: 11/7/2024  Degenerative joint disease. Images reviewed, interpreted, and dictated by Dr. GIANFRANCO Valladares. Transcribed by Lo Kaur PA-C.    US Renal Bilateral    Result Date: 7/29/2024  1. Right nephrolithiasis without hydronephrosis. 2. Suspected dromedary hump in the left kidney. A renal mass protocol CT is recommended to exclude mass.      Images were reviewed, interpreted, and dictated by Dr. Sary Johns MD Transcribed by Britt Huang PA-C.  This report was signed and finalized on 7/29/2024 12:42 PM by Sary Johns MD.        Measures:   Tobacco:   Juarez Hunt  reports that he has quit smoking. He has been exposed to tobacco smoke. His smokeless tobacco use includes chew. I have educated him on the risk of diseases from using tobacco  products.   Assessment / Plan      Assessment/Plan:   54 y.o. male is here today for evaluation of incidental finding of possible renal lesion versus dromedary hump on ultrasound completed in 7/2024.  We have discussed the indication for dedicated cross-sectional renal imaging for further evaluation.  Also incidentally found and reported to have nonobstructing renal stone.  We have discussed that imaging will allow us to evaluate both of these entities, make further recommendations.  He is understanding agreeable.  He will follow-up in 2 to 3 weeks after completion of CT imaging.    Diagnoses and all orders for this visit:    1. Renal lesion (Primary)  -     CT Abdomen Pelvis With & Without Contrast; Future           Follow Up:   Return in about 3 weeks (around 11/29/2024) for Recheck, Follow up after Imaging.    I spent approximately 45 minutes providing clinical care for this patient; including review of patient's chart and provider documentation, face to face time spent with patient in examination room (obtaining history, performing physical exam, discussing diagnosis and management options), placing orders, and completing patient documentation.     William Dudley MD  [facility] [place]

## 2024-11-14 ENCOUNTER — HOSPITAL ENCOUNTER (OUTPATIENT)
Dept: CT IMAGING | Facility: HOSPITAL | Age: 54
Discharge: HOME | End: 2024-11-14
Admitting: UROLOGY
Payer: MEDICARE

## 2024-11-14 DIAGNOSIS — N28.9 RENAL LESION: ICD-10-CM

## 2024-11-14 LAB — CREAT BLDA-MCNC: 1.6 MG/DL (ref 0.6–1.3)

## 2024-11-14 PROCEDURE — 74178 CT ABD&PLV WO CNTR FLWD CNTR: CPT

## 2024-11-14 PROCEDURE — 82565 ASSAY OF CREATININE: CPT

## 2024-11-14 PROCEDURE — 25510000001 IOPAMIDOL PER 1 ML: Performed by: UROLOGY

## 2024-11-14 RX ORDER — IOPAMIDOL 755 MG/ML
150 INJECTION, SOLUTION INTRAVASCULAR
Status: COMPLETED | OUTPATIENT
Start: 2024-11-14 | End: 2024-11-14

## 2024-11-14 RX ADMIN — IOPAMIDOL 150 ML: 755 INJECTION, SOLUTION INTRAVENOUS at 15:50

## 2024-11-15 LAB
ALBUMIN SERPL-MCNC: 3.8 G/DL (ref 3.5–5.2)
ALBUMIN/GLOB SERPL: 1.5 G/DL
ALP SERPL-CCNC: 81 U/L (ref 39–117)
ALT SERPL-CCNC: 19 U/L (ref 1–41)
AST SERPL-CCNC: 18 U/L (ref 1–40)
BASOPHILS # BLD AUTO: 0.08 10*3/MM3 (ref 0–0.2)
BASOPHILS NFR BLD AUTO: 1.3 % (ref 0–1.5)
BILIRUB SERPL-MCNC: 0.3 MG/DL (ref 0–1.2)
BUN SERPL-MCNC: 27 MG/DL (ref 6–20)
BUN/CREAT SERPL: 18 (ref 7–25)
CALCIUM SERPL-MCNC: 9.2 MG/DL (ref 8.6–10.5)
CHLORIDE SERPL-SCNC: 106 MMOL/L (ref 98–107)
CO2 SERPL-SCNC: 23.2 MMOL/L (ref 22–29)
CREAT SERPL-MCNC: 1.5 MG/DL (ref 0.76–1.27)
EGFRCR SERPLBLD CKD-EPI 2021: 54.6 ML/MIN/1.73
EOSINOPHIL # BLD AUTO: 0.21 10*3/MM3 (ref 0–0.4)
EOSINOPHIL NFR BLD AUTO: 3.5 % (ref 0.3–6.2)
ERYTHROCYTE [DISTWIDTH] IN BLOOD BY AUTOMATED COUNT: 11.7 % (ref 12.3–15.4)
GLOBULIN SER CALC-MCNC: 2.5 GM/DL
GLUCOSE SERPL-MCNC: 172 MG/DL (ref 65–99)
HBA1C MFR BLD: 7 % (ref 4.8–5.6)
HCT VFR BLD AUTO: 39.4 % (ref 37.5–51)
HGB BLD-MCNC: 13.8 G/DL (ref 13–17.7)
IMM GRANULOCYTES # BLD AUTO: 0.03 10*3/MM3 (ref 0–0.05)
IMM GRANULOCYTES NFR BLD AUTO: 0.5 % (ref 0–0.5)
LYMPHOCYTES # BLD AUTO: 2.22 10*3/MM3 (ref 0.7–3.1)
LYMPHOCYTES NFR BLD AUTO: 36.7 % (ref 19.6–45.3)
MCH RBC QN AUTO: 32.5 PG (ref 26.6–33)
MCHC RBC AUTO-ENTMCNC: 35 G/DL (ref 31.5–35.7)
MCV RBC AUTO: 92.7 FL (ref 79–97)
MONOCYTES # BLD AUTO: 0.63 10*3/MM3 (ref 0.1–0.9)
MONOCYTES NFR BLD AUTO: 10.4 % (ref 5–12)
NEUTROPHILS # BLD AUTO: 2.88 10*3/MM3 (ref 1.7–7)
NEUTROPHILS NFR BLD AUTO: 47.6 % (ref 42.7–76)
NRBC BLD AUTO-RTO: 0 /100 WBC (ref 0–0.2)
PLATELET # BLD AUTO: 202 10*3/MM3 (ref 140–450)
POTASSIUM SERPL-SCNC: 4.8 MMOL/L (ref 3.5–5.2)
PROT SERPL-MCNC: 6.3 G/DL (ref 6–8.5)
PSA SERPL-MCNC: 0.45 NG/ML (ref 0–4)
RBC # BLD AUTO: 4.25 10*6/MM3 (ref 4.14–5.8)
SODIUM SERPL-SCNC: 141 MMOL/L (ref 136–145)
VIT B12 SERPL-MCNC: 403 PG/ML (ref 211–946)
WBC # BLD AUTO: 6.05 10*3/MM3 (ref 3.4–10.8)

## 2024-11-19 DIAGNOSIS — I82.469 DEEP VEIN THROMBOSIS (DVT) OF CALF MUSCLE VEIN, UNSPECIFIED CHRONICITY, UNSPECIFIED LATERALITY: ICD-10-CM

## 2024-11-19 RX ORDER — INSULIN GLARGINE 100 [IU]/ML
INJECTION, SOLUTION SUBCUTANEOUS
Qty: 15 ML | Refills: 0 | Status: SHIPPED | OUTPATIENT
Start: 2024-11-19

## 2024-11-19 RX ORDER — SILDENAFIL CITRATE 20 MG/1
20 TABLET ORAL 3 TIMES DAILY
Qty: 90 TABLET | Refills: 0 | Status: SHIPPED | OUTPATIENT
Start: 2024-11-19

## 2024-11-19 RX ORDER — APIXABAN 5 MG/1
5 TABLET, FILM COATED ORAL EVERY 12 HOURS
Qty: 60 TABLET | Refills: 0 | Status: SHIPPED | OUTPATIENT
Start: 2024-11-19

## 2024-12-13 ENCOUNTER — OFFICE VISIT (OUTPATIENT)
Age: 54
End: 2024-12-13
Payer: MEDICARE

## 2024-12-13 VITALS — WEIGHT: 306 LBS | BODY MASS INDEX: 39.27 KG/M2 | HEIGHT: 74 IN

## 2024-12-13 DIAGNOSIS — N20.0 RENAL STONE: Primary | ICD-10-CM

## 2024-12-13 NOTE — PROGRESS NOTES
Renal Mass Office Visit      Patient Name: Juarez Hunt  : 1970   MRN: [id number]     Chief Complaint:  Renal Mass.   Chief Complaint   Patient presents with    Renal lesion         History of Present Illness: Juarez Hunt is a 54 y.o. male who presents today with for evaluation secondary to concern for possible renal lesion on ultrasound.  He is undergone cross-sectional CT imaging which has revealed no enhancing or concerning renal lesion.  He was demonstrated to have a 1.5 cm branching partial staghorn right lower pole stone.  Denies current flank pain.  Denies dysuria or hematuria.      Subjective      Review of System: Review of Systems   Genitourinary:  Negative for decreased urine volume, difficulty urinating, dysuria, enuresis, flank pain, frequency, hematuria and urgency.      I have reviewed the ROS documented by my clinical staff, I have updated appropriately and I agree. William Dudley MD    Past Medical History:   Past Medical History:   Diagnosis Date    Arthritis     Blood clot in vein     Diabetes mellitus     Hypertension        Past Surgical History:   Past Surgical History:   Procedure Laterality Date    CARDIAC CATHETERIZATION      IVC FILTER RETRIEVAL      KNEE SURGERY Left 1986    SKIN BIOPSY  2020    toe       Family History:   Family History   Problem Relation Age of Onset    Diabetes Mother     Heart disease Mother     Heart attack Mother     Hypertension Mother     Arthritis Father     Heart attack Maternal Grandfather     Heart attack Maternal Uncle     Liver cancer Maternal Aunt        Social History:   Social History     Socioeconomic History    Marital status:    Tobacco Use    Smoking status: Former     Passive exposure: Past    Smokeless tobacco: Current     Types: Chew   Vaping Use    Vaping status: Never Used   Substance and Sexual Activity    Alcohol use: Yes     Comment: 1/4 glass per week    Drug use: No    Sexual activity: Defer        Medications:     Current Outpatient Medications:     allopurinol (Zyloprim) 100 MG tablet, Take 1 tablet by mouth Daily., Disp: 90 tablet, Rfl: 3    Blood Glucose Monitoring Suppl (CVS Blood Glucose Meter) w/Device kit, 1 each 2 (Two) Times a Day. E11.9, Disp: 1 kit, Rfl: 0    Continuous Glucose  (Dexcom G7 ) device, USE as directed TO monitor blood glucose, Disp: , Rfl:     Continuous Glucose Sensor (Dexcom G7 Sensor) misc, Use 1 each Every 10 (Ten) Days., Disp: 1 each, Rfl: 0    Diclofenac Sodium (VOLTAREN) 1 % gel gel, Apply 4 g topically to the appropriate area as directed 4 (Four) Times a Day As Needed (pain)., Disp: 100 g, Rfl: 1    Eliquis 5 MG tablet tablet, TAKE 1 TABLET BY MOUTH EVERY 12 HOURS, Disp: 60 tablet, Rfl: 0    fluticasone (FLONASE) 50 MCG/ACT nasal spray, 2 sprays into the nostril(s) as directed by provider Daily., Disp: 16 g, Rfl: 0    glucose blood (True Metrix Blood Glucose Test) test strip, Use as instructed to test blood glucose two times per day, Disp: 180 each, Rfl: 3    glycerin adult 2 g suppository, Insert 1 suppository into the rectum As Needed for Constipation., Disp: 12 each, Rfl: 0    Insulin Pen Needle (BD Pen Needle Tina 2nd Gen) 32G X 4 MM misc, Inject 1 Units under the skin into the appropriate area as directed 2 (Two) Times a Day., Disp: 200 each, Rfl: 3    Lantus SoloStar 100 UNIT/ML injection pen, INJECT 30 UNITS UNDER THE SKIN INTO THE APPROPRIATE AREA 2 TIMES PER DAY., Disp: 15 mL, Rfl: 0    lisinopril (PRINIVIL,ZESTRIL) 20 MG tablet, Take 1 tablet by mouth Daily., Disp: 90 tablet, Rfl: 3    meclizine (ANTIVERT) 25 MG tablet, Take 1 tablet by mouth 3 (Three) Times a Day As Needed for Dizziness., Disp: 30 tablet, Rfl: 0    metFORMIN (Glucophage) 500 MG tablet, Take 1 tablet by mouth 2 (Two) Times a Day With Meals., Disp: 60 tablet, Rfl: 11    metoprolol tartrate (LOPRESSOR) 50 MG tablet, Take 1 tablet by mouth 2 (Two) Times a Day., Disp: 180 tablet,  "Rfl: 0    Rybelsus 3 MG tablet, Take 1 tablet by mouth Daily., Disp: , Rfl:     sildenafil (REVATIO) 20 MG tablet, TAKE 1 TABLET BY MOUTH 3 TIMES A DAY, Disp: 90 tablet, Rfl: 0    Allergies:   No Known Allergies    IPSS Questionnaire (AUA-7):  Over the past month…    1)  Incomplete Emptying  How often have you had a sensation of not emptying your bladder?  1 - Less than 1 time in 5   2)  Frequency  How often have you had to urinate less than every two hours? 1 - Less than 1 time in 5   3)  Intermittency  How often have you found you stopped and started again several times when you urinated?  0 - Not at all   4) Urgency  How often have you found it difficult to postpone urination?  1 - Less than 1 time in 5   5) Weak Stream  How often have you had a weak urinary stream?  0 - Not at all   6) Straining  How often have you had to push or strain to begin urination?  1 - Less than 1 time in 5   7) Nocturia  How many times did you typically get up at night to urinate?  3 - 3 times   Total Score:  7       Quality of life due to urinary symptoms:  If you were to spend the rest of your life with your urinary condition the way it is now, how would you feel about that? 2-Mostly Satisfied   Urine Leakage (Incontinence) 0-No Leakage     Objective     Physical Exam:   Vital Signs:   Vitals:    12/13/24 1040   Weight: (!) 139 kg (306 lb)   Height: 188 cm (74.02\")     Body mass index is 39.27 kg/m².     Physical Exam  Vitals and nursing note reviewed.   Constitutional:       Appearance: Normal appearance.   HENT:      Head: Normocephalic and atraumatic.   Cardiovascular:      Comments: Well perfused  Pulmonary:      Effort: Pulmonary effort is normal.   Abdominal:      General: Abdomen is flat.      Palpations: Abdomen is soft.   Musculoskeletal:         General: Normal range of motion.   Skin:     General: Skin is warm and dry.   Neurological:      General: No focal deficit present.      Mental Status: He is alert and oriented to " person, place, and time. Mental status is at baseline.   Psychiatric:         Mood and Affect: Mood normal.         Behavior: Behavior normal.         Thought Content: Thought content normal.         Judgment: Judgment normal.             Labs:   Lab Results   Component Value Date    PSA 0.450 11/14/2024    PSA 0.410 02/23/2021       Lab Results   Component Value Date    WBC 6.05 11/14/2024    HGB 13.8 11/14/2024    HCT 39.4 11/14/2024    MCV 92.7 11/14/2024     11/14/2024       Lab Results   Component Value Date    GLUCOSE 172 (H) 11/14/2024    BUN 27 (H) 11/14/2024    CREATININE 1.50 (H) 11/14/2024    EGFRIFNONA 91 06/11/2021    EGFRIFAFRI 105 06/11/2021    BCR 18.0 11/14/2024    K 4.8 11/14/2024    CO2 23.2 11/14/2024    CALCIUM 9.2 11/14/2024    PROTENTOTREF 6.3 11/14/2024    ALBUMIN 3.8 11/14/2024    LABIL2 1.5 11/14/2024    AST 18 11/14/2024    ALT 19 11/14/2024       Images:   CT Abdomen Pelvis With & Without Contrast    Result Date: 11/18/2024  1. No evidence of left renal mass. Abnormality suggested on the comparison renal ultrasound is compatible with anatomic dromedary hump 2. Right nephrolithiasis 3. Lobular liver contour which could indicate cirrhosis, with no ascites or splenomegaly IVC filter is present on imaging study. It is recommended that all patients with IVC filters in place have an active management care plan to monitor their IVC filter. If a care plan is not in place, patient should have a non emergent referral to an interventional specialist such as interventional radiology or other interventional vascular specialist for establishment of an IVC filter management care plan. Electronically Signed: Jim Talbot  11/18/2024 4:08 PM EST  Workstation ID: [id number]    CT LOWER EXTREMITY RIGHT WO CONTRAST    Result Date: 11/7/2024  Degenerative joint disease. Images reviewed, interpreted, and dictated by Dr. GIANFRANCO Valladares. Transcribed by Lo Kaur PA-C.      Measures:   Tobacco:    Juarez Hunt  reports that he has quit smoking. He has been exposed to tobacco smoke. His smokeless tobacco use includes chew. I have educated him on the risk of diseases from using tobacco products.     Assessment / Plan      Assessment:   Juarez Hunt is a 54 y.o. male who presents today for cross-sectional CT imaging evaluation after report of possible incidental finding of renal lesion.  CT imaging has confirm there is no evidence of enhancing renal lesion, concerning renal lesion finding.  He was demonstrated to have a 1.5 cm partial staghorn right lower pole stone.  Given large stone size, partial staghorn stone we have discussed the indication to consider operative intervention.  We have discussed the risks and benefits of ureteroscopy with laser lithotripsy.  Patient is not a candidate for PCNL.  After our discussion he reports that he would like to proceed.  We have discussed expected perioperative and postoperative course, post ureteral stent.  He is understanding agreeable.  Will coordinate schedule pending patient and or availability.    Diagnoses and all orders for this visit:    1. Renal stone (Primary)  -     acetaminophen (TYLENOL) tablet 1,000 mg  -     gabapentin (NEURONTIN) capsule 600 mg  -     scopolamine patch 1 mg/72 hr  -     Case Request; Standing  -     ceFAZolin (ANCEF) 3,000 mg in sodium chloride 0.9 % 100 mL IVPB  -     Case Request    Other orders  -     Follow Anesthesia Guidelines / Protocol; Future  -     Provide NPO Instructions to Patient; Future  -     Provide Hydration Instructions to Patient; Future  -     Provide Patient With Enhanced Recovery Booklet(s) or Handout; Future  -     Provide Chlorhexidine Skin Prep Wipes and Instructions; Future  -     Nursing to Order Blood Glucose on all Inpatients >18 years Old with not BMP Ordered; Future  -     Nursing to Place Order for HgbA1c on Adult Patients >18 Years Old (HgbA1c Within One Month of Admission  Acceptable); Future  -     Follow Anesthesia Guidelines / Protocol; Standing  -     Provide Patient With Enhanced Recovery Booklet(s) OR Handout; Standing  -     Verify NPO Status; Standing  -     Verify the Time Patient Completed Gatorade / G2; Standing  -     Place Sequential Compression Device; Standing  -     Maintain Sequential Compression Device; Standing           I spent approximately 40 minutes providing clinical care for this patient; including review of patient's chart and provider documentation, face to face time spent with patient in examination room (obtaining history, performing physical exam, discussing diagnosis and management options), placing orders, and completing patient documentation.     William Dudley MD  [facility] [place]

## 2024-12-18 PROBLEM — N20.0 RENAL STONE: Status: ACTIVE | Noted: 2024-12-13

## 2024-12-18 RX ORDER — ACETAMINOPHEN 500 MG
1000 TABLET ORAL ONCE
OUTPATIENT
Start: 2024-12-18 | End: 2024-12-18

## 2024-12-18 RX ORDER — SCOLOPAMINE TRANSDERMAL SYSTEM 1 MG/1
1 PATCH, EXTENDED RELEASE TRANSDERMAL CONTINUOUS
OUTPATIENT
Start: 2024-12-18 | End: 2024-12-21

## 2024-12-18 RX ORDER — GABAPENTIN 100 MG/1
600 CAPSULE ORAL ONCE
OUTPATIENT
Start: 2024-12-18 | End: 2024-12-18

## 2024-12-20 DIAGNOSIS — I82.469 DEEP VEIN THROMBOSIS (DVT) OF CALF MUSCLE VEIN, UNSPECIFIED CHRONICITY, UNSPECIFIED LATERALITY: ICD-10-CM

## 2024-12-20 RX ORDER — INSULIN GLARGINE 100 [IU]/ML
INJECTION, SOLUTION SUBCUTANEOUS
Qty: 15 ML | Refills: 0 | Status: SHIPPED | OUTPATIENT
Start: 2024-12-20

## 2024-12-20 NOTE — TELEPHONE ENCOUNTER
Caller: Nu Hunt    Relationship: Emergency Contact    Best call back number: [phone]     Requested Prescriptions:   Requested Prescriptions     Pending Prescriptions Disp Refills    apixaban (Eliquis) 5 MG tablet tablet 60 tablet 0     Sig: Take 1 tablet by mouth Every 12 (Twelve) Hours.    Insulin Glargine (Lantus SoloStar) 100 UNIT/ML injection pen 15 mL 0     Sig: INJECT 30 UNITS UNDER THE SKIN INTO THE APPROPRIATE AREA 2 TIMES PER DAY.        Pharmacy where request should be sent: [id number] [place], [place] - [address] [facility]  - [phone] [facility] [phone] FX     Last office visit with prescribing clinician: 8/21/2024   Last telemedicine visit with prescribing clinician: Visit date not found   Next office visit with prescribing clinician: Visit date not found     Additional details provided by patient:     Does the patient have less than a 3 day supply:  [] Yes  [x] No    Would you like a call back once the refill request has been completed: [] Yes [x] No    If the office needs to give you a call back, can they leave a voicemail: [] Yes [x] No    Lizabeth Parra   12/20/24 11:51 EST

## 2025-01-03 ENCOUNTER — TELEPHONE (OUTPATIENT)
Dept: INTERNAL MEDICINE | Facility: CLINIC | Age: 55
End: 2025-01-03
Payer: MEDICARE

## 2025-01-03 RX ORDER — INSULIN GLARGINE 100 [IU]/ML
INJECTION, SOLUTION SUBCUTANEOUS
Qty: 15 ML | Refills: 0 | Status: SHIPPED | OUTPATIENT
Start: 2025-01-03

## 2025-01-03 RX ORDER — LISINOPRIL 20 MG/1
20 TABLET ORAL DAILY
Qty: 90 TABLET | Refills: 3 | Status: SHIPPED | OUTPATIENT
Start: 2025-01-03 | End: 2025-01-03 | Stop reason: SDUPTHER

## 2025-01-03 RX ORDER — LISINOPRIL 20 MG/1
20 TABLET ORAL DAILY
Qty: 90 TABLET | Refills: 3 | Status: SHIPPED | OUTPATIENT
Start: 2025-01-03

## 2025-01-03 NOTE — TELEPHONE ENCOUNTER
Caller: Nu Hunt    Relationship: Emergency Contact    Best call back number: [phone]    Requested Prescriptions:   Requested Prescriptions      No prescriptions requested or ordered in this encounter      lisinopril (PRINIVIL,ZESTRIL) 20 MG tablet     Insulin Glargine (Lantus SoloStar) 100 UNIT/ML injection pen     Pharmacy where request should be sent: [id number] [place], [place] - [address] [facility]  - [phone] [facility] [phone]      Last office visit with prescribing clinician: 8/21/2024   Last telemedicine visit with prescribing clinician: Visit date not found   Next office visit with prescribing clinician: Visit date not found     Does the patient have less than a 3 day supply:  [x] Yes  [] No    Would you like a call back once the refill request has been completed: [] Yes [x] No    If the office needs to give you a call back, can they leave a voicemail: [] Yes [x] No    Yris Blanco, PCT   01/03/25 11:43 EST

## 2025-01-17 DIAGNOSIS — I82.469 DEEP VEIN THROMBOSIS (DVT) OF CALF MUSCLE VEIN, UNSPECIFIED CHRONICITY, UNSPECIFIED LATERALITY: ICD-10-CM

## 2025-01-17 RX ORDER — APIXABAN 5 MG/1
5 TABLET, FILM COATED ORAL
Qty: 60 TABLET | Refills: 0 | Status: SHIPPED | OUTPATIENT
Start: 2025-01-17

## 2025-01-30 DIAGNOSIS — Z79.4 TYPE 2 DIABETES MELLITUS WITHOUT COMPLICATION, WITH LONG-TERM CURRENT USE OF INSULIN: ICD-10-CM

## 2025-01-30 DIAGNOSIS — E11.9 TYPE 2 DIABETES MELLITUS WITHOUT COMPLICATION, WITH LONG-TERM CURRENT USE OF INSULIN: ICD-10-CM

## 2025-01-30 RX ORDER — ACYCLOVIR 400 MG/1
TABLET ORAL
Qty: 3 EACH | Refills: 11 | Status: SHIPPED | OUTPATIENT
Start: 2025-01-30

## 2025-02-03 DIAGNOSIS — E11.9 TYPE 2 DIABETES MELLITUS WITHOUT COMPLICATION, WITHOUT LONG-TERM CURRENT USE OF INSULIN: Primary | ICD-10-CM

## 2025-02-03 DIAGNOSIS — E55.9 VITAMIN D DEFICIENCY, UNSPECIFIED: ICD-10-CM

## 2025-02-03 DIAGNOSIS — Z00.00 ROUTINE GENERAL MEDICAL EXAMINATION AT A HEALTH CARE FACILITY: ICD-10-CM

## 2025-02-03 RX ORDER — INSULIN GLARGINE 100 [IU]/ML
INJECTION, SOLUTION SUBCUTANEOUS
Qty: 15 ML | Refills: 0 | Status: SHIPPED | OUTPATIENT
Start: 2025-02-03

## 2025-02-03 NOTE — TELEPHONE ENCOUNTER
Rx Refill Note  Requested Prescriptions     Pending Prescriptions Disp Refills    Lantus SoloStar 100 UNIT/ML injection pen [Pharmacy Med Name: Lantus SoloStar Subcutaneous Solution Pen-injector 100 UNIT/ML] 15 mL 0     Sig: INJECT 30 UNITS UNDER THE SKIN INTO THE APPROPRIATE AREA 2 TIMES PER DAY.      Last office visit with prescribing clinician: 8/21/2024   Last telemedicine visit with prescribing clinician: Visit date not found   Next office visit with prescribing clinician: Visit date not found                         Would you like a call back once the refill request has been completed: [] Yes [] No    If the office needs to give you a call back, can they leave a voicemail: [] Yes [] No    Laura Rose, [facility]  02/03/25, 09:09 EST

## 2025-02-06 ENCOUNTER — PRE-ADMISSION TESTING (OUTPATIENT)
Dept: PREADMISSION TESTING | Facility: HOSPITAL | Age: 55
End: 2025-02-06
Payer: MEDICARE

## 2025-02-06 VITALS — WEIGHT: 312.28 LBS | HEIGHT: 74 IN | BODY MASS INDEX: 40.08 KG/M2

## 2025-02-06 LAB
DEPRECATED RDW RBC AUTO: 40.3 FL (ref 37–54)
ERYTHROCYTE [DISTWIDTH] IN BLOOD BY AUTOMATED COUNT: 12.3 % (ref 12.3–15.4)
HBA1C MFR BLD: 7.2 % (ref 4.8–5.6)
HCT VFR BLD AUTO: 40.5 % (ref 37.5–51)
HGB BLD-MCNC: 14 G/DL (ref 13–17.7)
INR PPP: 1.05 (ref 0.89–1.12)
MCH RBC QN AUTO: 31 PG (ref 26.6–33)
MCHC RBC AUTO-ENTMCNC: 34.6 G/DL (ref 31.5–35.7)
MCV RBC AUTO: 89.8 FL (ref 79–97)
PLATELET # BLD AUTO: 205 10*3/MM3 (ref 140–450)
PMV BLD AUTO: 9.9 FL (ref 6–12)
POTASSIUM SERPL-SCNC: 5.2 MMOL/L (ref 3.5–5.2)
PROTHROMBIN TIME: 13.8 SECONDS (ref 12.2–14.5)
QT INTERVAL: 380 MS
QTC INTERVAL: 433 MS
RBC # BLD AUTO: 4.51 10*6/MM3 (ref 4.14–5.8)
WBC NRBC COR # BLD AUTO: 5.94 10*3/MM3 (ref 3.4–10.8)

## 2025-02-06 PROCEDURE — 84132 ASSAY OF SERUM POTASSIUM: CPT

## 2025-02-06 PROCEDURE — 36415 COLL VENOUS BLD VENIPUNCTURE: CPT

## 2025-02-06 PROCEDURE — 83036 HEMOGLOBIN GLYCOSYLATED A1C: CPT

## 2025-02-06 PROCEDURE — 93005 ELECTROCARDIOGRAM TRACING: CPT

## 2025-02-06 PROCEDURE — 85610 PROTHROMBIN TIME: CPT

## 2025-02-06 PROCEDURE — 93010 ELECTROCARDIOGRAM REPORT: CPT | Performed by: INTERNAL MEDICINE

## 2025-02-06 PROCEDURE — 85027 COMPLETE CBC AUTOMATED: CPT

## 2025-02-06 NOTE — PAT
An arrival time for procedure was not provided during PAT visit. If patient had any questions or concerns about their arrival time, they were instructed to contact their surgeon/physician.  Additionally, if the patient referred to an arrival time that was acquired from their my chart account, patient was encouraged to verify that time with their surgeon/physician. Arrival times are NOT provided in Pre Admission Testing Department.    Per Anesthesia Request, patient instructed not to take their ACE/ARB medications on the AM of surgery.    Patient denies any current skin issues.     Patient instructed to drink 20 ounces of Gatorade or Gatorlyte (if diabetic) and it needs to be completed 1 hour (for Main OR patients) or 2 hours (scheduled  section & [facility] patients) before given arrival time for procedure (NO RED Gatorade and NO Gatorade Zero).    Patient verbalized understanding.

## 2025-02-12 ENCOUNTER — ANESTHESIA EVENT (OUTPATIENT)
Dept: PERIOP | Facility: HOSPITAL | Age: 55
End: 2025-02-12
Payer: MEDICARE

## 2025-02-12 RX ORDER — SODIUM CHLORIDE 0.9 % (FLUSH) 0.9 %
10 SYRINGE (ML) INJECTION EVERY 12 HOURS SCHEDULED
Status: CANCELLED | OUTPATIENT
Start: 2025-02-12

## 2025-02-13 ENCOUNTER — ANESTHESIA (OUTPATIENT)
Dept: PERIOP | Facility: HOSPITAL | Age: 55
End: 2025-02-13
Payer: MEDICARE

## 2025-02-13 ENCOUNTER — HOSPITAL ENCOUNTER (OUTPATIENT)
Facility: HOSPITAL | Age: 55
Setting detail: HOSPITAL OUTPATIENT SURGERY
Discharge: HOME | End: 2025-02-13
Attending: UROLOGY | Admitting: UROLOGY
Payer: MEDICARE

## 2025-02-13 ENCOUNTER — APPOINTMENT (OUTPATIENT)
Dept: GENERAL RADIOLOGY | Facility: HOSPITAL | Age: 55
End: 2025-02-13
Payer: MEDICARE

## 2025-02-13 VITALS
HEIGHT: 74 IN | WEIGHT: 313.05 LBS | TEMPERATURE: 97.9 F | DIASTOLIC BLOOD PRESSURE: 67 MMHG | BODY MASS INDEX: 40.18 KG/M2 | HEART RATE: 90 BPM | SYSTOLIC BLOOD PRESSURE: 135 MMHG | RESPIRATION RATE: 16 BRPM | OXYGEN SATURATION: 98 %

## 2025-02-13 DIAGNOSIS — N20.0 RENAL STONE: ICD-10-CM

## 2025-02-13 LAB
ANION GAP SERPL CALCULATED.3IONS-SCNC: 9 MMOL/L (ref 5–15)
BUN SERPL-MCNC: 26 MG/DL (ref 6–20)
BUN/CREAT SERPL: 16.9 (ref 7–25)
CALCIUM SPEC-SCNC: 8.7 MG/DL (ref 8.6–10.5)
CHLORIDE SERPL-SCNC: 106 MMOL/L (ref 98–107)
CO2 SERPL-SCNC: 23 MMOL/L (ref 22–29)
CREAT SERPL-MCNC: 1.54 MG/DL (ref 0.76–1.27)
EGFRCR SERPLBLD CKD-EPI 2021: 52.9 ML/MIN/1.73
GLUCOSE BLDC GLUCOMTR-MCNC: 166 MG/DL (ref 70–130)
GLUCOSE SERPL-MCNC: 168 MG/DL (ref 65–99)
POTASSIUM SERPL-SCNC: 4.5 MMOL/L (ref 3.5–5.2)
SODIUM SERPL-SCNC: 138 MMOL/L (ref 136–145)

## 2025-02-13 PROCEDURE — 52356 CYSTO/URETERO W/LITHOTRIPSY: CPT | Performed by: UROLOGY

## 2025-02-13 PROCEDURE — 25010000002 LIDOCAINE PF 1% 1 % SOLUTION: Performed by: ANESTHESIOLOGY

## 2025-02-13 PROCEDURE — C1769 GUIDE WIRE: HCPCS | Performed by: UROLOGY

## 2025-02-13 PROCEDURE — 25810000003 SODIUM CHLORIDE 0.9 % SOLUTION: Performed by: ANESTHESIOLOGY

## 2025-02-13 PROCEDURE — 53899 UNLISTED PX URINARY SYSTEM: CPT | Performed by: UROLOGY

## 2025-02-13 PROCEDURE — C1894 INTRO/SHEATH, NON-LASER: HCPCS | Performed by: UROLOGY

## 2025-02-13 PROCEDURE — 25810000003 LACTATED RINGERS PER 1000 ML: Performed by: ANESTHESIOLOGY

## 2025-02-13 PROCEDURE — 82948 REAGENT STRIP/BLOOD GLUCOSE: CPT

## 2025-02-13 PROCEDURE — 80048 BASIC METABOLIC PNL TOTAL CA: CPT | Performed by: ANESTHESIOLOGY

## 2025-02-13 PROCEDURE — 99024 POSTOP FOLLOW-UP VISIT: CPT | Performed by: UROLOGY

## 2025-02-13 PROCEDURE — 25010000002 FENTANYL CITRATE (PF) 100 MCG/2ML SOLUTION: Performed by: ANESTHESIOLOGY

## 2025-02-13 PROCEDURE — C2617 STENT, NON-COR, TEM W/O DEL: HCPCS | Performed by: UROLOGY

## 2025-02-13 PROCEDURE — 74420 UROGRAPHY RTRGR +-KUB: CPT | Performed by: UROLOGY

## 2025-02-13 PROCEDURE — 25510000001 IOPAMIDOL 61 % SOLUTION: Performed by: UROLOGY

## 2025-02-13 PROCEDURE — 25010000002 CEFAZOLIN 3 G RECONSTITUTED SOLUTION 1 EACH VIAL: Performed by: UROLOGY

## 2025-02-13 PROCEDURE — 25010000002 DEXAMETHASONE PER 1 MG: Performed by: ANESTHESIOLOGY

## 2025-02-13 PROCEDURE — 25010000002 ONDANSETRON PER 1 MG: Performed by: ANESTHESIOLOGY

## 2025-02-13 PROCEDURE — 25010000002 PROPOFOL 10 MG/ML EMULSION: Performed by: ANESTHESIOLOGY

## 2025-02-13 PROCEDURE — 76000 FLUOROSCOPY <1 HR PHYS/QHP: CPT

## 2025-02-13 DEVICE — URETERAL STENT
Type: IMPLANTABLE DEVICE | Site: URETER | Status: FUNCTIONAL
Brand: PERCUFLEX™ PLUS

## 2025-02-13 RX ORDER — SODIUM CHLORIDE, SODIUM LACTATE, POTASSIUM CHLORIDE, CALCIUM CHLORIDE 600; 310; 30; 20 MG/100ML; MG/100ML; MG/100ML; MG/100ML
9 INJECTION, SOLUTION INTRAVENOUS CONTINUOUS
Status: DISCONTINUED | OUTPATIENT
Start: 2025-02-14 | End: 2025-02-13 | Stop reason: HOSPADM

## 2025-02-13 RX ORDER — SCOPOLAMINE 1 MG/3D
1 PATCH, EXTENDED RELEASE TRANSDERMAL CONTINUOUS
Status: DISCONTINUED | OUTPATIENT
Start: 2025-02-13 | End: 2025-02-13 | Stop reason: HOSPADM

## 2025-02-13 RX ORDER — NITROFURANTOIN 25; 75 MG/1; MG/1
100 CAPSULE ORAL 2 TIMES DAILY
Qty: 14 CAPSULE | Refills: 0 | Status: SHIPPED | OUTPATIENT
Start: 2025-02-13

## 2025-02-13 RX ORDER — DEXAMETHASONE SODIUM PHOSPHATE 4 MG/ML
INJECTION, SOLUTION INTRA-ARTICULAR; INTRALESIONAL; INTRAMUSCULAR; INTRAVENOUS; SOFT TISSUE AS NEEDED
Status: DISCONTINUED | OUTPATIENT
Start: 2025-02-13 | End: 2025-02-13 | Stop reason: SURG

## 2025-02-13 RX ORDER — FENTANYL CITRATE 50 UG/ML
INJECTION, SOLUTION INTRAMUSCULAR; INTRAVENOUS AS NEEDED
Status: DISCONTINUED | OUTPATIENT
Start: 2025-02-13 | End: 2025-02-13 | Stop reason: SURG

## 2025-02-13 RX ORDER — MAGNESIUM HYDROXIDE 1200 MG/15ML
LIQUID ORAL AS NEEDED
Status: DISCONTINUED | OUTPATIENT
Start: 2025-02-13 | End: 2025-02-13 | Stop reason: HOSPADM

## 2025-02-13 RX ORDER — TAMSULOSIN HYDROCHLORIDE 0.4 MG/1
1 CAPSULE ORAL DAILY
Qty: 14 CAPSULE | Refills: 0 | Status: SHIPPED | OUTPATIENT
Start: 2025-02-13 | End: 2025-02-27

## 2025-02-13 RX ORDER — MIDAZOLAM HYDROCHLORIDE 1 MG/ML
1 INJECTION, SOLUTION INTRAMUSCULAR; INTRAVENOUS
Status: DISCONTINUED | OUTPATIENT
Start: 2025-02-13 | End: 2025-02-13 | Stop reason: HOSPADM

## 2025-02-13 RX ORDER — BUPIVACAINE HCL/0.9 % NACL/PF 0.125 %
PLASTIC BAG, INJECTION (ML) EPIDURAL AS NEEDED
Status: DISCONTINUED | OUTPATIENT
Start: 2025-02-13 | End: 2025-02-13 | Stop reason: SURG

## 2025-02-13 RX ORDER — PHENAZOPYRIDINE HYDROCHLORIDE 200 MG/1
200 TABLET, FILM COATED ORAL 3 TIMES DAILY PRN
Qty: 20 TABLET | Refills: 0 | Status: SHIPPED | OUTPATIENT
Start: 2025-02-13

## 2025-02-13 RX ORDER — ONDANSETRON 2 MG/ML
INJECTION INTRAMUSCULAR; INTRAVENOUS AS NEEDED
Status: DISCONTINUED | OUTPATIENT
Start: 2025-02-13 | End: 2025-02-13 | Stop reason: SURG

## 2025-02-13 RX ORDER — GABAPENTIN 300 MG/1
600 CAPSULE ORAL ONCE
Status: COMPLETED | OUTPATIENT
Start: 2025-02-13 | End: 2025-02-13

## 2025-02-13 RX ORDER — PROPOFOL 10 MG/ML
VIAL (ML) INTRAVENOUS AS NEEDED
Status: DISCONTINUED | OUTPATIENT
Start: 2025-02-13 | End: 2025-02-13 | Stop reason: SURG

## 2025-02-13 RX ORDER — ONDANSETRON 2 MG/ML
4 INJECTION INTRAMUSCULAR; INTRAVENOUS ONCE AS NEEDED
Status: DISCONTINUED | OUTPATIENT
Start: 2025-02-13 | End: 2025-02-13 | Stop reason: HOSPADM

## 2025-02-13 RX ORDER — SODIUM CHLORIDE 9 MG/ML
9 INJECTION, SOLUTION INTRAVENOUS ONCE
Status: COMPLETED | OUTPATIENT
Start: 2025-02-13 | End: 2025-02-13

## 2025-02-13 RX ORDER — ACETAMINOPHEN 500 MG
1000 TABLET ORAL ONCE
Status: COMPLETED | OUTPATIENT
Start: 2025-02-13 | End: 2025-02-13

## 2025-02-13 RX ORDER — KETOROLAC TROMETHAMINE 10 MG/1
10 TABLET, FILM COATED ORAL EVERY 6 HOURS PRN
Qty: 15 TABLET | Refills: 0 | Status: SHIPPED | OUTPATIENT
Start: 2025-02-13

## 2025-02-13 RX ORDER — LIDOCAINE HYDROCHLORIDE 10 MG/ML
0.5 INJECTION, SOLUTION EPIDURAL; INFILTRATION; INTRACAUDAL; PERINEURAL ONCE AS NEEDED
Status: COMPLETED | OUTPATIENT
Start: 2025-02-13 | End: 2025-02-13

## 2025-02-13 RX ORDER — FAMOTIDINE 20 MG/1
20 TABLET, FILM COATED ORAL ONCE
Status: COMPLETED | OUTPATIENT
Start: 2025-02-13 | End: 2025-02-13

## 2025-02-13 RX ORDER — LIDOCAINE HYDROCHLORIDE 10 MG/ML
INJECTION, SOLUTION EPIDURAL; INFILTRATION; INTRACAUDAL; PERINEURAL AS NEEDED
Status: DISCONTINUED | OUTPATIENT
Start: 2025-02-13 | End: 2025-02-13 | Stop reason: SURG

## 2025-02-13 RX ORDER — FAMOTIDINE 10 MG/ML
20 INJECTION, SOLUTION INTRAVENOUS ONCE
Status: DISCONTINUED | OUTPATIENT
Start: 2025-02-13 | End: 2025-02-13 | Stop reason: HOSPADM

## 2025-02-13 RX ORDER — FENTANYL CITRATE 50 UG/ML
50 INJECTION, SOLUTION INTRAMUSCULAR; INTRAVENOUS
Status: DISCONTINUED | OUTPATIENT
Start: 2025-02-13 | End: 2025-02-13 | Stop reason: HOSPADM

## 2025-02-13 RX ORDER — IOPAMIDOL 612 MG/ML
INJECTION, SOLUTION INTRAVASCULAR AS NEEDED
Status: DISCONTINUED | OUTPATIENT
Start: 2025-02-13 | End: 2025-02-13 | Stop reason: HOSPADM

## 2025-02-13 RX ORDER — HYDROMORPHONE HYDROCHLORIDE 1 MG/ML
0.5 INJECTION, SOLUTION INTRAMUSCULAR; INTRAVENOUS; SUBCUTANEOUS
Status: DISCONTINUED | OUTPATIENT
Start: 2025-02-13 | End: 2025-02-13 | Stop reason: HOSPADM

## 2025-02-13 RX ORDER — OXYBUTYNIN CHLORIDE 5 MG/1
5 TABLET, EXTENDED RELEASE ORAL DAILY PRN
Qty: 14 TABLET | Refills: 0 | Status: SHIPPED | OUTPATIENT
Start: 2025-02-13

## 2025-02-13 RX ORDER — SODIUM CHLORIDE 0.9 % (FLUSH) 0.9 %
10 SYRINGE (ML) INJECTION AS NEEDED
Status: DISCONTINUED | OUTPATIENT
Start: 2025-02-13 | End: 2025-02-13 | Stop reason: HOSPADM

## 2025-02-13 RX ORDER — EPHEDRINE SULFATE 50 MG/ML
INJECTION INTRAVENOUS AS NEEDED
Status: DISCONTINUED | OUTPATIENT
Start: 2025-02-13 | End: 2025-02-13 | Stop reason: SURG

## 2025-02-13 RX ADMIN — LIDOCAINE HYDROCHLORIDE 0.5 ML: 10 INJECTION, SOLUTION EPIDURAL; INFILTRATION; INTRACAUDAL; PERINEURAL at 06:50

## 2025-02-13 RX ADMIN — SODIUM CHLORIDE 3000 MG: 900 INJECTION INTRAVENOUS at 07:44

## 2025-02-13 RX ADMIN — ONDANSETRON 4 MG: 2 INJECTION INTRAMUSCULAR; INTRAVENOUS at 07:44

## 2025-02-13 RX ADMIN — SODIUM CHLORIDE, POTASSIUM CHLORIDE, SODIUM LACTATE AND CALCIUM CHLORIDE: 600; 310; 30; 20 INJECTION, SOLUTION INTRAVENOUS at 09:02

## 2025-02-13 RX ADMIN — SODIUM CHLORIDE, POTASSIUM CHLORIDE, SODIUM LACTATE AND CALCIUM CHLORIDE: 600; 310; 30; 20 INJECTION, SOLUTION INTRAVENOUS at 07:34

## 2025-02-13 RX ADMIN — EPHEDRINE SULFATE 10 MG: 50 INJECTION INTRAVENOUS at 08:02

## 2025-02-13 RX ADMIN — Medication 100 MCG: at 08:54

## 2025-02-13 RX ADMIN — PROPOFOL 200 MG: 10 INJECTION, EMULSION INTRAVENOUS at 07:40

## 2025-02-13 RX ADMIN — FAMOTIDINE 20 MG: 20 TABLET, FILM COATED ORAL at 06:50

## 2025-02-13 RX ADMIN — Medication 200 MCG: at 07:51

## 2025-02-13 RX ADMIN — FENTANYL CITRATE 100 MCG: 50 INJECTION, SOLUTION INTRAMUSCULAR; INTRAVENOUS at 07:40

## 2025-02-13 RX ADMIN — GABAPENTIN 600 MG: 300 CAPSULE ORAL at 06:51

## 2025-02-13 RX ADMIN — Medication 100 MCG: at 07:45

## 2025-02-13 RX ADMIN — Medication 100 MCG: at 08:41

## 2025-02-13 RX ADMIN — Medication 200 MCG: at 08:00

## 2025-02-13 RX ADMIN — SCOPOLAMINE 1 PATCH: 1.5 PATCH, EXTENDED RELEASE TRANSDERMAL at 06:51

## 2025-02-13 RX ADMIN — SODIUM CHLORIDE 9 ML/HR: 9 INJECTION, SOLUTION INTRAVENOUS at 06:50

## 2025-02-13 RX ADMIN — ACETAMINOPHEN 1000 MG: 500 TABLET ORAL at 06:51

## 2025-02-13 RX ADMIN — LIDOCAINE HYDROCHLORIDE 50 MG: 10 INJECTION, SOLUTION EPIDURAL; INFILTRATION; INTRACAUDAL; PERINEURAL at 07:40

## 2025-02-13 RX ADMIN — Medication 100 MCG: at 08:37

## 2025-02-13 RX ADMIN — DEXAMETHASONE SODIUM PHOSPHATE 4 MG: 4 INJECTION INTRA-ARTICULAR; INTRALESIONAL; INTRAMUSCULAR; INTRAVENOUS; SOFT TISSUE at 07:44

## 2025-02-13 NOTE — ANESTHESIA POSTPROCEDURE EVALUATION
"Patient: Juarez Hunt    Procedure Summary       Date: 02/13/25 Room / Location:  [facility]    Anesthesia Start: 0734 Anesthesia Stop: 0914    Procedure: URETEROSCOPY LASER LITHOTRIPSY WITH STENT INSERTION (Right: Ureter) Diagnosis:       Renal stone      (Renal stone [N20.0])    Surgeons: William Dudley MD Provider: Jason Guaman MD    Anesthesia Type: general ASA Status: 3            Anesthesia Type: general    Vitals  No vitals data found for the desired time range.          Post Anesthesia Care and Evaluation    Patient location during evaluation: PACU  Patient participation: complete - patient participated  Level of consciousness: awake and responsive to verbal stimuli  Pain score: 2  Pain management: adequate    Airway patency: patent  Anesthetic complications: No anesthetic complications    Cardiovascular status: acceptable  Respiratory status: acceptable  Hydration status: acceptable    Comments: Pt awake and responsive. SV. VSS. Report to RN. Patient Vitals in the past 24 hrs:  02/13/25 0648, BP:134/81, Temp:97.2 °F (36.2 °C), Temp src:Temporal, Pulse:87, Resp:18, SpO2:93 %, Height:188 cm (74.02\"), Weight:(!) 142 kg (313 lb 0.9 oz)  133/78. p 72. r 16. t 98.1                "

## 2025-02-13 NOTE — DISCHARGE INSTRUCTIONS
Ureteroscopy + Laser Lithotripsy Post-Operative Care/Expectations    Follow these guidelines after your procedure in order to assist with your recovery.    Anesthesia Precautions and Expectations  - Rest for 24 hours after receiving general anesthesia, make sure you have someone at home with you that can monitor you  - Do not operate a vehicle, drink alcohol, or make 'important decisions'/sign legal documentation during the immediate recovery period if you received sedation for your procedure  - You may experience a sore throat, jaw discomfort, or muscle aches related to anesthesia, these symptoms may last a few days    Activity  - You may resume your normal home activities immediately post-operatively; however, light activity is encouraged for 24 hours to prevent urinary bleeding  - Do not operate a vehicle or drink alcohol if you were prescribed narcotic pain medications     Bathing/Showering  - You may resume normal bathing and showering post-procedure    Pain/Urinary Symptoms  - You may experience burning urinary pain for a few days, and/or increased urinary urgency/frequency post-procedure for a few weeks which is expected (sometimes longer if a ureteral stent was left in place)   - A medication to prevent burning urinary pain (Phenazopyridine) may be prescribed by your doctor, take as directed  - A medication to prevent urinary urgency/frequency (sometimes referred to as “bladder spasms”) (Hyoscyamine, Oxybutynin, Mirabegron, Solifenacin, etc.) may be prescribed by your doctor for up to 1 month, take as directed     Urinary Bleeding (Hematuria)   - Some degree of light urinary bleeding (hematuria) is expected for up to 1-2 weeks (this may be as light as pink lemonade or somewhat darker like clear/pale red Gatorade); a good rule of thumb is that your urine should remain see-through    - If you experience heavy urinary bleeding (like the color and consistency of tomato juice, or red wine), large blood clots, or  you are unable to urinate for more than 8 hours you should contact your doctor and present to the nearest Emergency Department  - Drink plenty of water at home and stay hydrated, as this will help naturally flush out your bladder and urethra    Antibiotics  - Complete the antibiotic course (if) prescribed as directed to prevent urinary infection     When to call your doctor:   - Pain that is not controlled with oral medications  - Signs of significant infection: Fever 101F, shaking chills, profuse sweating, persistent nausea or vomiting, unable to tolerate food or drink   - Severe urinary bleeding or large blood clots in urine  - Inability to urinate for more than 8 hours post-surgery         STENT REMOVAL INSTRUCTIONS    A ureteral stent was left in place after your procedure, the ureteral stent is a flexible plastic tube roughly 9 to 10 inches in length which allows urine to drain from the kidney to the bladder while the inflammation in the ureter is settling down after surgery.  Without the stent in place, the ureter could be blocked due to this ureteral inflammation which could result in severe flank pain.

## 2025-02-13 NOTE — H&P
Pre-Op H&P (See Recent Office Note Attached for Full H&P)    Chief complaint: Nephrolithiasis    HPI:      Patient is a 54 y.o. male who presents for planned surgical management above issues.  Prior medical history of DVT, on Eliquis, hypertension, CKD, DM, postoperative nausea.  Also has history nephrolithiasis and has undergone URS/lithotripsy approximately 20 years ago.  Last fall, patient underwent a ultrasound for workup of his CKD.  He was found to have a possible renal lesion and was referred to urology for further workup.  Subsequent CT scan revealed no enhancing or concerning lesion, however he was found to have a 1.5 cm partial staghorn right lower pole stone.  Treatment options were discussed in the office.  Patient agreed to surgical management.  He anticipates a right URS/LL/stent with Dr. Dudley today.  Patient feels well today.  Denies any symptoms since last seen in the office.  States he last took his Eliquis yesterday afternoon around 4 PM.    Review of Systems:  General ROS:  no fever, chills, rashes.  No change since last office visit.  No recent sick exposure  Cardiovascular ROS: no chest pain or dyspnea on exertion  Respiratory ROS: no cough, shortness of breath, or wheezing   ROS: No flank pain, dysuria, gross hematuria.    No Known Allergies     Immunization History:   Influenza: Up-to-date  Pneumococcal: N/A  Tetanus: Unknown    Meds:    No current facility-administered medications on file prior to encounter.     Current Outpatient Medications on File Prior to Encounter   Medication Sig Dispense Refill    allopurinol (Zyloprim) 100 MG tablet Take 1 tablet by mouth Daily. (Patient not taking: Reported on 2/6/2025) 90 tablet 3    Blood Glucose Monitoring Suppl ([employer] Blood Glucose Meter) w/Device kit 1 each 2 (Two) Times a Day. E11.9 1 kit 0    Continuous Glucose  (Dexcom G7 ) device USE as directed TO monitor blood glucose      Diclofenac Sodium (VOLTAREN) 1 % gel gel Apply 4 g  topically to the appropriate area as directed 4 (Four) Times a Day As Needed (pain). 100 g 1    fluticasone (FLONASE) 50 MCG/ACT nasal spray 2 sprays into the nostril(s) as directed by provider Daily. 16 g 0    glucose blood (True Metrix Blood Glucose Test) test strip Use as instructed to test blood glucose two times per day 180 each 3    glycerin adult 2 g suppository Insert 1 suppository into the rectum As Needed for Constipation. (Patient not taking: Reported on 2/6/2025) 12 each 0    Insulin Pen Needle (BD Pen Needle Tina 2nd Gen) 32G X 4 MM misc Inject 1 Units under the skin into the appropriate area as directed 2 (Two) Times a Day. 200 each 3    meclizine (ANTIVERT) 25 MG tablet Take 1 tablet by mouth 3 (Three) Times a Day As Needed for Dizziness. 30 tablet 0    metFORMIN (Glucophage) 500 MG tablet Take 1 tablet by mouth 2 (Two) Times a Day With Meals. 60 tablet 11    metoprolol tartrate (LOPRESSOR) 50 MG tablet Take 1 tablet by mouth 2 (Two) Times a Day. 180 tablet 0    Rybelsus 3 MG tablet Take 1 tablet by mouth Daily.      sildenafil (REVATIO) 20 MG tablet TAKE 1 TABLET BY MOUTH 3 TIMES A DAY 90 tablet 0       Vital Signs:  There were no vitals taken for this visit.    Physical Exam:    CV:  S1S2 regular rate and rhythm, no murmur               Resp:  Clear to auscultation; respirations regular, even and unlabored    Results Review:     Lab Results   Component Value Date    WBC 5.94 02/06/2025    HGB 14.0 02/06/2025    HCT 40.5 02/06/2025    MCV 89.8 02/06/2025     02/06/2025    NEUTROABS 2.88 11/14/2024    GLUCOSE 172 (H) 11/14/2024    BUN 27 (H) 11/14/2024    CREATININE 1.50 (H) 11/14/2024    EGFRIFNONA 91 06/11/2021    EGFRIFAFRI 105 06/11/2021     11/14/2024    K 5.2 02/06/2025     11/14/2024    CO2 23.2 11/14/2024    MG 1.7 03/22/2024    CALCIUM 9.2 11/14/2024    ALBUMIN 3.8 11/14/2024    AST 18 11/14/2024    ALT 19 11/14/2024    BILITOT 0.3 11/14/2024    PTT 24.5 06/12/2018    INR  1.05 02/06/2025        I reviewed the patient's new clinical results.    Cancer Staging (if applicable)  Cancer Patient: __ yes _x_no __unknown; If yes, clinical stage T:__ N:__M:__, stage group or __N/A    Assessment/Plan:  Patient is a pleasant 54-year-old gentleman with multiple medical comorbidities who presents with 1.5 cm partial staghorn right lower pole stone.    -Plan for OR today for right URS/LL/stent placement.    Arden Driver PA-C   2/13/2025   06:41 EST

## 2025-02-13 NOTE — ANESTHESIA PROCEDURE NOTES
Airway  Urgency: elective    Date/Time: 2/13/2025 7:42 AM  Airway not difficult    General Information and Staff    Patient location during procedure: OR  CRNA/CAA: Earnest Brown CRNA  SRNA: Ashu Hill SRNA  Indications and Patient Condition  Indications for airway management: airway protection    Preoxygenated: yes  Mask difficulty assessment: 1 - vent by mask    Final Airway Details  Final airway type: supraglottic airway      Successful airway: I-gel  Size 5     Number of attempts at approach: 1  Assessment: lips, teeth, and gum same as pre-op    Additional Comments  LMA placed without difficulty, ventilation with assist, equal breath sounds and symmetric chest rise and fall

## 2025-02-13 NOTE — NURSING NOTE
Caregiver Telephone Number    Phone Number for Ride/Caregiver: FAIZAN SPOUSE [phone]     Who will be staying with you post procedure for the next 24 hours? Name and Phone Number?: FAIZAN SPOUSE [phone]

## 2025-02-13 NOTE — ANESTHESIA PREPROCEDURE EVALUATION
Anesthesia Evaluation     Patient summary reviewed and Nursing notes reviewed   history of anesthetic complications:   NPO Solid Status: > 8 hours  NPO Liquid Status: > 2 hours           Airway   Mallampati: I  TM distance: >3 FB  Neck ROM: full  No difficulty expected  Dental    (+) edentulous, upper dentures and lower dentures    Pulmonary    (+) pulmonary embolism, a smoker (2000) Former, cigarettes,sleep apnea ([place])  (-) asthma, shortness of breath, recent URI, no home oxygen  Cardiovascular     ECG reviewed    (+) hypertension  (-) past MI, dysrhythmias, angina, cardiac stents    ROS comment: ECG NSR LAD   ECHO 2024 EF 51% ow normal   MPS 2024 normal  no isch low risk EF 53%     Neuro/Psych  (-) seizures, CVA  GI/Hepatic/Renal/Endo    (+) obesity, morbid obesity, renal disease (creat 1.4---1.5       K 5,2)- CRI, diabetes mellitus  (-) no thyroid disorder    Musculoskeletal     Abdominal    Substance History      OB/GYN          Other                    Anesthesia Plan    ASA 3     general     (Needs recheck K creatnine )  intravenous induction     Anesthetic plan, risks, benefits, and alternatives have been provided, discussed and informed consent has been obtained with: patient.    Plan discussed with CRNA.      CODE STATUS:

## 2025-02-13 NOTE — OP NOTE
OPERATIVE REPORT     Patient Name:  Juarez Hunt  YOB: 1970    Patient MRN: [id number]    Date of Surgery:  2/13/25     Indications:  53 yo male with a history of recurrent stone.  He has a large 2.0cm right renal stone.  We have discussed the indication for definitive stone treatment.  We have discussed the risks and benefits of ureteroscopy and laser lithotripsy with stent placement.  Patient elects to proceed.     Pre-op Diagnosis:   Right Renal Stone    Post-op Diagnosis:   Right Renal  Stone    Procedures Performed:  CYSTOSCOPY  RIGHT URETEROSCOPY, LASER LITHOTRIPSY  RIGHT PYELOSCOPY, LASER LITHOTRIPSY, ClearPetra URETERAL CATHETERIZATION WITH STEERABLE VACUUM ASPIRATION   RIGHT RETROGRADE PYELOGRAM  RIGHT STENT PLACEMENT    Staff:  William Dudley MD    Anesthesia: General    Estimated Blood Loss: Minimal    Implants:  4.8F x 26cm ureteral stent     Specimen:  stone    Findings:   Normal urinary bladder  Right ureteroscopy with clearance of distal mid proximal ureter.  Right pyeloscopy with large lower pole renal stone. Laser lithotripsy performed with clearance of stone burden. ClearPetra ureteral catheterization steerable vacuum aspiration device was used to aid in clearance of stone burden.   Right retrograde pyelography with mild dilation of collecting system  Right ureteral stent placement       Complications: none immediate    Description of Procedure:    After informed consent, the patient was brought back to the operating suite and moved over to the operating table. General anesthesia was smoothly induced, IV antibiotics were administered, and the patient was placed in the dorsal lithotomy position with careful attention focused on padding all pressure points. The patient was prepped and draped in standard fashion. A timeout was performed to ensure the correct patient and procedure    A 22Fr cystoscope was used to cannulate the urethra. The urethra was of normal course and  caliber.  Upon entering the bladder, pan-cystoscopy revealed no bladder abnormalities.  The bilateral ureteral orifices were visualized in their orthotopic positions.     A sensor wire was advanced up the right ureter and into the collecting system on fluoroscopy to serve as a safety wire which was clamped to the surgical drapes.     SEMIRIGID URETEROSCOPY  The semi-rigid ureteroscope was then advanced into the bladder. The right ureter was cannulated demonstrating no evidence of stone.  A second sensor wire was advanced into the kidney through the scope and the scope was backed out.     ACCESS SHEATH  A 10/12F x 40cm clearpetra steerable ureteral access catheter for vacuum aspiration was  advanced over the working wire under fluoroscopy. This was advanced up to the level of the proximal ureter without resistance . The working wire and inner obturator were then removed.  The suction device was set up. A flexible ureteroscope was advanced through the access sheath into the renal pelvis.   A 200 micron Thulium laser fiber was then advanced through the ureteroscope.  Approximately 2 cm lower pole stone.  Approximately 60-70% of stone burden able to be cleared.     The clearpetra steerable ureteral catheter with vacuum aspiration was then used to navigate the renal pelvis to aid in clearance.  The ureter was then carefully inspected during removal of the access sheath under direct visualization and found to be free of any injuries or stone.       Right Retrograde Pyelogram Interpretation  A retrograde pyelogram was performed demonstrating normal course and caliber of the ureter, outlining the location of the renal pelvis and collecting system, with mild dilation.     CYSTO PLACEMENT  We switched back to the rigid cystoscope which was reinserted over the existing guidewire. A 4.8F x 26cm double J ureteral stent was advanced up the right ureter under direct visualization which confirmed good curl in the bladder.  Fluoroscopy confirmed good curl in the kidney. The bladder was drained and this concluded our procedure.      The patient was brought back to the PACU in stable condition. All scopes and instruments were in good working order at the end of the case. There were no complications.    Disposition/Follow Up: Patient will be discharged when meeting appropriate PACU criteria.  Patient in 7 to 14 days for second stage ureteroscopy for clearance secondary to large stone volume.    William Dudley MD     Date: 2/13/2025  Time: 09:28 EST

## 2025-02-14 DIAGNOSIS — I82.469 DEEP VEIN THROMBOSIS (DVT) OF CALF MUSCLE VEIN, UNSPECIFIED CHRONICITY, UNSPECIFIED LATERALITY: ICD-10-CM

## 2025-02-14 RX ORDER — APIXABAN 5 MG/1
5 TABLET, FILM COATED ORAL
Qty: 60 TABLET | Refills: 0 | Status: SHIPPED | OUTPATIENT
Start: 2025-02-14

## 2025-02-16 ENCOUNTER — PREP FOR SURGERY (OUTPATIENT)
Dept: OTHER | Facility: HOSPITAL | Age: 55
End: 2025-02-16
Payer: MEDICARE

## 2025-02-16 DIAGNOSIS — N20.0 RENAL STONE: Primary | ICD-10-CM

## 2025-02-16 RX ORDER — GABAPENTIN 300 MG/1
600 CAPSULE ORAL ONCE
OUTPATIENT
Start: 2025-02-16 | End: 2025-02-16

## 2025-02-16 RX ORDER — MELOXICAM 15 MG/1
15 TABLET ORAL ONCE
OUTPATIENT
Start: 2025-02-16 | End: 2025-02-16

## 2025-02-16 RX ORDER — SCOPOLAMINE 1 MG/3D
1 PATCH, EXTENDED RELEASE TRANSDERMAL CONTINUOUS
OUTPATIENT
Start: 2025-02-16 | End: 2025-02-19

## 2025-02-16 RX ORDER — ACETAMINOPHEN 500 MG
1000 TABLET ORAL ONCE
OUTPATIENT
Start: 2025-02-16 | End: 2025-02-16

## 2025-02-21 ENCOUNTER — TELEPHONE (OUTPATIENT)
Dept: UROLOGY | Facility: CLINIC | Age: 55
End: 2025-02-21
Payer: MEDICARE

## 2025-02-21 NOTE — TELEPHONE ENCOUNTER
Caller: DUSTIN LAI    Relationship to patient: SELF    Best call back number: [phone]    Patient is needing: PT HAS QUESTIONS REGARDING BLOOD IN URINE AND TAKING BLOOD THINNERS

## 2025-02-21 NOTE — TELEPHONE ENCOUNTER
I called and spoke with patient and let him know it is normal to see blood in his urine the whole time he has the stent in, he has started taking half of his blood thinner instead of a whole one and he is going to stick with that for a couple days but he stated overall he feels better knowing that it is normal. I let him know to give us a call back if he needs anything else.

## 2025-02-26 DIAGNOSIS — I82.469 DEEP VEIN THROMBOSIS (DVT) OF CALF MUSCLE VEIN, UNSPECIFIED CHRONICITY, UNSPECIFIED LATERALITY: ICD-10-CM

## 2025-02-27 ENCOUNTER — OFFICE VISIT (OUTPATIENT)
Dept: INTERNAL MEDICINE | Facility: CLINIC | Age: 55
End: 2025-02-27
Payer: MEDICARE

## 2025-02-27 VITALS
DIASTOLIC BLOOD PRESSURE: 102 MMHG | HEART RATE: 88 BPM | SYSTOLIC BLOOD PRESSURE: 180 MMHG | BODY MASS INDEX: 40.43 KG/M2 | HEIGHT: 74 IN | TEMPERATURE: 97.2 F | RESPIRATION RATE: 16 BRPM | OXYGEN SATURATION: 95 % | WEIGHT: 315 LBS

## 2025-02-27 DIAGNOSIS — N20.0 RENAL STONES: ICD-10-CM

## 2025-02-27 DIAGNOSIS — I10 BENIGN ESSENTIAL HYPERTENSION: Primary | ICD-10-CM

## 2025-02-27 LAB
ALBUMIN SERPL-MCNC: 3.9 G/DL (ref 3.8–4.9)
ALP SERPL-CCNC: 78 IU/L (ref 44–121)
ALT SERPL-CCNC: 25 IU/L (ref 0–44)
AST SERPL-CCNC: 23 IU/L (ref 0–40)
BASOPHILS # BLD AUTO: 0.1 X10E3/UL (ref 0–0.2)
BASOPHILS NFR BLD AUTO: 1 %
BILIRUB SERPL-MCNC: 0.4 MG/DL (ref 0–1.2)
BUN SERPL-MCNC: 25 MG/DL (ref 6–24)
BUN/CREAT SERPL: 15 (ref 9–20)
CALCIUM SERPL-MCNC: 8.7 MG/DL (ref 8.7–10.2)
CHLORIDE SERPL-SCNC: 104 MMOL/L (ref 96–106)
CHOLEST SERPL-MCNC: 157 MG/DL (ref 100–199)
CO2 SERPL-SCNC: 22 MMOL/L (ref 20–29)
CREAT SERPL-MCNC: 1.7 MG/DL (ref 0.76–1.27)
EGFRCR SERPLBLD CKD-EPI 2021: 47 ML/MIN/1.73
EOSINOPHIL # BLD AUTO: 0.3 X10E3/UL (ref 0–0.4)
EOSINOPHIL NFR BLD AUTO: 5 %
ERYTHROCYTE [DISTWIDTH] IN BLOOD BY AUTOMATED COUNT: 11.9 % (ref 11.6–15.4)
GLOBULIN SER CALC-MCNC: 2.3 G/DL (ref 1.5–4.5)
GLUCOSE SERPL-MCNC: 179 MG/DL (ref 70–99)
HBA1C MFR BLD: 7.6 % (ref 4.8–5.6)
HCT VFR BLD AUTO: 38 % (ref 37.5–51)
HDLC SERPL-MCNC: 36 MG/DL
HGB BLD-MCNC: 13 G/DL (ref 13–17.7)
IMM GRANULOCYTES # BLD AUTO: 0 X10E3/UL (ref 0–0.1)
IMM GRANULOCYTES NFR BLD AUTO: 1 %
LDLC SERPL CALC-MCNC: 101 MG/DL (ref 0–99)
LYMPHOCYTES # BLD AUTO: 2.4 X10E3/UL (ref 0.7–3.1)
LYMPHOCYTES NFR BLD AUTO: 33 %
MCH RBC QN AUTO: 31.6 PG (ref 26.6–33)
MCHC RBC AUTO-ENTMCNC: 34.2 G/DL (ref 31.5–35.7)
MCV RBC AUTO: 92 FL (ref 79–97)
MONOCYTES # BLD AUTO: 0.6 X10E3/UL (ref 0.1–0.9)
MONOCYTES NFR BLD AUTO: 9 %
NEUTROPHILS # BLD AUTO: 3.7 X10E3/UL (ref 1.4–7)
NEUTROPHILS NFR BLD AUTO: 51 %
PLATELET # BLD AUTO: 232 X10E3/UL (ref 150–450)
POTASSIUM SERPL-SCNC: 5.3 MMOL/L (ref 3.5–5.2)
PROT SERPL-MCNC: 6.2 G/DL (ref 6–8.5)
RBC # BLD AUTO: 4.12 X10E6/UL (ref 4.14–5.8)
SODIUM SERPL-SCNC: 138 MMOL/L (ref 134–144)
TRIGL SERPL-MCNC: 108 MG/DL (ref 0–149)
TSH SERPL DL<=0.005 MIU/L-ACNC: 1.43 UIU/ML (ref 0.45–4.5)
VIT B12 SERPL-MCNC: 394 PG/ML (ref 232–1245)
VLDLC SERPL CALC-MCNC: 20 MG/DL (ref 5–40)
WBC # BLD AUTO: 7.2 X10E3/UL (ref 3.4–10.8)

## 2025-02-27 PROCEDURE — 99214 OFFICE O/P EST MOD 30 MIN: CPT | Performed by: INTERNAL MEDICINE

## 2025-02-27 PROCEDURE — 1126F AMNT PAIN NOTED NONE PRSNT: CPT | Performed by: INTERNAL MEDICINE

## 2025-02-27 PROCEDURE — 3051F HG A1C>EQUAL 7.0%<8.0%: CPT | Performed by: INTERNAL MEDICINE

## 2025-02-27 PROCEDURE — 3077F SYST BP >= 140 MM HG: CPT | Performed by: INTERNAL MEDICINE

## 2025-02-27 PROCEDURE — 3080F DIAST BP >= 90 MM HG: CPT | Performed by: INTERNAL MEDICINE

## 2025-02-27 RX ORDER — CARVEDILOL 12.5 MG/1
12.5 TABLET ORAL 2 TIMES DAILY WITH MEALS
Qty: 60 TABLET | Refills: 5 | Status: SHIPPED | OUTPATIENT
Start: 2025-02-27

## 2025-02-27 RX ORDER — APIXABAN 5 MG/1
5 TABLET, FILM COATED ORAL
Qty: 60 TABLET | Refills: 0 | OUTPATIENT
Start: 2025-02-27

## 2025-02-27 RX ORDER — INSULIN GLARGINE 100 [IU]/ML
INJECTION, SOLUTION SUBCUTANEOUS
Qty: 15 ML | Refills: 0 | OUTPATIENT
Start: 2025-02-27

## 2025-02-27 NOTE — PROGRESS NOTES
Subjective     Patient ID: Juarez Hunt is a 54 y.o. male. Patient is here for management of multiple medical problems.     Chief Complaint   Patient presents with    Diabetes    Arthritis    Nephrolithiasis     History of Present Illness     Htn    Dm    Arthritis;        The following portions of the patient's history were reviewed and updated as appropriate: allergies, current medications, past family history, past medical history, past social history, past surgical history and problem list.    Review of Systems    Current Outpatient Medications:     allopurinol (Zyloprim) 100 MG tablet, Take 1 tablet by mouth Daily., Disp: 90 tablet, Rfl: 3    Blood Glucose Monitoring Suppl ([employer] Blood Glucose Meter) w/Device kit, 1 each 2 (Two) Times a Day. E11.9, Disp: 1 kit, Rfl: 0    Continuous Glucose  (Dexcom G7 ) device, USE as directed TO monitor blood glucose, Disp: , Rfl:     Continuous Glucose Sensor (Dexcom G7 Sensor) misc, CHANGE every 10 DAYS as directed, Disp: 3 each, Rfl: 11    CRANBERRY PO, Take  by mouth Daily., Disp: , Rfl:     Diclofenac Sodium (VOLTAREN) 1 % gel gel, Apply 4 g topically to the appropriate area as directed 4 (Four) Times a Day As Needed (pain)., Disp: 100 g, Rfl: 1    Eliquis 5 MG tablet tablet, TAKE 1 TABLET BY MOUTH EVERY 12 HOURS, Disp: 60 tablet, Rfl: 0    fluticasone (FLONASE) 50 MCG/ACT nasal spray, 2 sprays into the nostril(s) as directed by provider Daily., Disp: 16 g, Rfl: 0    glucose blood (True Metrix Blood Glucose Test) test strip, Use as instructed to test blood glucose two times per day, Disp: 180 each, Rfl: 3    glycerin adult 2 g suppository, Insert 1 suppository into the rectum As Needed for Constipation., Disp: 12 each, Rfl: 0    Insulin Pen Needle (BD Pen Needle Tina 2nd Gen) 32G X 4 MM misc, Inject 1 Units under the skin into the appropriate area as directed 2 (Two) Times a Day., Disp: 200 each, Rfl: 3    ketorolac (TORADOL) 10 MG tablet, Take 1  "tablet by mouth Every 6 (Six) Hours As Needed for Moderate Pain., Disp: 15 tablet, Rfl: 0    Lantus SoloStar 100 UNIT/ML injection pen, INJECT 30 UNITS UNDER THE SKIN INTO THE APPROPRIATE AREA 2 TIMES PER DAY., Disp: 15 mL, Rfl: 0    lisinopril (PRINIVIL,ZESTRIL) 20 MG tablet, Take 1 tablet by mouth Daily., Disp: 90 tablet, Rfl: 3    metFORMIN (Glucophage) 500 MG tablet, Take 1 tablet by mouth 2 (Two) Times a Day With Meals., Disp: 60 tablet, Rfl: 11    sildenafil (REVATIO) 20 MG tablet, TAKE 1 TABLET BY MOUTH 3 TIMES A DAY, Disp: 90 tablet, Rfl: 0    VITAMIN D PO, Take  by mouth Daily., Disp: , Rfl:     carvedilol (Coreg) 12.5 MG tablet, Take 1 tablet by mouth 2 (Two) Times a Day With Meals., Disp: 60 tablet, Rfl: 5    Objective      Blood pressure (!) 180/102, pulse 88, temperature 97.2 °F (36.2 °C), resp. rate 16, height 188 cm (74.02\"), weight (!) 143 kg (316 lb), SpO2 95%.            Physical Exam     General Appearance:    Alert, cooperative, no distress, appears stated age   Head:    Normocephalic, without obvious abnormality, atraumatic   Eyes:    PERRL, conjunctiva/corneas clear, EOM's intact   Ears:    Normal TM's and external ear canals, both ears   Nose:   Nares normal, septum midline, mucosa normal, no drainage   or sinus tenderness   Throat:   Lips, mucosa, and tongue normal; teeth and gums normal   Neck:   Supple, symmetrical, trachea midline, no adenopathy;        thyroid:  No enlargement/tenderness/nodules; no carotid    bruit or JVD   Back:     Symmetric, no curvature, ROM normal, no CVA tenderness   Lungs:     Clear to auscultation bilaterally, respirations unlabored   Chest wall:    No tenderness or deformity   Heart:    Regular rate and rhythm, S1 and S2 normal, no murmur,        rub or gallop   Abdomen:     Soft, non-tender, bowel sounds active all four quadrants,     no masses, no organomegaly   Extremities:   Extremities normal, atraumatic, no cyanosis or edema   Pulses:   2+ and symmetric " all extremities   Skin:   Skin color, texture, turgor normal, no rashes or lesions   Lymph nodes:   Cervical, supraclavicular, and axillary nodes normal   Neurologic:   CNII-XII intact. Normal strength, sensation and reflexes       throughout      Results for orders placed or performed during the hospital encounter of 02/13/25   POC Glucose Once    Collection Time: 02/13/25  6:44 AM    Specimen: Blood   Result Value Ref Range    Glucose 166 (H) 70 - 130 mg/dL   Basic Metabolic Panel    Collection Time: 02/13/25  6:47 AM    Specimen: Blood   Result Value Ref Range    Glucose 168 (H) 65 - 99 mg/dL    BUN 26 (H) 6 - 20 mg/dL    Creatinine 1.54 (H) 0.76 - 1.27 mg/dL    Sodium 138 136 - 145 mmol/L    Potassium 4.5 3.5 - 5.2 mmol/L    Chloride 106 98 - 107 mmol/L    CO2 23.0 22.0 - 29.0 mmol/L    Calcium 8.7 8.6 - 10.5 mg/dL    BUN/Creatinine Ratio 16.9 7.0 - 25.0    Anion Gap 9.0 5.0 - 15.0 mmol/L    eGFR 52.9 (L) >60.0 mL/min/1.73         Assessment & Plan   Diet changes discussed.     Renal stone.   D/c metorprol. Will see if we can get better bp with change in bb.      Diagnoses and all orders for this visit:    1. Benign essential hypertension (Primary)  -     carvedilol (Coreg) 12.5 MG tablet; Take 1 tablet by mouth 2 (Two) Times a Day With Meals.  Dispense: 60 tablet; Refill: 5    2. Renal stones      Return in about 2 weeks (around 3/13/2025).          There are no Patient Instructions on file for this visit.     Jw Guido MD    Assessment & Plan

## 2025-02-28 ENCOUNTER — TELEPHONE (OUTPATIENT)
Dept: INTERNAL MEDICINE | Facility: CLINIC | Age: 55
End: 2025-02-28
Payer: MEDICARE

## 2025-02-28 RX ORDER — PEN NEEDLE, DIABETIC 32GX 5/32"
1 NEEDLE, DISPOSABLE MISCELLANEOUS 2 TIMES DAILY
Qty: 200 EACH | Refills: 3 | OUTPATIENT
Start: 2025-02-28

## 2025-02-28 RX ORDER — INSULIN GLARGINE 100 [IU]/ML
INJECTION, SOLUTION SUBCUTANEOUS
Qty: 15 ML | Refills: 0 | OUTPATIENT
Start: 2025-02-28

## 2025-02-28 NOTE — TELEPHONE ENCOUNTER
Incoming Refill Request      Medication requested (name and dose): LANTUS-LAST REFILLED 2/3    Pharmacy where request should be sent: [facility]    Additional details provided by patient: APPT 3/17    Best call back number: [phone]    Does the patient have less than a 3 day supply:  [] Yes  [x] No    Geraldine Villalta Rep  02/28/25, 13:28 EST

## 2025-02-28 NOTE — TELEPHONE ENCOUNTER
Incoming Refill Request      Medication requested (name and dose): PEN NEEDLES    Pharmacy where request should be sent: [facility]    Additional details provided by patient: APPT 3/17    Best call back number: [phone]    Does the patient have less than a 3 day supply:  [x] Yes  [] No    Geraldine Villalta Rep  02/28/25, 13:32 EST

## 2025-03-03 RX ORDER — INSULIN GLARGINE 100 [IU]/ML
INJECTION, SOLUTION SUBCUTANEOUS
Qty: 15 ML | Refills: 0 | Status: SHIPPED | OUTPATIENT
Start: 2025-03-03

## 2025-03-09 ENCOUNTER — ANESTHESIA EVENT (OUTPATIENT)
Dept: PERIOP | Facility: HOSPITAL | Age: 55
End: 2025-03-09
Payer: MEDICARE

## 2025-03-09 RX ORDER — FAMOTIDINE 10 MG/ML
20 INJECTION, SOLUTION INTRAVENOUS ONCE
Status: CANCELLED | OUTPATIENT
Start: 2025-03-09 | End: 2025-03-09

## 2025-03-09 NOTE — ANESTHESIA PREPROCEDURE EVALUATION
Anesthesia Evaluation     Patient summary reviewed and Nursing notes reviewed   history of anesthetic complications:  PONV  NPO Solid Status: > 8 hours  NPO Liquid Status: > 2 hours           Airway   Mallampati: I  TM distance: >3 FB  Neck ROM: full  No difficulty expected  Dental    (+) edentulous, upper dentures and lower dentures    Pulmonary - normal exam   (+) pulmonary embolism, a smoker (2000) Former, cigarettes,sleep apnea ([place])  (-) asthma, shortness of breath, recent URI, no home oxygen  Cardiovascular - normal exam    ECG reviewed  PT is on anticoagulation therapy    (+) hypertension, DVT  (-) past MI, dysrhythmias, angina, cardiac stents    ROS comment: ECG NSR LAD   ECHO 2024 EF 51% ow normal   MPS 2024 normal  no isch low risk EF 53%     Neuro/Psych  (+) numbness (diabetic neuropathy)  (-) seizures, CVA  GI/Hepatic/Renal/Endo    (+) obesity, morbid obesity, renal disease (creat 1.4---1.5       K 5,2)- CRI, diabetes mellitus type 2 well controlled  (-) GERD, no thyroid disorder    Musculoskeletal     Abdominal    Substance History      OB/GYN          Other   arthritis,                 Anesthesia Plan    ASA 3     general     intravenous induction     Anesthetic plan, risks, benefits, and alternatives have been provided, discussed and informed consent has been obtained with: patient.    Plan discussed with CRNA.      CODE STATUS:

## 2025-03-10 ENCOUNTER — APPOINTMENT (OUTPATIENT)
Dept: GENERAL RADIOLOGY | Facility: HOSPITAL | Age: 55
End: 2025-03-10
Payer: MEDICARE

## 2025-03-10 ENCOUNTER — ANESTHESIA (OUTPATIENT)
Dept: PERIOP | Facility: HOSPITAL | Age: 55
End: 2025-03-10
Payer: MEDICARE

## 2025-03-10 ENCOUNTER — HOSPITAL ENCOUNTER (OUTPATIENT)
Facility: HOSPITAL | Age: 55
Setting detail: HOSPITAL OUTPATIENT SURGERY
Discharge: HOME | End: 2025-03-10
Attending: UROLOGY | Admitting: UROLOGY
Payer: MEDICARE

## 2025-03-10 VITALS
HEART RATE: 70 BPM | RESPIRATION RATE: 14 BRPM | DIASTOLIC BLOOD PRESSURE: 78 MMHG | BODY MASS INDEX: 40.43 KG/M2 | OXYGEN SATURATION: 98 % | TEMPERATURE: 97 F | HEIGHT: 74 IN | WEIGHT: 315 LBS | SYSTOLIC BLOOD PRESSURE: 135 MMHG

## 2025-03-10 DIAGNOSIS — N20.0 RENAL STONE: ICD-10-CM

## 2025-03-10 LAB — GLUCOSE BLDC GLUCOMTR-MCNC: 123 MG/DL (ref 70–130)

## 2025-03-10 PROCEDURE — 74420 UROGRAPHY RTRGR +-KUB: CPT | Performed by: UROLOGY

## 2025-03-10 PROCEDURE — 53899 UNLISTED PX URINARY SYSTEM: CPT | Performed by: UROLOGY

## 2025-03-10 PROCEDURE — C1894 INTRO/SHEATH, NON-LASER: HCPCS | Performed by: UROLOGY

## 2025-03-10 PROCEDURE — 25010000002 PROPOFOL 10 MG/ML EMULSION

## 2025-03-10 PROCEDURE — 25010000002 SUGAMMADEX 500 MG/5ML SOLUTION

## 2025-03-10 PROCEDURE — C1769 GUIDE WIRE: HCPCS | Performed by: UROLOGY

## 2025-03-10 PROCEDURE — 25010000002 FENTANYL CITRATE (PF) 100 MCG/2ML SOLUTION

## 2025-03-10 PROCEDURE — 25010000002 ONDANSETRON PER 1 MG

## 2025-03-10 PROCEDURE — 82948 REAGENT STRIP/BLOOD GLUCOSE: CPT

## 2025-03-10 PROCEDURE — 25010000002 DEXAMETHASONE PER 1 MG

## 2025-03-10 PROCEDURE — 52356 CYSTO/URETERO W/LITHOTRIPSY: CPT | Performed by: UROLOGY

## 2025-03-10 PROCEDURE — 25010000002 CEFAZOLIN 3 G RECONSTITUTED SOLUTION 1 EACH VIAL

## 2025-03-10 PROCEDURE — 25810000003 LACTATED RINGERS PER 1000 ML: Performed by: ANESTHESIOLOGY

## 2025-03-10 PROCEDURE — 25010000002 PHENYLEPHRINE 10 MG/ML SOLUTION

## 2025-03-10 PROCEDURE — C2617 STENT, NON-COR, TEM W/O DEL: HCPCS | Performed by: UROLOGY

## 2025-03-10 PROCEDURE — 25010000002 LIDOCAINE PF 1% 1 % SOLUTION: Performed by: ANESTHESIOLOGY

## 2025-03-10 PROCEDURE — 25010000002 LIDOCAINE PF 1% 1 % SOLUTION

## 2025-03-10 PROCEDURE — 25510000001 IOPAMIDOL 61 % SOLUTION: Performed by: UROLOGY

## 2025-03-10 PROCEDURE — 76000 FLUOROSCOPY <1 HR PHYS/QHP: CPT

## 2025-03-10 DEVICE — URETERAL STENT
Type: IMPLANTABLE DEVICE | Site: URETER | Status: FUNCTIONAL
Brand: PERCUFLEX™ PLUS

## 2025-03-10 RX ORDER — OXYBUTYNIN CHLORIDE 5 MG/1
5 TABLET, EXTENDED RELEASE ORAL DAILY PRN
Qty: 14 TABLET | Refills: 0 | Status: SHIPPED | OUTPATIENT
Start: 2025-03-10 | End: 2025-03-17

## 2025-03-10 RX ORDER — SODIUM CHLORIDE 0.9 % (FLUSH) 0.9 %
3 SYRINGE (ML) INJECTION EVERY 12 HOURS SCHEDULED
Status: DISCONTINUED | OUTPATIENT
Start: 2025-03-10 | End: 2025-03-10 | Stop reason: HOSPADM

## 2025-03-10 RX ORDER — GABAPENTIN 300 MG/1
600 CAPSULE ORAL ONCE
Status: COMPLETED | OUTPATIENT
Start: 2025-03-10 | End: 2025-03-10

## 2025-03-10 RX ORDER — OXYCODONE AND ACETAMINOPHEN 7.5; 325 MG/1; MG/1
1 TABLET ORAL EVERY 4 HOURS PRN
Status: DISCONTINUED | OUTPATIENT
Start: 2025-03-10 | End: 2025-03-10 | Stop reason: HOSPADM

## 2025-03-10 RX ORDER — HYDROCODONE BITARTRATE AND ACETAMINOPHEN 5; 325 MG/1; MG/1
1 TABLET ORAL ONCE AS NEEDED
Status: DISCONTINUED | OUTPATIENT
Start: 2025-03-10 | End: 2025-03-10 | Stop reason: HOSPADM

## 2025-03-10 RX ORDER — SODIUM CHLORIDE 0.9 % (FLUSH) 0.9 %
10 SYRINGE (ML) INJECTION AS NEEDED
Status: DISCONTINUED | OUTPATIENT
Start: 2025-03-10 | End: 2025-03-10 | Stop reason: HOSPADM

## 2025-03-10 RX ORDER — HYDRALAZINE HYDROCHLORIDE 20 MG/ML
5 INJECTION INTRAMUSCULAR; INTRAVENOUS
Status: DISCONTINUED | OUTPATIENT
Start: 2025-03-10 | End: 2025-03-10 | Stop reason: HOSPADM

## 2025-03-10 RX ORDER — IOPAMIDOL 612 MG/ML
INJECTION, SOLUTION INTRAVASCULAR AS NEEDED
Status: DISCONTINUED | OUTPATIENT
Start: 2025-03-10 | End: 2025-03-10 | Stop reason: HOSPADM

## 2025-03-10 RX ORDER — SODIUM CHLORIDE, SODIUM LACTATE, POTASSIUM CHLORIDE, CALCIUM CHLORIDE 600; 310; 30; 20 MG/100ML; MG/100ML; MG/100ML; MG/100ML
9 INJECTION, SOLUTION INTRAVENOUS CONTINUOUS
Status: DISCONTINUED | OUTPATIENT
Start: 2025-03-10 | End: 2025-03-10 | Stop reason: HOSPADM

## 2025-03-10 RX ORDER — PHENAZOPYRIDINE HYDROCHLORIDE 200 MG/1
200 TABLET, FILM COATED ORAL 3 TIMES DAILY PRN
Qty: 20 TABLET | Refills: 0 | Status: SHIPPED | OUTPATIENT
Start: 2025-03-10 | End: 2025-03-17

## 2025-03-10 RX ORDER — SCOPOLAMINE 1 MG/3D
1 PATCH, EXTENDED RELEASE TRANSDERMAL CONTINUOUS
Status: DISCONTINUED | OUTPATIENT
Start: 2025-03-10 | End: 2025-03-10 | Stop reason: HOSPADM

## 2025-03-10 RX ORDER — PHENYLEPHRINE HYDROCHLORIDE 10 MG/ML
INJECTION INTRAVENOUS AS NEEDED
Status: DISCONTINUED | OUTPATIENT
Start: 2025-03-10 | End: 2025-03-10 | Stop reason: SURG

## 2025-03-10 RX ORDER — SODIUM CHLORIDE 0.9 % (FLUSH) 0.9 %
10 SYRINGE (ML) INJECTION EVERY 12 HOURS SCHEDULED
Status: DISCONTINUED | OUTPATIENT
Start: 2025-03-10 | End: 2025-03-10 | Stop reason: HOSPADM

## 2025-03-10 RX ORDER — MAGNESIUM HYDROXIDE 1200 MG/15ML
LIQUID ORAL AS NEEDED
Status: DISCONTINUED | OUTPATIENT
Start: 2025-03-10 | End: 2025-03-10 | Stop reason: HOSPADM

## 2025-03-10 RX ORDER — ONDANSETRON 2 MG/ML
4 INJECTION INTRAMUSCULAR; INTRAVENOUS ONCE AS NEEDED
Status: DISCONTINUED | OUTPATIENT
Start: 2025-03-10 | End: 2025-03-10 | Stop reason: HOSPADM

## 2025-03-10 RX ORDER — ONDANSETRON 2 MG/ML
INJECTION INTRAMUSCULAR; INTRAVENOUS AS NEEDED
Status: DISCONTINUED | OUTPATIENT
Start: 2025-03-10 | End: 2025-03-10 | Stop reason: SURG

## 2025-03-10 RX ORDER — MIDAZOLAM HYDROCHLORIDE 1 MG/ML
1 INJECTION, SOLUTION INTRAMUSCULAR; INTRAVENOUS
Status: DISCONTINUED | OUTPATIENT
Start: 2025-03-10 | End: 2025-03-10 | Stop reason: HOSPADM

## 2025-03-10 RX ORDER — ACETAMINOPHEN 500 MG
1000 TABLET ORAL ONCE
Status: COMPLETED | OUTPATIENT
Start: 2025-03-10 | End: 2025-03-10

## 2025-03-10 RX ORDER — PROMETHAZINE HYDROCHLORIDE 25 MG/1
25 TABLET ORAL ONCE AS NEEDED
Status: DISCONTINUED | OUTPATIENT
Start: 2025-03-10 | End: 2025-03-10 | Stop reason: HOSPADM

## 2025-03-10 RX ORDER — NALOXONE HCL 0.4 MG/ML
0.4 VIAL (ML) INJECTION AS NEEDED
Status: DISCONTINUED | OUTPATIENT
Start: 2025-03-10 | End: 2025-03-10 | Stop reason: HOSPADM

## 2025-03-10 RX ORDER — LIDOCAINE HYDROCHLORIDE 10 MG/ML
0.5 INJECTION, SOLUTION EPIDURAL; INFILTRATION; INTRACAUDAL; PERINEURAL ONCE AS NEEDED
Status: COMPLETED | OUTPATIENT
Start: 2025-03-10 | End: 2025-03-10

## 2025-03-10 RX ORDER — SODIUM CHLORIDE 0.9 % (FLUSH) 0.9 %
3-10 SYRINGE (ML) INJECTION AS NEEDED
Status: DISCONTINUED | OUTPATIENT
Start: 2025-03-10 | End: 2025-03-10 | Stop reason: HOSPADM

## 2025-03-10 RX ORDER — HYDROMORPHONE HYDROCHLORIDE 1 MG/ML
0.5 INJECTION, SOLUTION INTRAMUSCULAR; INTRAVENOUS; SUBCUTANEOUS
Status: DISCONTINUED | OUTPATIENT
Start: 2025-03-10 | End: 2025-03-10 | Stop reason: HOSPADM

## 2025-03-10 RX ORDER — FENTANYL CITRATE 50 UG/ML
50 INJECTION, SOLUTION INTRAMUSCULAR; INTRAVENOUS
Status: DISCONTINUED | OUTPATIENT
Start: 2025-03-10 | End: 2025-03-10 | Stop reason: HOSPADM

## 2025-03-10 RX ORDER — FAMOTIDINE 20 MG/1
20 TABLET, FILM COATED ORAL ONCE
Status: COMPLETED | OUTPATIENT
Start: 2025-03-10 | End: 2025-03-10

## 2025-03-10 RX ORDER — LIDOCAINE HYDROCHLORIDE 10 MG/ML
INJECTION, SOLUTION EPIDURAL; INFILTRATION; INTRACAUDAL; PERINEURAL AS NEEDED
Status: DISCONTINUED | OUTPATIENT
Start: 2025-03-10 | End: 2025-03-10 | Stop reason: SURG

## 2025-03-10 RX ORDER — LABETALOL HYDROCHLORIDE 5 MG/ML
5 INJECTION, SOLUTION INTRAVENOUS
Status: DISCONTINUED | OUTPATIENT
Start: 2025-03-10 | End: 2025-03-10 | Stop reason: HOSPADM

## 2025-03-10 RX ORDER — PROMETHAZINE HYDROCHLORIDE 25 MG/1
25 SUPPOSITORY RECTAL ONCE AS NEEDED
Status: DISCONTINUED | OUTPATIENT
Start: 2025-03-10 | End: 2025-03-10 | Stop reason: HOSPADM

## 2025-03-10 RX ORDER — NITROFURANTOIN 25; 75 MG/1; MG/1
100 CAPSULE ORAL 2 TIMES DAILY
Qty: 14 CAPSULE | Refills: 0 | Status: SHIPPED | OUTPATIENT
Start: 2025-03-10 | End: 2025-03-17

## 2025-03-10 RX ORDER — MELOXICAM 15 MG/1
15 TABLET ORAL ONCE
Status: COMPLETED | OUTPATIENT
Start: 2025-03-10 | End: 2025-03-10

## 2025-03-10 RX ORDER — TAMSULOSIN HYDROCHLORIDE 0.4 MG/1
1 CAPSULE ORAL DAILY
Qty: 14 CAPSULE | Refills: 0 | Status: SHIPPED | OUTPATIENT
Start: 2025-03-10 | End: 2025-03-17

## 2025-03-10 RX ORDER — DEXAMETHASONE SODIUM PHOSPHATE 4 MG/ML
INJECTION, SOLUTION INTRA-ARTICULAR; INTRALESIONAL; INTRAMUSCULAR; INTRAVENOUS; SOFT TISSUE AS NEEDED
Status: DISCONTINUED | OUTPATIENT
Start: 2025-03-10 | End: 2025-03-10 | Stop reason: SURG

## 2025-03-10 RX ORDER — SODIUM CHLORIDE, SODIUM LACTATE, POTASSIUM CHLORIDE, CALCIUM CHLORIDE 600; 310; 30; 20 MG/100ML; MG/100ML; MG/100ML; MG/100ML
9 INJECTION, SOLUTION INTRAVENOUS CONTINUOUS
Status: DISCONTINUED | OUTPATIENT
Start: 2025-03-11 | End: 2025-03-10 | Stop reason: HOSPADM

## 2025-03-10 RX ORDER — FENTANYL CITRATE 50 UG/ML
INJECTION, SOLUTION INTRAMUSCULAR; INTRAVENOUS AS NEEDED
Status: DISCONTINUED | OUTPATIENT
Start: 2025-03-10 | End: 2025-03-10 | Stop reason: SURG

## 2025-03-10 RX ORDER — IPRATROPIUM BROMIDE AND ALBUTEROL SULFATE 2.5; .5 MG/3ML; MG/3ML
3 SOLUTION RESPIRATORY (INHALATION) ONCE AS NEEDED
Status: DISCONTINUED | OUTPATIENT
Start: 2025-03-10 | End: 2025-03-10 | Stop reason: HOSPADM

## 2025-03-10 RX ORDER — SODIUM CHLORIDE 9 MG/ML
9 INJECTION, SOLUTION INTRAVENOUS AS NEEDED
Status: DISCONTINUED | OUTPATIENT
Start: 2025-03-10 | End: 2025-03-10 | Stop reason: HOSPADM

## 2025-03-10 RX ORDER — PROPOFOL 10 MG/ML
VIAL (ML) INTRAVENOUS AS NEEDED
Status: DISCONTINUED | OUTPATIENT
Start: 2025-03-10 | End: 2025-03-10 | Stop reason: SURG

## 2025-03-10 RX ORDER — ROCURONIUM BROMIDE 10 MG/ML
INJECTION, SOLUTION INTRAVENOUS AS NEEDED
Status: DISCONTINUED | OUTPATIENT
Start: 2025-03-10 | End: 2025-03-10 | Stop reason: SURG

## 2025-03-10 RX ADMIN — DEXAMETHASONE SODIUM PHOSPHATE 4 MG: 4 INJECTION INTRA-ARTICULAR; INTRALESIONAL; INTRAMUSCULAR; INTRAVENOUS; SOFT TISSUE at 08:59

## 2025-03-10 RX ADMIN — SODIUM CHLORIDE 3000 MG: 900 INJECTION INTRAVENOUS at 08:55

## 2025-03-10 RX ADMIN — FENTANYL CITRATE 50 MCG: 50 INJECTION, SOLUTION INTRAMUSCULAR; INTRAVENOUS at 09:40

## 2025-03-10 RX ADMIN — ONDANSETRON 4 MG: 2 INJECTION INTRAMUSCULAR; INTRAVENOUS at 09:35

## 2025-03-10 RX ADMIN — PROPOFOL 200 MG: 10 INJECTION, EMULSION INTRAVENOUS at 08:55

## 2025-03-10 RX ADMIN — SCOPOLAMINE 1 PATCH: 1.5 PATCH, EXTENDED RELEASE TRANSDERMAL at 08:23

## 2025-03-10 RX ADMIN — LIDOCAINE HYDROCHLORIDE 0.5 ML: 10 INJECTION, SOLUTION EPIDURAL; INFILTRATION; INTRACAUDAL; PERINEURAL at 08:15

## 2025-03-10 RX ADMIN — PHENYLEPHRINE HYDROCHLORIDE 50 MCG: 10 INJECTION INTRAVENOUS at 09:28

## 2025-03-10 RX ADMIN — PHENYLEPHRINE HYDROCHLORIDE 100 MCG: 10 INJECTION INTRAVENOUS at 09:04

## 2025-03-10 RX ADMIN — ROCURONIUM 50 MG: 50 INJECTION, SOLUTION INTRAVENOUS at 08:56

## 2025-03-10 RX ADMIN — PHENYLEPHRINE HYDROCHLORIDE 50 MCG: 10 INJECTION INTRAVENOUS at 09:32

## 2025-03-10 RX ADMIN — LIDOCAINE HYDROCHLORIDE 50 MG: 10 INJECTION, SOLUTION EPIDURAL; INFILTRATION; INTRACAUDAL; PERINEURAL at 08:55

## 2025-03-10 RX ADMIN — ACETAMINOPHEN 1000 MG: 500 TABLET, FILM COATED ORAL at 08:23

## 2025-03-10 RX ADMIN — PHENYLEPHRINE HYDROCHLORIDE 100 MCG: 10 INJECTION INTRAVENOUS at 09:03

## 2025-03-10 RX ADMIN — FAMOTIDINE 20 MG: 20 TABLET, FILM COATED ORAL at 08:23

## 2025-03-10 RX ADMIN — PHENYLEPHRINE HYDROCHLORIDE 100 MCG: 10 INJECTION INTRAVENOUS at 09:13

## 2025-03-10 RX ADMIN — PHENYLEPHRINE HYDROCHLORIDE 100 MCG: 10 INJECTION INTRAVENOUS at 09:20

## 2025-03-10 RX ADMIN — MELOXICAM 15 MG: 15 TABLET ORAL at 08:23

## 2025-03-10 RX ADMIN — GABAPENTIN 600 MG: 300 CAPSULE ORAL at 08:23

## 2025-03-10 RX ADMIN — PHENYLEPHRINE HYDROCHLORIDE 50 MCG: 10 INJECTION INTRAVENOUS at 09:26

## 2025-03-10 RX ADMIN — FENTANYL CITRATE 50 MCG: 50 INJECTION, SOLUTION INTRAMUSCULAR; INTRAVENOUS at 08:55

## 2025-03-10 RX ADMIN — SUGAMMADEX 300 MG: 100 INJECTION, SOLUTION INTRAVENOUS at 09:39

## 2025-03-10 RX ADMIN — SODIUM CHLORIDE, POTASSIUM CHLORIDE, SODIUM LACTATE AND CALCIUM CHLORIDE 9 ML/HR: 600; 310; 30; 20 INJECTION, SOLUTION INTRAVENOUS at 08:15

## 2025-03-10 RX ADMIN — PHENYLEPHRINE HYDROCHLORIDE 50 MCG: 10 INJECTION INTRAVENOUS at 09:37

## 2025-03-10 RX ADMIN — PHENYLEPHRINE HYDROCHLORIDE 50 MCG: 10 INJECTION INTRAVENOUS at 09:23

## 2025-03-10 NOTE — OP NOTE
OPERATIVE REPORT     Patient Name:  Juarez Hunt  YOB: 1970    Patient MRN: [id number]    Date of Surgery:  3/10/25     Indications:  55 yo male with a history of large 2.0-2.5cm right renal  stone.  We have discussed the indication for definitive stone treatment.  We have discussed the risks and benefits of ureteroscopy and laser lithotripsy with stent placement.  Patient elects to proceed.    Pre-op Diagnosis:   Right Renal Stone    Post-op Diagnosis:   Right Renal  Stone    Procedures Performed:  CYSTOSCOPY  RIGHT URETEROSCOPY, LASER LITHOTRIPSY  RIGHT PYELOSCOPY, LASER LITHOTRIPSY, ClearPetra URETERAL CATHETERIZATION WITH STEERABLE VACUUM ASPIRATION   RIGHT RETROGRADE PYELOGRAM  RIGHT URETERAL STENT PLACEMENT    Staff:  William Dudley MD    Anesthesia: General    Estimated Blood Loss: Minimal    Implants:  4.8F x 26cm ureteral stent     Specimen:  stone    Findings:   Normal urinary bladder  Right ureteroscopy with clearance of distal mid proximal ureter.  Right pyeloscopy with large renal stone. Laser lithotripsy performed with clearance of stone burden. ClearPetra ureteral catheterization steerable vacuum aspiration device was used to aid in clearance of stone burden.   Right retrograde pyelography with mild dilation of collecting system  Right ureteral stent placement    Complications: None immediate    Description of Procedure:    After informed consent, the patient was brought back to the operating suite and moved over to the operating table. General anesthesia was smoothly induced, IV antibiotics were administered, and the patient was placed in the dorsal lithotomy position with careful attention focused on padding all pressure points. The patient was prepped and draped in standard fashion. A timeout was performed to ensure the correct patient and procedure    A 22Fr cystoscope was used to cannulate the urethra. The urethra was of normal course and caliber.  Upon entering the  bladder, pan-cystoscopy revealed no bladder abnormalities.  The bilateral ureteral orifices were visualized in their orthotopic positions.     A sensor wire was advanced up the right ureter and into the collecting system on fluoroscopy to serve as a safety wire which was clamped to the surgical drapes.  Indwelling stent was removed intact.    SEMIRIGID URETEROSCOPY  The semi-rigid ureteroscope was then advanced into the bladder. The right ureter was cannulated demonstrating no evidence of stone in the distal mid proximal ureter..  A second sensor wire was advanced into the kidney through the scope and the scope was backed out.     ACCESS SHEATH  A 12F x 40cm clearpetra steerable ureteral access catheter for vacuum aspiration was advanced over the working wire under fluoroscopy. This was advanced up to the level of the proximal ureter without resistance . The working wire and inner obturator were then removed.  The suction device was set up. A flexible ureteroscope was advanced through the access sheath into the renal pelvis.   A 200 micron Thulium laser fiber was then advanced through the ureteroscope.  Continued nonobstructing stones were present in the lower pole.  Laser lithotripsy was used to fragment and dust stone.     The clearpetra steerable ureteral catheter with vacuum aspiration was then used to navigate the renal pelvis to aid in clearance.  The ureter was then carefully inspected during removal of the access sheath under direct visualization and found to be free of any injuries or stone.    Right retrograde Pyelogram Interpretation  A retrograde pyelogram was performed demonstrating normal course and caliber of the ureter, outlining the location of the renal pelvis and collecting system, with mild dilation.     CYSTO PLACEMENT  We switched back to the rigid cystoscope which was reinserted over the existing guidewire. A 4.8F x 26cm double J ureteral stent was advanced up the right ureter under direct  visualization which confirmed good curl in the bladder. Fluoroscopy confirmed good curl in the kidney. The bladder was drained and this concluded our procedure.      The patient was brought back to the PACU in stable condition. All scopes and instruments were in good working order at the end of the case. There were no complications.    Disposition/Follow Up: Patient will be discharged when meeting appropriate PACU criteria.  The patient will follow-up in 5 to 7 days for cystoscopy and stent removal.    William Dudley MD     Date: 3/10/2025  Time: 09:51 EDT

## 2025-03-10 NOTE — H&P
Pre-Op H&P  Juarez Hunt  [id number]  1970      Chief complaint: Kidney stone      Subjective:  Patient is a 54 y.o.male presents for scheduled surgery by Dr. Dudley. He anticipates a URETEROSCOPY LASER LITHOTRIPSY WITH STENT INSERTION  today.  He has history of kidney stones.  He underwent lithotripsy with stent insertion on 2/13/2025.  He denies flank pain, dysuria or gross hematuria.      Review of Systems:  Constitutional-- No fever, chills or sweats. No fatigue.  CV-- No chest pain, palpitation or syncope. +HTN  Resp-- No SOB, cough, hemoptysis  Skin--No rashes or lesions      Allergies: No Known Allergies      Home Meds:  Medications Prior to Admission   Medication Sig Dispense Refill Last Dose/Taking    allopurinol (Zyloprim) 100 MG tablet Take 1 tablet by mouth Daily. 90 tablet 3 3/9/2025    carvedilol (Coreg) 12.5 MG tablet Take 1 tablet by mouth 2 (Two) Times a Day With Meals. 60 tablet 5 3/9/2025    Continuous Glucose Sensor (Dexcom G7 Sensor) misc CHANGE every 10 DAYS as directed 3 each 11 Past Week    CRANBERRY PO Take  by mouth Daily.   Past Month    Eliquis 5 MG tablet tablet TAKE 1 TABLET BY MOUTH EVERY 12 HOURS 60 tablet 0 3/9/2025 at  5:00 PM    ketorolac (TORADOL) 10 MG tablet Take 1 tablet by mouth Every 6 (Six) Hours As Needed for Moderate Pain. 15 tablet 0 Past Month    Lantus SoloStar 100 UNIT/ML injection pen INJECT 30 UNITS UNDER THE SKIN INTO THE APPROPRIATE AREA 2 TIMES PER DAY. 15 mL 0 3/9/2025    lisinopril (PRINIVIL,ZESTRIL) 20 MG tablet Take 1 tablet by mouth Daily. 90 tablet 3 3/9/2025    metFORMIN (Glucophage) 500 MG tablet Take 1 tablet by mouth 2 (Two) Times a Day With Meals. 60 tablet 11 3/9/2025    sildenafil (REVATIO) 20 MG tablet TAKE 1 TABLET BY MOUTH 3 TIMES A DAY 90 tablet 0 Past Week    VITAMIN D PO Take  by mouth Daily.   3/9/2025    Blood Glucose Monitoring Suppl ([employer] Blood Glucose Meter) w/Device kit 1 each 2 (Two) Times a Day. E11.9 1 kit 0      Continuous Glucose  (Dexcom G7 ) device USE as directed TO monitor blood glucose       Diclofenac Sodium (VOLTAREN) 1 % gel gel Apply 4 g topically to the appropriate area as directed 4 (Four) Times a Day As Needed (pain). 100 g 1 More than a month    fluticasone (FLONASE) 50 MCG/ACT nasal spray 2 sprays into the nostril(s) as directed by provider Daily. 16 g 0 More than a month    glucose blood (True Metrix Blood Glucose Test) test strip Use as instructed to test blood glucose two times per day 180 each 3     glycerin adult 2 g suppository Insert 1 suppository into the rectum As Needed for Constipation. 12 each 0 More than a month    Insulin Pen Needle (BD Pen Needle Tina 2nd Gen) 32G X 4 MM misc Inject 1 Units under the skin into the appropriate area as directed 2 (Two) Times a Day. 200 each 3          PMH:   Past Medical History:   Diagnosis Date    Arthritis     Diabetes mellitus     DVT (deep venous thrombosis)     History of pulmonary embolus (PE)     Hypertension     Kidney stones     Postoperative nausea      PSH:    Past Surgical History:   Procedure Laterality Date    CARDIAC CATHETERIZATION      CYSTOSCOPY URETEROSCOPY LASER LITHOTRIPSY  2007    EYE SURGERY      DIGNA FILTER INSERTION JUGULAR  2006    KNEE SURGERY Left     SKIN BIOPSY  2020    toe    URETEROSCOPY LASER LITHOTRIPSY WITH STENT INSERTION Right 2025    Procedure: URETEROSCOPY LASER LITHOTRIPSY WITH STENT INSERTION RIGHT;  Surgeon: William Dudley MD;  Location: [facility];  Service: Urology;  Laterality: Right;       Immunization History:  Influenza: UTD  Pneumococcal: No  Tetanus: Unknown     Social History:   Tobacco:   Social History     Tobacco Use   Smoking Status Former    Current packs/day: 0.00    Average packs/day: 1 pack/day for 10.0 years (10.0 ttl pk-yrs)    Types: Cigarettes    Start date:     Quit date: 2000    Years since quittin.2    Passive exposure: Past   Smokeless Tobacco  "Current    Types: Chew      Alcohol:     Social History     Substance and Sexual Activity   Alcohol Use Yes    Comment: PT STATES ONE DRINK PER MONTH.         Physical Exam:/71 (BP Location: Right arm, Patient Position: Sitting)   Pulse 82   Temp 97.7 °F (36.5 °C) (Temporal)   Resp 18   Ht 188 cm (74\")   Wt (!) 143 kg (316 lb)   SpO2 100%   BMI 40.57 kg/m²       General Appearance:    Alert, cooperative, no distress, appears stated age   Head:    Normocephalic, without obvious abnormality, atraumatic   Lungs:     Clear to auscultation bilaterally, respirations unlabored    Heart:   Regular rate and rhythm, S1 and S2 normal    Abdomen:    Soft without tenderness   Extremities:   Extremities normal, atraumatic, no cyanosis or edema   Skin:   Skin color, texture, turgor normal, no rashes or lesions   Neurologic:   Grossly intact     Results Review:     LABS:  Lab Results   Component Value Date    WBC 7.2 02/26/2025    HGB 13.0 02/26/2025    HCT 38.0 02/26/2025    MCV 92 02/26/2025     02/26/2025    NEUTROABS 3.7 02/26/2025    GLUCOSE 179 (H) 02/26/2025    BUN 25 (H) 02/26/2025    CREATININE 1.70 (H) 02/26/2025    EGFRIFNONA 91 06/11/2021    EGFRIFAFRI 105 06/11/2021     02/26/2025    K 5.3 (H) 02/26/2025     02/26/2025    CO2 22 02/26/2025    MG 1.7 03/22/2024    CALCIUM 8.7 02/26/2025    ALBUMIN 3.9 02/26/2025    AST 23 02/26/2025    ALT 25 02/26/2025    BILITOT 0.4 02/26/2025       RADIOLOGY:  Imaging Results (Last 72 Hours)       ** No results found for the last 72 hours. **            I reviewed the patient's new clinical results.    Cancer Staging (if applicable)  Cancer Patient: __ yes __no __unknown; If yes, clinical stage T:__ N:__M:__, stage group or __N/A      Impression: Renal stone      Plan: URETEROSCOPY LASER LITHOTRIPSY WITH STENT INSERTION       CORNELIA Green   3/10/2025   08:15 EDT  "

## 2025-03-10 NOTE — ANESTHESIA PROCEDURE NOTES
Airway  Reason: elective    Date/Time: 3/10/2025 8:57 AM  Airway not difficult    General Information and Staff    Patient location during procedure: OR  CRNA/CAA: Yael Louis CRNA    Indications and Patient Condition  Indications for airway management: airway protection    Preoxygenated: yes  MILS not maintained throughout    Mask difficulty assessment: 1 - vent by mask    Final Airway Details    Final airway type: endotracheal airway      Successful airway: ETT  Cuffed: yes   Successful intubation technique: video laryngoscopy  Adjuncts used in placement: intubating stylet  Endotracheal tube insertion site: oral  Blade: Wesley  Blade size: 4  ETT size (mm): 7.5  Cormack-Lehane Classification: grade I - full view of glottis  Placement verified by: chest auscultation and capnometry   Cuff volume (mL): 6  Measured from: lips  ETT/EBT  to lips (cm): 22  Number of attempts at approach: 1  Assessment: lips, teeth, and gum same as pre-op and atraumatic intubation    Additional Comments  Negative epigastric sounds, Breath sound equal bilaterally with symmetric chest rise and fall

## 2025-03-10 NOTE — ANESTHESIA POSTPROCEDURE EVALUATION
Patient: Juarez Hunt    Procedure Summary       Date: 03/10/25 Room / Location:  [facility]    Anesthesia Start: 0848 Anesthesia Stop: 0949    Procedure: URETEROSCOPY LASER LITHOTRIPSY WITH STENT INSERTION (Right: Ureter) Diagnosis:       Renal stone      (Renal stone [N20.0])    Surgeons: William Dudley MD Provider: Natasha Salazar MD    Anesthesia Type: general ASA Status: 3            Anesthesia Type: general    Vitals  Vitals Value Taken Time   /78 03/10/25 09:49   Temp 97.6 °F (36.4 °C) 03/10/25 09:49   Pulse 68 03/10/25 09:50   Resp 13 03/10/25 09:49   SpO2 100 % 03/10/25 09:50   Vitals shown include unfiled device data.        Post Anesthesia Care and Evaluation    Patient location during evaluation: PACU  Patient participation: complete - patient participated  Level of consciousness: sleepy but conscious  Pain management: adequate    Airway patency: patent  Anesthetic complications: No anesthetic complications  PONV Status: none  Cardiovascular status: hemodynamically stable and acceptable  Respiratory status: nonlabored ventilation, acceptable and nasal cannula  Hydration status: acceptable

## 2025-03-10 NOTE — NURSING NOTE
.Caregiver Telephone Number    Phone Number for Ride/Caregiver: WIFE-FAIZAN [phone]     Who will be staying with you post procedure for the next 24 hours? Name and Phone Number?: YARIEL [phone]

## 2025-03-14 ENCOUNTER — PROCEDURE VISIT (OUTPATIENT)
Age: 55
End: 2025-03-14
Payer: MEDICARE

## 2025-03-14 VITALS — HEIGHT: 74 IN | BODY MASS INDEX: 40.43 KG/M2 | WEIGHT: 315 LBS

## 2025-03-14 DIAGNOSIS — N20.0 RENAL STONE: Primary | ICD-10-CM

## 2025-03-14 PROCEDURE — 52310 CYSTOSCOPY AND TREATMENT: CPT | Performed by: UROLOGY

## 2025-03-14 NOTE — PROGRESS NOTES
Procedure: Flexible Cystoscopy with Stent Removal     Preoperative Diagnosis: Right Renal Stone    Postoperative Diagnosis: Right Renal  Stone    Procedure performed: Cystoscopy with ureteral stent removal     Surgeon: William Dudley MD    Anesthesia: 2% Lidocaine Jelly    Indications: Juarez Hunt is a 54 y.o. year old male with a history of Right Renal  stone who underwent definitive stone treatment. A ureteral stent was left in place. The patient returns for planned ureteral stent removal today.     Procedure: Patient was taken to the urology procedure suite and prepped and draped in sterile fashion. A pre-procedural identification was performed. 10 cc of 2% lidocaine jelly was injected into the urethra. After adequate anesthesia, a lubricated flexible cystoscope was inserted into the urethra. The urethra appeared normal. The urinary sphincter was intact. The bladder was entered and 360 degree panendoscopy was performed revealing normal bladder anatomy, bilateral orthotopic ureteral orifices, and no concern for bladder stones, trabeculations, mucosal lesions/tumors, or divetrticuli. The ureteral stent was in good position emanating from the ureteral orifice.      The ureteral stent was grasped with a pair of grasping forceps and was removed without difficulty. The stent was removed intact.     PLAN    1) Discharge home    2) Follow up: 4 week with Renal US

## 2025-03-16 DIAGNOSIS — I82.469 DEEP VEIN THROMBOSIS (DVT) OF CALF MUSCLE VEIN, UNSPECIFIED CHRONICITY, UNSPECIFIED LATERALITY: ICD-10-CM

## 2025-03-17 ENCOUNTER — OFFICE VISIT (OUTPATIENT)
Dept: INTERNAL MEDICINE | Facility: CLINIC | Age: 55
End: 2025-03-17
Payer: MEDICARE

## 2025-03-17 VITALS
OXYGEN SATURATION: 97 % | WEIGHT: 311 LBS | HEIGHT: 74 IN | DIASTOLIC BLOOD PRESSURE: 78 MMHG | BODY MASS INDEX: 39.91 KG/M2 | SYSTOLIC BLOOD PRESSURE: 118 MMHG | HEART RATE: 83 BPM | RESPIRATION RATE: 16 BRPM | TEMPERATURE: 97.8 F

## 2025-03-17 DIAGNOSIS — Z00.00 ROUTINE GENERAL MEDICAL EXAMINATION AT A HEALTH CARE FACILITY: Primary | ICD-10-CM

## 2025-03-17 DIAGNOSIS — N18.2 STAGE 2 CHRONIC KIDNEY DISEASE: ICD-10-CM

## 2025-03-17 DIAGNOSIS — I10 HYPERTENSION, UNSPECIFIED TYPE: ICD-10-CM

## 2025-03-17 DIAGNOSIS — E11.9 TYPE 2 DIABETES MELLITUS WITHOUT COMPLICATION, WITHOUT LONG-TERM CURRENT USE OF INSULIN: ICD-10-CM

## 2025-03-17 RX ORDER — APIXABAN 5 MG/1
5 TABLET, FILM COATED ORAL
Qty: 60 TABLET | Refills: 0 | Status: SHIPPED | OUTPATIENT
Start: 2025-03-17

## 2025-03-17 RX ORDER — METOPROLOL TARTRATE 50 MG
50 TABLET ORAL 2 TIMES DAILY
Qty: 180 TABLET | Refills: 0 | OUTPATIENT
Start: 2025-03-17

## 2025-03-17 NOTE — PROGRESS NOTES
Subjective     Patient ID: Juarez Hunt is a 54 y.o. male. Patient is here for management of multiple medical problems.     Chief Complaint   Patient presents with    Medicare Wellness-subsequent   Htn    History of Present Illness     Htn    Renal stones  Uti  Better.          The following portions of the patient's history were reviewed and updated as appropriate: allergies, current medications, past family history, past medical history, past social history, past surgical history and problem list.    Review of Systems    Current Outpatient Medications:     allopurinol (Zyloprim) 100 MG tablet, Take 1 tablet by mouth Daily., Disp: 90 tablet, Rfl: 3    Blood Glucose Monitoring Suppl ([employer]S Blood Glucose Meter) w/Device kit, 1 each 2 (Two) Times a Day. E11.9, Disp: 1 kit, Rfl: 0    carvedilol (Coreg) 12.5 MG tablet, Take 1 tablet by mouth 2 (Two) Times a Day With Meals., Disp: 60 tablet, Rfl: 5    Continuous Glucose  (Dexcom G7 ) device, USE as directed TO monitor blood glucose, Disp: , Rfl:     Continuous Glucose Sensor (Dexcom G7 Sensor) misc, CHANGE every 10 DAYS as directed, Disp: 3 each, Rfl: 11    CRANBERRY PO, Take  by mouth Daily., Disp: , Rfl:     Diclofenac Sodium (VOLTAREN) 1 % gel gel, Apply 4 g topically to the appropriate area as directed 4 (Four) Times a Day As Needed (pain)., Disp: 100 g, Rfl: 1    Eliquis 5 MG tablet tablet, TAKE 1 TABLET BY MOUTH EVERY 12 HOURS, Disp: 60 tablet, Rfl: 0    fluticasone (FLONASE) 50 MCG/ACT nasal spray, 2 sprays into the nostril(s) as directed by provider Daily., Disp: 16 g, Rfl: 0    glucose blood (True Metrix Blood Glucose Test) test strip, Use as instructed to test blood glucose two times per day, Disp: 180 each, Rfl: 3    glycerin adult 2 g suppository, Insert 1 suppository into the rectum As Needed for Constipation., Disp: 12 each, Rfl: 0    Insulin Pen Needle (BD Pen Needle Tina 2nd Gen) 32G X 4 MM misc, Inject 1 Units under the skin into  "the appropriate area as directed 2 (Two) Times a Day., Disp: 200 each, Rfl: 3    ketorolac (TORADOL) 10 MG tablet, Take 1 tablet by mouth Every 6 (Six) Hours As Needed for Moderate Pain., Disp: 15 tablet, Rfl: 0    Lantus SoloStar 100 UNIT/ML injection pen, INJECT 30 UNITS UNDER THE SKIN INTO THE APPROPRIATE AREA 2 TIMES PER DAY., Disp: 15 mL, Rfl: 0    lisinopril (PRINIVIL,ZESTRIL) 20 MG tablet, Take 1 tablet by mouth Daily., Disp: 90 tablet, Rfl: 3    metFORMIN (Glucophage) 500 MG tablet, Take 1 tablet by mouth 2 (Two) Times a Day With Meals., Disp: 60 tablet, Rfl: 11    sildenafil (REVATIO) 20 MG tablet, TAKE 1 TABLET BY MOUTH 3 TIMES A DAY, Disp: 90 tablet, Rfl: 0    VITAMIN D PO, Take  by mouth Daily., Disp: , Rfl:     Objective      Blood pressure 118/78, pulse 83, temperature 97.8 °F (36.6 °C), resp. rate 16, height 188 cm (74\"), weight (!) 141 kg (311 lb), SpO2 97%.            Physical Exam     General Appearance:    Alert, cooperative, no distress, appears stated age   Head:    Normocephalic, without obvious abnormality, atraumatic   Eyes:    PERRL, conjunctiva/corneas clear, EOM's intact   Ears:    Normal TM's and external ear canals, both ears   Nose:   Nares normal, septum midline, mucosa normal, no drainage   or sinus tenderness   Throat:   Lips, mucosa, and tongue normal; teeth and gums normal   Neck:   Supple, symmetrical, trachea midline, no adenopathy;        thyroid:  No enlargement/tenderness/nodules; no carotid    bruit or JVD   Back:     Symmetric, no curvature, ROM normal, no CVA tenderness   Lungs:     Clear to auscultation bilaterally, respirations unlabored   Chest wall:    No tenderness or deformity   Heart:    Regular rate and rhythm, S1 and S2 normal, no murmur,        rub or gallop   Abdomen:     Soft, non-tender, bowel sounds active all four quadrants,     no masses, no organomegaly   Extremities:   Extremities normal, atraumatic, no cyanosis or edema   Pulses:   2+ and symmetric all " extremities   Skin:   Skin color, texture, turgor normal, no rashes or lesions   Lymph nodes:   Cervical, supraclavicular, and axillary nodes normal   Neurologic:   CNII-XII intact. Normal strength, sensation and reflexes       throughout      Results for orders placed or performed during the hospital encounter of 03/10/25   POC Glucose Once    Collection Time: 03/10/25  8:05 AM    Specimen: Blood   Result Value Ref Range    Glucose 123 70 - 130 mg/dL         Assessment & Plan       Diagnoses and all orders for this visit:    1. Routine general medical examination at a health care facility (Primary)  -     CBC & Differential  -     Lipid Panel  -     Vitamin B12  -     Comprehensive Metabolic Panel  -     TSH  -     Hemoglobin A1c  -     Microalbumin / Creatinine Urine Ratio - Urine, Clean Catch    2. Hypertension, unspecified type  -     CBC & Differential  -     Lipid Panel  -     Vitamin B12  -     Comprehensive Metabolic Panel  -     TSH  -     Hemoglobin A1c  -     Microalbumin / Creatinine Urine Ratio - Urine, Clean Catch    3. Type 2 diabetes mellitus without complication, without long-term current use of insulin  -     CBC & Differential  -     Lipid Panel  -     Vitamin B12  -     Comprehensive Metabolic Panel  -     TSH  -     Hemoglobin A1c  -     Microalbumin / Creatinine Urine Ratio - Urine, Clean Catch    4. Stage 2 chronic kidney disease  -     CBC & Differential  -     Lipid Panel  -     Vitamin B12  -     Comprehensive Metabolic Panel  -     TSH  -     Hemoglobin A1c  -     Microalbumin / Creatinine Urine Ratio - Urine, Clean Catch      No follow-ups on file.          There are no Patient Instructions on file for this visit.     Jw Guido MD    Assessment & Plan      Subjective   The ABCs of the Annual Wellness Visit  Medicare Wellness Visit      Juarez Hunt is a 54 y.o. patient who presents for a Medicare Wellness Visit.    The following portions of the patient's history were  reviewed and   updated as appropriate: allergies, current medications, past family history, past medical history, past social history, past surgical history, and problem list.    Compared to one year ago, the patient's physical   health is the same.  Compared to one year ago, the patient's mental   health is the same.    Recent Hospitalizations:  He was not admitted to the hospital during the last year.     Current Medical Providers:  Patient Care Team:  Jw Guido MD as PCP - General (Internal Medicine)  William Dudley MD as Consulting Physician (Urology)    Outpatient Medications Prior to Visit   Medication Sig Dispense Refill    allopurinol (Zyloprim) 100 MG tablet Take 1 tablet by mouth Daily. 90 tablet 3    Blood Glucose Monitoring Suppl (CVS Blood Glucose Meter) w/Device kit 1 each 2 (Two) Times a Day. E11.9 1 kit 0    carvedilol (Coreg) 12.5 MG tablet Take 1 tablet by mouth 2 (Two) Times a Day With Meals. 60 tablet 5    Continuous Glucose  (Dexcom G7 ) device USE as directed TO monitor blood glucose      Continuous Glucose Sensor (Dexcom G7 Sensor) misc CHANGE every 10 DAYS as directed 3 each 11    CRANBERRY PO Take  by mouth Daily.      Diclofenac Sodium (VOLTAREN) 1 % gel gel Apply 4 g topically to the appropriate area as directed 4 (Four) Times a Day As Needed (pain). 100 g 1    Eliquis 5 MG tablet tablet TAKE 1 TABLET BY MOUTH EVERY 12 HOURS 60 tablet 0    fluticasone (FLONASE) 50 MCG/ACT nasal spray 2 sprays into the nostril(s) as directed by provider Daily. 16 g 0    glucose blood (True Metrix Blood Glucose Test) test strip Use as instructed to test blood glucose two times per day 180 each 3    glycerin adult 2 g suppository Insert 1 suppository into the rectum As Needed for Constipation. 12 each 0    Insulin Pen Needle (BD Pen Needle Tina 2nd Gen) 32G X 4 MM misc Inject 1 Units under the skin into the appropriate area as directed 2 (Two) Times a Day. 200 each 3    ketorolac  (TORADOL) 10 MG tablet Take 1 tablet by mouth Every 6 (Six) Hours As Needed for Moderate Pain. 15 tablet 0    Lantus SoloStar 100 UNIT/ML injection pen INJECT 30 UNITS UNDER THE SKIN INTO THE APPROPRIATE AREA 2 TIMES PER DAY. 15 mL 0    lisinopril (PRINIVIL,ZESTRIL) 20 MG tablet Take 1 tablet by mouth Daily. 90 tablet 3    metFORMIN (Glucophage) 500 MG tablet Take 1 tablet by mouth 2 (Two) Times a Day With Meals. 60 tablet 11    sildenafil (REVATIO) 20 MG tablet TAKE 1 TABLET BY MOUTH 3 TIMES A DAY 90 tablet 0    VITAMIN D PO Take  by mouth Daily.      nitrofurantoin, macrocrystal-monohydrate, (Macrobid) 100 MG capsule Take 1 capsule by mouth 2 (Two) Times a Day. 14 capsule 0    oxybutynin XL (DITROPAN-XL) 5 MG 24 hr tablet Take 1 tablet by mouth Daily As Needed for bladder spasm. 14 tablet 0    phenazopyridine (Pyridium) 200 MG tablet Take 1 tablet by mouth 3 (Three) Times a Day As Needed for Bladder Spasms. 20 tablet 0    tamsulosin (FLOMAX) 0.4 MG capsule 24 hr capsule Take 1 capsule by mouth Daily for 14 days. 14 capsule 0     No facility-administered medications prior to visit.     No opioid medication identified on active medication list. I have reviewed chart for other potential  high risk medication/s and harmful drug interactions in the elderly.      Aspirin is not on active medication list.  Aspirin use is not indicated based on review of current medical condition/s. Risk of harm outweighs potential benefits.  .    Patient Active Problem List   Diagnosis    ERIK on CPAP    Type 2 diabetes mellitus without complication, with long-term current use of insulin    Acute pulmonary embolism without acute cor pulmonale    History of pulmonary embolism    Chronic anticoagulation    Obesity (BMI 30-39.9)    Benign essential hypertension    Cellulitis of toe of right foot    Encounter for long-term (current) use of insulin    Methicillin susceptible Staphylococcus aureus infection    Non-pressure chronic ulcer of  "other part of right foot with fat layer exposed    Non-pressure chronic ulcer of other part of right foot limited to breakdown of skin    Other acute osteomyelitis, right ankle and foot    Polyneuropathy in diabetes    Obesity due to excess calories    Renal stone     Advance Care Planning Advance Directive is not on file.  ACP discussion was held with the patient during this visit. Patient does not have an advance directive, declines further assistance.            Objective   Vitals:    25 1252   BP: 118/78   Pulse: 83   Resp: 16   Temp: 97.8 °F (36.6 °C)   SpO2: 97%   Weight: (!) 141 kg (311 lb)   Height: 188 cm (74\")       Estimated body mass index is 39.93 kg/m² as calculated from the following:    Height as of this encounter: 188 cm (74\").    Weight as of this encounter: 141 kg (311 lb).                Does the patient have evidence of cognitive impairment? No  Lab Results   Component Value Date    CHLPL 157 2025    TRIG 108 2025    HDL 36 (L) 2025     (H) 2025    VLDL 20 2025    HGBA1C 7.6 (H) 2025    HGBA1C 7.20 (H) 2025                                                                                               Health  Risk Assessment    Smoking Status:  Social History     Tobacco Use   Smoking Status Former    Current packs/day: 0.00    Average packs/day: 1 pack/day for 10.0 years (10.0 ttl pk-yrs)    Types: Cigarettes    Start date:     Quit date: 2000    Years since quittin.2    Passive exposure: Past   Smokeless Tobacco Current    Types: Chew     Alcohol Consumption:  Social History     Substance and Sexual Activity   Alcohol Use Yes    Comment: PT STATES ONE DRINK PER MONTH.       Fall Risk Screen  STEADI Fall Risk Assessment was completed, and patient is at MODERATE risk for falls. Assessment completed on:3/17/2025    Depression Screening   Little interest or pleasure in doing things? Not at all   Feeling down, depressed, or hopeless? Not " at all   PHQ-2 Total Score 0      Health Habits and Functional and Cognitive Screening:      3/17/2025    12:54 PM   Functional & Cognitive Status   Do you have difficulty preparing food and eating? No   Do you have difficulty bathing yourself, getting dressed or grooming yourself? No   Do you have difficulty using the toilet? No   Do you have difficulty moving around from place to place? No   Do you have trouble with steps or getting out of a bed or a chair? No   Current Diet Diabetic Diet   Dental Exam Up to date   Eye Exam Up to date   Exercise (times per week) 0 times per week   Current Exercises Include No Regular Exercise   Do you need help using the phone?  No   Are you deaf or do you have serious difficulty hearing?  No   Do you need help to go to places out of walking distance? No   Do you need help shopping? No   Do you need help preparing meals?  No   Do you need help with housework?  Yes   Do you need help with laundry? Yes   Do you need help taking your medications? No   Do you need help managing money? No   Do you ever drive or ride in a car without wearing a seat belt? No   Have you felt unusual stress, anger or loneliness in the last month? No   Who do you live with? Spouse   If you need help, do you have trouble finding someone available to you? No   Have you been bothered in the last four weeks by sexual problems? No   Do you have difficulty concentrating, remembering or making decisions? No           Age-appropriate Screening Schedule:  Refer to the list below for future screening recommendations based on patient's age, sex and/or medical conditions. Orders for these recommended tests are listed in the plan section. The patient has been provided with a written plan.    Health Maintenance List  Health Maintenance   Topic Date Due    URINE MICROALBUMIN-CREATININE RATIO (uACR)  Never done    Hepatitis B (1 of 3 - 19+ 3-dose series) Never done    Pneumococcal Vaccine 50+ (1 of 2 - PCV) Never done     HEPATITIS C SCREENING  Never done    COLORECTAL CANCER SCREENING  10/01/2023    DIABETIC EYE EXAM  03/01/2024    DIABETIC FOOT EXAM  09/12/2024    ANNUAL WELLNESS VISIT  02/26/2025    ZOSTER VACCINE (1 of 2) 03/17/2025 (Originally 10/21/2019)    TDAP/TD VACCINES (1 - Tdap) 03/17/2026 (Originally 10/21/1988)    BMI FOLLOWUP  06/26/2025    HEMOGLOBIN A1C  08/26/2025    LIPID PANEL  02/26/2026    COVID-19 Vaccine  Completed    INFLUENZA VACCINE  Completed                                                                                                                                                CMS Preventative Services Quick Reference  Risk Factors Identified During Encounter  Fall Risk-High or Moderate: Discussed Fall Prevention in the home and Information on Fall Prevention Shared in After Visit Summary  Inactivity/Sedentary: Patient was advised to exercise at least 150 minutes a week per CDC recommendations.    The above risks/problems have been discussed with the patient.  Pertinent information has been shared with the patient in the After Visit Summary.  An After Visit Summary and PPPS were made available to the patient.    Follow Up:   Next Medicare Wellness visit to be scheduled in 1 year.     Assessment & Plan  Routine general medical examination at a health care facility    Orders:    CBC & Differential    Lipid Panel    Vitamin B12    Comprehensive Metabolic Panel    TSH    Hemoglobin A1c    Microalbumin / Creatinine Urine Ratio - Urine, Clean Catch    Hypertension, unspecified type      Orders:    CBC & Differential    Lipid Panel    Vitamin B12    Comprehensive Metabolic Panel    TSH    Hemoglobin A1c    Microalbumin / Creatinine Urine Ratio - Urine, Clean Catch    Type 2 diabetes mellitus without complication, without long-term current use of insulin      Orders:    CBC & Differential    Lipid Panel    Vitamin B12    Comprehensive Metabolic Panel    TSH    Hemoglobin A1c    Microalbumin / Creatinine  Urine Ratio - Urine, Clean Catch    Stage 2 chronic kidney disease      Orders:    CBC & Differential    Lipid Panel    Vitamin B12    Comprehensive Metabolic Panel    TSH    Hemoglobin A1c    Microalbumin / Creatinine Urine Ratio - Urine, Clean Catch         Follow Up:   No follow-ups on file.

## 2025-03-17 NOTE — TELEPHONE ENCOUNTER
Rx Refill Note  Requested Prescriptions     Pending Prescriptions Disp Refills    Eliquis 5 MG tablet tablet [Pharmacy Med Name: Eliquis Oral Tablet 5 MG] 60 tablet 0     Sig: TAKE 1 TABLET BY MOUTH EVERY 12 HOURS     Refused Prescriptions Disp Refills    metoprolol tartrate (LOPRESSOR) 50 MG tablet [Pharmacy Med Name: Metoprolol Tartrate Oral Tablet 50 MG] 180 tablet 0     Sig: TAKE 1 TABLET BY MOUTH 2 TIMES A DAY     Refused By: LAURA ROSE     Reason for Refusal: Refill not appropriate      Last office visit with prescribing clinician: 2/27/2025   Last telemedicine visit with prescribing clinician: Visit date not found   Next office visit with prescribing clinician: 3/17/2025                         Would you like a call back once the refill request has been completed: [] Yes [] No    If the office needs to give you a call back, can they leave a voicemail: [] Yes [] No    Laura Rose CMA  03/17/25, 08:17 EDT

## 2025-03-31 DIAGNOSIS — I82.469 DEEP VEIN THROMBOSIS (DVT) OF CALF MUSCLE VEIN, UNSPECIFIED CHRONICITY, UNSPECIFIED LATERALITY: ICD-10-CM

## 2025-03-31 RX ORDER — APIXABAN 5 MG/1
5 TABLET, FILM COATED ORAL EVERY 12 HOURS SCHEDULED
Qty: 60 TABLET | Refills: 0 | OUTPATIENT
Start: 2025-03-31

## 2025-04-01 DIAGNOSIS — I82.469 DEEP VEIN THROMBOSIS (DVT) OF CALF MUSCLE VEIN, UNSPECIFIED CHRONICITY, UNSPECIFIED LATERALITY: ICD-10-CM

## 2025-04-01 NOTE — TELEPHONE ENCOUNTER
Caller: Nu Hunt    Relationship: Emergency Contact    Best call back number: [phone]     Requested Prescriptions:   Requested Prescriptions     Pending Prescriptions Disp Refills    Insulin Pen Needle (BD Pen Needle Tina 2nd Gen) 32G X 4 MM misc 200 each 3     Sig: Inject 1 Units under the skin into the appropriate area as directed 2 (Two) Times a Day.        Pharmacy where request should be sent: [id number] [place], [place] - [address] [facility] - [phone] [facility] [phone] FX     Last office visit with prescribing clinician: 3/17/2025   Last telemedicine visit with prescribing clinician: Visit date not found   Next office visit with prescribing clinician: Visit date not found     Additional details provided by patient: PATIENT HAS ENOUGH FOR TODAY ONLY. PHARMACY HAS REQUESTED THIS ALREADY TOO. SENDING A PRIORITY MESSAGE    Does the patient have less than a 3 day supply:  [x] Yes  [] No    Would you like a call back once the refill request has been completed: [] Yes [x] No    If the office needs to give you a call back, can they leave a voicemail: [] Yes [x] No    Geraldine Chang Rep   04/01/25 15:41 EDT

## 2025-04-02 RX ORDER — INSULIN GLARGINE 100 [IU]/ML
INJECTION, SOLUTION SUBCUTANEOUS
Qty: 15 ML | Refills: 0 | Status: SHIPPED | OUTPATIENT
Start: 2025-04-02

## 2025-04-02 RX ORDER — APIXABAN 5 MG/1
5 TABLET, FILM COATED ORAL EVERY 12 HOURS SCHEDULED
Qty: 60 TABLET | Refills: 0 | Status: SHIPPED | OUTPATIENT
Start: 2025-04-02

## 2025-04-02 RX ORDER — PEN NEEDLE, DIABETIC 32GX 5/32"
NEEDLE, DISPOSABLE MISCELLANEOUS
Qty: 200 EACH | Refills: 0 | OUTPATIENT
Start: 2025-04-02

## 2025-04-02 RX ORDER — PEN NEEDLE, DIABETIC 32GX 5/32"
1 NEEDLE, DISPOSABLE MISCELLANEOUS 2 TIMES DAILY
Qty: 200 EACH | Refills: 3 | Status: SHIPPED | OUTPATIENT
Start: 2025-04-02

## 2025-04-03 ENCOUNTER — HOSPITAL ENCOUNTER (OUTPATIENT)
Dept: ULTRASOUND IMAGING | Facility: HOSPITAL | Age: 55
Discharge: HOME | End: 2025-04-03
Admitting: UROLOGY
Payer: MEDICARE

## 2025-04-03 DIAGNOSIS — N20.0 RENAL STONE: ICD-10-CM

## 2025-04-03 PROCEDURE — 76775 US EXAM ABDO BACK WALL LIM: CPT

## 2025-04-16 ENCOUNTER — TELEPHONE (OUTPATIENT)
Dept: INTERNAL MEDICINE | Facility: CLINIC | Age: 55
End: 2025-04-16
Payer: MEDICARE

## 2025-04-16 DIAGNOSIS — N18.2 STAGE 2 CHRONIC KIDNEY DISEASE: ICD-10-CM

## 2025-04-16 DIAGNOSIS — E11.9 TYPE 2 DIABETES MELLITUS WITHOUT COMPLICATION, WITHOUT LONG-TERM CURRENT USE OF INSULIN: Primary | ICD-10-CM

## 2025-04-17 NOTE — TELEPHONE ENCOUNTER
"Relay      \"Tried calling patient with no answer and no vm, if patient or patient wife call back please inform that Referral has been placed. \"          Name: GilbertNu segovia    Relationship: Emergency Contact    Best Callback Number: [phone]     HUB PROVIDED THE RELAY MESSAGE FROM THE OFFICE   PATIENT VOICED UNDERSTANDING AND HAS NO FURTHER QUESTIONS AT THIS TIME    ADDITIONAL INFORMATION:    "

## 2025-04-17 NOTE — TELEPHONE ENCOUNTER
"Relay     \"Tried calling patient with no answer and no vm, if patient or patient wife call back please inform that Referral has been placed. \"                  "

## 2025-04-29 ENCOUNTER — TELEPHONE (OUTPATIENT)
Dept: INTERNAL MEDICINE | Facility: CLINIC | Age: 55
End: 2025-04-29
Payer: MEDICARE

## 2025-04-29 DIAGNOSIS — I82.469 DEEP VEIN THROMBOSIS (DVT) OF CALF MUSCLE VEIN, UNSPECIFIED CHRONICITY, UNSPECIFIED LATERALITY: ICD-10-CM

## 2025-04-29 NOTE — TELEPHONE ENCOUNTER
Called to discuss what the patient was needed and was unable to leave a message due to the mailbox being full.

## 2025-04-29 NOTE — TELEPHONE ENCOUNTER
Caller: Nu Hunt    Relationship: Emergency Contact    Best call back number:  [phone]    Requested Prescriptions:   Requested Prescriptions      No prescriptions requested or ordered in this encounter      Lantus SoloStar 100 UNIT/ML injection pen     Pharmacy where request should be sent: [id number] - [place], [place] - [address] - [phone]  - [phone] FX     Last office visit with prescribing clinician: 3/17/2025   Last telemedicine visit with prescribing clinician: Visit date not found   Next office visit with prescribing clinician: Visit date not found     Does the patient have less than a 3 day supply:  [] Yes  [x] No    Would you like a call back once the refill request has been completed: [] Yes [x] No    If the office needs to give you a call back, can they leave a voicemail: [] Yes [x] No    Yris Blanco, PCT   04/29/25 14:32 EDT

## 2025-04-30 RX ORDER — INSULIN GLARGINE 100 [IU]/ML
INJECTION, SOLUTION SUBCUTANEOUS
Qty: 15 ML | Refills: 0 | OUTPATIENT
Start: 2025-04-30

## 2025-04-30 RX ORDER — INSULIN GLARGINE 100 [IU]/ML
INJECTION, SOLUTION SUBCUTANEOUS
Qty: 15 ML | Refills: 0 | COMMUNITY
Start: 2025-04-30

## 2025-04-30 RX ORDER — APIXABAN 5 MG/1
5 TABLET, FILM COATED ORAL EVERY 12 HOURS SCHEDULED
Qty: 60 TABLET | Refills: 0 | Status: SHIPPED | OUTPATIENT
Start: 2025-04-30

## 2025-04-30 NOTE — TELEPHONE ENCOUNTER
Pts wife called and states that the pt needs the Lantus Solostar called in. She said it possibly may need a PA. He is currently out of the medication. Confirmed pharmacy.

## 2025-04-30 NOTE — TELEPHONE ENCOUNTER
Advised wife jonnie is aware of the PA and will be working on this. Advised she has a sample set aside for them to  and she will be by to get.

## 2025-05-09 RX ORDER — INSULIN GLARGINE 100 [IU]/ML
INJECTION, SOLUTION SUBCUTANEOUS
Qty: 15 ML | Refills: 0 | Status: SHIPPED | OUTPATIENT
Start: 2025-05-09

## 2025-05-09 NOTE — TELEPHONE ENCOUNTER
Rx Refill Note  Requested Prescriptions     Pending Prescriptions Disp Refills    Lantus SoloStar 100 UNIT/ML injection pen [Pharmacy Med Name: Lantus SoloStar Subcutaneous Solution Pen-injector 100 UNIT/ML] 15 mL 0     Sig: INJECT 30 UNITS UNDER THE SKIN INTO THE APPROPRIATE AREA 2 TIMES PER DAY.      Last office visit with prescribing clinician: 3/17/2025   Last telemedicine visit with prescribing clinician: Visit date not found   Next office visit with prescribing clinician: Visit date not found     Dante Mehta MA  05/09/25, 17:07 EDT

## 2025-05-29 ENCOUNTER — TELEPHONE (OUTPATIENT)
Dept: INTERNAL MEDICINE | Facility: CLINIC | Age: 55
End: 2025-05-29
Payer: MEDICARE

## 2025-05-29 RX ORDER — INSULIN GLARGINE 100 [IU]/ML
INJECTION, SOLUTION SUBCUTANEOUS
Qty: 15 ML | Refills: 2 | Status: SHIPPED | OUTPATIENT
Start: 2025-05-29 | End: 2025-05-29 | Stop reason: SDUPTHER

## 2025-05-29 RX ORDER — INSULIN GLARGINE 100 [IU]/ML
INJECTION, SOLUTION SUBCUTANEOUS
Qty: 15 ML | Refills: 2 | Status: SHIPPED | OUTPATIENT
Start: 2025-05-29

## 2025-05-29 NOTE — TELEPHONE ENCOUNTER
Caller: Nu Hunt    Relationship: Emergency Contact    Best call back number: [phone]    Requested Prescriptions:   Requested Prescriptions      No prescriptions requested or ordered in this encounter      Lantus SoloStar 100 UNIT/ML injection pen     Pharmacy where request should be sent: [id number] - [place], [place] - [address] - [phone]  - [phone] FX     Last office visit with prescribing clinician: 3/17/2025   Last telemedicine visit with prescribing clinician: Visit date not found   Next office visit with prescribing clinician: Visit date not found     Does the patient have less than a 3 day supply:  [x] Yes  [] No    Would you like a call back once the refill request has been completed: [] Yes [x] No    If the office needs to give you a call back, can they leave a voicemail: [] Yes [x] No    Yris Blanco, PCT   05/29/25 10:14 EDT

## 2025-06-01 DIAGNOSIS — I82.469 DEEP VEIN THROMBOSIS (DVT) OF CALF MUSCLE VEIN, UNSPECIFIED CHRONICITY, UNSPECIFIED LATERALITY: ICD-10-CM

## 2025-06-02 RX ORDER — APIXABAN 5 MG/1
5 TABLET, FILM COATED ORAL EVERY 12 HOURS SCHEDULED
Qty: 60 TABLET | Refills: 0 | Status: SHIPPED | OUTPATIENT
Start: 2025-06-02

## 2025-06-25 DIAGNOSIS — I82.469 DEEP VEIN THROMBOSIS (DVT) OF CALF MUSCLE VEIN, UNSPECIFIED CHRONICITY, UNSPECIFIED LATERALITY: ICD-10-CM

## 2025-06-25 RX ORDER — APIXABAN 5 MG/1
5 TABLET, FILM COATED ORAL EVERY 12 HOURS SCHEDULED
Qty: 60 TABLET | Refills: 0 | Status: SHIPPED | OUTPATIENT
Start: 2025-06-25

## 2025-07-11 ENCOUNTER — OFFICE VISIT (OUTPATIENT)
Age: 55
End: 2025-07-11
Payer: MEDICARE

## 2025-07-11 DIAGNOSIS — N20.0 NEPHROLITHIASIS: Primary | ICD-10-CM

## 2025-07-11 PROCEDURE — 1159F MED LIST DOCD IN RCRD: CPT | Performed by: UROLOGY

## 2025-07-11 PROCEDURE — 1160F RVW MEDS BY RX/DR IN RCRD: CPT | Performed by: UROLOGY

## 2025-07-11 PROCEDURE — 99214 OFFICE O/P EST MOD 30 MIN: CPT | Performed by: UROLOGY

## 2025-07-11 RX ORDER — QUINAPRIL 20 MG/1
20 TABLET ORAL
COMMUNITY

## 2025-07-11 RX ORDER — EPINEPHRINE 0.3 MG/.3ML
INJECTION SUBCUTANEOUS
COMMUNITY

## 2025-07-11 RX ORDER — METOPROLOL TARTRATE 50 MG
50 TABLET ORAL 2 TIMES DAILY
COMMUNITY

## 2025-07-11 RX ORDER — ORAL SEMAGLUTIDE 3 MG/1
1 TABLET ORAL DAILY
COMMUNITY

## 2025-07-11 RX ORDER — MELOXICAM 7.5 MG/1
7.5 TABLET ORAL DAILY
COMMUNITY

## 2025-07-11 NOTE — PROGRESS NOTES
Office Note Kidney Stone      Patient Name: Juarez Hunt  : 1970   MRN: [id number]     Chief Complaint: History of Nephrolithiasis/Ureterolithiasis       History of Present Illness: Juarez Hunt is a 54 y.o. male who presents today for 4-week follow-up after ureteroscopy and laser lithotripsy.  He is doing well with no acute stone symptoms.  Presents today with ultrasound.      Subjective      Review of System: Review of Systems   Genitourinary:  Negative for decreased urine volume, difficulty urinating, dysuria, enuresis, flank pain, frequency, hematuria and urgency.        I have reviewed the ROS documented by my clinical staff, updated as appropriate and I agree. William Dudley MD    Past Medical History:   Past Medical History:   Diagnosis Date    Arthritis     Diabetes mellitus     DVT (deep venous thrombosis)     History of pulmonary embolus (PE)     Hypertension     Kidney stones     Postoperative nausea        Past Surgical History:   Past Surgical History:   Procedure Laterality Date    CARDIAC CATHETERIZATION      CYSTOSCOPY URETEROSCOPY LASER LITHOTRIPSY      EYE SURGERY      DIGNA FILTER INSERTION JUGULAR  2006    KNEE SURGERY Left     SKIN BIOPSY  2020    toe    URETEROSCOPY LASER LITHOTRIPSY WITH STENT INSERTION Right 2025    Procedure: URETEROSCOPY LASER LITHOTRIPSY WITH STENT INSERTION RIGHT;  Surgeon: William Dudley MD;  Location:  [facility];  Service: Urology;  Laterality: Right;    URETEROSCOPY LASER LITHOTRIPSY WITH STENT INSERTION Right 3/10/2025    Procedure: URETEROSCOPY LASER LITHOTRIPSY WITH STENT INSERTION RIGHT;  Surgeon: William Dudley MD;  Location:  [facility];  Service: Urology;  Laterality: Right;       Family History:   Family History   Problem Relation Age of Onset    Diabetes Mother     Heart disease Mother     Heart attack Mother     Hypertension Mother     Arthritis Father     Heart attack Maternal Grandfather      Heart attack Maternal Uncle     Liver cancer Maternal Aunt        Social History:   Social History     Socioeconomic History    Marital status:    Tobacco Use    Smoking status: Former     Current packs/day: 0.00     Average packs/day: 1 pack/day for 10.0 years (10.0 ttl pk-yrs)     Types: Cigarettes     Start date:      Quit date:      Years since quittin.5     Passive exposure: Past    Smokeless tobacco: Current     Types: Chew   Vaping Use    Vaping status: Never Used   Substance and Sexual Activity    Alcohol use: Yes     Comment: PT STATES ONE DRINK PER MONTH.    Drug use: No    Sexual activity: Defer       Medications:     Current Outpatient Medications:     allopurinol (Zyloprim) 100 MG tablet, Take 1 tablet by mouth Daily., Disp: 90 tablet, Rfl: 3    Blood Glucose Monitoring Suppl ([employer] Blood Glucose Meter) w/Device kit, 1 each 2 (Two) Times a Day. E11.9, Disp: 1 kit, Rfl: 0    carvedilol (Coreg) 12.5 MG tablet, Take 1 tablet by mouth 2 (Two) Times a Day With Meals., Disp: 60 tablet, Rfl: 5    Continuous Glucose  (Dexcom G7 ) device, USE as directed TO monitor blood glucose, Disp: , Rfl:     Continuous Glucose Sensor (Dexcom G7 Sensor) misc, CHANGE every 10 DAYS as directed, Disp: 3 each, Rfl: 11    CRANBERRY PO, Take  by mouth Daily., Disp: , Rfl:     Diclofenac Sodium (VOLTAREN) 1 % gel gel, Apply 4 g topically to the appropriate area as directed 4 (Four) Times a Day As Needed (pain)., Disp: 100 g, Rfl: 1    Eliquis 5 MG tablet tablet, TAKE 1 TABLET BY MOUTH EVERY 12 HOURS, Disp: 60 tablet, Rfl: 0    EPINEPHrine (EPIPEN) 0.3 MG/0.3ML solution auto-injector injection, , Disp: , Rfl:     fluticasone (FLONASE) 50 MCG/ACT nasal spray, 2 sprays into the nostril(s) as directed by provider Daily., Disp: 16 g, Rfl: 0    glucose blood (True Metrix Blood Glucose Test) test strip, Use as instructed to test blood glucose two times per day, Disp: 180 each, Rfl: 3    glycerin adult 2 g  suppository, Insert 1 suppository into the rectum As Needed for Constipation., Disp: 12 each, Rfl: 0    Insulin Glargine (Lantus SoloStar) 100 UNIT/ML injection pen, INJECT 30 UNITS UNDER THE SKIN INTO THE APPROPRIATE AREA 2 TIMES PER DAY., Disp: 15 mL, Rfl: 2    Insulin Pen Needle (BD Pen Needle Tina 2nd Gen) 32G X 4 MM misc, Inject 1 Units under the skin into the appropriate area as directed 2 (Two) Times a Day., Disp: 200 each, Rfl: 3    lisinopril (PRINIVIL,ZESTRIL) 20 MG tablet, Take 1 tablet by mouth Daily., Disp: 90 tablet, Rfl: 3    meloxicam (MOBIC) 7.5 MG tablet, Take 1 tablet by mouth Daily., Disp: , Rfl:     metFORMIN (Glucophage) 500 MG tablet, Take 1 tablet by mouth 2 (Two) Times a Day With Meals., Disp: 60 tablet, Rfl: 11    metoprolol tartrate (LOPRESSOR) 50 MG tablet, Take 1 tablet by mouth 2 (Two) Times a Day., Disp: , Rfl:     quinapril (ACCUPRIL) 20 MG tablet, Take 1 tablet by mouth every night at bedtime., Disp: , Rfl:     Semaglutide (Rybelsus) 3 MG tablet, Take 1 tablet by mouth Daily., Disp: , Rfl:     sildenafil (REVATIO) 20 MG tablet, TAKE 1 TABLET BY MOUTH 3 TIMES A DAY, Disp: 90 tablet, Rfl: 0    VITAMIN D PO, Take  by mouth Daily., Disp: , Rfl:     ketorolac (TORADOL) 10 MG tablet, Take 1 tablet by mouth Every 6 (Six) Hours As Needed for Moderate Pain. (Patient not taking: Reported on 7/11/2025), Disp: 15 tablet, Rfl: 0    Allergies:   No Known Allergies    Objective     Physical Exam:   Vital Signs: There were no vitals filed for this visit.  There is no height or weight on file to calculate BMI.     Physical Exam  Vitals and nursing note reviewed.   Constitutional:       Appearance: Normal appearance.   HENT:      Head: Normocephalic and atraumatic.   Cardiovascular:      Comments: Well perfused  Pulmonary:      Effort: Pulmonary effort is normal.   Abdominal:      General: Abdomen is flat.      Palpations: Abdomen is soft.   Musculoskeletal:         General: Normal range of motion.    Skin:     General: Skin is warm and dry.   Neurological:      General: No focal deficit present.      Mental Status: He is alert and oriented to person, place, and time. Mental status is at baseline.   Psychiatric:         Mood and Affect: Mood normal.         Behavior: Behavior normal.         Thought Content: Thought content normal.         Judgment: Judgment normal.         Labs:   Brief Urine Lab Results  (Last result in the past 365 days)        Color   Clarity   Blood   Leuk Est   Nitrite   Protein   CREAT   Urine HCG        01/04/25 1525 Yellow   Clear   Trace   Negative   Negative   Trace                        Lab Results   Component Value Date    GLUCOSE 179 (H) 02/26/2025    CALCIUM 8.7 02/26/2025     02/26/2025    K 5.3 (H) 02/26/2025    CO2 22 02/26/2025     02/26/2025    BUN 25 (H) 02/26/2025    CREATININE 1.70 (H) 02/26/2025    EGFRIFAFRI 105 06/11/2021    EGFRIFNONA 91 06/11/2021    BCR 15 02/26/2025    ANIONGAP 9.0 02/13/2025       Lab Results   Component Value Date    WBC 7.2 02/26/2025    HGB 13.0 02/26/2025    HCT 38.0 02/26/2025    MCV 92 02/26/2025     02/26/2025         Images:   US Renal Bilateral  Result Date: 4/4/2025  Normal renal ultrasound.     Images were reviewed, interpreted, and dictated by Dr. Sary Johns MD Transcribed by Fernando Wolf PA-C.  This report was signed and finalized on 4/4/2025 8:31 AM by Sary Johns MD.        Measures:   Tobacco:   Juarez Hunt  reports that he quit smoking about 25 years ago. His smoking use included cigarettes. He started smoking about 35 years ago. He has a 10 pack-year smoking history. He has been exposed to tobacco smoke. His smokeless tobacco use includes chew. I have educated him on the risk of diseases from using tobacco product    Assessment / Plan      Assessment/Plan:   Juarez Hunt is a 54 y.o. male who presented today with nephrolithiasis/ureterolithiasis.  Turns today for 4-week follow-up after  ureteroscopy and laser lithotripsy for large renal pelvis stone.  He is doing well today with no acute symptoms.  Has undergone successful stent removal.  Ultrasound with no evidence of nephrolithiasis or hydronephrosis    Discussed conservative measures to reduce stone risk.  We have discussed the importance of increased fluid intake to greater than 2-2 and half liters, decreasing sodium and animal protein intake within the diet, increasing citrate intake.    Follow-up 1 year with ultrasound alert with acute stone symptoms prior.    Diagnoses and all orders for this visit:    1. Nephrolithiasis (Primary)  -     US Renal Bilateral; Future           Follow Up:   Return in about 1 year (around 7/11/2026).    I spent approximately 30 minutes providing clinical care for this patient; including review of patient's chart and provider documentation, face to face time spent with patient in examination room (obtaining history, performing physical exam, discussing diagnosis and management options), placing orders, and completing patient documentation.     William Dudley MD  [facility] [place]

## 2025-07-15 RX ORDER — BLOOD SUGAR DIAGNOSTIC
STRIP MISCELLANEOUS
Qty: 200 EACH | Refills: 0 | Status: SHIPPED | OUTPATIENT
Start: 2025-07-15

## 2025-08-12 DIAGNOSIS — I82.469 DEEP VEIN THROMBOSIS (DVT) OF CALF MUSCLE VEIN, UNSPECIFIED CHRONICITY, UNSPECIFIED LATERALITY: ICD-10-CM

## 2025-08-13 RX ORDER — APIXABAN 5 MG/1
5 TABLET, FILM COATED ORAL EVERY 12 HOURS SCHEDULED
Qty: 60 TABLET | Refills: 5 | Status: SHIPPED | OUTPATIENT
Start: 2025-08-13

## 2025-08-29 ENCOUNTER — OFFICE VISIT (OUTPATIENT)
Dept: INTERNAL MEDICINE | Facility: CLINIC | Age: 55
End: 2025-08-29
Payer: MEDICARE

## 2025-08-29 VITALS
RESPIRATION RATE: 16 BRPM | BODY MASS INDEX: 40.43 KG/M2 | WEIGHT: 315 LBS | OXYGEN SATURATION: 95 % | HEIGHT: 74 IN | SYSTOLIC BLOOD PRESSURE: 122 MMHG | TEMPERATURE: 97.9 F | HEART RATE: 80 BPM | DIASTOLIC BLOOD PRESSURE: 76 MMHG

## 2025-08-29 DIAGNOSIS — Z12.5 PROSTATE CANCER SCREENING: ICD-10-CM

## 2025-08-29 DIAGNOSIS — E79.0 HYPERURICEMIA: ICD-10-CM

## 2025-08-29 DIAGNOSIS — I10 PRIMARY HYPERTENSION: Primary | ICD-10-CM

## 2025-08-29 DIAGNOSIS — E11.9 TYPE 2 DIABETES MELLITUS WITHOUT COMPLICATION, WITHOUT LONG-TERM CURRENT USE OF INSULIN: ICD-10-CM

## (undated) DEVICE — CVR FTSWITCH UNIV

## (undated) DEVICE — DRAINBAG,ANTI-REFLUX TOWER,L/F,2000ML,LL: Brand: MEDLINE

## (undated) DEVICE — SYR LUERLOK 30CC

## (undated) DEVICE — NITINOL WIRE WITH HYDROPHILIC TIP: Brand: SENSOR

## (undated) DEVICE — BO COL STN CLEARPETRA LITHO FOR/10TO22FR/SHEATH 140ML

## (undated) DEVICE — TUBING, SUCTION, 1/4" X 10', STRAIGHT: Brand: MEDLINE

## (undated) DEVICE — FIBR LASR SOLTIVE BALL/TP 200MICRON 1P/U

## (undated) DEVICE — SYR LL TP 10ML STRL

## (undated) DEVICE — NITINOL STONE RETRIEVAL BASKET: Brand: ZERO TIP

## (undated) DEVICE — GLV SURG BIOGEL LTX PF 7 1/2

## (undated) DEVICE — PK CYSTO-TUR BASIC 10

## (undated) DEVICE — SHEATH URETRL ACC CLEARPETRA 10/12F 46CM

## (undated) DEVICE — Device

## (undated) DEVICE — TUBING,SUCTION,1/4"X100',RIBBED CONNCTR: Brand: MEDLINE